# Patient Record
Sex: FEMALE | Employment: OTHER | URBAN - METROPOLITAN AREA
[De-identification: names, ages, dates, MRNs, and addresses within clinical notes are randomized per-mention and may not be internally consistent; named-entity substitution may affect disease eponyms.]

---

## 2022-07-03 ENCOUNTER — APPOINTMENT (OUTPATIENT)
Dept: CT IMAGING | Age: 82
DRG: 871 | End: 2022-07-03
Attending: EMERGENCY MEDICINE
Payer: MEDICARE

## 2022-07-03 ENCOUNTER — APPOINTMENT (OUTPATIENT)
Dept: GENERAL RADIOLOGY | Age: 82
DRG: 871 | End: 2022-07-03
Attending: HOSPITALIST
Payer: MEDICARE

## 2022-07-03 ENCOUNTER — APPOINTMENT (OUTPATIENT)
Dept: GENERAL RADIOLOGY | Age: 82
DRG: 871 | End: 2022-07-03
Attending: EMERGENCY MEDICINE
Payer: MEDICARE

## 2022-07-03 ENCOUNTER — HOSPITAL ENCOUNTER (INPATIENT)
Age: 82
LOS: 13 days | Discharge: NURSING, REHAB OR LONG-TERM CARE | DRG: 871 | End: 2022-07-16
Attending: EMERGENCY MEDICINE | Admitting: HOSPITALIST
Payer: MEDICARE

## 2022-07-03 DIAGNOSIS — I48.91 ATRIAL FIBRILLATION WITH RAPID VENTRICULAR RESPONSE (HCC): Primary | ICD-10-CM

## 2022-07-03 PROBLEM — H52.13 MYOPIA OF BOTH EYES WITH ASTIGMATISM: Status: ACTIVE | Noted: 2022-07-03

## 2022-07-03 PROBLEM — N39.0 UTI (URINARY TRACT INFECTION): Status: ACTIVE | Noted: 2022-07-03

## 2022-07-03 PROBLEM — J32.9 SINUSITIS: Status: ACTIVE | Noted: 2022-07-03

## 2022-07-03 PROBLEM — D70.9 NEUTROPENIC FEVER (HCC): Status: ACTIVE | Noted: 2022-07-03

## 2022-07-03 PROBLEM — L03.90 CELLULITIS: Status: ACTIVE | Noted: 2022-07-03

## 2022-07-03 PROBLEM — E83.42 HYPOMAGNESEMIA: Status: ACTIVE | Noted: 2022-07-03

## 2022-07-03 PROBLEM — C83.30 DIFFUSE LARGE B CELL LYMPHOMA (HCC): Status: ACTIVE | Noted: 2022-07-03

## 2022-07-03 PROBLEM — J90 PLEURAL EFFUSION: Status: ACTIVE | Noted: 2022-07-03

## 2022-07-03 PROBLEM — D61.818 PANCYTOPENIA (HCC): Status: ACTIVE | Noted: 2022-07-03

## 2022-07-03 PROBLEM — E43 SEVERE PROTEIN-CALORIE MALNUTRITION (HCC): Status: ACTIVE | Noted: 2022-07-03

## 2022-07-03 PROBLEM — E87.1 HYPONATREMIA: Status: ACTIVE | Noted: 2022-07-03

## 2022-07-03 PROBLEM — R50.81 NEUTROPENIC FEVER (HCC): Status: ACTIVE | Noted: 2022-07-03

## 2022-07-03 PROBLEM — C85.90 LYMPHOMA (HCC): Status: ACTIVE | Noted: 2022-07-03

## 2022-07-03 PROBLEM — J96.01 ACUTE RESPIRATORY FAILURE WITH HYPOXIA (HCC): Status: ACTIVE | Noted: 2022-07-03

## 2022-07-03 PROBLEM — H52.203 MYOPIA OF BOTH EYES WITH ASTIGMATISM: Status: ACTIVE | Noted: 2022-07-03

## 2022-07-03 LAB
ALBUMIN SERPL-MCNC: 2.8 G/DL (ref 3.4–5)
ALBUMIN/GLOB SERPL: 1 {RATIO} (ref 0.8–1.7)
ALP SERPL-CCNC: 113 U/L (ref 45–117)
ALT SERPL-CCNC: 15 U/L (ref 13–56)
ANION GAP SERPL CALC-SCNC: 10 MMOL/L (ref 3–18)
APPEARANCE UR: ABNORMAL
ARTERIAL PATENCY WRIST A: POSITIVE
AST SERPL-CCNC: 14 U/L (ref 10–38)
BACTERIA URNS QL MICRO: ABNORMAL /HPF
BASE DEFICIT BLD-SCNC: 1.9 MMOL/L
BASOPHILS # BLD: 0 K/UL (ref 0–0.1)
BASOPHILS NFR BLD: 8 % (ref 0–2)
BDY SITE: ABNORMAL
BILIRUB SERPL-MCNC: 0.6 MG/DL (ref 0.2–1)
BILIRUB UR QL: NEGATIVE
BNP SERPL-MCNC: 446 PG/ML (ref 0–1800)
BUN SERPL-MCNC: 19 MG/DL (ref 7–18)
BUN/CREAT SERPL: 37 (ref 12–20)
CALCIUM SERPL-MCNC: 8.2 MG/DL (ref 8.5–10.1)
CHLORIDE SERPL-SCNC: 100 MMOL/L (ref 100–111)
CO2 SERPL-SCNC: 22 MMOL/L (ref 21–32)
COLOR UR: YELLOW
COVID-19 RAPID TEST, COVR: NOT DETECTED
CREAT SERPL-MCNC: 0.52 MG/DL (ref 0.6–1.3)
DIFFERENTIAL METHOD BLD: ABNORMAL
EOSINOPHIL # BLD: 0 K/UL (ref 0–0.4)
EOSINOPHIL NFR BLD: 4 % (ref 0–5)
EPITH CASTS URNS QL MICRO: ABNORMAL /LPF (ref 0–5)
ERYTHROCYTE [DISTWIDTH] IN BLOOD BY AUTOMATED COUNT: 29.2 % (ref 11.6–14.5)
FLUAV AG NPH QL IA: NEGATIVE
FLUBV AG NOSE QL IA: NEGATIVE
GAS FLOW.O2 O2 DELIVERY SYS: ABNORMAL L/MIN
GAS FLOW.O2 SETTING OXYMISER: 37 BPM
GLOBULIN SER CALC-MCNC: 2.7 G/DL (ref 2–4)
GLUCOSE BLD STRIP.AUTO-MCNC: 212 MG/DL (ref 70–110)
GLUCOSE SERPL-MCNC: 193 MG/DL (ref 74–99)
GLUCOSE UR STRIP.AUTO-MCNC: NEGATIVE MG/DL
HCO3 BLD-SCNC: 21.2 MMOL/L (ref 22–26)
HCT VFR BLD AUTO: 31 % (ref 35–45)
HGB BLD-MCNC: 10 G/DL (ref 12–16)
HGB UR QL STRIP: ABNORMAL
IMM GRANULOCYTES # BLD AUTO: 0 K/UL
IMM GRANULOCYTES NFR BLD AUTO: 0 %
KETONES UR QL STRIP.AUTO: NEGATIVE MG/DL
LACTATE SERPL-SCNC: 2 MMOL/L (ref 0.4–2)
LEUKOCYTE ESTERASE UR QL STRIP.AUTO: ABNORMAL
LYMPHOCYTES # BLD: 0.1 K/UL (ref 0.9–3.6)
LYMPHOCYTES NFR BLD: 40 % (ref 21–52)
MAGNESIUM SERPL-MCNC: 1.5 MG/DL (ref 1.6–2.6)
MCH RBC QN AUTO: 28.4 PG (ref 24–34)
MCHC RBC AUTO-ENTMCNC: 32.3 G/DL (ref 31–37)
MCV RBC AUTO: 88.1 FL (ref 78–100)
MONOCYTES # BLD: 0.1 K/UL (ref 0.05–1.2)
MONOCYTES NFR BLD: 36 % (ref 3–10)
NEUTS SEG # BLD: 0 K/UL (ref 1.8–8)
NEUTS SEG NFR BLD: 12 % (ref 40–73)
NITRITE UR QL STRIP.AUTO: NEGATIVE
NRBC # BLD: 0 K/UL (ref 0–0.01)
NRBC BLD-RTO: 0 PER 100 WBC
O2/TOTAL GAS SETTING VFR VENT: 44 %
PCO2 BLD: 30 MMHG (ref 35–45)
PH BLD: 7.46 [PH] (ref 7.35–7.45)
PH UR STRIP: 7 [PH] (ref 5–8)
PLATELET # BLD AUTO: 108 K/UL (ref 135–420)
PMV BLD AUTO: 9.4 FL (ref 9.2–11.8)
PO2 BLD: 74 MMHG (ref 80–100)
POTASSIUM SERPL-SCNC: 3.7 MMOL/L (ref 3.5–5.5)
PROT SERPL-MCNC: 5.5 G/DL (ref 6.4–8.2)
PROT UR STRIP-MCNC: 300 MG/DL
RBC # BLD AUTO: 3.52 M/UL (ref 4.2–5.3)
RBC #/AREA URNS HPF: ABNORMAL /HPF (ref 0–5)
RBC MORPH BLD: ABNORMAL
RBC MORPH BLD: ABNORMAL
SAO2 % BLD: 95.7 % (ref 92–97)
SERVICE CMNT-IMP: ABNORMAL
SODIUM SERPL-SCNC: 132 MMOL/L (ref 136–145)
SOURCE, COVRS: NORMAL
SP GR UR REFRACTOMETRY: 1.02 (ref 1–1.03)
SPECIMEN TYPE: ABNORMAL
TROPONIN-HIGH SENSITIVITY: 19 NG/L (ref 0–54)
UROBILINOGEN UR QL STRIP.AUTO: 0.2 EU/DL (ref 0.2–1)
WBC # BLD AUTO: 0.2 K/UL (ref 4.6–13.2)
WBC URNS QL MICRO: ABNORMAL /HPF (ref 0–5)

## 2022-07-03 PROCEDURE — 65610000006 HC RM INTENSIVE CARE

## 2022-07-03 PROCEDURE — 83605 ASSAY OF LACTIC ACID: CPT

## 2022-07-03 PROCEDURE — 87077 CULTURE AEROBIC IDENTIFY: CPT

## 2022-07-03 PROCEDURE — 74011636637 HC RX REV CODE- 636/637: Performed by: HOSPITALIST

## 2022-07-03 PROCEDURE — 36600 WITHDRAWAL OF ARTERIAL BLOOD: CPT

## 2022-07-03 PROCEDURE — 74011000636 HC RX REV CODE- 636: Performed by: EMERGENCY MEDICINE

## 2022-07-03 PROCEDURE — 70450 CT HEAD/BRAIN W/O DYE: CPT

## 2022-07-03 PROCEDURE — 74011000258 HC RX REV CODE- 258: Performed by: HOSPITALIST

## 2022-07-03 PROCEDURE — 74011000258 HC RX REV CODE- 258: Performed by: EMERGENCY MEDICINE

## 2022-07-03 PROCEDURE — 73610 X-RAY EXAM OF ANKLE: CPT

## 2022-07-03 PROCEDURE — 71045 X-RAY EXAM CHEST 1 VIEW: CPT

## 2022-07-03 PROCEDURE — 82962 GLUCOSE BLOOD TEST: CPT

## 2022-07-03 PROCEDURE — 51702 INSERT TEMP BLADDER CATH: CPT

## 2022-07-03 PROCEDURE — P9047 ALBUMIN (HUMAN), 25%, 50ML: HCPCS | Performed by: HOSPITALIST

## 2022-07-03 PROCEDURE — 74011000250 HC RX REV CODE- 250: Performed by: HOSPITALIST

## 2022-07-03 PROCEDURE — 74011250636 HC RX REV CODE- 250/636: Performed by: HOSPITALIST

## 2022-07-03 PROCEDURE — 74011250636 HC RX REV CODE- 250/636: Performed by: EMERGENCY MEDICINE

## 2022-07-03 PROCEDURE — 87040 BLOOD CULTURE FOR BACTERIA: CPT

## 2022-07-03 PROCEDURE — 87635 SARS-COV-2 COVID-19 AMP PRB: CPT

## 2022-07-03 PROCEDURE — 87086 URINE CULTURE/COLONY COUNT: CPT

## 2022-07-03 PROCEDURE — 71275 CT ANGIOGRAPHY CHEST: CPT

## 2022-07-03 PROCEDURE — 81001 URINALYSIS AUTO W/SCOPE: CPT

## 2022-07-03 PROCEDURE — 83735 ASSAY OF MAGNESIUM: CPT

## 2022-07-03 PROCEDURE — 99285 EMERGENCY DEPT VISIT HI MDM: CPT

## 2022-07-03 PROCEDURE — 93005 ELECTROCARDIOGRAM TRACING: CPT

## 2022-07-03 PROCEDURE — 85025 COMPLETE CBC W/AUTO DIFF WBC: CPT

## 2022-07-03 PROCEDURE — 96365 THER/PROPH/DIAG IV INF INIT: CPT

## 2022-07-03 PROCEDURE — 87804 INFLUENZA ASSAY W/OPTIC: CPT

## 2022-07-03 PROCEDURE — 80053 COMPREHEN METABOLIC PANEL: CPT

## 2022-07-03 PROCEDURE — 87186 SC STD MICRODIL/AGAR DIL: CPT

## 2022-07-03 PROCEDURE — 0202U NFCT DS 22 TRGT SARS-COV-2: CPT

## 2022-07-03 PROCEDURE — 82803 BLOOD GASES ANY COMBINATION: CPT

## 2022-07-03 PROCEDURE — 83880 ASSAY OF NATRIURETIC PEPTIDE: CPT

## 2022-07-03 PROCEDURE — 96368 THER/DIAG CONCURRENT INF: CPT

## 2022-07-03 PROCEDURE — 84484 ASSAY OF TROPONIN QUANT: CPT

## 2022-07-03 RX ORDER — ENOXAPARIN SODIUM 100 MG/ML
40 INJECTION SUBCUTANEOUS DAILY
Status: DISCONTINUED | OUTPATIENT
Start: 2022-07-04 | End: 2022-07-05

## 2022-07-03 RX ORDER — MAGNESIUM SULFATE 1 G/100ML
1 INJECTION INTRAVENOUS
Status: COMPLETED | OUTPATIENT
Start: 2022-07-03 | End: 2022-07-03

## 2022-07-03 RX ORDER — INSULIN LISPRO 100 [IU]/ML
INJECTION, SOLUTION INTRAVENOUS; SUBCUTANEOUS EVERY 6 HOURS
Status: DISCONTINUED | OUTPATIENT
Start: 2022-07-03 | End: 2022-07-07

## 2022-07-03 RX ORDER — ACETAMINOPHEN 325 MG/1
650 TABLET ORAL
Status: DISCONTINUED | OUTPATIENT
Start: 2022-07-03 | End: 2022-07-03

## 2022-07-03 RX ORDER — SODIUM CHLORIDE 0.9 % (FLUSH) 0.9 %
5-40 SYRINGE (ML) INJECTION AS NEEDED
Status: DISCONTINUED | OUTPATIENT
Start: 2022-07-03 | End: 2022-07-16 | Stop reason: HOSPADM

## 2022-07-03 RX ORDER — FUROSEMIDE 10 MG/ML
20 INJECTION INTRAMUSCULAR; INTRAVENOUS DAILY
Status: DISCONTINUED | OUTPATIENT
Start: 2022-07-03 | End: 2022-07-07

## 2022-07-03 RX ORDER — DEXTROSE MONOHYDRATE 100 MG/ML
0-250 INJECTION, SOLUTION INTRAVENOUS AS NEEDED
Status: DISCONTINUED | OUTPATIENT
Start: 2022-07-03 | End: 2022-07-16 | Stop reason: HOSPADM

## 2022-07-03 RX ORDER — ACETAMINOPHEN 325 MG/1
650 TABLET ORAL
Status: DISCONTINUED | OUTPATIENT
Start: 2022-07-03 | End: 2022-07-16 | Stop reason: HOSPADM

## 2022-07-03 RX ORDER — SODIUM CHLORIDE 0.9 % (FLUSH) 0.9 %
5-40 SYRINGE (ML) INJECTION EVERY 8 HOURS
Status: DISCONTINUED | OUTPATIENT
Start: 2022-07-03 | End: 2022-07-16 | Stop reason: HOSPADM

## 2022-07-03 RX ORDER — MAGNESIUM SULFATE HEPTAHYDRATE 40 MG/ML
2 INJECTION, SOLUTION INTRAVENOUS ONCE
Status: COMPLETED | OUTPATIENT
Start: 2022-07-03 | End: 2022-07-03

## 2022-07-03 RX ORDER — ALBUMIN HUMAN 250 G/1000ML
25 SOLUTION INTRAVENOUS EVERY 6 HOURS
Status: DISCONTINUED | OUTPATIENT
Start: 2022-07-03 | End: 2022-07-13

## 2022-07-03 RX ORDER — ACETAMINOPHEN 650 MG/1
650 SUPPOSITORY RECTAL
Status: DISCONTINUED | OUTPATIENT
Start: 2022-07-03 | End: 2022-07-16 | Stop reason: HOSPADM

## 2022-07-03 RX ORDER — MAGNESIUM SULFATE 100 %
4 CRYSTALS MISCELLANEOUS AS NEEDED
Status: DISCONTINUED | OUTPATIENT
Start: 2022-07-03 | End: 2022-07-16 | Stop reason: HOSPADM

## 2022-07-03 RX ORDER — ONDANSETRON 2 MG/ML
4 INJECTION INTRAMUSCULAR; INTRAVENOUS
Status: DISCONTINUED | OUTPATIENT
Start: 2022-07-03 | End: 2022-07-16 | Stop reason: HOSPADM

## 2022-07-03 RX ORDER — SODIUM CHLORIDE 9 MG/ML
25 INJECTION, SOLUTION INTRAVENOUS CONTINUOUS
Status: DISCONTINUED | OUTPATIENT
Start: 2022-07-03 | End: 2022-07-05

## 2022-07-03 RX ORDER — POLYETHYLENE GLYCOL 3350 17 G/17G
17 POWDER, FOR SOLUTION ORAL DAILY PRN
Status: DISCONTINUED | OUTPATIENT
Start: 2022-07-03 | End: 2022-07-16 | Stop reason: HOSPADM

## 2022-07-03 RX ORDER — ONDANSETRON 4 MG/1
4 TABLET, ORALLY DISINTEGRATING ORAL
Status: DISCONTINUED | OUTPATIENT
Start: 2022-07-03 | End: 2022-07-16 | Stop reason: HOSPADM

## 2022-07-03 RX ADMIN — ALBUMIN (HUMAN) 25 G: 0.25 INJECTION, SOLUTION INTRAVENOUS at 20:16

## 2022-07-03 RX ADMIN — SODIUM CHLORIDE 75 ML/HR: 9 INJECTION, SOLUTION INTRAVENOUS at 19:06

## 2022-07-03 RX ADMIN — FUROSEMIDE 20 MG: 10 INJECTION, SOLUTION INTRAMUSCULAR; INTRAVENOUS at 20:16

## 2022-07-03 RX ADMIN — MAGNESIUM SULFATE HEPTAHYDRATE 2 G: 2 INJECTION, SOLUTION INTRAVENOUS at 20:17

## 2022-07-03 RX ADMIN — SODIUM CHLORIDE 1000 ML: 9 INJECTION, SOLUTION INTRAVENOUS at 18:31

## 2022-07-03 RX ADMIN — ALBUMIN (HUMAN) 25 G: 0.25 INJECTION, SOLUTION INTRAVENOUS at 23:49

## 2022-07-03 RX ADMIN — Medication 4 UNITS: at 21:19

## 2022-07-03 RX ADMIN — PIPERACILLIN AND TAZOBACTAM 3.38 G: 3; .375 INJECTION, POWDER, LYOPHILIZED, FOR SOLUTION INTRAVENOUS at 23:44

## 2022-07-03 RX ADMIN — VANCOMYCIN HYDROCHLORIDE 1000 MG: 1 INJECTION, POWDER, LYOPHILIZED, FOR SOLUTION INTRAVENOUS at 17:15

## 2022-07-03 RX ADMIN — MAGNESIUM SULFATE HEPTAHYDRATE 1 G: 1 INJECTION, SOLUTION INTRAVENOUS at 18:10

## 2022-07-03 RX ADMIN — PIPERACILLIN AND TAZOBACTAM 3.38 G: 3; .375 INJECTION, POWDER, LYOPHILIZED, FOR SOLUTION INTRAVENOUS at 17:11

## 2022-07-03 RX ADMIN — IOPAMIDOL 100 ML: 755 INJECTION, SOLUTION INTRAVENOUS at 18:07

## 2022-07-03 RX ADMIN — SODIUM CHLORIDE, PRESERVATIVE FREE 10 ML: 5 INJECTION INTRAVENOUS at 21:20

## 2022-07-03 NOTE — ED PROVIDER NOTES
66-year-old female past medical history of formal GERD hypertension hypothyroidism presents to the emergency department with altered mental status and weakness. She had a last chemotherapy on Monday.  said she was given the medication to increase her white blood count. Today patient woke up was fine later on in the day she became weak and confused he called his oncologist he said to watch her at home. Somebody came over to give her therapy today she get weak again and confused EMS was shortly called. EMS stated that they were considering bagging her because of difficulty breathing. She cannot provide a history.  told me what happened. In addition to the help from EMS workers. Past Medical History:   Diagnosis Date    Arthritis     GERD (gastroesophageal reflux disease)     Hypertension     Osteoarthritis of right knee 2012    S/P total knee arthroplasty 2012    Thyroid disease        Past Surgical History:   Procedure Laterality Date    HX APPENDECTOMY  65's    HX CHOLECYSTECTOMY      laparoscopic    HX HEENT      Surgery OD infection x 4    HX HEENT      throat surgery x 2    HX KNEE ARTHROSCOPY      right         No family history on file. Social History     Socioeconomic History    Marital status:      Spouse name: Not on file    Number of children: Not on file    Years of education: Not on file    Highest education level: Not on file   Occupational History    Not on file   Tobacco Use    Smoking status: Former Smoker     Quit date: 1999     Years since quittin.4    Smokeless tobacco: Never Used   Substance and Sexual Activity    Alcohol use:  Yes     Alcohol/week: 1.7 standard drinks     Types: 1 Glasses of wine, 1 Cans of beer per week    Drug use: No    Sexual activity: Not on file   Other Topics Concern    Not on file   Social History Narrative    Not on file     Social Determinants of Health     Financial Resource Strain:  Difficulty of Paying Living Expenses: Not on file   Food Insecurity:     Worried About Running Out of Food in the Last Year: Not on file    Ran Out of Food in the Last Year: Not on file   Transportation Needs:     Lack of Transportation (Medical): Not on file    Lack of Transportation (Non-Medical): Not on file   Physical Activity:     Days of Exercise per Week: Not on file    Minutes of Exercise per Session: Not on file   Stress:     Feeling of Stress : Not on file   Social Connections:     Frequency of Communication with Friends and Family: Not on file    Frequency of Social Gatherings with Friends and Family: Not on file    Attends [employer] Services: Not on file    Active Member of [address] [place] [address] or Organizations: Not on file    Attends Club or Organization Meetings: Not on file    Marital Status: Not on file   Intimate Partner Violence:     Fear of Current or Ex-Partner: Not on file    Emotionally Abused: Not on file    Physically Abused: Not on file    Sexually Abused: Not on file   Housing Stability:     Unable to Pay for Housing in the Last Year: Not on file    Number of [place] in the Last Year: Not on file    Unstable Housing in the Last Year: Not on file         ALLERGIES: Adhesive tape-silicones and Penicillin g    Review of Systems   Unable to perform ROS: Mental status change       Vitals:    07/03/22 1702 07/03/22 1730 07/03/22 1805 07/03/22 1808   BP: 136/75 (!) 199/66 (!) 149/72    Pulse: (!) 116 (!) 119 (!) 119    Resp: 27 (!) 34  22   Temp:       SpO2: 100% 100% 96%    Weight:       Height:                Physical Exam  Vitals and nursing note reviewed. Constitutional:       General: She is in acute distress. Appearance: She is ill-appearing and toxic-appearing. She is not diaphoretic. HENT:      Head: Normocephalic. Mouth/Throat:      Mouth: Mucous membranes are moist.   Cardiovascular:      Rate and Rhythm: Regular rhythm. Tachycardia present.    Pulmonary: Effort: Respiratory distress present. Breath sounds: Normal breath sounds. Abdominal:      General: There is no distension. Palpations: Abdomen is soft. There is no mass. Tenderness: There is no abdominal tenderness. There is no guarding. Musculoskeletal:      Cervical back: Neck supple. Skin:     General: Skin is warm. Capillary Refill: Capillary refill takes less than 2 seconds. Neurological:      Mental Status: She is alert. Psychiatric:         Mood and Affect: Mood is anxious. MDM  Number of Diagnoses or Management Options  Diagnosis management comments: I spoke to oncology they agreed with my work-up. He said to see her tomorrow morning. 2 hospitalist who agreed to admit the patient. She wanted him to go to ICU. She wants me to consult the intensivist.  Am waiting for the intensivist to call back. Spoke to the  and told him that she was going to get admitted and all the labs and testings that were done. It is pending also CT results are pending.    's  stated that she has been having diarrhea so ordered stool labs.     ED Course as of 07/03/22 1819   Sun Jul 03, 2022   1757 WBC(!!): 0.2 [MI]      ED Course User Index  [MI] Nicole Willams MD       Procedures

## 2022-07-03 NOTE — H&P
History & Physical    Patient: Susana Ocasio MRN: [id number]  CSN: [id number]    YOB: 1940  Age: 78 y.o. Sex: female      DOA: 7/3/2022  Primary Care Provider:  Yessenia Andujar MD      Assessment/Plan     Hospital Problems  Date Reviewed: 9/21/2012          Codes Class Noted POA    Neutropenic fever ([facility] ICD-10-CM: D70.9, R50.81  ICD-9-CM: 288.00, 780.61  7/3/2022 Unknown        * (Principal) Acute respiratory failure with hypoxia ([facility] ICD-10-CM: J96.01  ICD-9-CM: 518.81  7/3/2022 Unknown        Hypomagnesemia ICD-10-CM: E83.42  ICD-9-CM: 275.2  7/3/2022 Unknown        Diffuse large B cell lymphoma ([facility] ICD-10-CM: C83.30  ICD-9-CM: 202.80  7/3/2022 Unknown        GERD (gastroesophageal reflux disease) ICD-10-CM: K21.9  ICD-9-CM: 530.81  Unknown Unknown        Thyroid disease ICD-10-CM: E07.9  ICD-9-CM: 246. 9  Unknown Unknown        Acute encephalopathy ICD-10-CM: G93.40  ICD-9-CM: 348.30  Unknown Unknown        Hyponatremia ICD-10-CM: E87.1  ICD-9-CM: 276.1  7/3/2022 Unknown        Pancytopenia ([facility] ICD-10-CM: U10.922  ICD-9-CM: 284.19  7/3/2022 Unknown        UTI (urinary tract infection) ICD-10-CM: N39.0  ICD-9-CM: 599.0  7/3/2022 Unknown        Sinusitis ICD-10-CM: J32.9  ICD-9-CM: 473.9  7/3/2022 Unknown        Myopia of both eyes with astigmatism ICD-10-CM: H52.13, H52.203  ICD-9-CM: 367.1, 367.20  7/3/2022 Unknown        Pleural effusion ICD-10-CM: J90  ICD-9-CM: 511.9  7/3/2022 Unknown        Cellulitis ICD-10-CM: L03.90  ICD-9-CM: 682.9  7/3/2022 Unknown        Severe protein-calorie malnutrition ([facility] ICD-10-CM: E43  ICD-9-CM: 350  7/3/2022 Unknown                Admit to icu   CRITICAL CARE PLAN    Resp -   Acute respiratory failure with hypoxia with 6 L oxygen   Flu negative, rapid covid 19 negative   intensivist is consulted   Breathing tx   HOB>30 degrees. Bronchodilators.    cta no central PE   Low threshhold to be intubated   reported that he is mpoa -keep her full code Pleural effusion   Moderate and large effusion  Will see intensivist recommend for tapping   Lasix -low dose due to boarder line bp       ID -   Cellulitis of rt leg, neutropenia fever , acute on chronic sinusitis , UTI  Continue vanc and zosyn Follow up blood and urine cx. CVS -   paf   Tachy now, will have echo   bp boarder line bp     Heme/onc -   Pancytopenia  Due to chemo   Continue monitoring   Will defer to hematologist for Neupogen     Diffusion Large B lymphoma   On chemo therapy   Oncologist f/u         Renal -   Hyponatremia , hypomagnesemia  icu electrolytes replacement protocol   Monitoring urine out    Trend BUN, Cr, follow I/O, castrejon in place. Check and replace Mg, K, phos. Endocrine -   DM type II   Ssi , hypoglycemia protocol     hypothyroidism   Continue synthyroid      Neuro  Acute encephalopath  Will mri brain     Myopia      GI - NPO for now-will have speech evaluation     Mks: rt ankle swelling-will have x-ray to r/o     Severe malnutrition     Hypoalbuminemia   Albumin infusion     Prophylaxis - DVT: hold for lovenox for now in case  thoracentesis and will see hematologist recommendation due to pancytopenia  , GI: protonix    Prognosis is guided , will have palliative care on board       6:00 PM-8:00     Discussed with    120  minutes of critical care time spent in the direct evaluation and treatment of this high risk patient. The reason for providing this level of medical care for this critically ill patient was due a critical illness that impaired one or more vital organ systems such that there was a high probability of imminent or life threatening deterioration in the patients condition. This care involved high complexity decision making to assess, manipulate, and support vital system functions, to treat this degreee vital organ system failure and to prevent further life threatening deterioration of the patients condition.      AC:  I have had a discussion with  regarding code status. Particularly, described potential options in event of cardiac or respiratory arrest. I have explained what being full code entails, including cardiorespiratory resuscitation attempts with chest compressions, potential cariodioversion/ \" shocks\" as well as intubation. I have also explained Do not resuscitate which would mean we allow a natural death with out aggressive interventions. Full code AC 32 min     Please note that this dictation was completed with [employer]iwave, the computer voice recognition software. Quite often unanticipated grammatical, syntax, homophones, and other interpretive errors are inadvertently transcribed by the computer software. Please disregard these errors. Please excuse any errors that have escaped final proofreading    Estimate  length of stay : TBD     DVT : heparin ppi proph  CC:ams        HPI:     Noris Al is a 78 y.o. female with hypertension, hypothyroidism, a fib,  lymphoma last chemo Monday presented to ER due to weakness/AMS/shortness of breath. Pt presented weakness after nap today.  called on call oncologist , he was  recommend to check vitals and she was back to her normal, then she became weakness and confused again.  was recommended to send patient  to ER for further evaluation. EMS was called and she was found hypoxia and bagged her. In ER, She presented Neutropenia with neutrophile  #0, pancytopenia, magnesium 1.5, hypoxia she was put on 6 L oxygen in ER. Ct head no acute issue, but indicated acute on chronic sinusitis, ua -uti. She also has rt ankle injured two days ago. She received iv abx in ER. She still confusion, oriented to her self, unable to answer questions.  Above information came from  and ER physician     Visit Vitals  BP (!) 119/54   Pulse (!) 112   Temp 99.5 °F (37.5 °C)   Resp (!) 35   Ht 5' 7\" (1.702 m)   Wt 53.5 kg (118 lb)   SpO2 (!) 89%   BMI 18.48 kg/m²    O2 Flow Rate (L/min): 6 l/min O2 Device: Nasal cannula      Past Medical History:   Diagnosis Date    Arthritis     GERD (gastroesophageal reflux disease)     Hypertension     Osteoarthritis of right knee 2012    S/P total knee arthroplasty 2012    Thyroid disease        Past Surgical History:   Procedure Laterality Date    HX APPENDECTOMY  65's    HX CHOLECYSTECTOMY      laparoscopic    HX HEENT      Surgery OD infection x 4    HX HEENT      throat surgery x 2    HX KNEE ARTHROSCOPY      right     Family History      Medical History Relation Name Comments   Cancer Father          Relation Name Status Comments   Father         Mother              Social History     Socioeconomic History    Marital status:    Tobacco Use    Smoking status: Former Smoker     Quit date: 1999     Years since quittin.4    Smokeless tobacco: Never Used   Substance and Sexual Activity    Alcohol use: Yes     Alcohol/week: 1.7 standard drinks     Types: 1 Glasses of wine, 1 Cans of beer per week    Drug use: No       Prior to Admission medications    Medication Sig Start Date End Date Taking? Authorizing Provider   oxyCODONE-acetaminophen (PERCOCET) 5-325 mg per tablet Take 1-2 Tabs by mouth every four (4) hours as needed for Pain. 12   Seay Clingillian ALONSO PA-C   warfarin (COUMADIN) 2.5 mg tablet Take one tab PO every evening or as directed per Dr. Jyoti Greenwood nurse Dayna Rea. Dosage angella vary depending on PT/INR levels.   Indications: DEEP VEIN THROMBOSIS PREVENTION 12   Seay Clink ALANA ALONSO       Allergies   Allergen Reactions    Adhesive Tape-Silicones Other (comments)     Takes skin off    Penicillin G Itching       Review of Systems  Limited due to pt' condition         Physical Exam:     Physical Exam:  Visit Vitals  BP (!) 119/54   Pulse (!) 112   Temp 99.5 °F (37.5 °C)   Resp (!) 35   Ht 5' 7\" (1.702 m)   Wt 53.5 kg (118 lb)   SpO2 (!) 89%   BMI 18.48 kg/m²    O2 Flow Rate (L/min): 6 l/min O2 Device: Nasal cannula    Temp (24hrs), Av.1 °F (37.8 °C), Min:99.5 °F (37.5 °C), Max:100.7 °F (38.2 °C)    No intake/output data recorded. No intake/output data recorded. General:  Awake, oriented to her self , in distress, sick looking    Head:  Normocephalic, without obvious abnormality, atraumatic. Eyes:  Conjunctivae/corneas clear, sclera anicteric, PERRL, EOMs intact. myopia    Nose: Nares normal. No drainage or sinus tenderness. Throat: Lips, mucosa, and tongue normal. .   Neck: Supple, symmetrical, trachea midline, no adenopathy. Lungs:   Decreases BS        Heart:  Tachy  S1, S2 normal, no murmur, click, rub or gallop. Abdomen: Soft, non-tender. Bowel sounds normal. No masses,  No organomegaly. Extremities: Extremities normal, atraumatic, + edema, erythema of rt leg, warm on touch, rt ankle swelling. Pulses: 2+ and symmetric all extremities.    Skin: Skin color-pink, texture, turgor normal. See above, ecchymosis  Capillary refill normal    Neurologic: Limited due to conation        Labs Reviewed:    BMP:   Lab Results   Component Value Date/Time     (L) 2022 04:45 PM    K 3.7 2022 04:45 PM     2022 04:45 PM    CO2 22 2022 04:45 PM    AGAP 10 2022 04:45 PM     (H) 2022 04:45 PM    BUN 19 (H) 2022 04:45 PM    CREA 0.52 (L) 2022 04:45 PM    GFRAA >60 2022 04:45 PM    GFRNA >60 2022 04:45 PM     CMP:   Lab Results   Component Value Date/Time     (L) 2022 04:45 PM    K 3.7 2022 04:45 PM     2022 04:45 PM    CO2 22 2022 04:45 PM    AGAP 10 2022 04:45 PM     (H) 2022 04:45 PM    BUN 19 (H) 2022 04:45 PM    CREA 0.52 (L) 2022 04:45 PM    GFRAA >60 2022 04:45 PM    GFRNA >60 2022 04:45 PM    CA 8.2 (L) 2022 04:45 PM    MG 1.5 (L) 2022 04:45 PM    ALB 2.8 (L) 2022 04:45 PM    TP 5.5 (L) 2022 04:45 PM    GLOB 2.7 2022 04:45 PM    AGRAT 1.0 07/03/2022 04:45 PM    ALT 15 07/03/2022 04:45 PM     CBC:   Lab Results   Component Value Date/Time    WBC 0.2 (LL) 07/03/2022 04:45 PM    HGB 10.0 (L) 07/03/2022 04:45 PM    HCT 31.0 (L) 07/03/2022 04:45 PM     (L) 07/03/2022 04:45 PM     All Cardiac Markers in the last 24 hours: No results found for: CPK, CK, CKMMB, CKMB, RCK3, CKMBT, CKNDX, CKND1, HUMBERTO, TROPT, TROIQ, JANE, TROPT, TNIPOC, BNP, BNPP  Recent Glucose Results:   Lab Results   Component Value Date/Time     (H) 07/03/2022 04:45 PM     ABG:   Lab Results   Component Value Date/Time    PHI 7.46 (H) 07/03/2022 05:12 PM    PCO2I 30.0 (L) 07/03/2022 05:12 PM    PO2I 74 (L) 07/03/2022 05:12 PM    HCO3I 21.2 (L) 07/03/2022 05:12 PM    FIO2I 44 07/03/2022 05:12 PM     COAGS: No results found for: APTT, PTP, INR, INREXT, INREXT  Liver Panel:   Lab Results   Component Value Date/Time    ALB 2.8 (L) 07/03/2022 04:45 PM    TP 5.5 (L) 07/03/2022 04:45 PM    GLOB 2.7 07/03/2022 04:45 PM    AGRAT 1.0 07/03/2022 04:45 PM    ALT 15 07/03/2022 04:45 PM     07/03/2022 04:45 PM     Pancreatic Markers: No results found for: AMYLPOCT, AML, LIPPOCT, LPSE    No results found.   Procedures/imaging: see electronic medical records for all procedures/Xrays and details which were not copied into this note but were reviewed prior to creation of Sharon Mayberry MD, Internal Medicine     CC: Rebekah Katz MD

## 2022-07-03 NOTE — ED TRIAGE NOTES
Pt arrives via ems stretcher with c\o AMS that started at approx 1300 today, pt BGL en route was 224 mg/dL, pt is a&o x 4 in triage, pt with delayed responses in triage, pt has difficulty following commands, having to repeat directions multiple times, pt with redness and swelling to LEFT ankle but denies pain, pt is in obvious distress aeb tachycardia and increased WOB

## 2022-07-03 NOTE — PROGRESS NOTES
[facility] Pharmacokinetic Monitoring Service - Vancomycin     Estuardo Al is a 78 y.o. female starting on vancomycin therapy for Sepsis unknown. Pharmacy consulted by Dr Solange Cormier for monitoring and adjustment. Target Concentration: Goal trough of 10-15 mg/L and AUC/TRUDY <500 mg*hr/L    Additional Antimicrobials: Zosyn 3.375 gm q6h    Pertinent Laboratory Values: Wt Readings from Last 1 Encounters:   07/03/22 53.5 kg (118 lb)     Temp Readings from Last 1 Encounters:   07/03/22 100 °F (37.8 °C)     No components found for: PROCAL  Estimated Creatinine Clearance: 51.4 mL/min (A) (by C-G formula based on SCr of 0.52 mg/dL (L)).   Recent Labs     07/03/22  1645   WBC 0.2*       Pertinent Cultures:  Culture Date Source Results   7/3/2022 Lab Pending     Plan:  Dosing recommendations based on Bayesian software  Start vancomycin 1,000 mg load, then 750 mg q12h  Anticipated AUC of 453 and trough concentration of 13.8  at steady state  Renal labs as indicated   Lab levels drawn when needed  Pharmacy will continue to monitor patient and adjust therapy as indicated    Thank you for the consult,  VERONICA Morse [facility]  7/3/2022 7:13 PM  [phone]

## 2022-07-04 PROBLEM — R65.21 SEPTIC SHOCK (HCC): Status: ACTIVE | Noted: 2022-07-04

## 2022-07-04 PROBLEM — A41.9 SEPTIC SHOCK (HCC): Status: ACTIVE | Noted: 2022-07-04

## 2022-07-04 LAB
ALBUMIN SERPL-MCNC: 3.5 G/DL (ref 3.4–5)
ALBUMIN/GLOB SERPL: 1.2 {RATIO} (ref 0.8–1.7)
ALP SERPL-CCNC: 87 U/L (ref 45–117)
ALT SERPL-CCNC: 13 U/L (ref 13–56)
ANION GAP SERPL CALC-SCNC: 12 MMOL/L (ref 3–18)
AST SERPL-CCNC: 9 U/L (ref 10–38)
B PERT DNA SPEC QL NAA+PROBE: NOT DETECTED
BASOPHILS # BLD: 0 K/UL (ref 0–0.1)
BASOPHILS NFR BLD: 6 % (ref 0–2)
BILIRUB SERPL-MCNC: 1 MG/DL (ref 0.2–1)
BORDETELLA PARAPERTUSSIS PCR, BORPAR: NOT DETECTED
BUN SERPL-MCNC: 15 MG/DL (ref 7–18)
BUN/CREAT SERPL: 25 (ref 12–20)
C PNEUM DNA SPEC QL NAA+PROBE: NOT DETECTED
CA-I SERPL-SCNC: 1.12 MMOL/L (ref 1.12–1.32)
CALCIUM SERPL-MCNC: 8.7 MG/DL (ref 8.5–10.1)
CHLORIDE SERPL-SCNC: 100 MMOL/L (ref 100–111)
CO2 SERPL-SCNC: 21 MMOL/L (ref 21–32)
CORTIS SERPL-MCNC: 38.9 UG/DL
CREAT SERPL-MCNC: 0.61 MG/DL (ref 0.6–1.3)
DIFFERENTIAL METHOD BLD: ABNORMAL
EOSINOPHIL # BLD: 0 K/UL (ref 0–0.4)
EOSINOPHIL NFR BLD: 6 % (ref 0–5)
ERYTHROCYTE [DISTWIDTH] IN BLOOD BY AUTOMATED COUNT: 28.8 % (ref 11.6–14.5)
EST. AVERAGE GLUCOSE BLD GHB EST-MCNC: 143 MG/DL
FLUAV SUBTYP SPEC NAA+PROBE: NOT DETECTED
FLUBV RNA SPEC QL NAA+PROBE: NOT DETECTED
GLOBULIN SER CALC-MCNC: 2.9 G/DL (ref 2–4)
GLUCOSE BLD STRIP.AUTO-MCNC: 196 MG/DL (ref 70–110)
GLUCOSE BLD STRIP.AUTO-MCNC: 207 MG/DL (ref 70–110)
GLUCOSE BLD STRIP.AUTO-MCNC: 226 MG/DL (ref 70–110)
GLUCOSE BLD STRIP.AUTO-MCNC: 74 MG/DL (ref 70–110)
GLUCOSE BLD STRIP.AUTO-MCNC: 84 MG/DL (ref 70–110)
GLUCOSE SERPL-MCNC: 192 MG/DL (ref 74–99)
HADV DNA SPEC QL NAA+PROBE: NOT DETECTED
HBA1C MFR BLD: 6.6 % (ref 4.2–5.6)
HCOV 229E RNA SPEC QL NAA+PROBE: NOT DETECTED
HCOV HKU1 RNA SPEC QL NAA+PROBE: NOT DETECTED
HCOV NL63 RNA SPEC QL NAA+PROBE: NOT DETECTED
HCOV OC43 RNA SPEC QL NAA+PROBE: NOT DETECTED
HCT VFR BLD AUTO: 28.7 % (ref 35–45)
HGB BLD-MCNC: 9.4 G/DL (ref 12–16)
HMPV RNA SPEC QL NAA+PROBE: NOT DETECTED
HPIV1 RNA SPEC QL NAA+PROBE: NOT DETECTED
HPIV2 RNA SPEC QL NAA+PROBE: NOT DETECTED
HPIV3 RNA SPEC QL NAA+PROBE: NOT DETECTED
HPIV4 RNA SPEC QL NAA+PROBE: NOT DETECTED
IMM GRANULOCYTES # BLD AUTO: 0 K/UL
IMM GRANULOCYTES NFR BLD AUTO: 0 %
INR PPP: 1.2 (ref 0.8–1.2)
L PNEUMO AG UR QL IA: NEGATIVE
LYMPHOCYTES # BLD: 0.1 K/UL (ref 0.9–3.6)
LYMPHOCYTES NFR BLD: 34 % (ref 21–52)
M PNEUMO DNA SPEC QL NAA+PROBE: NOT DETECTED
MAGNESIUM SERPL-MCNC: 2.6 MG/DL (ref 1.6–2.6)
MCH RBC QN AUTO: 29.4 PG (ref 24–34)
MCHC RBC AUTO-ENTMCNC: 32.8 G/DL (ref 31–37)
MCV RBC AUTO: 89.7 FL (ref 78–100)
MONOCYTES # BLD: 0.3 K/UL (ref 0.05–1.2)
MONOCYTES NFR BLD: 48 % (ref 3–10)
NEUTS SEG # BLD: 0 K/UL (ref 1.8–8)
NEUTS SEG NFR BLD: 6 % (ref 40–73)
NRBC # BLD: 0 K/UL (ref 0–0.01)
NRBC BLD-RTO: 0 PER 100 WBC
PHOSPHATE SERPL-MCNC: 3.2 MG/DL (ref 2.5–4.9)
PLATELET # BLD AUTO: 108 K/UL (ref 135–420)
PLATELET COMMENTS,PCOM: ABNORMAL
PMV BLD AUTO: 9.2 FL (ref 9.2–11.8)
POTASSIUM SERPL-SCNC: 3.1 MMOL/L (ref 3.5–5.5)
POTASSIUM SERPL-SCNC: 3.2 MMOL/L (ref 3.5–5.5)
PROT SERPL-MCNC: 6.4 G/DL (ref 6.4–8.2)
PROTHROMBIN TIME: 15.4 SEC (ref 11.5–15.2)
RBC # BLD AUTO: 3.2 M/UL (ref 4.2–5.3)
RBC MORPH BLD: ABNORMAL
RBC MORPH BLD: ABNORMAL
RSV RNA SPEC QL NAA+PROBE: NOT DETECTED
RV+EV RNA SPEC QL NAA+PROBE: NOT DETECTED
S PNEUM AG UR QL: NEGATIVE
SARS-COV-2 PCR, COVPCR: NOT DETECTED
SODIUM SERPL-SCNC: 133 MMOL/L (ref 136–145)
T4 FREE SERPL-MCNC: 1.4 NG/DL (ref 0.7–1.5)
TOTAL CELLS COUNTED SPEC: 50
TSH SERPL DL<=0.05 MIU/L-ACNC: 3.5 UIU/ML (ref 0.36–3.74)
WBC # BLD AUTO: 0.4 K/UL (ref 4.6–13.2)

## 2022-07-04 PROCEDURE — 92610 EVALUATE SWALLOWING FUNCTION: CPT

## 2022-07-04 PROCEDURE — 74011000258 HC RX REV CODE- 258: Performed by: HOSPITALIST

## 2022-07-04 PROCEDURE — 65610000006 HC RM INTENSIVE CARE

## 2022-07-04 PROCEDURE — 74011636637 HC RX REV CODE- 636/637: Performed by: HOSPITALIST

## 2022-07-04 PROCEDURE — 83036 HEMOGLOBIN GLYCOSYLATED A1C: CPT

## 2022-07-04 PROCEDURE — 77010033678 HC OXYGEN DAILY

## 2022-07-04 PROCEDURE — 74011250636 HC RX REV CODE- 250/636: Performed by: INTERNAL MEDICINE

## 2022-07-04 PROCEDURE — 84145 PROCALCITONIN (PCT): CPT

## 2022-07-04 PROCEDURE — 82330 ASSAY OF CALCIUM: CPT

## 2022-07-04 PROCEDURE — 87449 NOS EACH ORGANISM AG IA: CPT

## 2022-07-04 PROCEDURE — 84443 ASSAY THYROID STIM HORMONE: CPT

## 2022-07-04 PROCEDURE — 74011000250 HC RX REV CODE- 250: Performed by: INTERNAL MEDICINE

## 2022-07-04 PROCEDURE — 84100 ASSAY OF PHOSPHORUS: CPT

## 2022-07-04 PROCEDURE — 74011250637 HC RX REV CODE- 250/637: Performed by: INTERNAL MEDICINE

## 2022-07-04 PROCEDURE — 74011250636 HC RX REV CODE- 250/636: Performed by: HOSPITALIST

## 2022-07-04 PROCEDURE — 74011000250 HC RX REV CODE- 250: Performed by: HOSPITALIST

## 2022-07-04 PROCEDURE — 74011000258 HC RX REV CODE- 258: Performed by: INTERNAL MEDICINE

## 2022-07-04 PROCEDURE — 85025 COMPLETE CBC W/AUTO DIFF WBC: CPT

## 2022-07-04 PROCEDURE — 84439 ASSAY OF FREE THYROXINE: CPT

## 2022-07-04 PROCEDURE — 92526 ORAL FUNCTION THERAPY: CPT

## 2022-07-04 PROCEDURE — 84132 ASSAY OF SERUM POTASSIUM: CPT

## 2022-07-04 PROCEDURE — 85610 PROTHROMBIN TIME: CPT

## 2022-07-04 PROCEDURE — 82533 TOTAL CORTISOL: CPT

## 2022-07-04 PROCEDURE — 36415 COLL VENOUS BLD VENIPUNCTURE: CPT

## 2022-07-04 PROCEDURE — 82962 GLUCOSE BLOOD TEST: CPT

## 2022-07-04 PROCEDURE — 83735 ASSAY OF MAGNESIUM: CPT

## 2022-07-04 PROCEDURE — P9047 ALBUMIN (HUMAN), 25%, 50ML: HCPCS | Performed by: HOSPITALIST

## 2022-07-04 PROCEDURE — C9113 INJ PANTOPRAZOLE SODIUM, VIA: HCPCS | Performed by: HOSPITALIST

## 2022-07-04 RX ORDER — AMIODARONE HYDROCHLORIDE 100 MG/1
100 TABLET ORAL DAILY
COMMUNITY

## 2022-07-04 RX ORDER — POTASSIUM CHLORIDE 7.45 MG/ML
10 INJECTION INTRAVENOUS
Status: COMPLETED | OUTPATIENT
Start: 2022-07-04 | End: 2022-07-05

## 2022-07-04 RX ORDER — ACYCLOVIR 400 MG/1
400 TABLET ORAL 2 TIMES DAILY
COMMUNITY

## 2022-07-04 RX ORDER — EZETIMIBE 10 MG/1
TABLET ORAL
COMMUNITY

## 2022-07-04 RX ORDER — ALBUTEROL SULFATE 0.83 MG/ML
2.5 SOLUTION RESPIRATORY (INHALATION)
Status: DISCONTINUED | OUTPATIENT
Start: 2022-07-04 | End: 2022-07-04

## 2022-07-04 RX ORDER — LANOLIN ALCOHOL/MO/W.PET/CERES
400 CREAM (GRAM) TOPICAL 2 TIMES DAILY
COMMUNITY

## 2022-07-04 RX ORDER — CHOLECALCIFEROL (VITAMIN D3) 125 MCG
5 CAPSULE ORAL
COMMUNITY

## 2022-07-04 RX ORDER — RABEPRAZOLE SODIUM 20 MG/1
20 TABLET, DELAYED RELEASE ORAL DAILY
COMMUNITY

## 2022-07-04 RX ORDER — ALBUTEROL SULFATE 0.83 MG/ML
2.5 SOLUTION RESPIRATORY (INHALATION)
Status: DISCONTINUED | OUTPATIENT
Start: 2022-07-04 | End: 2022-07-16 | Stop reason: HOSPADM

## 2022-07-04 RX ORDER — ALBUTEROL SULFATE 0.83 MG/ML
2.5 SOLUTION RESPIRATORY (INHALATION)
Status: DISCONTINUED | OUTPATIENT
Start: 2022-07-04 | End: 2022-07-04 | Stop reason: SDUPTHER

## 2022-07-04 RX ORDER — POTASSIUM CHLORIDE 7.45 MG/ML
10 INJECTION INTRAVENOUS
Status: COMPLETED | OUTPATIENT
Start: 2022-07-04 | End: 2022-07-04

## 2022-07-04 RX ORDER — LEVOTHYROXINE SODIUM 125 UG/1
125 TABLET ORAL
COMMUNITY

## 2022-07-04 RX ORDER — LEVOTHYROXINE SODIUM 20 UG/ML
75 INJECTION, SOLUTION INTRAVENOUS EVERY 24 HOURS
Status: DISCONTINUED | OUTPATIENT
Start: 2022-07-04 | End: 2022-07-04

## 2022-07-04 RX ORDER — NOREPINEPHRINE BITARTRATE/D5W 8 MG/250ML
.5-3 PLASTIC BAG, INJECTION (ML) INTRAVENOUS
Status: DISCONTINUED | OUTPATIENT
Start: 2022-07-04 | End: 2022-07-06

## 2022-07-04 RX ADMIN — POTASSIUM CHLORIDE 10 MEQ: 7.46 INJECTION, SOLUTION INTRAVENOUS at 23:33

## 2022-07-04 RX ADMIN — SODIUM CHLORIDE, PRESERVATIVE FREE 10 ML: 5 INJECTION INTRAVENOUS at 21:48

## 2022-07-04 RX ADMIN — POTASSIUM CHLORIDE 10 MEQ: 7.46 INJECTION, SOLUTION INTRAVENOUS at 08:45

## 2022-07-04 RX ADMIN — AZITHROMYCIN MONOHYDRATE 500 MG: 500 INJECTION, POWDER, LYOPHILIZED, FOR SOLUTION INTRAVENOUS at 13:50

## 2022-07-04 RX ADMIN — POTASSIUM CHLORIDE 10 MEQ: 7.46 INJECTION, SOLUTION INTRAVENOUS at 10:23

## 2022-07-04 RX ADMIN — PIPERACILLIN AND TAZOBACTAM 4.5 G: 4; .5 INJECTION, POWDER, LYOPHILIZED, FOR SOLUTION INTRAVENOUS at 18:08

## 2022-07-04 RX ADMIN — NOREPINEPHRINE BITARTRATE 17 MCG/MIN: 8 INJECTION, SOLUTION INTRAVENOUS at 18:08

## 2022-07-04 RX ADMIN — SODIUM CHLORIDE, PRESERVATIVE FREE 10 ML: 5 INJECTION INTRAVENOUS at 14:00

## 2022-07-04 RX ADMIN — POTASSIUM CHLORIDE 10 MEQ: 7.46 INJECTION, SOLUTION INTRAVENOUS at 19:05

## 2022-07-04 RX ADMIN — ALBUMIN (HUMAN) 25 G: 0.25 INJECTION, SOLUTION INTRAVENOUS at 05:49

## 2022-07-04 RX ADMIN — NOREPINEPHRINE BITARTRATE 4 MCG/MIN: 8 INJECTION, SOLUTION INTRAVENOUS at 00:54

## 2022-07-04 RX ADMIN — POTASSIUM BICARBONATE 40 MEQ: 782 TABLET, EFFERVESCENT ORAL at 21:48

## 2022-07-04 RX ADMIN — SODIUM CHLORIDE, PRESERVATIVE FREE 40 MG: 5 INJECTION INTRAVENOUS at 09:18

## 2022-07-04 RX ADMIN — FUROSEMIDE 20 MG: 10 INJECTION, SOLUTION INTRAMUSCULAR; INTRAVENOUS at 09:13

## 2022-07-04 RX ADMIN — Medication 2 UNITS: at 18:00

## 2022-07-04 RX ADMIN — POTASSIUM CHLORIDE 10 MEQ: 7.46 INJECTION, SOLUTION INTRAVENOUS at 09:13

## 2022-07-04 RX ADMIN — Medication 4 UNITS: at 12:14

## 2022-07-04 RX ADMIN — ALBUMIN (HUMAN) 25 G: 0.25 INJECTION, SOLUTION INTRAVENOUS at 12:00

## 2022-07-04 RX ADMIN — NOREPINEPHRINE BITARTRATE 17 MCG/MIN: 8 INJECTION, SOLUTION INTRAVENOUS at 09:15

## 2022-07-04 RX ADMIN — POTASSIUM CHLORIDE 10 MEQ: 7.46 INJECTION, SOLUTION INTRAVENOUS at 22:12

## 2022-07-04 RX ADMIN — POTASSIUM CHLORIDE 10 MEQ: 7.46 INJECTION, SOLUTION INTRAVENOUS at 05:49

## 2022-07-04 RX ADMIN — POTASSIUM CHLORIDE 10 MEQ: 7.46 INJECTION, SOLUTION INTRAVENOUS at 21:08

## 2022-07-04 RX ADMIN — VANCOMYCIN HYDROCHLORIDE 750 MG: 750 INJECTION, POWDER, LYOPHILIZED, FOR SOLUTION INTRAVENOUS at 04:06

## 2022-07-04 RX ADMIN — POTASSIUM CHLORIDE 10 MEQ: 7.46 INJECTION, SOLUTION INTRAVENOUS at 11:35

## 2022-07-04 RX ADMIN — SODIUM CHLORIDE, PRESERVATIVE FREE 10 ML: 5 INJECTION INTRAVENOUS at 05:49

## 2022-07-04 RX ADMIN — ALBUMIN (HUMAN) 25 G: 0.25 INJECTION, SOLUTION INTRAVENOUS at 18:08

## 2022-07-04 RX ADMIN — Medication 4 UNITS: at 05:54

## 2022-07-04 RX ADMIN — PIPERACILLIN AND TAZOBACTAM 3.38 G: 3; .375 INJECTION, POWDER, LYOPHILIZED, FOR SOLUTION INTRAVENOUS at 04:06

## 2022-07-04 RX ADMIN — PIPERACILLIN AND TAZOBACTAM 3.38 G: 3; .375 INJECTION, POWDER, LYOPHILIZED, FOR SOLUTION INTRAVENOUS at 11:39

## 2022-07-04 RX ADMIN — POTASSIUM CHLORIDE 10 MEQ: 7.46 INJECTION, SOLUTION INTRAVENOUS at 07:09

## 2022-07-04 RX ADMIN — VANCOMYCIN HYDROCHLORIDE 750 MG: 750 INJECTION, POWDER, LYOPHILIZED, FOR SOLUTION INTRAVENOUS at 16:07

## 2022-07-04 RX ADMIN — POTASSIUM CHLORIDE 10 MEQ: 7.46 INJECTION, SOLUTION INTRAVENOUS at 20:05

## 2022-07-04 NOTE — PROGRESS NOTES
Pharmacy IV Levothyroxine Hold    Estuardo Al is a 78 y.o. yo female ordered levothyroxine IV for thyroid disease. Ordered dose: levothyroxine 75 mcg IV q24h -   order discontinued to be held for 7 days (approved by Dr. Macey Walker)     Pt takes levothyroxine 125 mcg po daily before breakfast as outpatient medication. Pt is currently NPO     TSH   Date Value Ref Range Status   07/04/2022 3.50 0.36 - 3.74 uIU/mL Final     T4, Free   Date Value Ref Range Status   07/04/2022 1.4 0.7 - 1.5 NG/DL Final       IV Levothyroxine will NOT be held for myxedema, pediatric indications, organ donation or new starts. IV Levothyroxine will be held for 7 days. (IV therapy can be ordered on 7/11/22). If pt can take oral medications prior to 7/11/22, oral therapy may be re-ordered. Pharmacy to continue to monitor for conversion to oral levothyroxine.

## 2022-07-04 NOTE — CONSULTS
[facility]  (A Division of [address] [place] [address])                                                                                                                      Julieta Meza MD  Office #: - Option # [phone]  Fax #: [phone]     Date of Admission: 7/3/2022Date of Note: 7/4/2022      Reason for Referral: Evaluation and antibiotic management of Neutropenic Fever/ Septic shock. Thank you for involving me in the care of this patient. Please do not hesitate to contact me on the above number if question or concern. Current Antimicrobials:    Prior Antimicrobials:  Vancomycin and Zosyn    Immunosuppressive drugs: chemotherapy-- R-CHOP last 6/28/22        Assessment- ID related:  --------------------------------------------------------------------------    Critically ill patient with:    · Septic Shock  -- etiology for infection multiple given her level of Neutropenia-->> resp Vs urinary Vs GI Vs line related- Possible sinus source, though appears to be chronic. RLE cellulitic change present as well  --UA is abnormal-pyuria  --CT lung: interstial edema/pleural effusion-- possible>> PNA( BNP slightly up)  --multiple hypodense lesion on spleen-- appeared smaller in April C/W Jan 2022 as well. Tumor related? · Neutropenic Fever  · NHL-- post R-CHOP 6/28/22, received growth factor as well  · Hx of PNA/Septic shock at [facility] 3/2022-- 2/2 Pneumococcus( urine ag +)  · History of Bacteroides Bacteremia 01/2022    Other Medical Issues- Mx per respective team:    · DM T2  · HLD  · A fib with RVR history  · Hx of graves ds, R eye blind   Recommendation for ID issues I am following:  ------------------------------------------------------------------------------  DW Dr Dukes/ RN    Check procalcitonin/urinary ag pneumo + legionala STAT  --add Azithromycin for atypical coverage added  Cont Vanco/Zosyn. Need to cover GI//Line pathogen until cultures unfolds.  Adjust Zosyn dose to anti-pseudomonal 4.5gm Q6-- as she is at risk    FU BCX/ UCX 7/3-- NGSF  C.diff if she has diarreha  Closely monitor abd for distension/ tenderness    Daily CBC/CMP    Type/Duration based on clinical progress and culture     Oncology following, ICU supportive care per team           HPI: History obtained from patient and medical staff and Care Every where. Yasir Lozano is a 78 y.o. DECLINED with [facility] as listed below-- she had her last chemo on 6/28 and received growth factor as well. She came to ED with fever/lethargy/ weakness and SOB of 1 day and found to have fever to 100.7, Neutropenia to 200, pyruria, and CT head/CT chest done as well as CXR and ankle X-ray R foot done and admitted to ICU. She is requiring levophed support for border line BP. During interview( hard of hearing too), she denies HA/sorethroat. Chest pain, abd pain, dysuria. She has no focal complaint during exam.  Denies viral/COVID infection at home. Feels SOB. She is currently receiving V/Z.     Care Everywhere-- shows admission in 01 and 04 2022 with diagnosis of sepsis- Bacteroides bacteremia in jan( also dx of cancer)  and PNA with septic shock in 04/2022        Active Hospital Problems    Diagnosis Date Noted    Septic shock ([address] 07/04/2022    Neutropenic fever ([address] 07/03/2022    Acute respiratory failure with hypoxia ([address] 07/03/2022    Hypomagnesemia 07/03/2022    Diffuse large B cell lymphoma ([address] 07/03/2022    Hyponatremia 07/03/2022    Pancytopenia ([address] 07/03/2022    UTI (urinary tract infection) 07/03/2022    Sinusitis 07/03/2022    Myopia of both eyes with astigmatism 07/03/2022    Pleural effusion 07/03/2022    Cellulitis 07/03/2022    Severe protein-calorie malnutrition ([address] 07/03/2022    GERD (gastroesophageal reflux disease)     Thyroid disease     Acute encephalopathy     Hypokalemia 09/21/2012     Past Medical History:   Diagnosis Date    Arthritis     GERD (gastroesophageal reflux disease)  Hypertension     Osteoarthritis of right knee 2012    S/P total knee arthroplasty 2012    Thyroid disease      Past Surgical History:   Procedure Laterality Date    HX APPENDECTOMY  65's    HX CHOLECYSTECTOMY      laparoscopic    HX HEENT      Surgery OD infection x 4    HX HEENT      throat surgery x 2    HX KNEE ARTHROSCOPY      right     No family history on file. Social History     Socioeconomic History    Marital status:      Spouse name: Not on file    Number of children: Not on file    Years of education: Not on file    Highest education level: Not on file   Occupational History    Not on file   Tobacco Use    Smoking status: Former Smoker     Quit date: 1999     Years since quittin.4    Smokeless tobacco: Never Used   Substance and Sexual Activity    Alcohol use: Yes     Alcohol/week: 1.7 standard drinks     Types: 1 Glasses of wine, 1 Cans of beer per week    Drug use: No    Sexual activity: Not on file   Other Topics Concern    Not on file   Social History Narrative    Not on file     Social Determinants of Health     Financial Resource Strain:     Difficulty of Paying Living Expenses: Not on file   Food Insecurity:     Worried About Running Out of Food in the Last Year: Not on file    Patricia of Food in the Last Year: Not on file   Transportation Needs:     Lack of Transportation (Medical): Not on file    Lack of Transportation (Non-Medical):  Not on file   Physical Activity:     Days of Exercise per Week: Not on file    Minutes of Exercise per Session: Not on file   Stress:     Feeling of Stress : Not on file   Social Connections:     Frequency of Communication with Friends and Family: Not on file    Frequency of Social Gatherings with Friends and Family: Not on file    Attends Anabaptist Services: Not on file    Active Member of Clubs or Organizations: Not on file    Attends Club or Organization Meetings: Not on file    Marital Status: Not on file   Intimate Partner Violence:     Fear of Current or Ex-Partner: Not on file    Emotionally Abused: Not on file    Physically Abused: Not on file    Sexually Abused: Not on file   Housing Stability:     Unable to Pay for Housing in the Last Year: Not on file    Number of Places Lived in the Last Year: Not on file    Unstable Housing in the Last Year: Not on file       Allergies:  Adhesive tape-silicones and Penicillin g     Medications:  Current Facility-Administered Medications   Medication Dose Route Frequency    NOREPINephrine (LEVOPHED) 8 mg in 5% dextrose 250mL (32 mcg/mL) infusion  0.5-30 mcg/min IntraVENous TITRATE    potassium chloride 10 mEq in 100 ml IVPB  10 mEq IntraVENous Q1H    albuterol (PROVENTIL VENTOLIN) nebulizer solution 2.5 mg  2.5 mg Nebulization Q4H PRN    azithromycin (ZITHROMAX) 500 mg in 0.9% sodium chloride 250 mL (VIAL-MATE)  500 mg IntraVENous Q24H    piperacillin-tazobactam (ZOSYN) 4.5 g in 0.9% sodium chloride (MBP/ADV) 100 mL MBP  4.5 g IntraVENous Q6H    sodium chloride (NS) flush 5-40 mL  5-40 mL IntraVENous Q8H    sodium chloride (NS) flush 5-40 mL  5-40 mL IntraVENous PRN    acetaminophen (TYLENOL) tablet 650 mg  650 mg Oral Q6H PRN    Or    acetaminophen (TYLENOL) suppository 650 mg  650 mg Rectal Q6H PRN    polyethylene glycol (MIRALAX) packet 17 g  17 g Oral DAILY PRN    ondansetron (ZOFRAN ODT) tablet 4 mg  4 mg Oral Q8H PRN    Or    ondansetron (ZOFRAN) injection 4 mg  4 mg IntraVENous Q6H PRN    [Held by provider] enoxaparin (LOVENOX) injection 40 mg  40 mg SubCUTAneous DAILY    ELECTROLYTE REPLACEMENT PROTOCOL - Potassium Standard Dosing   1 Each Other PRN    ELECTROLYTE REPLACEMENT PROTOCOL - Magnesium   1 Each Other PRN    ELECTROLYTE REPLACEMENT PROTOCOL - Phosphorus  Standard Dosing  1 Each Other PRN    ELECTROLYTE REPLACEMENT PROTOCOL - Calcium   1 Each Other PRN    pantoprazole (PROTONIX) 40 mg in 0.9% sodium chloride 10 mL injection  40 mg IntraVENous DAILY    0.9% sodium chloride infusion  25 mL/hr IntraVENous CONTINUOUS    vancomycin (VANCOCIN) 750 mg in 0.9% sodium chloride 250 mL (VIAL-MATE)  750 mg IntraVENous Q12H    Vancomycin-Pharmacy To Dose  1 Each Other Rx Dosing/Monitoring    albumin human 25% (BUMINATE) solution 25 g  25 g IntraVENous Q6H    furosemide (LASIX) injection 20 mg  20 mg IntraVENous DAILY    insulin lispro (HUMALOG) injection   SubCUTAneous Q6H    glucose chewable tablet 16 g  4 Tablet Oral PRN    glucagon (GLUCAGEN) injection 1 mg  1 mg IntraMUSCular PRN    dextrose 10% infusion 0-250 mL  0-250 mL IntraVENous PRN        ROS:  Pertinent items are noted in the History of Present Illness. Physical Exam:    Temp (24hrs), Av.8 °F (37.7 °C), Min:99 °F (37.2 °C), Max:100.7 °F (38.2 °C)    Visit Vitals  BP (!) 128/54   Pulse 84   Temp 99.8 °F (37.7 °C)   Resp 30   Ht 5' 7\" (1.702 m)   Wt 53.5 kg (118 lb)   SpO2 96%   BMI 18.48 kg/m²          GEN: WD sick looking, on NC--not in resp distress. HEENT: Unicteric. Protruding eye-R>L, EOMI intact  No neck swelling  CHEST: Non laboured breathing. CTA  CVS:RRR, no mur/gallop  ABD: Obese/soft. Non tender. Non distended  ANN:Lucero in  EXT: No apparent swelling or redness on UE/LE joints except:  R ankle has some mild redness, no marked calor. No open lesion  Skin: Dry and intact. No rash- vesicular or other type, no redness. CNS: A, OX3. Moves all extremity.  CN grossly ok except Very [facility]       Microbiology  All Micro Results     Procedure Component Value Units Date/Time    LEGIONELLA PNEUMOPHILA Bere Linares URINE [[id number]]     Order Status: Sent Specimen: Urine     STREP Serg Hensley, URINE [[id number]]     Order Status: Sent Specimen: Urine     CULTURE, URINE [[id number]] Collected: 22 1700    Order Status: Completed Specimen: Urine from Clean catch Updated: 22 1045    CULTURE, BLOOD [[id number]] Collected: 22 1645    Order Status: Completed Specimen: Blood Updated: 07/04/22 0719     Special Requests: NO SPECIAL REQUESTS        Culture result: NO GROWTH AFTER 14 HOURS       CULTURE, BLOOD [[id number]] Collected: 07/03/22 1650    Order Status: Completed Specimen: Blood Updated: 07/04/22 0719     Special Requests: NO SPECIAL REQUESTS        Culture result: NO GROWTH AFTER 14 HOURS       RESPIRATORY VIRUS PANEL W/COVID-19, PCR [[id number]] Collected: 07/03/22 2110    Order Status: Completed Specimen: NASOPHARYNGEAL SWAB Updated: 07/03/22 2144    C. DIFFICILE AG & TOXIN A/B [[id number]]     Order Status: Sent Specimen: Stool     COVID-19 RAPID TEST [[id number]] Collected: 07/03/22 1715    Order Status: Completed Specimen: Nasopharyngeal Updated: 07/03/22 1839     Specimen source Nasopharyngeal        COVID-19 rapid test Not detected        Comment: Rapid Abbott ID Now       Rapid NAAT:  The specimen is NEGATIVE for SARS-CoV-2, the novel coronavirus associated with COVID-19. Negative results should be treated as presumptive and, if inconsistent with clinical signs and symptoms or necessary for patient management, should be tested with an alternative molecular assay. Negative results do not preclude SARS-CoV-2 infection and should not be used as the sole basis for patient management decisions. This test has been authorized by the FDA under an Emergency Use Authorization (EUA) for use by authorized laboratories.    Fact sheet for Healthcare Providers: ConventionUpdate.co.nz  Fact sheet for Patients: ConventionUpdate.co.nz       Methodology: Isothermal Nucleic Acid Amplification         ENTERIC BACTERIA PANEL, DNA [[id number]]     Order Status: Sent Specimen: Stool     INFLUENZA A & B AG (RAPID TEST) [[id number]] Collected: 07/03/22 1715    Order Status: Completed Specimen: Nasopharyngeal from Nasal washing Updated: 07/03/22 1744     Influenza A Antigen Negative        Comment: A negative result does not exclude influenza virus infection, seasonal or H1N1 due to suboptimal sensitivity. If influenza is circulating in your community, a diagnosis of influenza should be considered based on a patients clinical presentation and empiric antiviral treatment should be considered, if indicated. Influenza B Antigen Negative              Lab results:    Chemistry  Recent Labs     07/04/22  0440 07/03/22  1645   * 193*   * 132*   K 3.1* 3.7    100   CO2 21 22   BUN 15 19*   CREA 0.61 0.52*   CA 8.7 8.2*   AGAP 12 10   BUCR 25* 37*   AP 87 113   TP 6.4 5.5*   ALB 3.5 2.8*   GLOB 2.9 2.7   AGRAT 1.2 1.0       CBC w/ Diff  Recent Labs     07/04/22  0440 07/03/22  1645   WBC 0.4* 0.2*   RBC 3.20* 3.52*   HGB 9.4* 10.0*   HCT 28.7* 31.0*   * 108*   GRANS 6* 12*   LYMPH 34 40   EOS 6* 4       Imaging: report reviewed and as posted by radiologist    CT head- 7/3  1. No CT findings of an acute intracranial abnormality. Please note that  noncontrast head CT may be normal in early acute infarct.        2. Chronic left sphenoethmoidal sinus disease, with questional small left  frontoethmoidal air-fluid level potentially acute on chronic sinus disease.     3. Asymmetric hyperdense right globe. Recommend correlation with patient ocular  History. 7/3: R ankle     1.  Nonspecific soft tissue edema, with no acute or destructive osseous  abnormality.

## 2022-07-04 NOTE — CONSULTS
[facility]  Phone: [phone]  Paging : [facility]) [phone] ([phone]  [facility]) [phone]     Hematology / Oncology Consult Note    Impression:   Principal Problem:    Acute respiratory failure with hypoxia ([address] (7/3/2022)    Active Problems:    Neutropenic fever ([address] (7/3/2022)      Hypomagnesemia (7/3/2022)      Diffuse large B cell lymphoma (HCC) (7/3/2022)      GERD (gastroesophageal reflux disease) ()      Thyroid disease ()      Acute encephalopathy ()      Hyponatremia (7/3/2022)      Pancytopenia ([address] (7/3/2022)      UTI (urinary tract infection) (7/3/2022)      Sinusitis (7/3/2022)      Myopia of both eyes with astigmatism (7/3/2022)      Pleural effusion (7/3/2022)      Cellulitis (7/3/2022)      Severe protein-calorie malnutrition ([address] (7/3/2022)        Sepsis     Diffuse Large B Cell Lymphoma - remission status undetermined. She was to received a PET/CT as outpatient to determine remission status - treatment was curative intent    Splenic Hypodensities - ?infectious v. Lymphomatous. Neutropenia - the patient has already received growth factor    Anemia - dt/ chemotherapy    Pleural Effusion    ? Cellulitis    Hx of AFib    Weakness - dt/ chemotherapy + illness    Plan:   Agree w/ antibiotics  ? Thoracentesis  No need for growth factor - already received  Reviewed all records + imaging + labs  I will speak with her       Reason for Consultation:  Naldo Dewey is a 78 y.o. female who I've been asked to consult for suspected sepsis in the setting of lymphoma    HPI:      The patient is treated by my partner Dr. Bibi Mao for stage IV Diffuse Large B cell Lymphoma. She was undergoing chemotherapy with R-CHOP, last cycle given 6/28/22. This was to be her final chemotherapy prior to a PET/CT to determine remission status with some possibility for cure.   Her treatment course has been complicated by multiple admissions for sepsis, pneumonia, Atrial Fibrillation with RVR and general weakness. I spoke with her  yesterday who reported severe weakness and I directed him to the emergency department for ongoing care with concerns that she may be septic or have some complication as related to her chemotherapy. Labwork reveaeld a hemoglobin of 9, WBC of 0.4 with an ANC of 0. She did receive Neulasta in the office. INR was 1.2  CMP was normal.   NT pro-BNP slightly elevated at 446 and lactic acid was 2. Influenza negative and Covid pending. CT Head 7/3/22 showed no concerning abnormality. CT of Chest identified interstitial ground glass changes suggestive of edema with a large L effusion and indeterminate splenic hypodensities. Upon arrival, she was found to be hypoxic and was requiring 6L of oxygen. Past Medical History:   Diagnosis Date    Arthritis     GERD (gastroesophageal reflux disease)     Hypertension     Osteoarthritis of right knee 9/19/2012    S/P total knee arthroplasty 9/19/2012    Thyroid disease      Past Surgical History:   Procedure Laterality Date    HX APPENDECTOMY  65's    HX CHOLECYSTECTOMY      laparoscopic    HX HEENT      Surgery OD infection x 4    HX HEENT      throat surgery x 2    HX KNEE ARTHROSCOPY      right     @socr@  No family history on file.   Allergies   Allergen Reactions    Adhesive Tape-Silicones Other (comments)     Takes skin off    Penicillin G Itching       Home Medications:   [unfilled]    Hospital Medications:     Current Facility-Administered Medications   Medication Dose Route Frequency Provider Last Rate Last Admin    albuterol (PROVENTIL VENTOLIN) nebulizer solution 2.5 mg  2.5 mg Nebulization Q4H PRN Joshua Patel MD        NOREPINephrine (LEVOPHED) 8 mg in 5% dextrose 250mL (32 mcg/mL) infusion  0.5-30 mcg/min IntraVENous TITRATE Lana Bhakta MD 31.9 mL/hr at 07/04/22 0400 17 mcg/min at 07/04/22 0400    albuterol (PROVENTIL VENTOLIN) nebulizer solution 2.5 mg  2.5 mg Nebulization TID RT Margaux Ariza MD        potassium chloride 10 mEq in 100 ml IVPB  10 mEq IntraVENous Q1H Lars Bhakta  mL/hr at 07/04/22 0709 10 mEq at 07/04/22 0709    sodium chloride (NS) flush 5-40 mL  5-40 mL IntraVENous Q8H Margaux Ariza MD   10 mL at 07/04/22 0549    sodium chloride (NS) flush 5-40 mL  5-40 mL IntraVENous PRN Margaux Ariza MD        acetaminophen (TYLENOL) tablet 650 mg  650 mg Oral Q6H PRN Margaux Ariza MD        Or   Andrés Brown acetaminophen (TYLENOL) suppository 650 mg  650 mg Rectal Q6H PRN Margaux Ariza MD        polyethylene glycol (MIRALAX) packet 17 g  17 g Oral DAILY PRN Margaux Ariza MD        ondansetron (ZOFRAN ODT) tablet 4 mg  4 mg Oral Q8H PRN Margaux Ariza MD        Or    ondansetron [facility]) injection 4 mg  4 mg IntraVENous Q6H PRN Margaux Ariza MD        [Held by provider] enoxaparin (LOVENOX) injection 40 mg  40 mg SubCUTAneous DAILY Margaux Ariza MD        ELECTROLYTE REPLACEMENT PROTOCOL - Potassium Standard Dosing   1 Each Other PRPALAK Ariza MD        ELECTROLYTE REPLACEMENT PROTOCOL - Magnesium   1 Each Other PRN Margaux Ariza MD        ELECTROLYTE REPLACEMENT PROTOCOL - Phosphorus  Standard Dosing  1 Each Other PRPALAK Ariza MD        ELECTROLYTE REPLACEMENT PROTOCOL - Calcium   1 Each Other PRPALAK Ariza MD        piperacillin-tazobactam (ZOSYN) 3.375 g in 0.9% sodium chloride (MBP/ADV) 100 mL MBP  3.375 g IntraVENous Q6H Margaux Ariza  mL/hr at 07/04/22 0406 3.375 g at 07/04/22 0406    pantoprazole (PROTONIX) 40 mg in 0.9% sodium chloride 10 mL injection  40 mg IntraVENous DAILY Margaux Ariza MD        0.9% sodium chloride infusion  25 mL/hr IntraVENous CONTINUOUS Margaux Ariza MD 25 mL/hr at 07/03/22 1910 25 mL/hr at 07/03/22 1910    vancomycin (VANCOCIN) 750 mg in 0.9% sodium chloride 250 mL (VIAL-MATE)  750 mg IntraVENous Q12H Margaux Ariza  mL/hr at 07/04/22 0406 750 mg at 07/04/22 0406    Vancomycin-Pharmacy To Dose  1 Each Other Rx Dosing/Monitoring MD Andrés Zuniga albumin human 25% (BUMINATE) solution 25 g  25 g IntraVENous Q6H Margaux Ariza MD   25 g at 07/04/22 0549    furosemide (LASIX) injection 20 mg  20 mg IntraVENous DAILY Margaux Ariza MD   20 mg at 07/03/22 2016    insulin lispro (HUMALOG) injection   SubCUTAneous Q6H Margaux Ariza MD   4 Units at 07/04/22 0554    glucose chewable tablet 16 g  4 Tablet Oral PRN Margaux Ariza MD        glucagon [facility]) injection 1 mg  1 mg IntraMUSCular PRN Margaux Ariza MD        dextrose 10% infusion 0-250 mL  0-250 mL IntraVENous PRN Margaux Ariza MD           Review of Systems:   Constitutional:   Fever: Negative, Chills: Negative, Weight Loss: Negative, Malaise/Fatigue: Negative   Diaphoresis: Negative,  Weakness: Negative  Skin:   Rash: Negative,  Itching: Negative  HENT:   Headache:Negative, Hearing Loss: Negative, Tinnitus: Negative, Ear Pain: Negative   Nosebleeds: Negative, Congestion: Negative, Stridor: Negative,   Sore Throat: Negative  Eyes:    Blurred Vision: Negative, Double Vision: Negative, Photophobia: Negative   Eye Pain: Negative, Eye Discharge: Negative, Eye Redness: Negative  Cardiovascular:    Chest Pain: Negative, Palpitations: Negative, Orthopnea: Negative   Claudication: Negative, Leg Swelling: Negative PND: Negative  Respiratory:   Cough: Negative, Hemoptysis: Negative, Sputum Production: Negative   Shortness of Breath: Negative, Wheezing: Negative  Gastrointestinal:   Heartburn: Negative, Nausea: Negative, Vomiting: Negative   Abdominal Pain: Negative, Diarrhea: Negative, Constipation: Negative   Blood in Stool: Negative, Melena: Negative   Genitourinary:    Dysuria: Negative, Urgency: Negative, Frequency: Negative   Hematuria: Negative, Flank Pain: Negative  Musculoskeletal:    Myalgias: Negative, Neck Pain: Negative, Back Pain: Negative   Joint Pain: Negative, Falls: Negative  Endo/Heme/Allergies:    Easy Bruise/Blood: Negative, Env.  Allergies: Negative, Polydipsia: Negative   Neurological:    Dizziness: Negative, Tingling: Negative. Tremor: Negative,    Sensory Change: Negative. Speech Change: Negative, Focal Weakness: Negative     Seizures: Negative, LOC: Negative  Psychiatric:   Depression: Negative, Suicidal Ideas: Negative, Substance Abuse: Negative   Hallucinations: Negative, Nervous/Anxious: Negative   Insomnia: Negative, Memory Loss: Negative     Visit Vitals  /60   Pulse 81   Temp 99.8 °F (37.7 °C)   Resp 27   Ht 5' 7\" (1.702 m)   Wt 53.5 kg (118 lb)   SpO2 98%   BMI 18.48 kg/m²       Temp (24hrs), Av.8 °F (37.7 °C), Min:99 °F (37.2 °C), Max:100.7 °F (38.2 °C)      General: no acute distress and laying in bed  HEENT: no icterus, no oral lesions  Lym: no cervical or supraclavicular adenopathy  CV: rate is regular and normal and their is no murmur  Lung: clear to auscultation, no increased work of breathing  Abd: nontender, no organomegaly  Neuro: cnoversant and moving extremities  MSK: no sarcopenia, normal tone  Derm: no rash  Psych: normal affect  Per: warm, no edema    Labs:  Recent Labs     22  0440 22  1645   WBC 0.4* 0.2*   RBC 3.20* 3.52*   HCT 28.7* 31.0*   MCV 89.7 88.1   MCH 29.4 28.4   MCHC 32.8 32.3   RDW 28.8* 29.2*       Recent Labs     22  0440 22  1645   CO2 21 22   BUN 15 19*       No results for input(s): ALBUMIN in the last 72 hours.     No lab exists for component: BELA Huang    [email or web address]    Juliana Darnell MD MD  [facility]

## 2022-07-04 NOTE — RT PROTOCOL NOTE
RT Nebulizer Bronchodilator Protocol Note    There is a bronchodilator order in the chart from a provider indicating to follow the RT Bronchodilator Protocol and there is an Initiate RT Bronchodilator Protocol order as well (see protocol at bottom of note). CXR Findings:  Recent Results (from the past 2 days)    XR ANKLE RT MIN 3 V 07/03/2022    Impression  1. Nonspecific soft tissue edema, with no acute or destructive osseous  abnormality. The findings from the last RT Protocol Assessment were as follows:  History of Pulmonary Disease: None or smoker <15 pack years  Respiratory Pattern: Use of accessory muscles, prolonged exhalation, or RR 26-30 bpm  Breath Sounds: Slighly diminished and/or crackles  Cough: Strong, spontaneous, non-productive  Indication for Bronchodilator Therapy: Decreased or absent breath sounds  Bronchodilator Assessment Score: 8     Aerosolized bronchodilator medication orders have been revised according to the RT Nebulizer Bronchodilator Protocol below. Respiratory Therapist to perform RT Therapy Protocol Assessment initially then follow the protocol. Repeat RT Therapy Protocol Assessment PRN for score 0-3 or on second treatment, BID, and PRN for scores above 3. No Indications - adjust the frequency to every 6 hours PRN wheezing or bronchospasm, if no treatments needed after 48 hours then discontinue using Per Protocol order mode. If indication present, adjust the RT bronchodilator orders based on the Bronchodilator Assessment Score as indicated below. If a patient is on this medication at home then do not decrease Frequency below that used at home. 0-3 - enter or revise RT bronchodilator order(s) to equivalent RT Bronchodilator order with Frequency of every 4 hours PRN for wheezing or increased work of breathing using Per Protocol order mode.         4-6 - enter or revise RT Bronchodilator order(s) to two equivalent RT bronchodilator orders with one order with BID Frequency and one order with Frequency of every 4 hours PRN wheezing or increased work of breathing using Per Protocol order mode. 7-10 - enter or revise RT Bronchodilator order(s) to two equivalent RT bronchodilator orders with one order with TID Frequency and one order with Frequency of every 4 hours PRN wheezing or increased work of breathing using Per Protocol order mode. 11-13 - enter or revise RT Bronchodilator order(s) to one equivalent RT bronchodilator order with QID Frequency and an Albuterol order with Frequency of every 4 hours PRN wheezing or increased work of breathing using Per Protocol order mode. Greater than 13 - enter or revise RT Bronchodilator order(s) to one equivalent RT bronchodilator order with every 4 hours Frequency and an Albuterol order with Frequency of every 2 hours PRN wheezing or increased work of breathing using Per Protocol order mode. RT to enter RT Home Evaluation for COPD & MDI Assessment order using Per Protocol order mode.     Electronically signed by RT Shiela on 7/4/2022 at 12:39 AM

## 2022-07-04 NOTE — PROGRESS NOTES
1939: TRANSFER - IN REPORT:    Verbal report received from [facility], RN (name) on Rajwinder Moore  being received from ED (unit). Report consisted of patients Situation, Background, Assessment and   Recommendations(SBAR). Information from the following report(s) SBAR, Kardex, ED Summary, Intake/Output, MAR and Recent Results was reviewed with the receiving nurse. Opportunity for questions and clarification was provided. Assessment completed upon patients arrival to unit and care assumed. 2000: Received patient on 6L NC. Respiration rate shallow and tachypneic. Patient is alert to self and place. Dyspneic with exertion and at rest. HR <110s.      0032: Attempted blood pressure in multiple extremities -- BP systolic currently reading in the 80s. Called Dr. Nabila Alfaro, received orders for Levophed drip.     0630: Patient is currently on 17mcg/min of Levophed. Remains on 6L NC. Electrolytes will be replaced per protocol. Bedside shift change report given to Jordan Cristobal RN (oncoming nurse) by Alejandro BRARIENTOS RN (offgoing nurse). Report included the following information SBAR, Kardex and MAR.

## 2022-07-04 NOTE — PROGRESS NOTES
Hospitalist Progress Note-critical care note     Patient: Bert Araiza MRN: [id number]  [facility]: [id number]    YOB: 1940  Age: 78 y.o. Sex: female    DOA: 7/3/2022 LOS:  LOS: 1 day            Chief complaint: septic shock. Neutropenic fever, lymphoma, gerd , dm    Assessment/Plan         Hospital Problems  Date Reviewed: 9/21/2012          Codes Class Noted POA    Septic shock ([facility] ICD-10-CM: A41.9, R65.21  ICD-9-CM: 038.9, 785.52, 995.92  7/4/2022 Unknown        Neutropenic fever ([facility] ICD-10-CM: D70.9, R50.81  ICD-9-CM: 288.00, 780.61  7/3/2022 Unknown        * (Principal) Acute respiratory failure with hypoxia (HCC) ICD-10-CM: J96.01  ICD-9-CM: 518.81  7/3/2022 Unknown        Hypomagnesemia ICD-10-CM: E83.42  ICD-9-CM: 275.2  7/3/2022 Unknown        Diffuse large B cell lymphoma ([facility] ICD-10-CM: C83.30  ICD-9-CM: 202.80  7/3/2022 Unknown        GERD (gastroesophageal reflux disease) ICD-10-CM: K21.9  ICD-9-CM: 530.81  Unknown Unknown        Thyroid disease ICD-10-CM: E07.9  ICD-9-CM: 246. 9  Unknown Unknown        Acute encephalopathy ICD-10-CM: G93.40  ICD-9-CM: 348.30  Unknown Unknown        Hyponatremia ICD-10-CM: E87.1  ICD-9-CM: 276.1  7/3/2022 Unknown        Pancytopenia ([facility] ICD-10-CM: A19.895  ICD-9-CM: 284.19  7/3/2022 Unknown        UTI (urinary tract infection) ICD-10-CM: N39.0  ICD-9-CM: 599.0  7/3/2022 Unknown        Sinusitis ICD-10-CM: J32.9  ICD-9-CM: 473.9  7/3/2022 Unknown        Myopia of both eyes with astigmatism ICD-10-CM: H52.13, H52.203  ICD-9-CM: 367.1, 367.20  7/3/2022 Unknown        Pleural effusion ICD-10-CM: J90  ICD-9-CM: 511.9  7/3/2022 Unknown        Cellulitis ICD-10-CM: L03.90  ICD-9-CM: 682.9  7/3/2022 Unknown        Severe protein-calorie malnutrition ([facility] ICD-10-CM: E43  ICD-9-CM: 857  7/3/2022 Unknown        Hypokalemia ICD-10-CM: E87.6  ICD-9-CM: 276.8  9/21/2012 Yes              Resp -   Acute respiratory failure with hypoxia with 6 L oxygen -continue weaning as needed -stable overnight   Flu negative, rapid covid 19 negative   cta no central PE   Low threshhold to be intubated  Full code, palliative care consult   Viral panel and pcr covid 19 still pending      Pleural effusion   Moderate and large effusion-will defer to pulmonologist decide for thoracentesis   Lasix -low         ID -   Cellulitis of rt leg, neutropenia fever , acute on chronic sinusitis , UTI-leg significant improving   Continue vanc and zosyn Follow up blood and urine cx. ID consulted   bcx negative      [facility] -   paf   Continue balance electrolytes and echo     Septic shock   On levophed to keep MAP >65. Continue abx to treat septic shock      Heme/onc -   Pancytopenia  Due to chemo   Continue monitoring  Received growth factor per oncologist      Diffusion Large B lymphoma   On chemo therapy   Oncologist f/u    splenic hypodensities?          Renal -   Hyponatremia , hypomagnesemia, hypokalemia -na 133, K 3.1 will replace. Mg is good now   icu electrolytes replacement protocol   Monitoring urine out      Endocrine -   DM type II   Ssi , hypoglycemia protocol      hypothyroidism   Continue synthyroid  ,ths and free t4 ordered      Neuro  Acute encephalopath  Much better,      Myopia      GI - NPO for now-will have speech evaluation -will start diet with speech recommended      Mks: rt ankle swelling-swelling much better, x-ray no fracture      Severe malnutrition      Hypoalbuminemia   Albumin infusion       rn : alert and oriented more levophed started last night     Case discussed with Dr. Angelica Carmnoa and Dr. Monserrat Dong   8:10-8:45  35 minutes of critical care time spent in the direct evaluation and treatment of this high risk patient. The reason for providing this level of medical care for this critically ill patient was due a critical illness that impaired one or more vital organ systems such that there was a high probability of imminent or life threatening deterioration in the patients condition. This care involved high complexity decision making to assess, manipulate, and support vital system functions, to treat this degreee vital organ system failure and to prevent further life threatening deterioration of the patients condition. Subjective : feel better   Disposition :tbd,   Review of systems:    Limited due to pt' condition, she answered some questions and closed her eyes   Vital signs/Intake and Output:  Visit Vitals  /74   Pulse 78   Temp 99.8 °F (37.7 °C)   Resp 23   Ht 5' 7\" (1.702 m)   Wt 53.5 kg (118 lb)   SpO2 97%   BMI 18.48 kg/m²     Current Shift:  07/04 0701 - 07/04 1900  In: 0   Out: 250 [Urine:250]  Last three shifts:  07/02 1901 - 07/04 0700  In: 1417.8 [I.V.:1417.8]  Out: 1750 [Urine:1750]    Physical Exam:  General: WD, WN. Alert, some cooperative, sick looking   HEENT: NC, Atraumatic. PERRLA, anicteric sclerae. Lungs: Decreased BS of left side of chest , cta of rt   Heart:  Regular  rhythm,  No murmur, No Rubs, No Gallops  Abdomen: Soft, Non distended, Non tender. +Bowel sounds,    Extremities: No c/c/e ,rt leg erythema significant improving, swelling resolved   Psych:   Not anxious or agitated. Neurologic:  Myopia , limited due to pt's condition              Labs: Results:       Chemistry Recent Labs     07/04/22  0440 07/03/22  1645   * 193*   * 132*   K 3.1* 3.7    100   CO2 21 22   BUN 15 19*   CREA 0.61 0.52*   CA 8.7 8.2*   AGAP 12 10   BUCR 25* 37*   AP 87 113   TP 6.4 5.5*   ALB 3.5 2.8*   GLOB 2.9 2.7   AGRAT 1.2 1.0      CBC w/Diff Recent Labs     07/04/22  0440 07/03/22  1645   WBC 0.4* 0.2*   RBC 3.20* 3.52*   HGB 9.4* 10.0*   HCT 28.7* 31.0*   * 108*   GRANS 6* 12*   LYMPH 34 40   EOS 6* 4      Cardiac Enzymes No results for input(s): CPK, CKND1, HUMBERTO in the last 72 hours.     No lab exists for component: CKRMB, TROIP   Coagulation Recent Labs     07/04/22  0440   PTP 15.4*   INR 1.2       Lipid Panel No results found for: CHOL, CHOLPOCT, CHOLX, CHLST, CHOLV, [id number], HDL, HDLP, LDL, LDLC, DLDLP, 483170, VLDLC, VLDL, TGLX, TRIGL, TRIGP, TGLPOCT, CHHD, CHHDX   BNP No results for input(s): BNPP in the last 72 hours. Liver Enzymes Recent Labs     07/04/22  0440   TP 6.4   ALB 3.5   AP 87      Thyroid Studies No results found for: T4, T3U, TSH, TSHEXT, TSHEXT     Procedures/imaging: see electronic medical records for all procedures/Xrays and details which were not copied into this note but were reviewed prior to creation of Plan    XR ANKLE RT MIN 3 V    Result Date: 7/3/2022  EXAM: ANKLE SERIES Indication: Swelling Technique: Frontal, , oblique, and lateral views of the right ankle. Comparison studies: None. _______________ FINDINGS: -OSSEOUS: No acute or destructive osseous abnormality. Tibiotalar degenerative changes with small degenerative plantar calcaneal spur. Normal alignment with preserved ankle mortise. . -SOFT TISSUES: Benign calcifications of the lower leg with mild diffuse edema of the distal leg to visualized midfoot. . No significant joint effusion. _____________    1. Nonspecific soft tissue edema, with no acute or destructive osseous abnormality. CT HEAD WO CONT    Result Date: 7/3/2022  EXAM: CT HEAD WO CONT CLINICAL INDICATION/HISTORY: ams -Additional: None COMPARISON: None TECHNIQUE: Axial CT imaging of the head was performed without intravenous contrast. One or more dose reduction techniques were used on this CT: automated exposure control, adjustment of the mAs and/or kVp according to patient size, and iterative reconstruction techniques. The specific techniques used on this CT exam have been documented in the patient's electronic medical record. Digital Imaging and Communications in Medicine (DICOM) format image data are available to nonaffiliated external healthcare facilities or entities on a secure, media free, reciprocally searchable basis with patient authorization for at least a 12-month period after this study. _______________ FINDINGS: BRAIN:   > Brain volume: Age appropriate.   > White matter: Little or no white matter disease. > Infarcts, encephalomalacia: None.   > Parenchymal mass: None.   > Parenchymal hemorrhage: None.   > Midline shift: None.   > Miscellaneous: None. EXTRA-AXIAL SPACES: Unremarkable. No fluid collections. CALVARIUM: Intact. SINUSES, MASTOIDS: Complete opacified left sphenoethmoidal air cells with mild mucosal thickening on the right and small left anterior frontal ethmoidal opacities. OTHER EXTRACRANIAL: Asymmetric dense right globe with findings of prior bilateral cataract surgery. _______________     1. No CT findings of an acute intracranial abnormality. Please note that noncontrast head CT may be normal in early acute infarct. 2. Chronic left sphenoethmoidal sinus disease, with questional small left frontoethmoidal air-fluid level potentially acute on chronic sinus disease. 3. Asymmetric hyperdense right globe. Recommend correlation with patient ocular history. CTA CHEST W OR W WO CONT    Result Date: 7/3/2022  EXAM: CTA Chest INDICATION: Hypoxic. COMPARISON: No recent exams for direct comparison, most recent study from 9/21/2012. TECHNIQUE: Axial CT imaging from the thoracic inlet through the diaphragm with intravenous contrast. Coronal and sagittal MIP reformats were generated. One or more dose reduction techniques were used on this CT: automated exposure control, adjustment of the mAs and/or kVp according to patient size, and iterative reconstruction techniques. The specific techniques used on this CT exam have been documented in the patient's electronic medical record. Digital Imaging and Communications in Medicine (DICOM) format image data are available to nonaffiliated external healthcare facilities or entities on a secure, media free, reciprocally searchable basis with patient authorization for at least a 12-month period after this study.  _______________ FINDINGS: EXAM QUALITY: Adequate bolus timing with mild diffuse respiratory motion artifact degradation. [facility] PULMONARY ARTERIES: No evidence of central, interlobar or mid to proximal segmental branch pulmonary arterial filling defect. Asymmetric motion degradation involving the lower lobes. Normal sized main pulmonary artery. Pulmonary embolism. MEDIASTINUM: Normal heart size without pericardial effusion. Coronary artery and aortic atherosclerosis. A right upper chest Mediport is present with the catheter in the SVC. Moderate-sized hiatus hernia. LUNGS and PLEURA: Moderate to large left low attenuating layering pleural effusion. Marked diffuse emphysematous changes with chronic areas of scarring and atelectasis. Bilateral lower lobe groundglass, difficult to differentiate between dependent changes, atelectasis and/or edema. . AIRWAY: Normal. LYMPH NODES: No enlarged nodes. UPPER ABDOMEN: Multiple splenic hypodensities, largest anteriorly measuring 3 cm. Overall normal splenic size measuring 11.3 cm in AP dimension. Prior cholecystectomy OTHER: No acute or aggressive osseous abnormalities identified. _______________     1. Respiratory motion degradation. Otherwise, No evidence of central pulmonary embolism. 2.  Interstitial and groundglass changes suggestive of underlying edema, with moderate to large left-sided pleural effusion. 3. Multiple Indeterminate splenic hypodensities, recommend comparison to any prior recent outside exams and patient history. 4. Moderate-sized hiatus hernia. XR CHEST PORT    Result Date: 7/3/2022  EXAM: XR CHEST PORT CLINICAL INDICATION/HISTORY: ams fever -Additional: None COMPARISON: None TECHNIQUE: Frontal view of the chest _______________ FINDINGS: SUPPORT LINES AND TUBES:Right-sided tunneled Mediport and catheter tip over the distal SVC. HEART AND MEDIASTINUM: Midline cardiac silhouette appreciable cardiomegaly. Hilar vascular congestion and cephalization.  LUNGS AND PLEURAL SPACES: Diffuse bilateral hazy indistinct interstitial opacities with retrocardiac left basilar confluent opacity and blunted costophrenic angle/effusion. No significant right-sided pleural effusion. No pneumothorax. BONY THORAX AND SOFT TISSUES: No acute or destructive osseous abnormality. _______________     1. Findings of CHF versus fluid overload with edema and left pleural effusion.       Nadeen Galindo MD

## 2022-07-04 NOTE — CONSULTS
[facility]  Pulmonary, Critical Care, and Sleep Medicine    Name: Carla Hanley MRN: [id number]   : 1940 Hospital: [facility]    Date: 2022  Room: [address] [place] [address] Note                                              Consult requesting physician: Dr. Tatyana Abdul  Reason for Consult: Neutropenic fever, acute respiratory failure with hypoxia. IMPRESSION:       Active Hospital Problems    Diagnosis Date Noted    Neutropenic fever ([address] 2022    Acute respiratory failure with hypoxia (HCC) 2022    Hypomagnesemia 2022    Diffuse large B cell lymphoma ([address] 2022    Hyponatremia 2022    Pancytopenia ([address] 2022    UTI (urinary tract infection) 2022    Sinusitis 2022    Myopia of both eyes with astigmatism 2022    Pleural effusion 2022    Cellulitis 2022    Severe protein-calorie malnutrition (HCC) 2022    GERD (gastroesophageal reflux disease)     Thyroid disease     Acute encephalopathy    ·      Patient Active Problem List   Diagnosis Code    S/P total knee arthroplasty Z96.659    Hypoxia R09.02    Hypokalemia E87.6    Acute posthemorrhagic anemia D62    HTN (hypertension), benign I10    Neutropenic fever ([address] D70.9, R50.81    Acute respiratory failure with hypoxia ([address] J96.01    Hypomagnesemia E83.42    Diffuse large B cell lymphoma (HCC) C83.30    GERD (gastroesophageal reflux disease) K21.9    Thyroid disease E07.9    Acute encephalopathy G93.40    Hyponatremia E87.1    Pancytopenia (HCC) D61.818    UTI (urinary tract infection) N39.0    Sinusitis J32.9    Myopia of both eyes with astigmatism H52.13, H52.203    Pleural effusion J90    Cellulitis L03.90    Severe protein-calorie malnutrition (HCC) E43   ·       · Code status: Full Code       RECOMMENDATIONS:     Respiratory: Acute hypoxic respiratory failure due to pulmonary vascular congestion and pleural effusion.   CTA chest 7/3/2022: No PE. Emphysema. Moderate to large left pleural effusion. Pulmonary vascular congestion. Moderate hiatal hernia. Multiple indeterminate splenic hypodensities. On 6 LPM NC; titrate to keep SPO2 more than 91%. On Lasix 20 mg daily for pleural effusion and vascular congestion. Repeat CXR in the morning; if persistent pleural effusion, then consider diagnostic thoracentesis by IR. Bronchodilators: Change albuterol ATC to as needed. Keep SPO2 >=92%. HOB 30 degree elevation all the time. Aggressive pulmonary toileting. Aspiration precautions. Incentive spirometry. CVS:   Normal lactic acid and troponin. Mildly elevated BNP. Right leg edema; PVL LE pending. Pulmonary vascular congestion and pleural effusion; echo pending. On Lasix 20 mg daily. Hypotensive, likely shock; on Levophed; titrate to keep SBP more than 100 or MAP more than 65. ID: Neutropenic fever/neutropenic sepsis. Possible source of infection: UTI, Questionable acute on chronic sinusitis as reported on CT head, right leg cellulitis, pulmonary infection. Rapid COVID-19 7/3/2022: Negative. Rapid influenza 7/3/2022: Negative. Respiratory viral panel 7/3/2022: Pending. UA 7/3/2022: Negative nitrites but moderate leukocyte esterase; bacteria 2+ with WBC 21-35. Urine culture 7/3/2022: Pending. Blood culture 7/3/2022: NGTD. Lactic acid normal.  Antibiotics: Continue Zosyn and vancomycin for now. Sepsis bundle followed. Follow cultures. Deescalate antibiotic when appropriate. ID consult pending. Hematology/Oncology: Diffuse large B-cell lymphoma on R-CHOP, last received 6/28/2022. Splenic indeterminate hypodensities, could be early B-cell lymphoma process versus other etiologies. Neutropenic fever. Pancytopenia. Normal coags. Patient has received Neupogen at Dr. Osvaldo Gonzalez office. Seen by Dr. Aissatou Giraldo. Renal:   Normal creatinine. Hypokalemia being replaced.   Mild hyponatremia; expected to improve with IVF.    GI/:   Normal LFT. Splenic indeterminate hypodensities, could be early B-cell lymphoma process versus other etiologies. Endocrine: Monitor BS. SSI. Patient is on Synthroid 125 mcg daily; will change to IV 75 mcg daily for now. Check TSH, free T4, cortisol. Neurology: Alert awake oriented but lethargic due to sepsis. Nonfocal on exam.  CT head 7/3/2022: No acute findings. Chronic left sphenoid ethmoidal sinus disease, with questionable small left frontoethmoidal air-fluid level, potentially acute on chronic sinus disease. Asymmetric hypodensities right eye globe. Psychiatry: No acute issues. Toxicology: No acute issues. Pain/Sedation: No acute issues. Skin/Wound: No acute issues. Electrolytes: Replace electrolytes per ICU electrolyte replacement protocol. IVF: NS at 25 mill per hour. Nutrition: N.p.o. for now    Prophylaxis: DVT Prophylaxis: SCD (thrombocytopenia). GI Prophylaxis: Protonix. Restraints: none    Lines/Tubes: PIV  Midline will be placed. Lucero: 7/3/2022 (Medically necessary for strict input/output monitoring in critically ill patient, will remove it when not needed. Lucero bundle followed). PT/OT eval and treat. OOB. Advance Directive/Palliative Care: Consulted. Will defer respective systems problem management to primary and other respective consultant and follow patient in ICU with primary and other medical team.  Further recommendations will be based on the patient's response to recommended treatment and results of the investigation ordered. Quality Care: PPI, DVT prophylaxis, HOB elevated, Infection control all reviewed and addressed. Care of plan d/w RN, RT, MDR, Dr. Shaquille Jiang  D/w patient (answered all questions to satisfaction). High complexity decision making was performed during the evaluation of this patient at high risk for decompensation with multiple organ involvement.     Total critical care time spent rendering care exclusive of procedures/family discussion/coordination of care: 45 minutes. Subjective/History of Present Illness:     Patient is a 78 y.o. female with PMHx significant for HTN, hypothyroidism, GERD, diffuse large B-cell lymphoma on R-CHOP, last received 6/28/2022; admitted with neutropenic fever and hypoxic respiratory failure, pleural effusion. COVID19 vaccine status: Patient has received Pfizer COVID-19 vaccine x2 followed by 1 booster. 7/4/2022 :   Seen in ICU room 105. Patient is lethargic and weak. Patient was brought in to ER by her  due to shortness of breath and generalized weakness. Due to neutropenia, patient has received Neulasta and Dr. Adan Cruz office. Right distal leg and ankle redness with warmth. Rapid COVID-19 negative; PCR pending. Due to hypotension, patient is on Levophed at 17 mcg now. Hypokalemia being replaced. Denies cough but appears mildly dyspneic; on 6 LPM O2 NC. Bilateral extensive rhonchi. Temperature max 100.7. On broad-spectrum antibiotics. On droplet isolation precautions until COVID-19 PCR result available. No other overnight issues reported. I/O last 24 hrs: Intake/Output Summary (Last 24 hours) at 7/4/2022 0945  Last data filed at 7/4/2022 0803  Gross per 24 hour   Intake 1417.76 ml   Output 2000 ml   Net -582.24 ml         The patient is critically ill and can not provide additional history due to Unable to comprehend    History taken from  EMR     Review of Systems:  ROS not obtained due to patient factor.        Allergies   Allergen Reactions    Adhesive Tape-Silicones Other (comments)     Takes skin off    Penicillin G Itching      Past Medical History:   Diagnosis Date    Arthritis     GERD (gastroesophageal reflux disease)     Hypertension     Osteoarthritis of right knee 9/19/2012    S/P total knee arthroplasty 9/19/2012    Thyroid disease       Past Surgical History:   Procedure Laterality Date    HX APPENDECTOMY  1950's    HX CHOLECYSTECTOMY      laparoscopic    HX HEENT      Surgery OD infection x 4    HX HEENT      throat surgery x 2    HX KNEE ARTHROSCOPY      right      Social History     Tobacco Use    Smoking status: Former Smoker     Quit date: 1999     Years since quittin.4    Smokeless tobacco: Never Used   Substance Use Topics    Alcohol use: Yes     Alcohol/week: 1.7 standard drinks     Types: 1 Glasses of wine, 1 Cans of beer per week      No family history on file. Prior to Admission medications    Medication Sig Start Date End Date Taking? Authorizing Provider   levothyroxine (Synthroid) 125 mcg tablet Take 125 mcg by mouth Daily (before breakfast). Yes Provider, Historical   acyclovir (ZOVIRAX) 400 mg tablet Take 400 mg by mouth two (2) times a day. Yes Provider, Historical   amiodarone (PACERONE) 100 mg tablet Take 100 mg by mouth daily. Yes Provider, Historical   apixaban (Eliquis) 5 mg tablet Take 5 mg by mouth two (2) times a day. Yes Provider, Historical   ezetimibe (Zetia) 10 mg tablet Take  by mouth. Yes Provider, Historical   SITagliptin (Januvia) 100 mg tablet Take 100 mg by mouth daily. Yes Provider, Historical   RABEprazole (ACIPHEX) 20 mg TbEC Take 20 mg by mouth daily. Yes Provider, Historical   melatonin 5 mg tablet Take 5 mg by mouth nightly. Yes Provider, Historical   magnesium oxide (MAG-OX) 400 mg tablet Take 400 mg by mouth two (2) times a day.    Yes Provider, Historical     Current Facility-Administered Medications   Medication Dose Route Frequency    NOREPINephrine (LEVOPHED) 8 mg in 5% dextrose 250mL (32 mcg/mL) infusion  0.5-30 mcg/min IntraVENous TITRATE    albuterol (PROVENTIL VENTOLIN) nebulizer solution 2.5 mg  2.5 mg Nebulization TID RT    potassium chloride 10 mEq in 100 ml IVPB  10 mEq IntraVENous Q1H    sodium chloride (NS) flush 5-40 mL  5-40 mL IntraVENous Q8H    [Held by provider] enoxaparin (LOVENOX) injection 40 mg  40 mg SubCUTAneous DAILY    piperacillin-tazobactam (ZOSYN) 3.375 g in 0.9% sodium chloride (MBP/ADV) 100 mL MBP  3.375 g IntraVENous Q6H    pantoprazole (PROTONIX) 40 mg in 0.9% sodium chloride 10 mL injection  40 mg IntraVENous DAILY    0.9% sodium chloride infusion  25 mL/hr IntraVENous CONTINUOUS    vancomycin (VANCOCIN) 750 mg in 0.9% sodium chloride 250 mL (VIAL-MATE)  750 mg IntraVENous Q12H    Vancomycin-Pharmacy To Dose  1 Each Other Rx Dosing/Monitoring    albumin human 25% (BUMINATE) solution 25 g  25 g IntraVENous Q6H    furosemide (LASIX) injection 20 mg  20 mg IntraVENous DAILY    insulin lispro (HUMALOG) injection   SubCUTAneous Q6H         Objective:   Vital Signs:    Visit Vitals  /60   Pulse 81   Temp 99.8 °F (37.7 °C)   Resp 27   Ht 5' 7\" (1.702 m)   Wt 53.5 kg (118 lb)   SpO2 97%   BMI 18.48 kg/m²       O2 Device: Nasal cannula   O2 Flow Rate (L/min): 6 l/min   Temp (24hrs), Av.8 °F (37.7 °C), Min:99 °F (37.2 °C), Max:100.7 °F (38.2 °C)       Intake/Output:   Last shift:      701 -  190  In: 0   Out: 250 [Urine:250]    Last 3 shifts:  190 -  0700  In: 1417.8 [I.V.:1417.8]  Out: 1750 [Urine:1750]      Intake/Output Summary (Last 24 hours) at 2022 0945  Last data filed at 2022 0803  Gross per 24 hour   Intake 1417.76 ml   Output 2000 ml   Net -582.24 ml       Last 3 Recorded Weights in this Encounter    22 1649   Weight: 53.5 kg (118 lb)         Recent Labs     22  1712   PHI 7.46*   PCO2I 30.0*   PO2I 74*   HCO3I 21.2*   FIO2I 44       Physical Exam:     General/Neurology: Alert, awake and oriented. Lethargic. Chronically ill looking. O2 NC. Right chest Mediport in place. Head:   Normocephalic, without obvious abnormality, atraumatic. Eye:   EOM intact. No scleral icterus, no pallor, no cyanosis. Nose:   No sinus tenderness  Throat:  Lips, mucosa, and tongue normal. No oral thrush. Neck:   Supple, symmetric. No lymphadenopathy.  Trachea midline  Lung: Reduced air entry at the bases. Bilateral extensive rhonchi. No wheezing. No prolonged expiration. Heart:   Regular rate & rhythm. S1 S2 present. No murmur. No JVD. Abdomen:  Soft. NT. ND. +BS. No masses. Extremities:  Right leg mild edema with erythema at the distal leg/ankle with warmth. 2+ dorsalis pedis pulses bilaterally. No cyanosis or clubbing. Pulses: 2+ and symmetric in DP. Capillary refill: normal  Lymphatic:  No cervical or supraclavicular palpable lymphadenopathy. Musculoskeletal: No joint swelling. No tenderness. Data:       Recent Results (from the past 24 hour(s))   CULTURE, BLOOD    Collection Time: 07/03/22  4:45 PM    Specimen: Blood   Result Value Ref Range    Special Requests: NO SPECIAL REQUESTS      Culture result: NO GROWTH AFTER 14 HOURS     LACTIC ACID    Collection Time: 07/03/22  4:45 PM   Result Value Ref Range    Lactic acid 2.0 0.4 - 2.0 MMOL/L   CBC WITH AUTOMATED DIFF    Collection Time: 07/03/22  4:45 PM   Result Value Ref Range    WBC 0.2 (LL) 4.6 - 13.2 K/uL    RBC 3.52 (L) 4.20 - 5.30 M/uL    HGB 10.0 (L) 12.0 - 16.0 g/dL    HCT 31.0 (L) 35.0 - 45.0 %    MCV 88.1 78.0 - 100.0 FL    MCH 28.4 24.0 - 34.0 PG    MCHC 32.3 31.0 - 37.0 g/dL    RDW 29.2 (H) 11.6 - 14.5 %    PLATELET 319 (L) 559 - 420 K/uL    MPV 9.4 9.2 - 11.8 FL    NRBC 0.0 0  WBC    ABSOLUTE NRBC 0.00 0.00 - 0.01 K/uL    NEUTROPHILS 12 (L) 40 - 73 %    LYMPHOCYTES 40 21 - 52 %    MONOCYTES 36 (H) 3 - 10 %    EOSINOPHILS 4 0 - 5 %    BASOPHILS 8 (H) 0 - 2 %    IMMATURE GRANULOCYTES 0 %    ABS. NEUTROPHILS 0.0 (L) 1.8 - 8.0 K/UL    ABS. LYMPHOCYTES 0.1 (L) 0.9 - 3.6 K/UL    ABS. MONOCYTES 0.1 0.05 - 1.2 K/UL    ABS. EOSINOPHILS 0.0 0.0 - 0.4 K/UL    ABS. BASOPHILS 0.0 0.0 - 0.1 K/UL    ABS. IMM.  GRANS. 0.0 K/UL    DF MANUAL      RBC COMMENTS ANISOCYTOSIS  2+        RBC COMMENTS OVALOCYTES  1+       METABOLIC PANEL, COMPREHENSIVE    Collection Time: 07/03/22  4:45 PM   Result Value Ref Range Sodium 132 (L) 136 - 145 mmol/L    Potassium 3.7 3.5 - 5.5 mmol/L    Chloride 100 100 - 111 mmol/L    CO2 22 21 - 32 mmol/L    Anion gap 10 3.0 - 18 mmol/L    Glucose 193 (H) 74 - 99 mg/dL    BUN 19 (H) 7.0 - 18 MG/DL    Creatinine 0.52 (L) 0.6 - 1.3 MG/DL    BUN/Creatinine ratio 37 (H) 12 - 20      GFR est AA >60 >60 ml/min/1.73m2    GFR est non-AA >60 >60 ml/min/1.73m2    Calcium 8.2 (L) 8.5 - 10.1 MG/DL    Bilirubin, total 0.6 0.2 - 1.0 MG/DL    ALT (SGPT) 15 13 - 56 U/L    AST (SGOT) 14 10 - 38 U/L    Alk.  phosphatase 113 45 - 117 U/L    Protein, total 5.5 (L) 6.4 - 8.2 g/dL    Albumin 2.8 (L) 3.4 - 5.0 g/dL    Globulin 2.7 2.0 - 4.0 g/dL    A-G Ratio 1.0 0.8 - 1.7     NT-PRO BNP    Collection Time: 07/03/22  4:45 PM   Result Value Ref Range    NT pro- 0 - 1,800 PG/ML   TROPONIN-HIGH SENSITIVITY    Collection Time: 07/03/22  4:45 PM   Result Value Ref Range    Troponin-High Sensitivity 19 0 - 54 ng/L   MAGNESIUM    Collection Time: 07/03/22  4:45 PM   Result Value Ref Range    Magnesium 1.5 (L) 1.6 - 2.6 mg/dL   CULTURE, BLOOD    Collection Time: 07/03/22  4:50 PM    Specimen: Blood   Result Value Ref Range    Special Requests: NO SPECIAL REQUESTS      Culture result: NO GROWTH AFTER 14 HOURS     URINALYSIS W/ RFLX MICROSCOPIC    Collection Time: 07/03/22  5:00 PM   Result Value Ref Range    Color YELLOW      Appearance CLOUDY      Specific gravity 1.016 1.005 - 1.030      pH (UA) 7.0 5.0 - 8.0      Protein 300 (A) NEG mg/dL    Glucose Negative NEG mg/dL    Ketone Negative NEG mg/dL    Bilirubin Negative NEG      Blood MODERATE (A) NEG      Urobilinogen 0.2 0.2 - 1.0 EU/dL    Nitrites Negative NEG      Leukocyte Esterase MODERATE (A) NEG     URINE MICROSCOPIC ONLY    Collection Time: 07/03/22  5:00 PM   Result Value Ref Range    WBC 21 to 35 0 - 5 /hpf    RBC 11 to 20 0 - 5 /hpf    Epithelial cells FEW 0 - 5 /lpf    Bacteria 2+ (A) NEG /hpf   BLOOD GAS, ARTERIAL POC    Collection Time: 07/03/22  5:12 PM Result Value Ref Range    Device: NASAL CANNULA      FIO2 (POC) 44 %    pH (POC) 7.46 (H) 7.35 - 7.45      pCO2 (POC) 30.0 (L) 35.0 - 45.0 MMHG    pO2 (POC) 74 (L) 80 - 100 MMHG    HCO3 (POC) 21.2 (L) 22 - 26 MMOL/L    sO2 (POC) 95.7 92 - 97 %    Base deficit (POC) 1.9 mmol/L    Set Rate 37 bpm    Allens test (POC) Positive      Site LEFT BRACHIAL      Specimen type (POC) ARTERIAL      Performed by Angi Bell    COVID-19 RAPID TEST    Collection Time: 07/03/22  5:15 PM   Result Value Ref Range    Specimen source Nasopharyngeal      COVID-19 rapid test Not detected NOTD     INFLUENZA A & B AG (RAPID TEST)    Collection Time: 07/03/22  5:15 PM   Result Value Ref Range    Influenza A Antigen Negative NEG      Influenza B Antigen Negative NEG     GLUCOSE, POC    Collection Time: 07/03/22  9:16 PM   Result Value Ref Range    Glucose (POC) 212 (H) 70 - 110 mg/dL   GLUCOSE, POC    Collection Time: 07/04/22 12:12 AM   Result Value Ref Range    Glucose (POC) 74 70 - 110 mg/dL   GLUCOSE, POC    Collection Time: 07/04/22 12:37 AM   Result Value Ref Range    Glucose (POC) 84 70 - 110 mg/dL   PROTHROMBIN TIME + INR    Collection Time: 07/04/22  4:40 AM   Result Value Ref Range    Prothrombin time 15.4 (H) 11.5 - 15.2 sec    INR 1.2 0.8 - 1.2     METABOLIC PANEL, COMPREHENSIVE    Collection Time: 07/04/22  4:40 AM   Result Value Ref Range    Sodium 133 (L) 136 - 145 mmol/L    Potassium 3.1 (L) 3.5 - 5.5 mmol/L    Chloride 100 100 - 111 mmol/L    CO2 21 21 - 32 mmol/L    Anion gap 12 3.0 - 18 mmol/L    Glucose 192 (H) 74 - 99 mg/dL    BUN 15 7.0 - 18 MG/DL    Creatinine 0.61 0.6 - 1.3 MG/DL    BUN/Creatinine ratio 25 (H) 12 - 20      GFR est AA >60 >60 ml/min/1.73m2    GFR est non-AA >60 >60 ml/min/1.73m2    Calcium 8.7 8.5 - 10.1 MG/DL    Bilirubin, total 1.0 0.2 - 1.0 MG/DL    ALT (SGPT) 13 13 - 56 U/L    AST (SGOT) 9 (L) 10 - 38 U/L    Alk.  phosphatase 87 45 - 117 U/L    Protein, total 6.4 6.4 - 8.2 g/dL    Albumin 3.5 3.4 - 5.0 g/dL    Globulin 2.9 2.0 - 4.0 g/dL    A-G Ratio 1.2 0.8 - 1.7     CBC WITH AUTOMATED DIFF    Collection Time: 07/04/22  4:40 AM   Result Value Ref Range    WBC 0.4 (LL) 4.6 - 13.2 K/uL    RBC 3.20 (L) 4.20 - 5.30 M/uL    HGB 9.4 (L) 12.0 - 16.0 g/dL    HCT 28.7 (L) 35.0 - 45.0 %    MCV 89.7 78.0 - 100.0 FL    MCH 29.4 24.0 - 34.0 PG    MCHC 32.8 31.0 - 37.0 g/dL    RDW 28.8 (H) 11.6 - 14.5 %    PLATELET 268 (L) 687 - 420 K/uL    MPV 9.2 9.2 - 11.8 FL    NRBC 0.0 0  WBC    ABSOLUTE NRBC 0.00 0.00 - 0.01 K/uL    NEUTROPHILS 6 (L) 40 - 73 %    LYMPHOCYTES 34 21 - 52 %    MONOCYTES 48 (H) 3 - 10 %    EOSINOPHILS 6 (H) 0 - 5 %    BASOPHILS 6 (H) 0 - 2 %    IMMATURE GRANULOCYTES 0 %    TOTAL CELLS COUNTED 50      ABS. NEUTROPHILS 0.0 (L) 1.8 - 8.0 K/UL    ABS. LYMPHOCYTES 0.1 (L) 0.9 - 3.6 K/UL    ABS. MONOCYTES 0.3 0.05 - 1.2 K/UL    ABS. EOSINOPHILS 0.0 0.0 - 0.4 K/UL    ABS. BASOPHILS 0.0 0.0 - 0.1 K/UL    ABS. IMM.  GRANS. 0.0 K/UL    DF MANUAL      PLATELET COMMENTS DECREASED PLATELETS      RBC COMMENTS ANISOCYTOSIS  2+        RBC COMMENTS OVALOCYTES  1+       MAGNESIUM    Collection Time: 07/04/22  4:40 AM   Result Value Ref Range    Magnesium 2.6 1.6 - 2.6 mg/dL   PHOSPHORUS    Collection Time: 07/04/22  4:40 AM   Result Value Ref Range    Phosphorus 3.2 2.5 - 4.9 MG/DL   CALCIUM, IONIZED    Collection Time: 07/04/22  4:40 AM   Result Value Ref Range    Ionized Calcium 1.12 1.12 - 1.32 MMOL/L   GLUCOSE, POC    Collection Time: 07/04/22  5:53 AM   Result Value Ref Range    Glucose (POC) 226 (H) 70 - 110 mg/dL         Chemistry Recent Labs     07/04/22  0440 07/03/22  1645   * 193*   * 132*   K 3.1* 3.7    100   CO2 21 22   BUN 15 19*   CREA 0.61 0.52*   CA 8.7 8.2*   MG 2.6 1.5*   PHOS 3.2  --    AGAP 12 10   BUCR 25* 37*   AP 87 113   TP 6.4 5.5*   ALB 3.5 2.8*   GLOB 2.9 2.7   AGRAT 1.2 1.0        Lactic Acid Lactic acid   Date Value Ref Range Status   07/03/2022 2.0 0.4 - 2. 0 MMOL/L Final     Recent Labs     07/03/22  1645   LAC 2.0        Liver Enzymes Protein, total   Date Value Ref Range Status   07/04/2022 6.4 6.4 - 8.2 g/dL Final     Albumin   Date Value Ref Range Status   07/04/2022 3.5 3.4 - 5.0 g/dL Final     Globulin   Date Value Ref Range Status   07/04/2022 2.9 2.0 - 4.0 g/dL Final     A-G Ratio   Date Value Ref Range Status   07/04/2022 1.2 0.8 - 1.7   Final     Alk.  phosphatase   Date Value Ref Range Status   07/04/2022 87 45 - 117 U/L Final     Recent Labs     07/04/22  0440 07/03/22  1645   TP 6.4 5.5*   ALB 3.5 2.8*   GLOB 2.9 2.7   AGRAT 1.2 1.0   AP 87 113        CBC w/Diff Recent Labs     07/04/22  0440 07/03/22  1645   WBC 0.4* 0.2*   RBC 3.20* 3.52*   HGB 9.4* 10.0*   HCT 28.7* 31.0*   * 108*   GRANS 6* 12*   LYMPH 34 40   EOS 6* 4        Cardiac Enzymes No results found for: CPK, CK, CKMMB, CKMB, RCK3, CKMBT, CKNDX, CKND1, HUMBERTO, TROPT, TROIQ, JANE, TROPT, TNIPOC, BNP, BNPP     BNP No results found for: BNP, BNPP, XBNPT     Coagulation Recent Labs     07/04/22  0440   PTP 15.4*   INR 1.2         Thyroid  No results found for: T4, T3U, TSH, TSHEXT    No results found for: T4     Urinalysis Lab Results   Component Value Date/Time    Color YELLOW 07/03/2022 05:00 PM    Appearance CLOUDY 07/03/2022 05:00 PM    Specific gravity 1.016 07/03/2022 05:00 PM    pH (UA) 7.0 07/03/2022 05:00 PM    Protein 300 (A) 07/03/2022 05:00 PM    Glucose Negative 07/03/2022 05:00 PM    Ketone Negative 07/03/2022 05:00 PM    Bilirubin Negative 07/03/2022 05:00 PM    Urobilinogen 0.2 07/03/2022 05:00 PM    Nitrites Negative 07/03/2022 05:00 PM    Leukocyte Esterase MODERATE (A) 07/03/2022 05:00 PM    Epithelial cells FEW 07/03/2022 05:00 PM    Bacteria 2+ (A) 07/03/2022 05:00 PM    WBC 21 to 35 07/03/2022 05:00 PM    RBC 11 to 20 07/03/2022 05:00 PM        Micro  Recent Labs     07/03/22  1650 07/03/22  1645   CULT NO GROWTH AFTER 14 HOURS NO GROWTH AFTER 14 HOURS     Recent Labs 07/03/22 1650 07/03/22 1645   CULT NO GROWTH AFTER 14 HOURS NO GROWTH AFTER 14 HOURS          Culture data during this hospitalization. All Micro Results     Procedure Component Value Units Date/Time    CULTURE, BLOOD [[id number]] Collected: 07/03/22 1645    Order Status: Completed Specimen: Blood Updated: 07/04/22 0719     Special Requests: NO SPECIAL REQUESTS        Culture result: NO GROWTH AFTER 14 HOURS       CULTURE, BLOOD [[id number]] Collected: 07/03/22 1650    Order Status: Completed Specimen: Blood Updated: 07/04/22 0719     Special Requests: NO SPECIAL REQUESTS        Culture result: NO GROWTH AFTER 14 HOURS       RESPIRATORY VIRUS PANEL W/COVID-19, PCR [[id number]] Collected: 07/03/22 2110    Order Status: Completed Specimen: NASOPHARYNGEAL SWAB Updated: 07/03/22 2144    C. DIFFICILE AG & TOXIN A/B [[id number]]     Order Status: Sent Specimen: Stool     COVID-19 RAPID TEST [[id number]] Collected: 07/03/22 1715    Order Status: Completed Specimen: Nasopharyngeal Updated: 07/03/22 1839     Specimen source Nasopharyngeal        COVID-19 rapid test Not detected        Comment: Rapid Abbott ID Now       Rapid NAAT:  The specimen is NEGATIVE for SARS-CoV-2, the novel coronavirus associated with COVID-19. Negative results should be treated as presumptive and, if inconsistent with clinical signs and symptoms or necessary for patient management, should be tested with an alternative molecular assay. Negative results do not preclude SARS-CoV-2 infection and should not be used as the sole basis for patient management decisions. This test has been authorized by the FDA under an Emergency Use Authorization (EUA) for use by authorized laboratories.    Fact sheet for Healthcare Providers: ConventionUpdate.co.nz  Fact sheet for Patients: ConventionUpdate.co.nz       Methodology: Isothermal Nucleic Acid Amplification         ENTERIC BACTERIA PANEL, DNA [[id number]] Order Status: Sent Specimen: Stool     INFLUENZA A & B AG (RAPID TEST) [[id number]] Collected: 07/03/22 1715    Order Status: Completed Specimen: Nasopharyngeal from Nasal washing Updated: 07/03/22 1744     Influenza A Antigen Negative        Comment: A negative result does not exclude influenza virus infection, seasonal or H1N1 due to suboptimal sensitivity. If influenza is circulating in your community, a diagnosis of influenza should be considered based on a patients clinical presentation and empiric antiviral treatment should be considered, if indicated. Influenza B Antigen Negative       CULTURE, URINE [[id number]] Collected: 07/03/22 1700    Order Status: Completed Specimen: Urine from Clean catch Updated: 07/03/22 1726             XR ANKLE RT MIN 3 V    Result Date: 7/3/2022  EXAM: ANKLE SERIES Indication: Swelling Technique: Frontal, , oblique, and lateral views of the right ankle. Comparison studies: None. _______________ FINDINGS: -OSSEOUS: No acute or destructive osseous abnormality. Tibiotalar degenerative changes with small degenerative plantar calcaneal spur. Normal alignment with preserved ankle mortise. . -SOFT TISSUES: Benign calcifications of the lower leg with mild diffuse edema of the distal leg to visualized midfoot. . No significant joint effusion. _____________    1. Nonspecific soft tissue edema, with no acute or destructive osseous abnormality. CT HEAD WO CONT    Result Date: 7/3/2022  EXAM: CT HEAD WO CONT CLINICAL INDICATION/HISTORY: ams -Additional: None COMPARISON: None TECHNIQUE: Axial CT imaging of the head was performed without intravenous contrast. One or more dose reduction techniques were used on this CT: automated exposure control, adjustment of the mAs and/or kVp according to patient size, and iterative reconstruction techniques. The specific techniques used on this CT exam have been documented in the patient's electronic medical record.  Digital Imaging and Communications in Medicine (DICOM) format image data are available to nonaffiliated external healthcare facilities or entities on a secure, media free, reciprocally searchable basis with patient authorization for at least a 12-month period after this study. _______________ FINDINGS: BRAIN:   > Brain volume: Age appropriate.   > White matter: Little or no white matter disease. > Infarcts, encephalomalacia: None.   > Parenchymal mass: None.   > Parenchymal hemorrhage: None.   > Midline shift: None.   > Miscellaneous: None. EXTRA-AXIAL SPACES: Unremarkable. No fluid collections. CALVARIUM: Intact. SINUSES, MASTOIDS: Complete opacified left sphenoethmoidal air cells with mild mucosal thickening on the right and small left anterior frontal ethmoidal opacities. OTHER EXTRACRANIAL: Asymmetric dense right globe with findings of prior bilateral cataract surgery. _______________     1. No CT findings of an acute intracranial abnormality. Please note that noncontrast head CT may be normal in early acute infarct. 2. Chronic left sphenoethmoidal sinus disease, with questional small left frontoethmoidal air-fluid level potentially acute on chronic sinus disease. 3. Asymmetric hyperdense right globe. Recommend correlation with patient ocular history. CTA CHEST W OR W WO CONT    Result Date: 7/3/2022  EXAM: CTA Chest INDICATION: Hypoxic. COMPARISON: No recent exams for direct comparison, most recent study from 9/21/2012. TECHNIQUE: Axial CT imaging from the thoracic inlet through the diaphragm with intravenous contrast. Coronal and sagittal MIP reformats were generated. One or more dose reduction techniques were used on this CT: automated exposure control, adjustment of the mAs and/or kVp according to patient size, and iterative reconstruction techniques. The specific techniques used on this CT exam have been documented in the patient's electronic medical record.  Digital Imaging and Communications in Medicine (DICOM) format image data are available to nonaffiliated external healthcare facilities or entities on a secure, media free, reciprocally searchable basis with patient authorization for at least a 12-month period after this study. _______________ FINDINGS: EXAM QUALITY: Adequate bolus timing with mild diffuse respiratory motion artifact degradation. Lakshmi Graven PULMONARY ARTERIES: No evidence of central, interlobar or mid to proximal segmental branch pulmonary arterial filling defect. Asymmetric motion degradation involving the lower lobes. Normal sized main pulmonary artery. Pulmonary embolism. MEDIASTINUM: Normal heart size without pericardial effusion. Coronary artery and aortic atherosclerosis. A right upper chest Mediport is present with the catheter in the SVC. Moderate-sized hiatus hernia. LUNGS and PLEURA: Moderate to large left low attenuating layering pleural effusion. Marked diffuse emphysematous changes with chronic areas of scarring and atelectasis. Bilateral lower lobe groundglass, difficult to differentiate between dependent changes, atelectasis and/or edema. . AIRWAY: Normal. LYMPH NODES: No enlarged nodes. UPPER ABDOMEN: Multiple splenic hypodensities, largest anteriorly measuring 3 cm. Overall normal splenic size measuring 11.3 cm in AP dimension. Prior cholecystectomy OTHER: No acute or aggressive osseous abnormalities identified. _______________     1. Respiratory motion degradation. Otherwise, No evidence of central pulmonary embolism. 2.  Interstitial and groundglass changes suggestive of underlying edema, with moderate to large left-sided pleural effusion. 3. Multiple Indeterminate splenic hypodensities, recommend comparison to any prior recent outside exams and patient history. 4. Moderate-sized hiatus hernia.     XR CHEST PORT    Result Date: 7/3/2022  EXAM: XR CHEST PORT CLINICAL INDICATION/HISTORY: ams fever -Additional: None COMPARISON: None TECHNIQUE: Frontal view of the chest _______________ FINDINGS: SUPPORT LINES AND TUBES:Right-sided tunneled Mediport and catheter tip over the distal SVC. HEART AND MEDIASTINUM: Midline cardiac silhouette appreciable cardiomegaly. Hilar vascular congestion and cephalization. LUNGS AND PLEURAL SPACES: Diffuse bilateral hazy indistinct interstitial opacities with retrocardiac left basilar confluent opacity and blunted costophrenic angle/effusion. No significant right-sided pleural effusion. No pneumothorax. BONY THORAX AND SOFT TISSUES: No acute or destructive osseous abnormality. _______________     1. Findings of CHF versus fluid overload with edema and left pleural effusion. Images report reviewed by me:  CT (Most Recent) (CT chest reviewed by me) Results from Hospital Encounter encounter on 07/03/22    CTA CHEST W OR W WO CONT    Narrative  EXAM: CTA Chest    INDICATION: Hypoxic. COMPARISON: No recent exams for direct comparison, most recent study from  9/21/2012. TECHNIQUE: Axial CT imaging from the thoracic inlet through the diaphragm with  intravenous contrast. Coronal and sagittal MIP reformats were generated. One or more dose reduction techniques were used on this CT: automated exposure  control, adjustment of the mAs and/or kVp according to patient size, and  iterative reconstruction techniques. The specific techniques used on this CT  exam have been documented in the patient's electronic medical record. Digital  Imaging and Communications in Medicine (DICOM) format image data are available  to nonaffiliated external healthcare facilities or entities on a secure, media  free, reciprocally searchable basis with patient authorization for at least a  12-month period after this study. _______________    FINDINGS:    EXAM QUALITY: Adequate bolus timing with mild diffuse respiratory motion  artifact degradation. Pamalee Bucker PULMONARY ARTERIES: No evidence of central, interlobar or mid to proximal  segmental branch pulmonary arterial filling defect.  Asymmetric motion  degradation involving the lower lobes. Normal sized main pulmonary artery. Pulmonary embolism. MEDIASTINUM: Normal heart size without pericardial effusion. Coronary artery and  aortic atherosclerosis. A right upper chest Mediport is present with the  catheter in the SVC. Moderate-sized hiatus hernia. LUNGS and PLEURA: Moderate to large left low attenuating layering pleural  effusion. Marked diffuse emphysematous changes with chronic areas of scarring  and atelectasis. Bilateral lower lobe groundglass, difficult to differentiate  between dependent changes, atelectasis and/or edema. .    AIRWAY: Normal.    LYMPH NODES: No enlarged nodes. UPPER ABDOMEN: Multiple splenic hypodensities, largest anteriorly measuring 3  cm. Overall normal splenic size measuring 11.3 cm in AP dimension. Prior  cholecystectomy    OTHER: No acute or aggressive osseous abnormalities identified. _______________    Impression  1. Respiratory motion degradation. Otherwise, No evidence of central pulmonary  embolism. 2.  Interstitial and groundglass changes suggestive of underlying edema, with  moderate to large left-sided pleural effusion. 3. Multiple Indeterminate splenic hypodensities, recommend comparison to any  prior recent outside exams and patient history. 4. Moderate-sized hiatus hernia. CXR reviewed by me:  XR (Most Recent). CXR  reviewed by me and compared with previous CXR Results from Hospital Encounter encounter on 07/03/22    XR ANKLE RT MIN 3 V    Narrative  EXAM: ANKLE SERIES    Indication: Swelling    Technique: Frontal, , oblique, and lateral views of the right ankle. Comparison studies: None.  _______________  FINDINGS:    -OSSEOUS: No acute or destructive osseous abnormality. Tibiotalar degenerative  changes with small degenerative plantar calcaneal spur. Normal alignment with  preserved ankle mortise. .    -SOFT TISSUES: Benign calcifications of the lower leg with mild diffuse edema of  the distal leg to visualized midfoot. . No significant joint effusion. _____________    Impression  1. Nonspecific soft tissue edema, with no acute or destructive osseous  abnormality. ·Please note: Voice-recognition software may have been used to generate this report, which may have resulted in some phonetic-based errors in grammar and contents. Even though attempts were made to correct all the mistakes, some may have been missed, and remained in the body of the document.       Anayeli Daily MD  7/4/2022

## 2022-07-04 NOTE — PROGRESS NOTES
Zosyn (Piperacillin/Tazobactam) Extended Infusion    Erika Hernandez, a 78 y.o. yo female, has been converted to an extended infusion of Zosyn while in the intensive care unit. Extended infusions will run over 4 hours (240 minutes).     Recent Labs     07/04/22  0440 07/03/22  1645   CREA 0.61 0.52*     Ht Readings from Last 1 Encounters:   07/03/22 170.2 cm (67\")     Wt Readings from Last 1 Encounters:   07/03/22 53.5 kg (118 lb)       CrCl : Serum creatinine: 0.61 mg/dL 07/04/22 0440  Estimated creatinine clearance: 51.4 mL/min    adjustment of extended infusion of Zosyn  4.5 gm every 8 hours for CrCl >/= 20 ml/min    Nixon Lee, [address]

## 2022-07-04 NOTE — PROGRESS NOTES
Bedside shift change report given to Remi Lombardi RN (oncoming nurse) by Nelsy Serrano RN (offgoing nurse). Report included the following information SBAR, Kardex, MAR and Accordion.

## 2022-07-04 NOTE — RT PROTOCOL NOTE
RT Inhaler-Nebulizer Bronchodilator Protocol Note    There is a bronchodilator order in the chart from a provider indicating to follow the RT Bronchodilator Protocol and there is an Initiate RT Bronchodilator Protocol order as well (see protocol at bottom of note). CXR Findings:  Recent Results (from the past 2 days)    XR ANKLE RT MIN 3 V 07/03/2022    Impression  1. Nonspecific soft tissue edema, with no acute or destructive osseous  abnormality. The findings from the last RT Protocol Assessment were as follows:  History of Pulmonary Disease: None or smoker <15 pack years  Respiratory Pattern: Regular pattern and RR 12-20 bpm  Breath Sounds: Slighly diminished and/or crackles  Cough: Strong, spontaneous, non-productive  Indication for Bronchodilator Therapy: Decreased or absent breath sounds  Bronchodilator Assessment Score: 2    Aerosolized bronchodilator medication orders have been revised according to the RT Inhaler-Nebulizer Bronchodilator Protocol below. Respiratory Therapist to perform RT Therapy Protocol Assessment initially then follow the protocol. Repeat RT Therapy Protocol Assessment PRN for score 0-3 or on second treatment, BID, and PRN for scores above 3. No Indications  adjust the frequency to every 6 hours PRN wheezing or bronchospasm, if no treatments needed after 48 hours then discontinue using Per Protocol order mode. If indication present, adjust the RT bronchodilator orders based on the Bronchodilator Assessment Score as indicated below. Use Inhaler orders unless patient has one or more of the following: on home nebulizer, not able to hold breath for 10 seconds, is not alert and oriented, cannot activate and use MDI correctly, or respiratory rate 25 breaths per minute or more, then use the equivalent nebulizer order(s) with same Frequency and PRN reasons based on the score.   If a patient is on this medication at home then do not decrease Frequency below that used at home.    0-3  enter or revise RT bronchodilator order(s) to equivalent RT Bronchodilator order with Frequency of every 4 hours PRN for wheezing or increased work of breathing using Per Protocol order mode. 4-6  enter or revise RT Bronchodilator order(s) to two equivalent RT bronchodilator orders with one order with BID Frequency and one order with Frequency of every 4 hours PRN wheezing or increased work of breathing using Per Protocol order mode. 7-10  enter or revise RT Bronchodilator order(s) to two equivalent RT bronchodilator orders with one order with TID Frequency and one order with Frequency of every 4 hours PRN wheezing or increased work of breathing using Per Protocol order mode. 11-13  enter or revise RT Bronchodilator order(s) to one equivalent RT bronchodilator order with QID Frequency and an Albuterol order with Frequency of every 4 hours PRN wheezing or increased work of breathing using Per Protocol order mode. Greater than 13  enter or revise RT Bronchodilator order(s) to one equivalent RT bronchodilator order with every 4 hours Frequency and an Albuterol order with Frequency of every 2 hours PRN wheezing or increased work of breathing using Per Protocol order mode. RT to enter RT Home Evaluation for COPD & MDI Assessment order using Per Protocol order mode.     Electronically signed by RT Karon on 7/4/2022 at 12:27 AM

## 2022-07-04 NOTE — PROGRESS NOTES
attempted to visit with patient. She was sound asleep and did not awaken, no family seen. Rev.  Pennie Lin,Certified [employer] [employer] [facility]   Coordinator [place]  [phone]

## 2022-07-04 NOTE — PROGRESS NOTES
Problem: Dysphagia (Adult)  Goal: *Acute Goals and Plan of Care (Insert Text)  Description: Patient will:  1. Tolerate PO trials with 0 s/s overt distress in 4/5 trials  2. Participate in training and education related to continued aspiration risk, diet recs and compensatory strategies     Recommend:   Regular diet with thin liquids  Meds per patient preference  Aspiration precautions  HOB >45 degrees during all intake and for at least 30 min after po   Small bites/sips, Slow rate of intake    Outcome: Progressing Towards Goal       SPEECH LANGUAGE PATHOLOGY BEDSIDE SWALLOW   EVALUATION & TREATMENT     Patient: Anna Perkins (59 y.o. female)  Date: 7/4/2022  Primary Diagnosis: Neutropenic fever ([facility] [address] [D70.9, R50.81]  Precautions: aspiration     PLOF: as per H&P    ASSESSMENT :  Based on the objective data described below, the patient presents with no overt s/sx of dysphagia at this time. Patient A&Ox4- slight delay in responses noted. OME (oral mech exam) revealed WNL for all tasks performed. Patient noted to be tremulous in her hand and voice. Patient reported no issues with swallowing/meals prior to hospitalization. Patient requires feeding assistance. PO trials consumed: ice, thin +/- straw, puree, and regular solid. No overt s/sx of aspiration/penetration noted for all consistencies consumed. Patient demonstrated adequate mastication, bolus manipulation, and A-P transit. Laryngeal elevation upon palpation appeared functional with timely swallow initiation. 0 residue appreciated post swallow. Recommend regular diet with thin liquids Meds per patient preference. SLP following to check diet tolerance. Feeding assistance. D/w RN. TREATMENT :  Skilled therapy initiated; Educated pt on aspiration precautions and importance of compensatory swallow techniques to decrease aspiration risk (decrease rate of intake & sip/bite size, upright @HOB for all po intake and ~30 minutes after po); verbalized comprehension.  SLP to follow as indicated. Patient will benefit from skilled intervention to address the above impairments. Patient's rehabilitation potential is considered to be GooD  Factors which may influence rehabilitation potential include:   []            None noted  []            Mental ability/status  [x]            Medical condition  []            Home/family situation and support systems  []            Safety awareness  []            Pain tolerance/management  []            Other:      PLAN :  Recommendations and Planned Interventions:  See above  Frequency/Duration: Patient will be followed by speech-language pathology 1-2 times to check tolerance/safety  Discharge Recommendations: To Be Determined     SUBJECTIVE:   Patient stated I eat fine. OBJECTIVE:     Past Medical History:   Diagnosis Date    Arthritis     GERD (gastroesophageal reflux disease)     Hypertension     Osteoarthritis of right knee 9/19/2012    S/P total knee arthroplasty 9/19/2012    Thyroid disease      Past Surgical History:   Procedure Laterality Date    HX APPENDECTOMY  1950's    HX CHOLECYSTECTOMY      laparoscopic    HX HEENT      Surgery OD infection x 4    HX HEENT      throat surgery x 2    HX KNEE ARTHROSCOPY      right     Home Situation: did not assess  Diet prior to admission: regular with thin liquids  Current Diet:  regular with thin liquids   Cognitive and Communication Status:  Neurologic State: Alert  Orientation Level: Oriented X4  Cognition: Follows commands  Oral Assessment:  Oral Assessment  Labial: No impairment  Dentition: Natural  Oral Hygiene: adequate  Lingual: No impairment  Velum: No impairment  Mandible: No impairment  P.O. Trials:  Patient Position: 45 at [facility]  Vocal quality prior to P.O.: Tremorous  Consistency Presented: Puree; Solid; Thin liquid; Ice chips  How Presented: Self-fed/presented;SLP-fed/presented;Cup/sip;Spoon;Straw;Successive swallows  Bolus Acceptance: No impairment  Bolus Formation/Control: No impairment  Propulsion: No impairment  Oral Residue: None  Initiation of Swallow: No impairment  Laryngeal Elevation: Functional  Aspiration Signs/Symptoms: None  Pharyngeal Phase Characteristics: Poor endurance;Easily fatigued   Effective Modifications: None  Cues for Modifications: None  Oral Phase Severity: No impairment  Pharyngeal Phase Severity : No impairment    PAIN:  Pain level pre-treatment: non reported/10   Pain level post-treatment: non reported/10   After treatment:   []            Patient left in no apparent distress sitting up in chair  [x]            Patient left in no apparent distress in bed  [x]            Call bell left within reach  [x]            Nursing notified  []            Family present  []            Caregiver present  []            Bed alarm activated    COMMUNICATION/EDUCATION:   [x]            Aspiration precautions; swallow safety; compensatory techniques. [x]            Patient/family have participated as able in goal setting and plan of care. []            Patient/family agree to work toward stated goals and plan of care. []            Patient understands intent and goals of therapy; neutral about participation. []            Patient unable to participate in goal setting/plan of care; educ ongoing with interdisciplinary staff  [x]         Posted safety precautions in patient's room.     Thank you for this referral.  Jocy Flores M.S., CFY-SLP  Speech-Language Pathologist

## 2022-07-05 ENCOUNTER — APPOINTMENT (OUTPATIENT)
Dept: NON INVASIVE DIAGNOSTICS | Age: 82
DRG: 871 | End: 2022-07-05
Attending: HOSPITALIST
Payer: MEDICARE

## 2022-07-05 ENCOUNTER — APPOINTMENT (OUTPATIENT)
Dept: NON INVASIVE DIAGNOSTICS | Age: 82
DRG: 871 | End: 2022-07-05
Attending: INTERNAL MEDICINE
Payer: MEDICARE

## 2022-07-05 ENCOUNTER — APPOINTMENT (OUTPATIENT)
Dept: VASCULAR SURGERY | Age: 82
DRG: 871 | End: 2022-07-05
Attending: INTERNAL MEDICINE
Payer: MEDICARE

## 2022-07-05 ENCOUNTER — APPOINTMENT (OUTPATIENT)
Dept: GENERAL RADIOLOGY | Age: 82
DRG: 871 | End: 2022-07-05
Attending: INTERNAL MEDICINE
Payer: MEDICARE

## 2022-07-05 LAB
ALBUMIN SERPL-MCNC: 2.8 G/DL (ref 3.4–5)
ALBUMIN/GLOB SERPL: 1.3 {RATIO} (ref 0.8–1.7)
ALP SERPL-CCNC: 73 U/L (ref 45–117)
ALT SERPL-CCNC: 11 U/L (ref 13–56)
ANION GAP SERPL CALC-SCNC: 6 MMOL/L (ref 3–18)
AST SERPL-CCNC: 5 U/L (ref 10–38)
BASOPHILS # BLD: ABNORMAL K/UL
BASOPHILS NFR BLD: ABNORMAL %
BILIRUB SERPL-MCNC: 0.6 MG/DL (ref 0.2–1)
BNP SERPL-MCNC: 4592 PG/ML (ref 0–1800)
BUN SERPL-MCNC: 9 MG/DL (ref 7–18)
BUN/CREAT SERPL: 17 (ref 12–20)
CA-I SERPL-SCNC: 1.15 MMOL/L (ref 1.12–1.32)
CALCIUM SERPL-MCNC: 8.4 MG/DL (ref 8.5–10.1)
CHLORIDE SERPL-SCNC: 102 MMOL/L (ref 100–111)
CO2 SERPL-SCNC: 24 MMOL/L (ref 21–32)
CREAT SERPL-MCNC: 0.53 MG/DL (ref 0.6–1.3)
DATE LAST DOSE: ABNORMAL
DIFFERENTIAL METHOD BLD: ABNORMAL
ECHO AO ROOT DIAM: 3.4 CM
ECHO AO ROOT INDEX: 2.1 CM/M2
ECHO AV AREA PEAK VELOCITY: 1.9 CM2
ECHO AV AREA VTI: 2.1 CM2
ECHO AV AREA/BSA PEAK VELOCITY: 1.2 CM2/M2
ECHO AV AREA/BSA VTI: 1.3 CM2/M2
ECHO AV MEAN GRADIENT: 7 MMHG
ECHO AV MEAN VELOCITY: 1.3 M/S
ECHO AV PEAK GRADIENT: 12 MMHG
ECHO AV PEAK VELOCITY: 1.7 M/S
ECHO AV VELOCITY RATIO: 0.59
ECHO AV VTI: 22.9 CM
ECHO EST RA PRESSURE: 3 MMHG
ECHO LA DIAMETER INDEX: 2.04 CM/M2
ECHO LA DIAMETER: 3.3 CM
ECHO LA TO AORTIC ROOT RATIO: 0.97
ECHO LA VOL 2C: 30 ML (ref 22–52)
ECHO LA VOL 4C: 34 ML (ref 22–52)
ECHO LA VOL BP: 32 ML (ref 22–52)
ECHO LA VOL/BSA BIPLANE: 20 ML/M2 (ref 16–34)
ECHO LA VOLUME AREA LENGTH: 34 ML
ECHO LA VOLUME INDEX A2C: 19 ML/M2 (ref 16–34)
ECHO LA VOLUME INDEX A4C: 21 ML/M2 (ref 16–34)
ECHO LA VOLUME INDEX AREA LENGTH: 21 ML/M2 (ref 16–34)
ECHO LV EDV A2C: 38 ML
ECHO LV EDV A4C: 28 ML
ECHO LV EDV BP: 34 ML (ref 56–104)
ECHO LV EDV INDEX A4C: 17 ML/M2
ECHO LV EDV INDEX BP: 21 ML/M2
ECHO LV EDV NDEX A2C: 23 ML/M2
ECHO LV EJECTION FRACTION A2C: 69 %
ECHO LV EJECTION FRACTION A4C: 54 %
ECHO LV EJECTION FRACTION BIPLANE: 64 % (ref 55–100)
ECHO LV ESV A2C: 12 ML
ECHO LV ESV A4C: 13 ML
ECHO LV ESV BP: 12 ML (ref 19–49)
ECHO LV ESV INDEX A2C: 7 ML/M2
ECHO LV ESV INDEX A4C: 8 ML/M2
ECHO LV ESV INDEX BP: 7 ML/M2
ECHO LV FRACTIONAL SHORTENING: 24 % (ref 28–44)
ECHO LV INTERNAL DIMENSION DIASTOLE INDEX: 2.28 CM/M2
ECHO LV INTERNAL DIMENSION DIASTOLIC: 3.7 CM (ref 3.9–5.3)
ECHO LV INTERNAL DIMENSION SYSTOLIC INDEX: 1.73 CM/M2
ECHO LV INTERNAL DIMENSION SYSTOLIC: 2.8 CM
ECHO LV IVSD: 0.9 CM (ref 0.6–0.9)
ECHO LV MASS 2D: 89.5 G (ref 67–162)
ECHO LV MASS INDEX 2D: 55.2 G/M2 (ref 43–95)
ECHO LV POSTERIOR WALL DIASTOLIC: 0.8 CM (ref 0.6–0.9)
ECHO LV RELATIVE WALL THICKNESS RATIO: 0.43
ECHO LVOT AREA: 3.5 CM2
ECHO LVOT AV VTI INDEX: 0.62
ECHO LVOT DIAM: 2.1 CM
ECHO LVOT MEAN GRADIENT: 2 MMHG
ECHO LVOT PEAK GRADIENT: 4 MMHG
ECHO LVOT PEAK VELOCITY: 1 M/S
ECHO LVOT STROKE VOLUME INDEX: 30.1 ML/M2
ECHO LVOT SV: 48.8 ML
ECHO LVOT VTI: 14.1 CM
ECHO RIGHT VENTRICULAR SYSTOLIC PRESSURE (RVSP): 41 MMHG
ECHO RV FREE WALL PEAK S': 21 CM/S
ECHO RV INTERNAL DIMENSION: 3.5 CM
ECHO RV TAPSE: 1.5 CM (ref 1.7–?)
ECHO TV REGURGITANT MAX VELOCITY: 3.07 M/S
ECHO TV REGURGITANT PEAK GRADIENT: 38 MMHG
EOSINOPHIL # BLD: ABNORMAL K/UL
EOSINOPHIL NFR BLD: ABNORMAL %
ERYTHROCYTE [DISTWIDTH] IN BLOOD BY AUTOMATED COUNT: 29.1 % (ref 11.6–14.5)
GLOBULIN SER CALC-MCNC: 2.1 G/DL (ref 2–4)
GLUCOSE BLD STRIP.AUTO-MCNC: 156 MG/DL (ref 70–110)
GLUCOSE BLD STRIP.AUTO-MCNC: 178 MG/DL (ref 70–110)
GLUCOSE BLD STRIP.AUTO-MCNC: 196 MG/DL (ref 70–110)
GLUCOSE BLD STRIP.AUTO-MCNC: 207 MG/DL (ref 70–110)
GLUCOSE BLD STRIP.AUTO-MCNC: 240 MG/DL (ref 70–110)
GLUCOSE SERPL-MCNC: 147 MG/DL (ref 74–99)
HCT VFR BLD AUTO: 28.6 % (ref 35–45)
HGB BLD-MCNC: 9.1 G/DL (ref 12–16)
IMM GRANULOCYTES # BLD AUTO: ABNORMAL K/UL
IMM GRANULOCYTES NFR BLD AUTO: ABNORMAL %
LYMPHOCYTES # BLD: ABNORMAL K/UL
LYMPHOCYTES NFR BLD: ABNORMAL %
MAGNESIUM SERPL-MCNC: 1.9 MG/DL (ref 1.6–2.6)
MCH RBC QN AUTO: 28.7 PG (ref 24–34)
MCHC RBC AUTO-ENTMCNC: 31.8 G/DL (ref 31–37)
MCV RBC AUTO: 90.2 FL (ref 78–100)
MONOCYTES # BLD: ABNORMAL K/UL
MONOCYTES NFR BLD: ABNORMAL %
NEUTS SEG # BLD: ABNORMAL K/UL
NEUTS SEG NFR BLD: ABNORMAL %
NRBC # BLD: 0 K/UL (ref 0–0.01)
NRBC BLD-RTO: 0 PER 100 WBC
PHOSPHATE SERPL-MCNC: 1.9 MG/DL (ref 2.5–4.9)
PLATELET # BLD AUTO: 94 K/UL (ref 135–420)
PMV BLD AUTO: 9.4 FL (ref 9.2–11.8)
POTASSIUM SERPL-SCNC: 3.7 MMOL/L (ref 3.5–5.5)
POTASSIUM SERPL-SCNC: 3.7 MMOL/L (ref 3.5–5.5)
PROCALCITONIN SERPL-MCNC: 3.72 NG/ML
PROT SERPL-MCNC: 4.9 G/DL (ref 6.4–8.2)
RBC # BLD AUTO: 3.17 M/UL (ref 4.2–5.3)
REPORTED DOSE,DOSE: ABNORMAL UNITS
REPORTED DOSE/TIME,TMG: 1600
SODIUM SERPL-SCNC: 132 MMOL/L (ref 136–145)
TROPONIN-HIGH SENSITIVITY: 15 NG/L (ref 0–54)
VANCOMYCIN TROUGH SERPL-MCNC: 7.5 UG/ML (ref 10–20)
WBC # BLD AUTO: 0.5 K/UL (ref 4.6–13.2)
WBC #/AREA STL HPF: NORMAL /HPF

## 2022-07-05 PROCEDURE — 87493 C DIFF AMPLIFIED PROBE: CPT

## 2022-07-05 PROCEDURE — 89055 LEUKOCYTE ASSESSMENT FECAL: CPT

## 2022-07-05 PROCEDURE — 74011250636 HC RX REV CODE- 250/636: Performed by: HOSPITALIST

## 2022-07-05 PROCEDURE — 80202 ASSAY OF VANCOMYCIN: CPT

## 2022-07-05 PROCEDURE — 74011250636 HC RX REV CODE- 250/636: Performed by: INTERNAL MEDICINE

## 2022-07-05 PROCEDURE — 93970 EXTREMITY STUDY: CPT

## 2022-07-05 PROCEDURE — 84132 ASSAY OF SERUM POTASSIUM: CPT

## 2022-07-05 PROCEDURE — 74011636637 HC RX REV CODE- 636/637: Performed by: HOSPITALIST

## 2022-07-05 PROCEDURE — 74011000258 HC RX REV CODE- 258: Performed by: INTERNAL MEDICINE

## 2022-07-05 PROCEDURE — 99221 1ST HOSP IP/OBS SF/LOW 40: CPT | Performed by: NURSE PRACTITIONER

## 2022-07-05 PROCEDURE — 92526 ORAL FUNCTION THERAPY: CPT

## 2022-07-05 PROCEDURE — C9113 INJ PANTOPRAZOLE SODIUM, VIA: HCPCS | Performed by: HOSPITALIST

## 2022-07-05 PROCEDURE — 65610000006 HC RM INTENSIVE CARE

## 2022-07-05 PROCEDURE — 36415 COLL VENOUS BLD VENIPUNCTURE: CPT

## 2022-07-05 PROCEDURE — 82962 GLUCOSE BLOOD TEST: CPT

## 2022-07-05 PROCEDURE — 84100 ASSAY OF PHOSPHORUS: CPT

## 2022-07-05 PROCEDURE — 93306 TTE W/DOPPLER COMPLETE: CPT

## 2022-07-05 PROCEDURE — 85025 COMPLETE CBC W/AUTO DIFF WBC: CPT

## 2022-07-05 PROCEDURE — 84484 ASSAY OF TROPONIN QUANT: CPT

## 2022-07-05 PROCEDURE — 74011000250 HC RX REV CODE- 250: Performed by: INTERNAL MEDICINE

## 2022-07-05 PROCEDURE — 82330 ASSAY OF CALCIUM: CPT

## 2022-07-05 PROCEDURE — 87324 CLOSTRIDIUM AG IA: CPT

## 2022-07-05 PROCEDURE — 74011000250 HC RX REV CODE- 250: Performed by: HOSPITALIST

## 2022-07-05 PROCEDURE — 83880 ASSAY OF NATRIURETIC PEPTIDE: CPT

## 2022-07-05 PROCEDURE — P9047 ALBUMIN (HUMAN), 25%, 50ML: HCPCS | Performed by: HOSPITALIST

## 2022-07-05 PROCEDURE — 87506 IADNA-DNA/RNA PROBE TQ 6-11: CPT

## 2022-07-05 PROCEDURE — 74011250637 HC RX REV CODE- 250/637: Performed by: INTERNAL MEDICINE

## 2022-07-05 PROCEDURE — 77010033678 HC OXYGEN DAILY

## 2022-07-05 PROCEDURE — 83735 ASSAY OF MAGNESIUM: CPT

## 2022-07-05 PROCEDURE — 71045 X-RAY EXAM CHEST 1 VIEW: CPT

## 2022-07-05 RX ORDER — IPRATROPIUM BROMIDE 0.5 MG/2.5ML
0.5 SOLUTION RESPIRATORY (INHALATION)
Status: DISCONTINUED | OUTPATIENT
Start: 2022-07-05 | End: 2022-07-16 | Stop reason: HOSPADM

## 2022-07-05 RX ORDER — POTASSIUM CHLORIDE 20 MEQ/1
20 TABLET, EXTENDED RELEASE ORAL ONCE
Status: COMPLETED | OUTPATIENT
Start: 2022-07-05 | End: 2022-07-05

## 2022-07-05 RX ORDER — DIGOXIN 0.25 MG/ML
0.25 INJECTION INTRAMUSCULAR; INTRAVENOUS ONCE
Status: COMPLETED | OUTPATIENT
Start: 2022-07-05 | End: 2022-07-05

## 2022-07-05 RX ORDER — DIGOXIN 0.25 MG/ML
0.25 INJECTION INTRAMUSCULAR; INTRAVENOUS ONCE
Status: COMPLETED | OUTPATIENT
Start: 2022-07-06 | End: 2022-07-06

## 2022-07-05 RX ORDER — DIGOXIN 0.25 MG/ML
0.5 INJECTION INTRAMUSCULAR; INTRAVENOUS ONCE
Status: COMPLETED | OUTPATIENT
Start: 2022-07-05 | End: 2022-07-05

## 2022-07-05 RX ORDER — ENOXAPARIN SODIUM 100 MG/ML
1 INJECTION SUBCUTANEOUS EVERY 12 HOURS
Status: DISCONTINUED | OUTPATIENT
Start: 2022-07-05 | End: 2022-07-11

## 2022-07-05 RX ORDER — POTASSIUM CHLORIDE 7.45 MG/ML
10 INJECTION INTRAVENOUS
Status: COMPLETED | OUTPATIENT
Start: 2022-07-05 | End: 2022-07-05

## 2022-07-05 RX ORDER — AMIODARONE HYDROCHLORIDE 200 MG/1
100 TABLET ORAL DAILY
Status: DISCONTINUED | OUTPATIENT
Start: 2022-07-05 | End: 2022-07-16 | Stop reason: HOSPADM

## 2022-07-05 RX ADMIN — AMIODARONE HYDROCHLORIDE 100 MG: 200 TABLET ORAL at 11:56

## 2022-07-05 RX ADMIN — PHENYLEPHRINE HYDROCHLORIDE 75 MCG/MIN: 10 INJECTION INTRAVENOUS at 12:50

## 2022-07-05 RX ADMIN — FUROSEMIDE 20 MG: 10 INJECTION, SOLUTION INTRAMUSCULAR; INTRAVENOUS at 09:57

## 2022-07-05 RX ADMIN — ALBUMIN (HUMAN) 25 G: 0.25 INJECTION, SOLUTION INTRAVENOUS at 06:49

## 2022-07-05 RX ADMIN — PIPERACILLIN AND TAZOBACTAM 4.5 G: 4; .5 INJECTION, POWDER, LYOPHILIZED, FOR SOLUTION INTRAVENOUS at 18:29

## 2022-07-05 RX ADMIN — Medication 2 UNITS: at 06:51

## 2022-07-05 RX ADMIN — SODIUM CHLORIDE, PRESERVATIVE FREE 10 ML: 5 INJECTION INTRAVENOUS at 14:00

## 2022-07-05 RX ADMIN — VANCOMYCIN HYDROCHLORIDE 750 MG: 750 INJECTION, POWDER, LYOPHILIZED, FOR SOLUTION INTRAVENOUS at 16:42

## 2022-07-05 RX ADMIN — Medication 2 UNITS: at 18:29

## 2022-07-05 RX ADMIN — POTASSIUM CHLORIDE 10 MEQ: 7.46 INJECTION, SOLUTION INTRAVENOUS at 14:58

## 2022-07-05 RX ADMIN — SODIUM CHLORIDE, PRESERVATIVE FREE 40 MG: 5 INJECTION INTRAVENOUS at 09:57

## 2022-07-05 RX ADMIN — Medication 2 UNITS: at 00:14

## 2022-07-05 RX ADMIN — PHENYLEPHRINE HYDROCHLORIDE 80 MCG/MIN: 10 INJECTION INTRAVENOUS at 17:00

## 2022-07-05 RX ADMIN — NOREPINEPHRINE BITARTRATE 7 MCG/MIN: 8 INJECTION, SOLUTION INTRAVENOUS at 07:41

## 2022-07-05 RX ADMIN — DIGOXIN 500 MCG: 0.25 INJECTION INTRAMUSCULAR; INTRAVENOUS at 13:44

## 2022-07-05 RX ADMIN — ALBUMIN (HUMAN) 25 G: 0.25 INJECTION, SOLUTION INTRAVENOUS at 18:29

## 2022-07-05 RX ADMIN — DIGOXIN 250 MCG: 250 INJECTION, SOLUTION INTRAMUSCULAR; INTRAVENOUS; PARENTERAL at 22:08

## 2022-07-05 RX ADMIN — ALBUMIN (HUMAN) 25 G: 0.25 INJECTION, SOLUTION INTRAVENOUS at 00:10

## 2022-07-05 RX ADMIN — DOXYCYCLINE 100 MG: 100 INJECTION, POWDER, LYOPHILIZED, FOR SOLUTION INTRAVENOUS at 12:23

## 2022-07-05 RX ADMIN — POTASSIUM CHLORIDE 20 MEQ: 20 TABLET, EXTENDED RELEASE ORAL at 20:35

## 2022-07-05 RX ADMIN — PIPERACILLIN AND TAZOBACTAM 4.5 G: 4; .5 INJECTION, POWDER, LYOPHILIZED, FOR SOLUTION INTRAVENOUS at 09:56

## 2022-07-05 RX ADMIN — Medication 4 UNITS: at 13:44

## 2022-07-05 RX ADMIN — VANCOMYCIN HYDROCHLORIDE 750 MG: 750 INJECTION, POWDER, LYOPHILIZED, FOR SOLUTION INTRAVENOUS at 06:49

## 2022-07-05 RX ADMIN — ALBUMIN (HUMAN) 25 G: 0.25 INJECTION, SOLUTION INTRAVENOUS at 12:24

## 2022-07-05 RX ADMIN — PIPERACILLIN AND TAZOBACTAM 4.5 G: 4; .5 INJECTION, POWDER, LYOPHILIZED, FOR SOLUTION INTRAVENOUS at 02:24

## 2022-07-05 RX ADMIN — AMIODARONE HYDROCHLORIDE 150 MG: 1.5 INJECTION, SOLUTION INTRAVENOUS at 12:05

## 2022-07-05 RX ADMIN — ENOXAPARIN SODIUM 50 MG: 100 INJECTION SUBCUTANEOUS at 13:51

## 2022-07-05 RX ADMIN — POTASSIUM CHLORIDE 10 MEQ: 7.46 INJECTION, SOLUTION INTRAVENOUS at 14:16

## 2022-07-05 RX ADMIN — SODIUM CHLORIDE, PRESERVATIVE FREE 10 ML: 5 INJECTION INTRAVENOUS at 06:51

## 2022-07-05 RX ADMIN — POTASSIUM CHLORIDE 10 MEQ: 7.46 INJECTION, SOLUTION INTRAVENOUS at 00:11

## 2022-07-05 RX ADMIN — SODIUM CHLORIDE, PRESERVATIVE FREE 10 ML: 5 INJECTION INTRAVENOUS at 22:21

## 2022-07-05 RX ADMIN — PHENYLEPHRINE HYDROCHLORIDE 30 MCG/MIN: 10 INJECTION INTRAVENOUS at 12:05

## 2022-07-05 NOTE — PROGRESS NOTES
[facility]  (A Division of [address] [place] [address])                                                                                                                      Jake Perdomo MD  Office #: - Option [phone]  Fax #: [phone]     Date of Admission: 7/3/2022Date of Note: 7/5/2022  Reason for Referral: Evaluation and antibiotic management of Neutropenic Fever/ Septic shock. Current Antimicrobials:    Prior Antimicrobials:  Vancomycin and Zosyn  Zithromax 7/4-> doxycycline 7/5    Immunosuppressive drugs: chemotherapy-- R-CHOP last 6/28/22        Assessment- ID related:  --------------------------------------------------------------------------    Critically ill and neutropenic patient with:    · Septic Shock  -- etiology for infection multiple given her level of Neutropenia-->> Suspect urinary +/- R ankle possible cellulitis. Susp resp change more due to CHF/fluid . Line related/ possible sinus source-- no growth so far.   --UA is abnormal-pyuria  --CT lung: interstial edema/pleural effusion-- possible>> PNA( BNP slightly up)  --multiple hypodense lesion on spleen-- appeared smaller in April C/W Jan 2022 as well.  Tumor related?  --Urine ag- leg/Pneumon 7/4: Negative  --Elevated Procal 3.72  · Neutropenic Fever  · NHL-- post R-CHOP 6/28/22, received growth factor as well  · Hx of PNA/Septic shock at [facility] 3/2022-- 2/2 Pneumococcus( urine ag +)  · History of Bacteroides Bacteremia 01/2022    Other Medical Issues- Mx per respective team:    · DM T2  · HLD  · A fib with RVR history   · Hx of graves ds, R eye blind   Recommendation for ID issues I am following:  ------------------------------------------------------------------------------  DW Dr Abad Cisse, ICU NP/ RN and pt       Remains neutropenic-  only  --cont with Broad ABX- Vanco/Zosyn- GI well covered  --Rui Kennedy to change zithromax to doxycycline for atypical  --No antifungal indicated at this time  -- Pharmacy on case at length- [place]     BCX--NGSF  UCX 7/3-- GNR->100K, ID pending    Daily CBC/CMP    Type/Duration based on clinical progress and culture     Oncology following, ICU supportive care per team                      -> received IV push amiodrone. Cardiology following       Subjective:  Pt is awake, reports no comlaints. [place]   in room with her  She remains on 1 pressor  No fever/ sob/cough/abd pain  Lucero in place    CXR this am:    Bilateral interstitial and parenchymal opacities. Small to moderate left  effusion/atelectasis. HPI:     [place] is a 78 y.o. DECLINED with PMH as listed below-- she had her last chemo on 6/28 and received growth factor as well. She came to ED with fever/lethargy/ weakness and SOB of 1 day and found to have fever to 100.7, Neutropenia to 200, pyruria, and CT head/CT chest done as well as CXR and ankle X-ray R foot done and admitted to ICU. She is requiring levophed support for border line BP. During interview( hard of hearing too), she denies HA/sorethroat. Chest pain, abd pain, dysuria. She has no focal complaint during exam.  Denies viral/COVID infection at home. Feels SOB. She is currently receiving V/Z.     Care Everywhere-- shows admission in 01 and 04 2022 with diagnosis of sepsis- Bacteroides bacteremia in jan( also dx of cancer)  and PNA with septic shock in 04/2022        Active Hospital Problems    Diagnosis Date Noted    Septic shock ([address] 07/04/2022    Neutropenic fever ([address] 07/03/2022    Acute respiratory failure with hypoxia ([address] 07/03/2022    Hypomagnesemia 07/03/2022    Diffuse large B cell lymphoma ([address] 07/03/2022    Hyponatremia 07/03/2022    Pancytopenia ([address] 07/03/2022    UTI (urinary tract infection) 07/03/2022    Sinusitis 07/03/2022    Myopia of both eyes with astigmatism 07/03/2022    Pleural effusion 07/03/2022    Cellulitis 07/03/2022    Severe protein-calorie malnutrition ([address] 07/03/2022    GERD (gastroesophageal reflux disease)     Thyroid disease     Acute encephalopathy     Hypokalemia 2012     Past Medical History:   Diagnosis Date    Arthritis     GERD (gastroesophageal reflux disease)     Hypertension     Osteoarthritis of right knee 2012    S/P total knee arthroplasty 2012    Thyroid disease      Past Surgical History:   Procedure Laterality Date    HX APPENDECTOMY  's    HX CHOLECYSTECTOMY      laparoscopic    HX HEENT      Surgery OD infection x 4    HX HEENT      throat surgery x 2    HX KNEE ARTHROSCOPY      right     No family history on file. Social History     Socioeconomic History    Marital status:      Spouse name: Not on file    Number of children: Not on file    Years of education: Not on file    Highest education level: Not on file   Occupational History    Not on file   Tobacco Use    Smoking status: Former Smoker     Quit date: 1999     Years since quittin.4    Smokeless tobacco: Never Used   Substance and Sexual Activity    Alcohol use: Yes     Alcohol/week: 1.7 standard drinks     Types: 1 Glasses of wine, 1 Cans of beer per week    Drug use: No    Sexual activity: Not on file   Other Topics Concern    Not on file   Social History Narrative    Not on file     Social Determinants of Health     Financial Resource Strain:     Difficulty of Paying Living Expenses: Not on file   Food Insecurity:     Worried About Running Out of Food in the Last Year: Not on file    Patricia of Food in the Last Year: Not on file   Transportation Needs:     Lack of Transportation (Medical): Not on file    Lack of Transportation (Non-Medical):  Not on file   Physical Activity:     Days of Exercise per Week: Not on file    Minutes of Exercise per Session: Not on file   Stress:     Feeling of Stress : Not on file   Social Connections:     Frequency of Communication with Friends and Family: Not on file    Frequency of Social Gatherings with Friends and Family: Not on file    Attends Oriental orthodox Services: Not on file    Active Member of Clubs or Organizations: Not on file    Attends Club or Organization Meetings: Not on file    Marital Status: Not on file   Intimate Partner Violence:     Fear of Current or Ex-Partner: Not on file    Emotionally Abused: Not on file    Physically Abused: Not on file    Sexually Abused: Not on file   Housing Stability:     Unable to Pay for Housing in the Last Year: Not on file    Number of Places Lived in the Last Year: Not on file    Unstable Housing in the Last Year: Not on file       Allergies:  Adhesive tape-silicones and Penicillin g     Medications:  Current Facility-Administered Medications   Medication Dose Route Frequency    doxycycline (VIBRAMYCIN) 100 mg in 0.9% sodium chloride (MBP/ADV) 100 mL MBP  100 mg IntraVENous Q12H    PHENYLephrine (VISHAL-SYNEPHRINE) 30 mg in 0.9% sodium chloride 250 mL infusion   mcg/min IntraVENous TITRATE    amiodarone (CORDARONE) tablet 100 mg  100 mg Oral DAILY    enoxaparin (LOVENOX) injection 50 mg  1 mg/kg SubCUTAneous Q12H    ipratropium (ATROVENT) 0.02 % nebulizer solution 0.5 mg  0.5 mg Nebulization TID PRN    digoxin (LANOXIN) injection 500 mcg  0.5 mg IntraVENous ONCE    [Held by provider] NOREPINephrine (LEVOPHED) 8 mg in 5% dextrose 250mL (32 mcg/mL) infusion  0.5-30 mcg/min IntraVENous TITRATE    albuterol (PROVENTIL VENTOLIN) nebulizer solution 2.5 mg  2.5 mg Nebulization Q4H PRN    piperacillin-tazobactam (ZOSYN) 4.5 g in 0.9% sodium chloride (MBP/ADV) 100 mL MBP  4.5 g IntraVENous Q8H    sodium chloride (NS) flush 5-40 mL  5-40 mL IntraVENous Q8H    sodium chloride (NS) flush 5-40 mL  5-40 mL IntraVENous PRN    acetaminophen (TYLENOL) tablet 650 mg  650 mg Oral Q6H PRN    Or    acetaminophen (TYLENOL) suppository 650 mg  650 mg Rectal Q6H PRN    polyethylene glycol (MIRALAX) packet 17 g  17 g Oral DAILY PRN    ondansetron (ZOFRAN ODT) tablet 4 mg  4 mg Oral Q8H PRN    Or    ondansetron (ZOFRAN) injection 4 mg  4 mg IntraVENous Q6H PRN    ELECTROLYTE REPLACEMENT PROTOCOL - Potassium Standard Dosing   1 Each Other PRN    ELECTROLYTE REPLACEMENT PROTOCOL - Magnesium   1 Each Other PRN    ELECTROLYTE REPLACEMENT PROTOCOL - Phosphorus  Standard Dosing  1 Each Other PRN    ELECTROLYTE REPLACEMENT PROTOCOL - Calcium   1 Each Other PRN    pantoprazole (PROTONIX) 40 mg in 0.9% sodium chloride 10 mL injection  40 mg IntraVENous DAILY    vancomycin (VANCOCIN) 750 mg in 0.9% sodium chloride 250 mL (VIAL-MATE)  750 mg IntraVENous Q12H    Vancomycin-Pharmacy To Dose  1 Each Other Rx Dosing/Monitoring    albumin human 25% (BUMINATE) solution 25 g  25 g IntraVENous Q6H    furosemide (LASIX) injection 20 mg  20 mg IntraVENous DAILY    insulin lispro (HUMALOG) injection   SubCUTAneous Q6H    glucose chewable tablet 16 g  4 Tablet Oral PRN    glucagon (GLUCAGEN) injection 1 mg  1 mg IntraMUSCular PRN    dextrose 10% infusion 0-250 mL  0-250 mL IntraVENous PRN        ROS:  Pertinent items are noted in the History of Present Illness. Physical Exam:    Temp (24hrs), Av.8 °F (37.1 °C), Min:97.7 °F (36.5 °C), Max:99.8 °F (37.7 °C)    Visit Vitals  BP (!) 92/56   Pulse (!) 153   Temp 97.7 °F (36.5 °C)   Resp 23   Ht 5' 7\" (1.702 m)   Wt 53.5 kg (118 lb)   SpO2 97%   BMI 18.48 kg/m²          GEN: WD sick looking, on NC--not in resp distress. HEENT: Unicteric. Protruding eye-R>L, EOMI intact  No neck swelling  CHEST: Non laboured breathing. CTA  CVS:RRR, no mur/gallop  ABD: Obese/soft. Non tender. Non distended  ANN:Lucero in  EXT: No apparent swelling or redness on UE/LE joints except:  R ankle has some mild redness- stable, no marked calor. No open lesion  Skin: Dry and intact. No rash- vesicular or other type, no redness. CNS: A, OX3. Moves all extremity.  CN grossly ok except Very ODALIS [facility]       Microbiology  All Micro Results     Procedure Component Value Units Date/Time    CULTURE, URINE [[id number]]  (Abnormal) Collected: 07/03/22 1700    Order Status: Completed Specimen: Urine from Clean catch Updated: 07/05/22 0815     Special Requests: NO SPECIAL REQUESTS        [place] Count --        >100,000  COLONIES/mL       Culture result: GRAM NEGATIVE RODS       CULTURE, BLOOD [[id number]] Collected: 07/03/22 1645    Order Status: Completed Specimen: Blood Updated: 07/05/22 0725     Special Requests: NO SPECIAL REQUESTS        Culture result: NO GROWTH 2 DAYS       CULTURE, BLOOD [[id number]] Collected: 07/03/22 1650    Order Status: Completed Specimen: Blood Updated: 07/05/22 0725     Special Requests: NO SPECIAL REQUESTS        Culture result: NO GROWTH 2 DAYS       STREP PNEUMO AG, URINE [[id number]] Collected: 07/04/22 1247    Order Status: Completed Specimen: Urine, random Updated: 07/04/22 2030     Strep pneumo Ag, urine Negative       LEGIONELLA PNEUMOPHILA AG, URINE [[id number]] Collected: 07/04/22 1247    Order Status: Completed Specimen: Urine, random Updated: 07/04/22 2030     Legionella Ag, urine Negative       RESPIRATORY VIRUS PANEL W/COVID-19, PCR [[id number]] Collected: 07/03/22 2110    Order Status: Completed Specimen: Nasopharyngeal Updated: 07/04/22 1229     Adenovirus Not detected        Coronavirus 229E Not detected        Coronavirus HKU1 Not detected        Coronavirus CVNL63 Not detected        Coronavirus OC43 Not detected        SARS-CoV-2, PCR Not detected        Metapneumovirus Not detected        Rhinovirus and Enterovirus Not detected        Influenza A Not detected        Influenza B Not detected        Parainfluenza 1 Not detected        Parainfluenza 2 Not detected        Parainfluenza 3 Not detected        Parainfluenza virus 4 Not detected        RSV by PCR Not detected        B. parapertussis, PCR Not detected        Bordetella pertussis - PCR Not detected        Chlamydophila pneumoniae DNA, QL, PCR Not detected Mycoplasma pneumoniae DNA, QL, PCR Not detected       C. DIFFICILE AG & TOXIN A/B [[id number]]     Order Status: Canceled Specimen: Stool     COVID-19 RAPID TEST [[id number]] Collected: 07/03/22 1715    Order Status: Completed Specimen: Nasopharyngeal Updated: 07/03/22 1839     Specimen source Nasopharyngeal        COVID-19 rapid test Not detected        Comment: Rapid Abbott ID Now       Rapid NAAT:  The specimen is NEGATIVE for SARS-CoV-2, the novel coronavirus associated with COVID-19. Negative results should be treated as presumptive and, if inconsistent with clinical signs and symptoms or necessary for patient management, should be tested with an alternative molecular assay. Negative results do not preclude SARS-CoV-2 infection and should not be used as the sole basis for patient management decisions. This test has been authorized by the FDA under an Emergency Use Authorization (EUA) for use by authorized laboratories. Fact sheet for Healthcare Providers: ConventionUpdate.co.nz  Fact sheet for Patients: ConventionUpdate.co.nz       Methodology: Isothermal Nucleic Acid Amplification         ENTERIC BACTERIA PANEL, DNA [[id number]]     Order Status: Sent Specimen: Stool     INFLUENZA A & B AG (RAPID TEST) [[id number]] Collected: 07/03/22 1715    Order Status: Completed Specimen: Nasopharyngeal from Nasal washing Updated: 07/03/22 1749     Influenza A Antigen Negative        Comment: A negative result does not exclude influenza virus infection, seasonal or H1N1 due to suboptimal sensitivity. If influenza is circulating in your community, a diagnosis of influenza should be considered based on a patients clinical presentation and empiric antiviral treatment should be considered, if indicated.         Influenza B Antigen Negative              Lab results:    Chemistry  Recent Labs     07/05/22  0530 07/04/22  1655 07/04/22  0440 07/03/22  1645 07/03/22  1645   * --  192*  --  193*   *  --  133*  --  132*   K 3.7 3.2* 3.1*   < > 3.7     --  100  --  100   CO2 24  --  21  --  22   BUN 9  --  15  --  19*   CREA 0.53*  --  0.61  --  0.52*   CA 8.4*  --  8.7  --  8.2*   AGAP 6  --  12  --  10   BUCR 17  --  25*  --  37*   AP 73  --  87  --  113   TP 4.9*  --  6.4  --  5.5*   ALB 2.8*  --  3.5  --  2.8*   GLOB 2.1  --  2.9  --  2.7   AGRAT 1.3  --  1.2  --  1.0    < > = values in this interval not displayed. CBC w/ Diff  Recent Labs     07/05/22  0530 07/04/22  0440 07/03/22  1645   WBC 0.5* 0.4* 0.2*   RBC 3.17* 3.20* 3.52*   HGB 9.1* 9.4* 10.0*   HCT 28.6* 28.7* 31.0*   PLT 94* 108* 108*   GRANS WBC TOO FEW TO DIFFERENTIATE 6* 12*   LYMPH WBC TOO FEW TO DIFFERENTIATE 34 40   EOS WBC TOO FEW TO DIFFERENTIATE 6* 4       Imaging: report reviewed and as posted by radiologist    CT head- 7/3  1. No CT findings of an acute intracranial abnormality. Please note that  noncontrast head CT may be normal in early acute infarct.        2. Chronic left sphenoethmoidal sinus disease, with questional small left  frontoethmoidal air-fluid level potentially acute on chronic sinus disease.     3. Asymmetric hyperdense right globe. Recommend correlation with patient ocular  History. 7/3: R ankle     1.  Nonspecific soft tissue edema, with no acute or destructive osseous  abnormality.

## 2022-07-05 NOTE — PROGRESS NOTES
Blood work safety concern today 5.30 am blood work was placed as 5/30/2022  We spoke to lab to correct or draw again  SHAWN Saldana MD

## 2022-07-05 NOTE — PROGRESS NOTES
[facility]  Pulmonary, Critical Care, and Sleep Medicine    Name: George Amador MRN: [id number]   : 1940 Hospital: [facility] [place]    Date: 2022  Room: [address] [place] [address] Note                                              Consult requesting physician: Dr. Leonardo Chacon  Reason for Consult: Neutropenic fever, acute respiratory failure with hypoxia.     IMPRESSION:       Active Hospital Problems    Diagnosis Date Noted    Septic shock ([address] 2022    Neutropenic fever ([address] 2022    Acute respiratory failure with hypoxia ([address] 2022    Hypomagnesemia 2022    Diffuse large B cell lymphoma ([address] 2022    Hyponatremia 2022    Pancytopenia ([address] 2022    UTI (urinary tract infection) 2022    Sinusitis 2022    Myopia of both eyes with astigmatism 2022    Pleural effusion 2022    Cellulitis 2022    Severe protein-calorie malnutrition (HCC) 2022    GERD (gastroesophageal reflux disease)     Thyroid disease     Acute encephalopathy     Hypokalemia 2012   ·      Patient Active Problem List   Diagnosis Code    S/P total knee arthroplasty Z96.659    Hypoxia R09.02    Hypokalemia E87.6    Acute posthemorrhagic anemia D62    HTN (hypertension), benign I10    Neutropenic fever ([address] D70.9, R50.81    Acute respiratory failure with hypoxia ([address] J96.01    Hypomagnesemia E83.42    Diffuse large B cell lymphoma (HCC) C83.30    GERD (gastroesophageal reflux disease) K21.9    Thyroid disease E07.9    Acute encephalopathy G93.40    Hyponatremia E87.1    Pancytopenia (HCC) D61.818    UTI (urinary tract infection) N39.0    Sinusitis J32.9    Myopia of both eyes with astigmatism H52.13, H52.203    Pleural effusion J90    Cellulitis L03.90    Severe protein-calorie malnutrition (HCC) E43    Septic shock (HCC) A41.9, R65.21         · Code status: Full Code       RECOMMENDATIONS:     Respiratory: Acute hypoxic respiratory failure due to pulmonary vascular congestion and pleural effusion. On NC  Albumins and gentle diuresis    CTA chest 7/3/2022: No PE. Emphysema. Moderate to large left pleural effusion. Pulmonary vascular congestion. Moderate hiatal hernia. Multiple indeterminate splenic hypodensities. On 6 LPM NC; titrate to keep SPO2 more than 91%. On Lasix 20 mg daily for pleural effusion and vascular congestion. Repeat CXR in the morning; if persistent pleural effusion, then consider diagnostic thoracentesis by IR. Stable today   Bronchodilators: Change albuterol ATC to as needed. Keep SPO2 >=92%. HOB 30 degree elevation all the time. Aggressive pulmonary toileting. Aspiration precautions. Incentive spirometry. CVS:  Septic shock on levophed change to carly  Afib at home on amiodarone give 1 push and see if able to take po  Cardiology consulted   Ac an issue has anemia hold Eliquis as might need thora or central line  Start Lovenox and monitor hb oncology on board  Echo pending   Normal lactic acid and troponin. Mildly elevated BNP. Right leg edema; PVL LE pending. Pulmonary vascular congestion and pleural effusion; echo pending. On Lasix 20 mg daily with albumins   Hypotensive, likely shock; on Levophed; titrate to keep SBP more than 100 or MAP more than 65. Avoid central line due to pancytopenia abut ok to access port we spoke to oncology  ID: Neutropenic fever/neutropenic sepsis. Primary  UTI on admission now gram negative renata final pending   Change azith to doxy due to amiodarone   Remove castrejon if able to tolerate   Possible source of infection: UTI, Questionable acute on chronic sinusitis as reported on CT head, right leg cellulitis, pulmonary infection less likely . Rapid COVID-19 7/3/2022: Negative. Rapid influenza 7/3/2022: Negative. Respiratory viral panel 7/3/2022: Pending. UA 7/3/2022: Negative nitrites but moderate leukocyte esterase; bacteria 2+ with WBC 21-35.   Urine culture 7/3/2022: Pending. Blood culture 7/3/2022: NGTD. Lactic acid normal.  Antibiotics: Continue Zosyn and vancomycin add doxy  Sepsis bundle followed. Follow cultures. Deescalate antibiotic when appropriate. ID consulted     Hematology/Oncology: Diffuse large B-cell lymphoma on R-CHOP, last received 6/28/2022. Splenic indeterminate hypodensities, could be early B-cell lymphoma process versus other etiologies. Neutropenic fever. Pancytopenia. Normal coags. Patient has received Neupogen at Dr. Puga Cea office. Seen by Dr. Rhonda Barclay. pvl today  Renal:   Normal creatinine today   Hypokalemia being replaced. Mild hyponatremia; expected to improve with IVF. GI/:   Normal LFT. Splenic indeterminate hypodensities, could be early B-cell lymphoma process versus other etiologies. Endocrine: Monitor BS. SSI. Patient is on Synthroid 125 mcg daily; will change to IV 75 mcg daily for now. Check TSH, free T4, cortisol. Neurology: Alert awake oriented but lethargic due to sepsis. Nonfocal on exam.  CT head 7/3/2022: No acute findings. Chronic left sphenoid ethmoidal sinus disease, with questionable small left frontoethmoidal air-fluid level, potentially acute on chronic sinus disease. Asymmetric hypodensities right eye globe. Psychiatry: No acute issues. Toxicology: No acute issues. Pain/Sedation: No acute issues. Skin/Wound: No acute issues. Electrolytes: Replace electrolytes per ICU electrolyte replacement protocol. IVF: NS at 25 mill per hour. Nutrition: N.p.o. for now    Prophylaxis: DVT Prophylaxis: SCD (thrombocytopenia). GI Prophylaxis: Protonix. Restraints: none    Lines/Tubes: PIV  Midline will be placed. Lucero: 7/3/2022 (Medically necessary for strict input/output monitoring in critically ill patient, will remove it when not needed. Lucero bundle followed). PT/OT eval and treat. OOB. Advance Directive/Palliative Care: Consulted.      Will defer respective systems problem management to primary and other respective consultant and follow patient in ICU with primary and other medical team.  Further recommendations will be based on the patient's response to recommended treatment and results of the investigation ordered. Quality Care: PPI, DVT prophylaxis, HOB elevated, Infection control all reviewed and addressed. Care of plan d/w RN, RT, MDR, Dr. Tatyana Abdul  D/w patient (answered all questions to satisfaction).  updated at the bedside     High complexity decision making was performed during the evaluation of this patient at high risk for decompensation with multiple organ involvement. Total critical care time spent rendering care exclusive of procedures/family discussion/coordination of care: 48 minutes. Subjective/History of Present Illness:     Patient is a 78 y.o. female with PMHx significant for HTN, hypothyroidism, GERD, diffuse large B-cell lymphoma on R-CHOP, last received 6/28/2022; admitted with neutropenic fever and hypoxic respiratory failure, pleural effusion. COVID19 vaccine status: Patient has received Pfizer COVID-19 vaccine x2 followed by 1 booster. 7/5/2022 :   Seen in ICU room 105. Patient is lethargic and weak. ICU bed 5   at the bedside  Shock and afib not improving   Consulted cariology amiodarone iv push  Access port ok per oncology  Urine culure positive  Hold Eliquis as might need procedures ok to give Ac as per oncology  Monitor inr and hb  Prognosis is guarded full code          I/O last 24 hrs: Intake/Output Summary (Last 24 hours) at 7/5/2022 1200  Last data filed at 7/5/2022 1050  Gross per 24 hour   Intake 2655.9 ml   Output 2150 ml   Net 505.9 ml         The patient is critically ill and can not provide additional history due to Unable to comprehend    History taken from  EMR     Review of Systems:  ROS not obtained due to patient factor.        Allergies   Allergen Reactions    Adhesive Tape-Silicones Other (comments)     Takes skin off    Penicillin G Itching      Past Medical History:   Diagnosis Date    Arthritis     GERD (gastroesophageal reflux disease)     Hypertension     Osteoarthritis of right knee 2012    S/P total knee arthroplasty 2012    Thyroid disease       Past Surgical History:   Procedure Laterality Date    HX APPENDECTOMY  65's    HX CHOLECYSTECTOMY      laparoscopic    HX HEENT      Surgery OD infection x 4    HX HEENT      throat surgery x 2    HX KNEE ARTHROSCOPY      right      Social History     Tobacco Use    Smoking status: Former Smoker     Quit date: 1999     Years since quittin.4    Smokeless tobacco: Never Used   Substance Use Topics    Alcohol use: Yes     Alcohol/week: 1.7 standard drinks     Types: 1 Glasses of wine, 1 Cans of beer per week      No family history on file. Prior to Admission medications    Medication Sig Start Date End Date Taking? Authorizing Provider   levothyroxine (Synthroid) 125 mcg tablet Take 125 mcg by mouth Daily (before breakfast). Yes Provider, Historical   acyclovir (ZOVIRAX) 400 mg tablet Take 400 mg by mouth two (2) times a day. Yes Provider, Historical   amiodarone (PACERONE) 100 mg tablet Take 100 mg by mouth daily. Yes Provider, Historical   apixaban (Eliquis) 5 mg tablet Take 5 mg by mouth two (2) times a day. Yes Provider, Historical   ezetimibe (Zetia) 10 mg tablet Take  by mouth. Yes Provider, Historical   SITagliptin (Januvia) 100 mg tablet Take 100 mg by mouth daily. Yes Provider, Historical   RABEprazole (ACIPHEX) 20 mg TbEC Take 20 mg by mouth daily. Yes Provider, Historical   melatonin 5 mg tablet Take 5 mg by mouth nightly. Yes Provider, Historical   magnesium oxide (MAG-OX) 400 mg tablet Take 400 mg by mouth two (2) times a day.    Yes Provider, Historical     Current Facility-Administered Medications   Medication Dose Route Frequency    doxycycline (VIBRAMYCIN) 100 mg in 0.9% sodium chloride (MBP/ADV) 100 mL MBP  100 mg IntraVENous Q12H    PHENYLephrine (VISHAL-SYNEPHRINE) 30 mg in 0.9% sodium chloride 250 mL infusion   mcg/min IntraVENous TITRATE    amiodarone (NEXTERONE) 150 mg in dextrose 5% 100 ml bolus premix 150 mg  150 mg IntraVENous ONCE    amiodarone (CORDARONE) tablet 100 mg  100 mg Oral DAILY    enoxaparin (LOVENOX) injection 50 mg  1 mg/kg SubCUTAneous Q12H    NOREPINephrine (LEVOPHED) 8 mg in 5% dextrose 250mL (32 mcg/mL) infusion  0.5-30 mcg/min IntraVENous TITRATE    piperacillin-tazobactam (ZOSYN) 4.5 g in 0.9% sodium chloride (MBP/ADV) 100 mL MBP  4.5 g IntraVENous Q8H    sodium chloride (NS) flush 5-40 mL  5-40 mL IntraVENous Q8H    pantoprazole (PROTONIX) 40 mg in 0.9% sodium chloride 10 mL injection  40 mg IntraVENous DAILY    vancomycin (VANCOCIN) 750 mg in 0.9% sodium chloride 250 mL (VIAL-MATE)  750 mg IntraVENous Q12H    Vancomycin-Pharmacy To Dose  1 Each Other Rx Dosing/Monitoring    albumin human 25% (BUMINATE) solution 25 g  25 g IntraVENous Q6H    furosemide (LASIX) injection 20 mg  20 mg IntraVENous DAILY    insulin lispro (HUMALOG) injection   SubCUTAneous Q6H         Objective:   Vital Signs:    Visit Vitals  BP (!) 103/50   Pulse 76   Temp 98.4 °F (36.9 °C)   Resp 23   Ht 5' 7\" (1.702 m)   Wt 53.5 kg (118 lb)   SpO2 97%   BMI 18.48 kg/m²       O2 Device: Nasal cannula   O2 Flow Rate (L/min): 6 l/min   Temp (24hrs), Av °F (37.2 °C), Min:98.4 °F (36.9 °C), Max:99.8 °F (37.7 °C)       Intake/Output:   Last shift:      701 - 1900  In: 200 [P.O.:200]  Out: -     Last 3 shifts: 1901 - 700  In: 3873.7 [P.O.:480; I.V.:3393.7]  Out: 5050 [Urine:5050]      Intake/Output Summary (Last 24 hours) at 2022 1200  Last data filed at 2022 1050  Gross per 24 hour   Intake 2655.9 ml   Output 2150 ml   Net 505.9 ml       Last 3 Recorded Weights in this Encounter    22 1649   Weight: 53.5 kg (118 lb) Recent Labs     07/03/22  1712   PHI 7.46*   PCO2I 30.0*   PO2I 74*   HCO3I 21.2*   FIO2I 44       Physical Exam:     General/Neurology: Alert, awake and oriented. Lethargic. Chronically ill looking. O2 NC. Right chest Mediport   Head:   Normocephalic, without obvious abnormality, atraumatic. Eye:   EOM intact. No scleral icterus, no pallor, no cyanosis. Nose:   No sinus tenderness  Throat:  Lips, mucosa, and tongue normal. No oral thrush. Neck:   Supple, symmetric. No lymphadenopathy. Trachea midline  Lung:   Reduced air entry at the bases. Bilateral extensive rhonchi. L>R No wheezing. No prolonged expiration. Heart:   Regular rate & rhythm. S1 S2 present. No murmur. No JVD. Abdomen:  Soft. NT. ND. +BS. No masses. Extremities:  Right leg mild edema with erythema at the distal leg/ankle with warmth. 2+ dorsalis pedis pulses bilaterally. No cyanosis or clubbing. Pulses: 2+ and symmetric in DP. Capillary refill: normal  Lymphatic:  No cervical or supraclavicular palpable lymphadenopathy. Musculoskeletal: No joint swelling. No tenderness.    Skin left lower extremity swelling, bruses upper extremities       Data:       Recent Results (from the past 24 hour(s))   LEGIONELLA PNEUMOPHILA AG, URINE    Collection Time: 07/04/22 12:47 PM    Specimen: Urine, random   Result Value Ref Range    Legionella Ag, urine Negative NEG     STREP PNEUMO AG, URINE    Collection Time: 07/04/22 12:47 PM    Specimen: Urine, random   Result Value Ref Range    Strep pneumo Ag, urine Negative NEG     PROCALCITONIN    Collection Time: 07/04/22  1:05 PM   Result Value Ref Range    Procalcitonin 3.72 ng/mL   POTASSIUM    Collection Time: 07/04/22  4:55 PM   Result Value Ref Range    Potassium 3.2 (L) 3.5 - 5.5 mmol/L   GLUCOSE, POC    Collection Time: 07/04/22  5:46 PM   Result Value Ref Range    Glucose (POC) 196 (H) 70 - 110 mg/dL   GLUCOSE, POC    Collection Time: 07/05/22 12:10 AM   Result Value Ref Range    Glucose (POC) 196 (H) 70 - 110 mg/dL   CALCIUM, IONIZED    Collection Time: 07/05/22  5:30 AM   Result Value Ref Range    Ionized Calcium 1.15 1.12 - 1.32 MMOL/L   VANCOMYCIN, TROUGH    Collection Time: 07/05/22  5:30 AM   Result Value Ref Range    Vancomycin,trough 7.5 (L) 10.0 - 20.0 ug/mL    Reported dose date 20220704      Reported dose time: 1600      Reported dose: 750MG Q12H UNITS   GLUCOSE, POC    Collection Time: 07/05/22  5:53 AM   Result Value Ref Range    Glucose (POC) 156 (H) 70 - 110 mg/dL   GLUCOSE, POC    Collection Time: 07/05/22 11:30 AM   Result Value Ref Range    Glucose (POC) 240 (H) 70 - 110 mg/dL         Chemistry Recent Labs     07/04/22  1655 07/04/22  0440 07/03/22  1645   GLU  --  192* 193*   NA  --  133* 132*   K 3.2* 3.1* 3.7   CL  --  100 100   CO2  --  21 22   BUN  --  15 19*   CREA  --  0.61 0.52*   CA  --  8.7 8.2*   MG  --  2.6 1.5*   PHOS  --  3.2  --    AGAP  --  12 10   BUCR  --  25* 37*   AP  --  87 113   TP  --  6.4 5.5*   ALB  --  3.5 2.8*   GLOB  --  2.9 2.7   AGRAT  --  1.2 1.0        Lactic Acid Lactic acid   Date Value Ref Range Status   07/03/2022 2.0 0.4 - 2.0 MMOL/L Final     Recent Labs     07/03/22  1645   LAC 2.0        Liver Enzymes Protein, total   Date Value Ref Range Status   07/04/2022 6.4 6.4 - 8.2 g/dL Final     Albumin   Date Value Ref Range Status   07/04/2022 3.5 3.4 - 5.0 g/dL Final     Globulin   Date Value Ref Range Status   07/04/2022 2.9 2.0 - 4.0 g/dL Final     A-G Ratio   Date Value Ref Range Status   07/04/2022 1.2 0.8 - 1.7   Final     Alk.  phosphatase   Date Value Ref Range Status   07/04/2022 87 45 - 117 U/L Final     Recent Labs     07/04/22  0440 07/03/22  1645   TP 6.4 5.5*   ALB 3.5 2.8*   GLOB 2.9 2.7   AGRAT 1.2 1.0   AP 87 113        CBC w/Diff Recent Labs     07/04/22  0440 07/03/22  1645   WBC 0.4* 0.2*   RBC 3.20* 3.52*   HGB 9.4* 10.0*   HCT 28.7* 31.0*   * 108*   GRANS 6* 12*   LYMPH 34 40   EOS 6* 4        Cardiac Enzymes No results found for: CPK, CK, CKMMB, CKMB, RCK3, CKMBT, CKNDX, CKND1, HUMBERTO, TROPT, TROIQ, JANE, TROPT, TNIPOC, BNP, BNPP     BNP No results found for: BNP, BNPP, XBNPT     Coagulation Recent Labs     07/04/22  0440   PTP 15.4*   INR 1.2         Thyroid  Lab Results   Component Value Date/Time    TSH 3.50 07/04/2022 04:40 AM       No results found for: T4     Urinalysis Lab Results   Component Value Date/Time    Color YELLOW 07/03/2022 05:00 PM    Appearance CLOUDY 07/03/2022 05:00 PM    Specific gravity 1.016 07/03/2022 05:00 PM    pH (UA) 7.0 07/03/2022 05:00 PM    Protein 300 (A) 07/03/2022 05:00 PM    Glucose Negative 07/03/2022 05:00 PM    Ketone Negative 07/03/2022 05:00 PM    Bilirubin Negative 07/03/2022 05:00 PM    Urobilinogen 0.2 07/03/2022 05:00 PM    Nitrites Negative 07/03/2022 05:00 PM    Leukocyte Esterase MODERATE (A) 07/03/2022 05:00 PM    Epithelial cells FEW 07/03/2022 05:00 PM    Bacteria 2+ (A) 07/03/2022 05:00 PM    WBC 21 to 35 07/03/2022 05:00 PM    RBC 11 to 20 07/03/2022 05:00 PM        Micro  Recent Labs     07/03/22  1700 07/03/22  1650 07/03/22  1645   CULT GRAM NEGATIVE RODS* NO GROWTH 2 DAYS NO GROWTH 2 DAYS     Recent Labs     07/03/22  1700 07/03/22  1650 07/03/22  1645   CULT GRAM NEGATIVE RODS* NO GROWTH 2 DAYS NO GROWTH 2 DAYS          Culture data during this hospitalization.    All Micro Results     Procedure Component Value Units Date/Time    CULTURE, URINE [[id number]]  (Abnormal) Collected: 07/03/22 1700    Order Status: Completed Specimen: Urine from Clean catch Updated: 07/05/22 0815     Special Requests: NO SPECIAL REQUESTS        Nelson Count --        >100,000  COLONIES/mL       Culture result: GRAM NEGATIVE RODS       CULTURE, BLOOD [[id number]] Collected: 07/03/22 1645    Order Status: Completed Specimen: Blood Updated: 07/05/22 0725     Special Requests: NO SPECIAL REQUESTS        Culture result: NO GROWTH 2 DAYS       CULTURE, BLOOD [[id number]] Collected: 07/03/22 1650    Order Status: Completed Specimen: Blood Updated: 07/05/22 0725     Special Requests: NO SPECIAL REQUESTS        Culture result: NO GROWTH 2 DAYS       STREP Serg Numbers, URINE [[id number]] Collected: 07/04/22 1247    Order Status: Completed Specimen: Urine, random Updated: 07/04/22 2030     Strep pneumo Ag, urine Negative       LEGIONELLA PNEUMOPHILA AG, URINE [[id number]] Collected: 07/04/22 1247    Order Status: Completed Specimen: Urine, random Updated: 07/04/22 2030     Legionella Ag, urine Negative       RESPIRATORY VIRUS PANEL W/COVID-19, PCR [[id number]] Collected: 07/03/22 2110    Order Status: Completed Specimen: Nasopharyngeal Updated: 07/04/22 1229     Adenovirus Not detected        Coronavirus 229E Not detected        Coronavirus HKU1 Not detected        Coronavirus CVNL63 Not detected        Coronavirus OC43 Not detected        SARS-CoV-2, PCR Not detected        Metapneumovirus Not detected        Rhinovirus and Enterovirus Not detected        Influenza A Not detected        Influenza B Not detected        Parainfluenza 1 Not detected        Parainfluenza 2 Not detected        Parainfluenza 3 Not detected        Parainfluenza virus 4 Not detected        RSV by PCR Not detected        B. parapertussis, PCR Not detected        Bordetella pertussis - PCR Not detected        Chlamydophila pneumoniae DNA, QL, PCR Not detected        Mycoplasma pneumoniae DNA, QL, PCR Not detected       C. DIFFICILE AG & TOXIN A/B [[id number]]     Order Status: Sent Specimen: Stool     COVID-19 RAPID TEST [[id number]] Collected: 07/03/22 1715    Order Status: Completed Specimen: Nasopharyngeal Updated: 07/03/22 1839     Specimen source Nasopharyngeal        COVID-19 rapid test Not detected        Comment: Rapid Abbott ID Now       Rapid NAAT:  The specimen is NEGATIVE for SARS-CoV-2, the novel coronavirus associated with COVID-19.        Negative results should be treated as presumptive and, if inconsistent with clinical signs and symptoms or necessary for patient management, should be tested with an alternative molecular assay. Negative results do not preclude SARS-CoV-2 infection and should not be used as the sole basis for patient management decisions. This test has been authorized by the FDA under an Emergency Use Authorization (EUA) for use by authorized laboratories. Fact sheet for Healthcare Providers: [employer]  Fact sheet for Patients: [employer]       Methodology: Isothermal Nucleic Acid Amplification         ENTERIC BACTERIA PANEL, DNA [[id number]]     Order Status: Sent Specimen: Stool     INFLUENZA A & B AG (RAPID TEST) [[id number]] Collected: 07/03/22 1719    Order Status: Completed Specimen: Nasopharyngeal from Nasal washing Updated: 07/03/22 1281     Influenza A Antigen Negative        Comment: A negative result does not exclude influenza virus infection, seasonal or H1N1 due to suboptimal sensitivity. If influenza is circulating in your community, a diagnosis of influenza should be considered based on a patients clinical presentation and empiric antiviral treatment should be considered, if indicated. Influenza B Antigen Negative                XR ANKLE RT MIN 3 V    Result Date: 7/3/2022  EXAM: ANKLE SERIES Indication: Swelling Technique: Frontal, , oblique, and lateral views of the right ankle. Comparison studies: None. _______________ FINDINGS: -OSSEOUS: No acute or destructive osseous abnormality. Tibiotalar degenerative changes with small degenerative plantar calcaneal spur. Normal alignment with preserved ankle mortise. . -SOFT TISSUES: Benign calcifications of the lower leg with mild diffuse edema of the distal leg to visualized midfoot. . No significant joint effusion. _____________    1. Nonspecific soft tissue edema, with no acute or destructive osseous abnormality.      CT HEAD WO CONT    Result Date: 7/3/2022  EXAM: CT HEAD WO CONT CLINICAL INDICATION/HISTORY: ams -Additional: None COMPARISON: None TECHNIQUE: Axial CT imaging of the head was performed without intravenous contrast. One or more dose reduction techniques were used on this CT: automated exposure control, adjustment of the mAs and/or kVp according to patient size, and iterative reconstruction techniques. The specific techniques used on this CT exam have been documented in the patient's electronic medical record. Digital Imaging and Communications in Medicine (DICOM) format image data are available to nonaffiliated external healthcare facilities or entities on a secure, media free, reciprocally searchable basis with patient authorization for at least a 12-month period after this study. _______________ FINDINGS: BRAIN:   > Brain volume: Age appropriate.   > White matter: Little or no white matter disease. > Infarcts, encephalomalacia: None.   > Parenchymal mass: None.   > Parenchymal hemorrhage: None.   > Midline shift: None.   > Miscellaneous: None. EXTRA-AXIAL SPACES: Unremarkable. No fluid collections. CALVARIUM: Intact. SINUSES, MASTOIDS: Complete opacified left sphenoethmoidal air cells with mild mucosal thickening on the right and small left anterior frontal ethmoidal opacities. OTHER EXTRACRANIAL: Asymmetric dense right globe with findings of prior bilateral cataract surgery. _______________     1. No CT findings of an acute intracranial abnormality. Please note that noncontrast head CT may be normal in early acute infarct. 2. Chronic left sphenoethmoidal sinus disease, with questional small left frontoethmoidal air-fluid level potentially acute on chronic sinus disease. 3. Asymmetric hyperdense right globe. Recommend correlation with patient ocular history. CTA CHEST W OR W WO CONT    Result Date: 7/3/2022  EXAM: CTA Chest INDICATION: Hypoxic. COMPARISON: No recent exams for direct comparison, most recent study from 9/21/2012.  TECHNIQUE: Axial CT imaging from the thoracic inlet through the diaphragm with intravenous contrast. Coronal and sagittal MIP reformats were generated. One or more dose reduction techniques were used on this CT: automated exposure control, adjustment of the mAs and/or kVp according to patient size, and iterative reconstruction techniques. The specific techniques used on this CT exam have been documented in the patient's electronic medical record. Digital Imaging and Communications in Medicine (DICOM) format image data are available to nonaffiliated external healthcare facilities or entities on a secure, media free, reciprocally searchable basis with patient authorization for at least a 12-month period after this study. _______________ FINDINGS: EXAM QUALITY: Adequate bolus timing with mild diffuse respiratory motion artifact degradation. Blanchie Bevels PULMONARY ARTERIES: No evidence of central, interlobar or mid to proximal segmental branch pulmonary arterial filling defect. Asymmetric motion degradation involving the lower lobes. Normal sized main pulmonary artery. Pulmonary embolism. MEDIASTINUM: Normal heart size without pericardial effusion. Coronary artery and aortic atherosclerosis. A right upper chest Mediport is present with the catheter in the SVC. Moderate-sized hiatus hernia. LUNGS and PLEURA: Moderate to large left low attenuating layering pleural effusion. Marked diffuse emphysematous changes with chronic areas of scarring and atelectasis. Bilateral lower lobe groundglass, difficult to differentiate between dependent changes, atelectasis and/or edema. . AIRWAY: Normal. LYMPH NODES: No enlarged nodes. UPPER ABDOMEN: Multiple splenic hypodensities, largest anteriorly measuring 3 cm. Overall normal splenic size measuring 11.3 cm in AP dimension. Prior cholecystectomy OTHER: No acute or aggressive osseous abnormalities identified. _______________     1. Respiratory motion degradation. Otherwise, No evidence of central pulmonary embolism.  2.  Interstitial and groundglass changes suggestive of underlying edema, with moderate to large left-sided pleural effusion. 3. Multiple Indeterminate splenic hypodensities, recommend comparison to any prior recent outside exams and patient history. 4. Moderate-sized hiatus hernia. XR CHEST PORT    Result Date: 7/3/2022  EXAM: XR CHEST PORT CLINICAL INDICATION/HISTORY: ams fever -Additional: None COMPARISON: None TECHNIQUE: Frontal view of the chest _______________ FINDINGS: SUPPORT LINES AND TUBES:Right-sided tunneled Mediport and catheter tip over the distal SVC. HEART AND MEDIASTINUM: Midline cardiac silhouette appreciable cardiomegaly. Hilar vascular congestion and cephalization. LUNGS AND PLEURAL SPACES: Diffuse bilateral hazy indistinct interstitial opacities with retrocardiac left basilar confluent opacity and blunted costophrenic angle/effusion. No significant right-sided pleural effusion. No pneumothorax. BONY THORAX AND SOFT TISSUES: No acute or destructive osseous abnormality. _______________     1. Findings of CHF versus fluid overload with edema and left pleural effusion. Images report reviewed by me:  CT (Most Recent) (CT chest reviewed by me) Results from Hospital Encounter encounter on 07/03/22    CTA CHEST W OR W WO CONT    Narrative  EXAM: CTA Chest    INDICATION: Hypoxic. COMPARISON: No recent exams for direct comparison, most recent study from  9/21/2012. TECHNIQUE: Axial CT imaging from the thoracic inlet through the diaphragm with  intravenous contrast. Coronal and sagittal MIP reformats were generated. One or more dose reduction techniques were used on this CT: automated exposure  control, adjustment of the mAs and/or kVp according to patient size, and  iterative reconstruction techniques. The specific techniques used on this CT  exam have been documented in the patient's electronic medical record.  Digital  Imaging and Communications in Medicine (DICOM) format image data are available  to nonaffiliated external healthcare facilities or entities on a secure, media  free, reciprocally searchable basis with patient authorization for at least a  12-month period after this study. _______________    FINDINGS:    EXAM QUALITY: Adequate bolus timing with mild diffuse respiratory motion  artifact degradation. Brijesh Confer PULMONARY ARTERIES: No evidence of central, interlobar or mid to proximal  segmental branch pulmonary arterial filling defect. Asymmetric motion  degradation involving the lower lobes. Normal sized main pulmonary artery. Pulmonary embolism. MEDIASTINUM: Normal heart size without pericardial effusion. Coronary artery and  aortic atherosclerosis. A right upper chest Mediport is present with the  catheter in the SVC. Moderate-sized hiatus hernia. LUNGS and PLEURA: Moderate to large left low attenuating layering pleural  effusion. Marked diffuse emphysematous changes with chronic areas of scarring  and atelectasis. Bilateral lower lobe groundglass, difficult to differentiate  between dependent changes, atelectasis and/or edema. .    AIRWAY: Normal.    LYMPH NODES: No enlarged nodes. UPPER ABDOMEN: Multiple splenic hypodensities, largest anteriorly measuring 3  cm. Overall normal splenic size measuring 11.3 cm in AP dimension. Prior  cholecystectomy    OTHER: No acute or aggressive osseous abnormalities identified. _______________    Impression  1. Respiratory motion degradation. Otherwise, No evidence of central pulmonary  embolism. 2.  Interstitial and groundglass changes suggestive of underlying edema, with  moderate to large left-sided pleural effusion. 3. Multiple Indeterminate splenic hypodensities, recommend comparison to any  prior recent outside exams and patient history. 4. Moderate-sized hiatus hernia. CXR reviewed by me:  XR (Most Recent).  CXR  reviewed by me and compared with previous CXR Results from Hospital Encounter encounter on 07/03/22    XR CHEST PORT    Narrative  EXAM: XR CHEST PORT    CLINICAL INDICATION/HISTORY: Pleural effusion  -Additional: None    COMPARISON: 7/3/2022    TECHNIQUE: Portable frontal view of the chest    _______________    FINDINGS:    SUPPORT DEVICES: None. HEART AND MEDIASTINUM: Cardiomediastinal silhouette within normal limits. LUNGS AND PLEURAL SPACES: Bilateral interstitial and parenchymal opacities. Small to moderate left effusion/atelectasis. No pneumothorax.    _______________    Impression  Bilateral interstitial and parenchymal opacities. Small to moderate left  effusion/atelectasis. ·Please note: Voice-recognition software may have been used to generate this report, which may have resulted in some phonetic-based errors in grammar and contents. Even though attempts were made to correct all the mistakes, some may have been missed, and remained in the body of the document.       Jyoti Peterson MD  7/5/2022

## 2022-07-05 NOTE — PROGRESS NOTES
Reason for Admission:   Neutropenic fever ([facility] [address] [D70.9, R50.81]    PCP: Nimisha Alford MD    Enteric Contact  C. difficile (Rule Out)                RUR Score:     17%             Resources/supports,as identified by patient/family:     Lives with spouse    Barriers to discharge:             Plan for utilizing home health:    yes Anticipate patient could benefit from [facility]. If patient goes to rehab she will need to put any cancer treatment on hold while at facility. Likelihood of readmission:        17%    Transition of Care Plan:  Anticipate home with [facility] versus rehab depending upon clinical progression;HH would allow patient to continue any caner treatments                  Chart reviewed. Patient brought to ER via EMS stretcher for AMS. Per chart review, patient's spouse is her MPOA. Patient is active with Dr. Susie Méndez of [facility] for stage IV diffuse large B-cell lymphoma. Patient had her last cycle of cheoptherapy prior to PET/CT to determine remission status. Per patient's chart, patient was at [facility] in April, will need to verify discharge date to determine if patient has skilled rehab days available. Patient's current insurance is Payor: [place] MEDICARE / Plan: VA MEDICARE PART A & B / Product Type: Medicare /      Care Management Interventions  PCP Verified by CM:  Yes  Transition of Care Consult (CM Consult): Discharge Planning  Physical Therapy Consult: Yes  Occupational Therapy Consult: Yes  Support Systems: Spouse/Significant Other  Confirm Follow Up Transport: Family  Discharge Location  Patient Expects to be Discharged to[de-identified] Skilled nursing facility

## 2022-07-05 NOTE — CONSULTS
Palliative Medicine Consult    Patient Name: Estuardo Al  YOB: 1940    Date of Initial Consult: 7/5/2022  Reason for Consult: Goals of care discussions  Requesting Provider: Dr. Solange Cormier   Primary Care Physician: Jo Ramirez MD      SUMMARY:   Estuardo Al is a 78 y.o. with a past history of hypertension, hypothyroidism, atrial fibrillation, and lymphoma on chemo, who was admitted on 7/3/2022 from home with a diagnosis of septic shock, neutropenic fever, and lymphoma. Current medical issues leading to Palliative Medicine involvement include: Goals of care discussions. PALLIATIVE DIAGNOSES:   1. Goals of care discussions  2. Septic shock  3. Diffuse large B-cell lymphoma  4. Advanced age/debility       PLAN:   1. Goals of care: Palliative medicine team including  LUCY Davis and MANOHAR met with patient at patient's bedside. Patient is awake, alert, and oriented to person, place, and time. She is currently disoriented to situation, cannot express why she is hospitalized. Met with patient later in the morning when  was at bedside.  Xander Orantes is appreciative of all the medical care patient is receiving. Introduced role as palliative medicine. Support offered as  expresses his desire to determine patient's response to her chemotherapy which was last received 6/28/2022. Patient and  are hopeful for remission status, as treatment had a curative intent; patient was scheduled to have a CAT scan tomorrow, but due to hospitalization will be unable to complete. Oncology following.  reports patient was admitted earlier this year with similar septic shock.  is hopeful that patient will respond to medical treatments. Reviewed CODE STATUS including intubation in the event of cardiopulmonary arrest or respiratory decline prearrest.  At this time patient remains a full code with full interventions.   They are open to further discussions once they are aware of patient's lymphoma cancer status. We will continue to follow for support, and further goals of care conversations as appropriate. 2. Septic shock: ID following, on vasopressor support. On broad-spectrum antibiotic therapy. 3. Diffuse large B-cell lymphoma: Follows Dr. Colleen Rushing from oncology, patient received her last chemotherapy session 6/28/2022, remission status is unknown as she was supposed to receive a CT scan as an outpatient but did not make appointment due to hospitalization. Treatment was curative intent. Oncology continuing to follow. 4. Advanced age/debility: 78-year-old female who lives at home with her . She has generalized weakness and malaise. Poor appetite, which has been improving since admission. At this time she needs assistance with all ADLs, including feeding. 5. Initial consult note routed to primary continuity provider  6. Communicated plan of care with: Palliative IDT       GOALS OF CARE / TREATMENT PREFERENCES:   [====Goals of Care====]  GOALS OF CARE: Full code with full interventions  Patient/Health Care Proxy Stated Goals: Prolong life      TREATMENT PREFERENCES:   Code Status: Full Code    Advance Care Planning:  No flowsheet data found.     Medical Interventions: Full interventions            The palliative care team has discussed with patient / health care proxy about goals of care / treatment preferences for patient.  [====Goals of Care====]         HISTORY:     History obtained from: patient, chart, spouse    CHIEF COMPLAINT: drowsy, appetite improving slowly    HPI/SUBJECTIVE:    The patient is:   [x] Verbal and participatory  [] Non-participatory due to:   Limited due to fatigue, confused about current situation (did not know why she is in the hospital)    Clinical Pain Assessment (nonverbal scale for severity on nonverbal patients):   Clinical Pain Assessment  Severity: 0    Adult Nonverbal Pain Scale  Face: No particular expression or smile  Activity (Movement): Lying quietly, normal position  Guarding: Lying quietly, no positioning of hands over areas of body  Physiology (Vital Signs): Stable vital signs  Respiratory: Baseline RR/SpO2 compliant with ventilator  Total Score: 0       FUNCTIONAL ASSESSMENT:     Palliative Performance Scale (PPS):   PPS: 50       PSYCHOSOCIAL/SPIRITUAL SCREENING:     Advance Care Planning:  No flowsheet data found. Any spiritual / Gnosticism concerns:  [] Yes /  [x] No    Caregiver Burnout:  [] Yes /  [x] No /  [] No Caregiver Present      Anticipatory grief assessment:   [x] Normal  / [] Maladaptive          REVIEW OF SYSTEMS:     Positive and pertinent negative findings in ROS are noted above in HPI. The following systems were [x] reviewed / [] unable to be reviewed as noted in HPI  Other findings are noted below. Systems: constitutional, ears/nose/mouth/throat, respiratory, gastrointestinal, genitourinary, musculoskeletal, integumentary, neurologic, psychiatric, endocrine. Positive findings noted below. Modified ESAS Completed by: provider   Fatigue: 5       Pain: 0   Anxiety: 0 Nausea: 0   Anorexia: 0 Dyspnea: 0     Constipation: No              PHYSICAL EXAM:     From RN flowsheet:  Wt Readings from Last 3 Encounters:   07/03/22 53.5 kg (118 lb)   09/19/12 84.4 kg (186 lb 2 oz)   09/14/12 83.9 kg (185 lb)     Blood pressure (!) 85/60, pulse (!) 155, temperature 97.7 °F (36.5 °C), resp. rate 23, height 5' 7\" (1.702 m), weight 53.5 kg (118 lb), SpO2 97 %.     Pain Scale 1: Numeric (0 - 10)  Pain Intensity 1: 0                      Constitutional: Awake, alert, NAD, appears chronically ill  Eyes: pupils equal, anicteric  ENMT: no nasal discharge, dry mucous membranes  Cardiovascular: regular rhythm, distal pulses intact  Respiratory: breathing not labored, symmetric, on NC  Gastrointestinal: soft non-tender   Musculoskeletal: no deformity, no tenderness to palpation  Skin: warm, dry  Neurologic: following commands, moving all extremities, oriented to person, place, and time. Confused about current situation. Psychiatric: flat affect, no hallucinations       HISTORY:     Principal Problem:    Acute respiratory failure with hypoxia ([address] (7/3/2022)    Active Problems:    Hypokalemia (2012)      Neutropenic fever ([address] (7/3/2022)      Hypomagnesemia (7/3/2022)      Diffuse large B cell lymphoma (HCC) (7/3/2022)      GERD (gastroesophageal reflux disease) ()      Thyroid disease ()      Acute encephalopathy ()      Hyponatremia (7/3/2022)      Pancytopenia ([address] (7/3/2022)      UTI (urinary tract infection) (7/3/2022)      Sinusitis (7/3/2022)      Myopia of both eyes with astigmatism (7/3/2022)      Pleural effusion (7/3/2022)      Cellulitis (7/3/2022)      Severe protein-calorie malnutrition ([address] (7/3/2022)      Septic shock ([address] (2022)      Past Medical History:   Diagnosis Date    Arthritis     GERD (gastroesophageal reflux disease)     Hypertension     Osteoarthritis of right knee 2012    S/P total knee arthroplasty 2012    Thyroid disease       Past Surgical History:   Procedure Laterality Date    HX APPENDECTOMY  s    HX CHOLECYSTECTOMY      laparoscopic    HX HEENT      Surgery OD infection x 4    HX HEENT      throat surgery x 2    HX KNEE ARTHROSCOPY      right      No family history on file. History reviewed, no pertinent family history. Social History     Tobacco Use    Smoking status: Former Smoker     Quit date: 1999     Years since quittin.4    Smokeless tobacco: Never Used   Substance Use Topics    Alcohol use:  Yes     Alcohol/week: 1.7 standard drinks     Types: 1 Glasses of wine, 1 Cans of beer per week     Allergies   Allergen Reactions    Adhesive Tape-Silicones Other (comments)     Takes skin off    Penicillin G Itching      Current Facility-Administered Medications   Medication Dose Route Frequency    doxycycline (VIBRAMYCIN) 100 mg in 0.9% sodium chloride (MBP/ADV) 100 mL MBP  100 mg IntraVENous Q12H    PHENYLephrine (VISHAL-SYNEPHRINE) 30 mg in 0.9% sodium chloride 250 mL infusion   mcg/min IntraVENous TITRATE    amiodarone (CORDARONE) tablet 100 mg  100 mg Oral DAILY    enoxaparin (LOVENOX) injection 50 mg  1 mg/kg SubCUTAneous Q12H    ipratropium (ATROVENT) 0.02 % nebulizer solution 0.5 mg  0.5 mg Nebulization TID PRN    potassium chloride 10 mEq in 100 ml IVPB  10 mEq IntraVENous Q1H    [Held by provider] NOREPINephrine (LEVOPHED) 8 mg in 5% dextrose 250mL (32 mcg/mL) infusion  0.5-30 mcg/min IntraVENous TITRATE    albuterol (PROVENTIL VENTOLIN) nebulizer solution 2.5 mg  2.5 mg Nebulization Q4H PRN    piperacillin-tazobactam (ZOSYN) 4.5 g in 0.9% sodium chloride (MBP/ADV) 100 mL MBP  4.5 g IntraVENous Q8H    sodium chloride (NS) flush 5-40 mL  5-40 mL IntraVENous Q8H    sodium chloride (NS) flush 5-40 mL  5-40 mL IntraVENous PRN    acetaminophen (TYLENOL) tablet 650 mg  650 mg Oral Q6H PRN    Or    acetaminophen (TYLENOL) suppository 650 mg  650 mg Rectal Q6H PRN    polyethylene glycol (MIRALAX) packet 17 g  17 g Oral DAILY PRN    ondansetron (ZOFRAN ODT) tablet 4 mg  4 mg Oral Q8H PRN    Or    ondansetron (ZOFRAN) injection 4 mg  4 mg IntraVENous Q6H PRN    ELECTROLYTE REPLACEMENT PROTOCOL - Potassium Standard Dosing   1 Each Other PRN    ELECTROLYTE REPLACEMENT PROTOCOL - Magnesium   1 Each Other PRN    ELECTROLYTE REPLACEMENT PROTOCOL - Phosphorus  Standard Dosing  1 Each Other PRN    ELECTROLYTE REPLACEMENT PROTOCOL - Calcium   1 Each Other PRN    pantoprazole (PROTONIX) 40 mg in 0.9% sodium chloride 10 mL injection  40 mg IntraVENous DAILY    vancomycin (VANCOCIN) 750 mg in 0.9% sodium chloride 250 mL (VIAL-MATE)  750 mg IntraVENous Q12H    Vancomycin-Pharmacy To Dose  1 Each Other Rx Dosing/Monitoring    albumin human 25% (BUMINATE) solution 25 g  25 g IntraVENous Q6H    furosemide (LASIX) injection 20 mg  20 mg IntraVENous DAILY  insulin lispro (HUMALOG) injection   SubCUTAneous Q6H    glucose chewable tablet 16 g  4 Tablet Oral PRN    glucagon (GLUCAGEN) injection 1 mg  1 mg IntraMUSCular PRN    dextrose 10% infusion 0-250 mL  0-250 mL IntraVENous PRN          LAB AND IMAGING FINDINGS:     Lab Results   Component Value Date/Time    WBC 0.5 (LL) 07/05/2022 05:30 AM    HGB 9.1 (L) 07/05/2022 05:30 AM    PLATELET 94 (L) 10/52/4489 05:30 AM     Lab Results   Component Value Date/Time    Sodium 132 (L) 07/05/2022 05:30 AM    Potassium 3.7 07/05/2022 05:30 AM    Chloride 102 07/05/2022 05:30 AM    CO2 24 07/05/2022 05:30 AM    BUN 9 07/05/2022 05:30 AM    Creatinine 0.53 (L) 07/05/2022 05:30 AM    Calcium 8.4 (L) 07/05/2022 05:30 AM    Magnesium 1.9 07/05/2022 05:30 AM    Phosphorus 1.9 (L) 07/05/2022 05:30 AM      Lab Results   Component Value Date/Time    Alk. phosphatase 73 07/05/2022 05:30 AM    Protein, total 4.9 (L) 07/05/2022 05:30 AM    Albumin 2.8 (L) 07/05/2022 05:30 AM    Globulin 2.1 07/05/2022 05:30 AM     Lab Results   Component Value Date/Time    INR 1.2 07/04/2022 04:40 AM    Prothrombin time 15.4 (H) 07/04/2022 04:40 AM    aPTT 22.3 (L) 08/28/2012 08:50 AM      No results found for: IRON, FE, TIBC, IBCT, PSAT, FERR   No results found for: PH, PCO2, PO2  No components found for: GLPOC   No results found for: CPK, CKMB             Total time: 30 minutes  Counseling / coordination time, spent as noted above:   > 50% counseling / coordination?: yes, patient, family, and medical team    Prolonged service was provided for  []30 min   []75 min in face to face time in the presence of the patient, spent as noted above.   Time Start:   Time End:

## 2022-07-05 NOTE — PROGRESS NOTES
Comprehensive Nutrition Assessment    Type and Reason for Visit: Initial,Positive nutrition screen    Nutrition Recommendations/Plan:   1. Supplement: will order ensure enlive TID to meet nutritional needs. Malnutrition Assessment:  Malnutrition Status:  Severe malnutrition (07/05/22 1053)    Context:  Acute illness     Findings of the 6 clinical characteristics of malnutrition:   Energy Intake:  50% or less of est energy requirements for 5 or more days  Weight Loss:  Greater than 5% over 1 month     Body Fat Loss: Moderate body fat loss, Orbital   Muscle Mass Loss: Moderate muscle mass loss, Temples (temporalis),Hand (interosseous)    Nutrition Assessment:    PMHx: HTN, hypothyroidism, GERD, lymphoma last chemo Monday. Admitted with Acute respiratory failure with hypoxia, pleural effusions, neutropenia fever. Nutrition Related Findings:    Protonix, humalog, lasix. Started on PO diet 7/4; 51-75% intake. No BM noted since admission. Pt with breakfast tray at bedside (omlette, coffee, milk). Helped pt set up tray. States tried to have 3 meals daily however does not eat alot. States has been eating 25-50% less than normal. Prior to admission, pt drinks oral nutritional supplement (ensure) 3x daily at each meal - will order ensure TID for pt. Pt with significant weight lost per chart review: 154lb (12/29/21), 176lb (1/2022), 166lb (4/8/22). Current Nutrition Intake & Therapies:  Average Meal Intake: 51-75%  Average Supplement Intake: None ordered  ADULT DIET Regular; 4 carb choices (60 gm/meal); no raw fruits or veggies    Anthropometric Measures:  Height: 5' 7\" (170.2 cm)  Ideal Body Weight (IBW): 135 lbs (61 kg)  Admission Body Weight: 118 lb  Current Body Wt:  53.5 kg (118 lb), 87.4 % IBW.  Bed scale  Current BMI (kg/m2): 18.5  Usual Body Weight: 75.3 kg (166 lb) (4/8/2022)  % Weight Change (Calculated): -28.9  Weight Adjustment: No adjustment                 BMI Category: Underweight (BMI less than 25) age over 72    Estimated Daily Nutrient Needs:  Energy Requirements Based On: Formula  Weight Used for Energy Requirements: Current (30-35kcals)  Energy (kcal/day): 1596-5251  Weight Used for Protein Requirements: Current (1-1.5g)  Protein (g/day): 54-80  Method Used for Fluid Requirements: 1 ml/kcal  Fluid (ml/day): 1046-4108    Nutrition Diagnosis:   · Severe malnutrition related to inadequate protein-energy intake as evidenced by weight loss greater than or equal to 5% in 1 month,moderate loss of subcutaneous fat,moderate muscle loss,poor intake prior to admission      Nutrition Interventions:   Food and/or Nutrient Delivery: Continue current diet,Start oral nutrition supplement  Nutrition Education/Counseling: No recommendations at this time  Coordination of Nutrition Care: Continue to monitor while inpatient       Goals:     Goals: PO intake 75% or greater,within 2 days       Nutrition Monitoring and Evaluation:   Behavioral-Environmental Outcomes: None identified  Food/Nutrient Intake Outcomes: Diet advancement/tolerance,Food and nutrient intake,Supplement intake  Physical Signs/Symptoms Outcomes: Biochemical data,Hemodynamic status,Meal time behavior,Nutrition focused physical findings,Skin,Weight    Discharge Planning:    Continue current diet,Continue oral nutrition supplement    Jose R Conteh RD

## 2022-07-05 NOTE — PROGRESS NOTES
Problem: Dysphagia (Adult)  Goal: *Acute Goals and Plan of Care (Insert Text)  Description: Patient will:  1. Tolerate PO trials with 0 s/s overt distress in 4/5 trials  2. Participate in training and education related to continued aspiration risk, diet recs and compensatory strategies     Recommend:   Regular diet with thin liquids  Meds per patient preference  Aspiration precautions  HOB >45 degrees during all intake and for at least 30 min after po   Small bites/sips, Slow rate of intake    Outcome: Resolved/Met     [address] [address] Holman TREATMENT & DISCHARGE    Patient: Anjali Story (35 y.o. female)  Date: 7/5/2022  Diagnosis: Neutropenic fever ([facility] [address] [D70.9, R50.81] Acute respiratory failure with hypoxia (HCC)  Precautions: aspiration    PLOF: as per H&P    ASSESSMENT:  Patient seen on this date for dysphagia follow up.  at bedside. Patient reported no issues with meals. Pt A&Ox4. Presented with thin liquid and solid trials. Exhibited adequate bolus cohesion, manipulation and A-P transit. Further exhibited functional swallow timing/reflex and hyolaryngeal excursion. No overt s/sx of aspiration/penetration noted. Patient has achiever lest restrictive diet in acute setting. No further ST needs for dysphagia indicated at this time. SLP educated pt on role of speech therapist in current setting with re: speech/swallow; verbalized comprehension. SLP available for re-evaluation if indicated by MD. Results d/w RN. Progression toward goals:  [x]         Improving appropriately - goals met/approximated  []         Not making progress/Not appropriate - will d/c POC     PLAN:  Recommendations and Planned Interventions:  Maximum therapeutic gains met; safest, least restrictive diet achieved. D/C ST intervention at this time. Discharge Recommendations:  None     SUBJECTIVE:   Patient stated Kameron Araujo asks me what year it is.     OBJECTIVE:   Cognitive and Communication Status:  Neurologic State: Alert  Orientation Level: Oriented X4  Cognition: Follows commands  Dysphagia Treatment:  Oral Assessment:  Oral Assessment  Labial: No impairment  Dentition: Natural  Oral Hygiene: adequate  Lingual: No impairment  Velum: No impairment  Mandible: No impairment  P.O. Trials:   Patient Position: 45 at [address]   Vocal quality prior to P.O.: Tremorous   Consistency Presented: Solid,Thin liquid   How Presented: Straw,Successive swallows,Self-fed/presented   Bolus Acceptance: No impairment   Bolus Formation/Control: No impairment   Propulsion: No impairment   Oral Residue: None   Initiation of Swallow: No impairment   Laryngeal Elevation: Functional   Aspiration Signs/Symptoms: None   Pharyngeal Phase Characteristics: Poor endurance,Easily fatigued    Effective Modifications: None   Cues for Modifications: None   Oral Phase Severity: No impairment   Pharyngeal Phase Severity : No impairment  PAIN:  Pain level pre-treatment: 0/10   Pain level post-treatment: 0/10   After treatment:   []              Patient left in no apparent distress sitting up in chair  [x]              Patient left in no apparent distress in bed  [x]              Call bell left within reach  [x]              Nursing notified  []              Caregiver present  []              Bed alarm activated      COMMUNICATION/EDUCATION:   [x] Aspiration precautions; swallow safety; compensatory techniques  []        Patient unable to participate in education; education ongoing with staff  [x]  Posted safety precautions in patient's room.   [] Oral-motor/laryngeal strengthening exercises    Janie Coburn M.S., CFY-SLP  Speech-Language Pathologist

## 2022-07-05 NOTE — PROGRESS NOTES
SLP Note:       Follow up attempted x2. Could not progress with ST intervention because patient:       []  Lethargic, unable to be alerted enough for safe PO intake  []  Refused participation  []  Off the unit  []  NPO for procedure  [x]  Other: getting an ECHO. RN reported no swallowing issues with meals or meds. Patient tolerating current diet well. @3:21 & 4:00     Will continue to follow per POC.       Derrick You M.S., CFY-SLP  Speech-Language Pathologist

## 2022-07-05 NOTE — PROGRESS NOTES
Bedside report received from [address]. Assumed care of pt at this time. Pt in bed with no signs of distress. Pt left with call light within reach and encouraged to call for assistance. Able to titrate Levophed drip from 17mcg/min to 7mcg/min throughout the night. Patient is alert and oriented x3-4. Remains on 6L NC. Lungs coarse to auscultation. Bedside shift change report given to  (oncoming nurse) by Chucky BARRIENTOS RN (offgoing nurse). Report included the following information SBAR, Kardex and MAR.

## 2022-07-05 NOTE — PROGRESS NOTES
conducted a follow up visit with Linnea Park, who is a 78 y.o.,female. Patient is hard of hearing. She was responding with simple answers but also got tired.  at the bedside (also hard of hearing). Spoke with him in the room but also later in the hallway.  is trying to be \"on top of things\" as he makes decisions. He is hopeful about the chemo helping her cancer but is waiting to hear if \"what she has had so far is making a difference. \"  The are active at [facility] in [place]. At 's request I contacted the [employer]. They also have their \"personal \" in the form of a good friend who is coming to visit and pray with them.  is grateful for the care she is receiving. Continued the relationship of care and support. Listened empathically. Offered prayer and assurance of continued prayer on patients behalf. Chart reviewed.  expressed gratitude for Spiritual Care visit. Patient was reviewed in ICU Interdisciplinary Rounds. Chaplains will continue to follow and will provide pastoral care as needed or requested.  recommends bedside caregivers page the  on duty if patient shows signs of acute spiritual or emotional distress. [address], [place].    Board Certified   [phone] - Office

## 2022-07-05 NOTE — PROGRESS NOTES
Hospitalist Progress Note-critical care note     Patient: Parveen Novoa MRN: [id number]  CSN: [id number]    YOB: 1940  Age: 78 y.o. Sex: female    DOA: 7/3/2022 LOS:  LOS: 2 days            Chief complaint: septic shock. Neutropenic fever, lymphoma, gerd , dm    Assessment/Plan         Hospital Problems  Date Reviewed: 9/21/2012          Codes Class Noted POA    Septic shock ([facility] ICD-10-CM: A41.9, R65.21  ICD-9-CM: 038.9, 785.52, 995.92  7/4/2022 Unknown        Neutropenic fever ([facility] ICD-10-CM: D70.9, R50.81  ICD-9-CM: 288.00, 780.61  7/3/2022 Unknown        * (Principal) Acute respiratory failure with hypoxia (HCC) ICD-10-CM: J96.01  ICD-9-CM: 518.81  7/3/2022 Unknown        Hypomagnesemia ICD-10-CM: E83.42  ICD-9-CM: 275.2  7/3/2022 Unknown        Diffuse large B cell lymphoma ([facility] ICD-10-CM: C83.30  ICD-9-CM: 202.80  7/3/2022 Unknown        GERD (gastroesophageal reflux disease) ICD-10-CM: K21.9  ICD-9-CM: 530.81  Unknown Unknown        Thyroid disease ICD-10-CM: E07.9  ICD-9-CM: 246. 9  Unknown Unknown        Acute encephalopathy ICD-10-CM: G93.40  ICD-9-CM: 348.30  Unknown Unknown        Hyponatremia ICD-10-CM: E87.1  ICD-9-CM: 276.1  7/3/2022 Unknown        Pancytopenia ([facility] ICD-10-CM: C20.676  ICD-9-CM: 284.19  7/3/2022 Unknown        UTI (urinary tract infection) ICD-10-CM: N39.0  ICD-9-CM: 599.0  7/3/2022 Unknown        Sinusitis ICD-10-CM: J32.9  ICD-9-CM: 473.9  7/3/2022 Unknown        Myopia of both eyes with astigmatism ICD-10-CM: H52.13, H52.203  ICD-9-CM: 367.1, 367.20  7/3/2022 Unknown        Pleural effusion ICD-10-CM: J90  ICD-9-CM: 511.9  7/3/2022 Unknown        Cellulitis ICD-10-CM: L03.90  ICD-9-CM: 682.9  7/3/2022 Unknown        Severe protein-calorie malnutrition ([facility] [address] ICD-10-CM: E43  ICD-9-CM: 346  7/3/2022 Unknown        Hypokalemia ICD-10-CM: E87.6  ICD-9-CM: 276.8  9/21/2012 Yes              Resp -   Acute respiratory failure with hypoxia with 6 L oxygen -stable at 6 L nc oxygen   Flu negative, rapid covid 19 negative -confirmed per pcr   cta no central PE   Low threshhold to be intubated  Full code, palliative care consult        Pleural effusion   Moderate and large effusion-will defer to pulmonologist decide for thoracentesis   Lasix -low         ID -   Cellulitis of rt leg, neutropenia fever , acute on chronic sinusitis , UTI-leg significant improving   Continue vanc and zosyn azithromycin    ID consulted   bcx negative      CVS -   paf : so far HR self controlled   Continue balance electrolytes and echo     Septic shock   On levophed to keep MAP >65. Continue abx to treat septic shock      Heme/onc -   Pancytopenia  Due to chemo   Continue monitoring  Received growth factor per oncologist   Counts stable. continue on zosyn   lovenox hold due to thrombocytopenia      Diffusion Large B lymphoma   On chemo therapy   Oncologist f/u    splenic hypodensities?          Renal -   Hyponatremia , hypomagnesemia, hypokalemia -continue replace K   icu electrolytes replacement protocol   Monitoring urine out      Endocrine -   DM type II   Ssi , hypoglycemia protocol      hypothyroidism   Continue synthyroid  ,ths and free t4 ordered      Neuro  Acute encephalopath  Much better, mri pending      Myopia      GI -passed speech continue diet      Mks: rt ankle swelling-resolved      Severe malnutrition      Hypoalbuminemia   Albumin infusion       rn :passed speech   Case discussed with Dr. Macey Walker and Dr. Solis Needs   8:32-9:02  30 minutes of critical care time spent in the direct evaluation and treatment of this high risk patient. The reason for providing this level of medical care for this critically ill patient was due a critical illness that impaired one or more vital organ systems such that there was a high probability of imminent or life threatening deterioration in the patients condition.  This care involved high complexity decision making to assess, manipulate, and support vital system functions, to treat this degreee vital organ system failure and to prevent further life threatening deterioration of the patients condition. Subjective : feel tired   Disposition :tbd,   Review of systems:    Limited due to pt' condition, she answered some questions, no chest pain, breathing is ok   Vital signs/Intake and Output:  Visit Vitals  BP (!) 103/50   Pulse 76   Temp 98.4 °F (36.9 °C)   Resp 23   Ht 5' 7\" (1.702 m)   Wt 53.5 kg (118 lb)   SpO2 97%   BMI 18.48 kg/m²     Current Shift:  07/05 0701 - 07/05 1900  In: 200 [P.O.:200]  Out: -   Last three shifts:  07/03 1901 - 07/05 0700  In: 3873.7 [P.O.:480; I.V.:3393.7]  Out: 5050 [Urine:5050]    Physical Exam:  General: WD, WN. Alert, some cooperative, sick looking   HEENT: NC, Atraumatic. PERRLA, anicteric sclerae. Lungs: Decreased BS of left side of chest , cta of rt   Heart:  Regular  rhythm,  No murmur, No Rubs, No Gallops  Abdomen: Soft, Non distended, Non tender. +Bowel sounds,    Extremities: No c/c/e ,rt leg erythema significant improving, swelling resolved   Psych:   Not anxious or agitated. Neurologic:  Myopia , limited due to pt's condition              Labs: Results:       Chemistry Recent Labs     07/04/22  1655 07/04/22  0440 07/03/22  1645   GLU  --  192* 193*   NA  --  133* 132*   K 3.2* 3.1* 3.7   CL  --  100 100   CO2  --  21 22   BUN  --  15 19*   CREA  --  0.61 0.52*   CA  --  8.7 8.2*   AGAP  --  12 10   BUCR  --  25* 37*   AP  --  87 113   TP  --  6.4 5.5*   ALB  --  3.5 2.8*   GLOB  --  2.9 2.7   AGRAT  --  1.2 1.0      CBC w/Diff Recent Labs     07/04/22  0440 07/03/22  1645   WBC 0.4* 0.2*   RBC 3.20* 3.52*   HGB 9.4* 10.0*   HCT 28.7* 31.0*   * 108*   GRANS 6* 12*   LYMPH 34 40   EOS 6* 4      Cardiac Enzymes No results for input(s): CPK, CKND1, HUMBERTO in the last 72 hours.     No lab exists for component: CKRMB, TROIP   Coagulation Recent Labs     07/04/22  0440   PTP 15.4*   INR 1.2       Lipid Panel No results found for: CHOL, CHOLPOCT, CHOLX, CHLST, CHOLV, 260639, HDL, HDLP, LDL, LDLC, DLDLP, 366125, VLDLC, VLDL, TGLX, TRIGL, TRIGP, TGLPOCT, CHHD, CHHDX   BNP No results for input(s): BNPP in the last 72 hours. Liver Enzymes Recent Labs     07/04/22  0440   TP 6.4   ALB 3.5   AP 87      Thyroid Studies Lab Results   Component Value Date/Time    TSH 3.50 07/04/2022 04:40 AM        Procedures/imaging: see electronic medical records for all procedures/Xrays and details which were not copied into this note but were reviewed prior to creation of Plan    XR ANKLE RT MIN 3 V    Result Date: 7/3/2022  EXAM: ANKLE SERIES Indication: Swelling Technique: Frontal, , oblique, and lateral views of the right ankle. Comparison studies: None. _______________ FINDINGS: -OSSEOUS: No acute or destructive osseous abnormality. Tibiotalar degenerative changes with small degenerative plantar calcaneal spur. Normal alignment with preserved ankle mortise. . -SOFT TISSUES: Benign calcifications of the lower leg with mild diffuse edema of the distal leg to visualized midfoot. . No significant joint effusion. _____________    1. Nonspecific soft tissue edema, with no acute or destructive osseous abnormality. CT HEAD WO CONT    Result Date: 7/3/2022  EXAM: CT HEAD WO CONT CLINICAL INDICATION/HISTORY: ams -Additional: None COMPARISON: None TECHNIQUE: Axial CT imaging of the head was performed without intravenous contrast. One or more dose reduction techniques were used on this CT: automated exposure control, adjustment of the mAs and/or kVp according to patient size, and iterative reconstruction techniques. The specific techniques used on this CT exam have been documented in the patient's electronic medical record.  Digital Imaging and Communications in Medicine (DICOM) format image data are available to nonaffiliated external healthcare facilities or entities on a secure, media free, reciprocally searchable basis with patient authorization for at least a 12-month period after this study. _______________ FINDINGS: BRAIN:   > Brain volume: Age appropriate.   > White matter: Little or no white matter disease. > Infarcts, encephalomalacia: None.   > Parenchymal mass: None.   > Parenchymal hemorrhage: None.   > Midline shift: None.   > Miscellaneous: None. EXTRA-AXIAL SPACES: Unremarkable. No fluid collections. CALVARIUM: Intact. SINUSES, MASTOIDS: Complete opacified left sphenoethmoidal air cells with mild mucosal thickening on the right and small left anterior frontal ethmoidal opacities. OTHER EXTRACRANIAL: Asymmetric dense right globe with findings of prior bilateral cataract surgery. _______________     1. No CT findings of an acute intracranial abnormality. Please note that noncontrast head CT may be normal in early acute infarct. 2. Chronic left sphenoethmoidal sinus disease, with questional small left frontoethmoidal air-fluid level potentially acute on chronic sinus disease. 3. Asymmetric hyperdense right globe. Recommend correlation with patient ocular history. CTA CHEST W OR W WO CONT    Result Date: 7/3/2022  EXAM: CTA Chest INDICATION: Hypoxic. COMPARISON: No recent exams for direct comparison, most recent study from 9/21/2012. TECHNIQUE: Axial CT imaging from the thoracic inlet through the diaphragm with intravenous contrast. Coronal and sagittal MIP reformats were generated. One or more dose reduction techniques were used on this CT: automated exposure control, adjustment of the mAs and/or kVp according to patient size, and iterative reconstruction techniques. The specific techniques used on this CT exam have been documented in the patient's electronic medical record.  Digital Imaging and Communications in Medicine (DICOM) format image data are available to nonaffiliated external healthcare facilities or entities on a secure, media free, reciprocally searchable basis with patient authorization for at least a 12-month period after this study. _______________ FINDINGS: EXAM QUALITY: Adequate bolus timing with mild diffuse respiratory motion artifact degradation. Graylon Gen PULMONARY ARTERIES: No evidence of central, interlobar or mid to proximal segmental branch pulmonary arterial filling defect. Asymmetric motion degradation involving the lower lobes. Normal sized main pulmonary artery. Pulmonary embolism. MEDIASTINUM: Normal heart size without pericardial effusion. Coronary artery and aortic atherosclerosis. A right upper chest Mediport is present with the catheter in the SVC. Moderate-sized hiatus hernia. LUNGS and PLEURA: Moderate to large left low attenuating layering pleural effusion. Marked diffuse emphysematous changes with chronic areas of scarring and atelectasis. Bilateral lower lobe groundglass, difficult to differentiate between dependent changes, atelectasis and/or edema. . AIRWAY: Normal. LYMPH NODES: No enlarged nodes. UPPER ABDOMEN: Multiple splenic hypodensities, largest anteriorly measuring 3 cm. Overall normal splenic size measuring 11.3 cm in AP dimension. Prior cholecystectomy OTHER: No acute or aggressive osseous abnormalities identified. _______________     1. Respiratory motion degradation. Otherwise, No evidence of central pulmonary embolism. 2.  Interstitial and groundglass changes suggestive of underlying edema, with moderate to large left-sided pleural effusion. 3. Multiple Indeterminate splenic hypodensities, recommend comparison to any prior recent outside exams and patient history. 4. Moderate-sized hiatus hernia. XR CHEST PORT    Result Date: 7/3/2022  EXAM: XR CHEST PORT CLINICAL INDICATION/HISTORY: ams fever -Additional: None COMPARISON: None TECHNIQUE: Frontal view of the chest _______________ FINDINGS: SUPPORT LINES AND TUBES:Right-sided tunneled Mediport and catheter tip over the distal SVC. HEART AND MEDIASTINUM: Midline cardiac silhouette appreciable cardiomegaly. Hilar vascular congestion and cephalization.  LUNGS AND PLEURAL SPACES: Diffuse bilateral hazy indistinct interstitial opacities with retrocardiac left basilar confluent opacity and blunted costophrenic angle/effusion. No significant right-sided pleural effusion. No pneumothorax. BONY THORAX AND SOFT TISSUES: No acute or destructive osseous abnormality. _______________     1. Findings of CHF versus fluid overload with edema and left pleural effusion.       Marlyn Lanes, MD

## 2022-07-05 NOTE — DIABETES MGMT
Diabetes/ Glycemic Control Plan of Care  Recommendations:   Consider starting basal insulin, suggest 5 units Lantus q 24 hours. Assessment: History of diabetes. HgbA1c 6.6% which is appropriate for age and conditions. BG ranging 156-207 mg/dl over the last 24 hours and just outside of hospital glycemic targets. Currently receiving corrective insulin, TDD 10 units Humalog. Can consider initiation of a small dose of basal insulin which may help reduce amount of corrective insulin needed. Recent Glucose Results:   Lab Results   Component Value Date/Time    GLUCPOC 156 (H) 07/05/2022 05:53 AM    GLUCPOC 196 (H) 07/05/2022 12:10 AM    GLUCPOC 196 (H) 07/04/2022 05:46 PM          BG within target range (non-ICU: <180; -180):  [] Yes    [x] No   Current insulin orders: corrective Humalog  Total Daily Dose previous 24 hours = 10 units Humalog    Current A1c:   Lab Results   Component Value Date/Time    Hemoglobin A1c 6.6 (H) 07/04/2022 04:40 AM      equivalent  to ave Blood Glucose of 143 mg/dl for 2-3 months prior to admission  Adequate glycemic control PTA: [x] Yes    [] No   Nutrition/Diet: ADULT DIET Regular; 4 carb choices (60 gm/meal); no raw fruits or veggies   Meal Intake:  Patient Vitals for the past 168 hrs:   % Diet Eaten   07/04/22 1815 51 - 75%     Home diabetes medications:   Key Antihyperglycemic Medications             SITagliptin (Januvia) 100 mg tablet (Taking) Take 100 mg by mouth daily. Plan/Goals:   Blood glucose will be within target of 70 - 180 mg/dl within 72 hours  Reinforce dietary and medication compliance at home.         Education:  [] Refer to Diabetes Education Record                       [x] Education not indicated at this time     Mirela Parnell RD  Glycemic Control Team  [phone]    Monday-Friday   9 am - 3 pm

## 2022-07-05 NOTE — ACP (ADVANCE CARE PLANNING)
Advance Care Planning     General Advance Care Planning (ACP) Conversation      Date of Conversation: 7/5/2022    Conducted with: Patient, patient's  Letty Khoury), Yair Molina NP (Palliative) and this writer. Healthcare Decision Maker:     Patient does not have a formal healthcare decision maker document, on file. Patient's , Letty Khoury, [place]z#[phone] and #[phone]), is her her legal next-of-kin and her default healthcare decision maker. Content/Action Overview: This writer, along with Yair Molina NP, with the Palliative Care team; visited with patient to offer support. Her  González Ambriz) arrived a short time later. Patient was laying in bed and alert and oriented with periods of confusion. She was about to have her breakfast. A short time later, patient's  arrive and the Palliative Care team returned to the room to offer support. Patient's  is very supportive of the patient and is her advocate. Patient's  is very focused on patient regaining her strength; which entails her eating and getting as much nutrition as possible. Patient's  spoke about not hearing anything from the nurse or the doctor, this morning. Patient's  also asked about a CT scan that was scheduled outpatient, for tomorrow. Since patient is now in the hospital, that scan will be postponed.  asked if the scan could be done, here at [facility]. Dr. Teri Damian was made aware of this request and the fact that he wanted some medical updates from the team. Patient's  would like to know what patient's current prognosis is, before he makes a decision regarding goals of care moving forward. The medical team will follow up with patient's . The Palliative Care team, at this time, will continue to offer support to patient and her . At this time, patient will remain a FULL CODE WITH FULL INTERVENTIONS.       Length of Voluntary ACP Conversation in minutes:  20 minutes        Lemmie M. Brigido Goldberg., MSW  Palliative Care   #[phone]

## 2022-07-05 NOTE — PROGRESS NOTES
[facility] Pharmacokinetic Monitoring Service - Vancomycin    Consulting Provider:  Beth Gutierrez  Indication:  Sepsis of Unknown Etiology, Febrile Neutopenia  Target Concentration: Goal AUC/TRUDY 400-600 mg*hr/L  Day of Therapy:  3  Additional Antimicrobials:  Zosyn, Azithromycin    Pertinent Laboratory Values: Wt Readings from Last 1 Encounters:   07/03/22 53.5 kg (118 lb)     Temp Readings from Last 1 Encounters:   07/05/22 98.4 °F (36.9 °C)     No components found for: PROCAL  Estimated Creatinine Clearance: 51.4 mL/min (by C-G formula based on SCr of 0.61 mg/dL). Recent Labs     07/04/22  0440 07/03/22  1645   WBC 0.4* 0.2*       Assessment:  Date/Time Current Dose Concentration Timing of Concentration (h) AUC   7/5/2022 750 mg IV q12hrs 7.5 mg/l 05:30 7/5/2022 400 mg/l.hr   Note: Serum concentrations collected for AUC dosing may appear elevated if collected in close proximity to the dose administered, this is not necessarily an indication of toxicity    Plan:  1. Current dosing regimen is therapeutic  2. Continue current dose  3.  Pharmacy will continue to monitor patient and adjust therapy as indicated    VERONICA Levi [facility], [facility]  7/5/2022 10:32 AM  [phone]

## 2022-07-05 NOTE — PROGRESS NOTES
Physician Progress Note      Viji Will  CSN #:                  [id number]  :                       1940  ADMIT DATE:       7/3/2022 4:42 PM  [address] [place] DATE:  RESPONDING  PROVIDER #:        Rey MITCHELL MD        QUERY TEXT:    Type of Encephalopathy: Please provide further specificity, if known. Clinical indicators include: fever, acute, encephalopathy, hyponatremia, infection, hypoglycemia, ams, alcohol use, alcohol, sepsis, infectious, septic, influenza, ams fever, bacteremia  Options provided:  -- Anoxic/hypoxic encephalopathy  -- Metabolic encephalopathy  -- Toxic encephalopathy  -- Hepatic encephalopathy  -- Hypertensive encephalopathy  -- Other - I will add my own diagnosis  -- Disagree - Not applicable / Not valid  -- Disagree - Clinically Unable to determine / Unknown        PROVIDER RESPONSE TEXT:    The patient has metabolic encephalopathy.       Electronically signed by:  Rey MITCHELL MD 2022 5:18 PM

## 2022-07-06 LAB
ALBUMIN SERPL-MCNC: 3.2 G/DL (ref 3.4–5)
ALBUMIN/GLOB SERPL: 1.8 {RATIO} (ref 0.8–1.7)
ALP SERPL-CCNC: 54 U/L (ref 45–117)
ALT SERPL-CCNC: 9 U/L (ref 13–56)
ANION GAP SERPL CALC-SCNC: 6 MMOL/L (ref 3–18)
AST SERPL-CCNC: 4 U/L (ref 10–38)
BACTERIA SPEC CULT: ABNORMAL
BASOPHILS # BLD: 0 K/UL (ref 0–0.1)
BASOPHILS NFR BLD: 2 % (ref 0–2)
BILIRUB SERPL-MCNC: 0.4 MG/DL (ref 0.2–1)
BNP SERPL-MCNC: 5018 PG/ML (ref 0–1800)
BUN SERPL-MCNC: 13 MG/DL (ref 7–18)
BUN/CREAT SERPL: 27 (ref 12–20)
C DIFF MOLECULAR, CDTTNP: NEGATIVE
C DIFF TOX A+B STL QL IA: NEGATIVE
CA-I SERPL-SCNC: 1.22 MMOL/L (ref 1.12–1.32)
CALCIUM SERPL-MCNC: 8.7 MG/DL (ref 8.5–10.1)
CAMPYLOBACTER SPECIES, DNA: NEGATIVE
CC UR VC: ABNORMAL
CHLORIDE SERPL-SCNC: 105 MMOL/L (ref 100–111)
CO2 SERPL-SCNC: 25 MMOL/L (ref 21–32)
CREAT SERPL-MCNC: 0.48 MG/DL (ref 0.6–1.3)
DIFFERENTIAL METHOD BLD: ABNORMAL
ENTEROTOXIGEN E COLI, DNA: NEGATIVE
EOSINOPHIL # BLD: 0.4 K/UL (ref 0–0.4)
EOSINOPHIL NFR BLD: 34 % (ref 0–5)
ERYTHROCYTE [DISTWIDTH] IN BLOOD BY AUTOMATED COUNT: 29.1 % (ref 11.6–14.5)
EST. AVERAGE GLUCOSE BLD GHB EST-MCNC: 148 MG/DL
GLOBULIN SER CALC-MCNC: 1.8 G/DL (ref 2–4)
GLUCOSE BLD STRIP.AUTO-MCNC: 106 MG/DL (ref 70–110)
GLUCOSE BLD STRIP.AUTO-MCNC: 156 MG/DL (ref 70–110)
GLUCOSE BLD STRIP.AUTO-MCNC: 172 MG/DL (ref 70–110)
GLUCOSE BLD STRIP.AUTO-MCNC: 202 MG/DL (ref 70–110)
GLUCOSE BLD STRIP.AUTO-MCNC: 217 MG/DL (ref 70–110)
GLUCOSE SERPL-MCNC: 99 MG/DL (ref 74–99)
HBA1C MFR BLD: 6.8 % (ref 4.2–5.6)
HCT VFR BLD AUTO: 27.4 % (ref 35–45)
HGB BLD-MCNC: 8.8 G/DL (ref 12–16)
IMM GRANULOCYTES # BLD AUTO: 0 K/UL
IMM GRANULOCYTES NFR BLD AUTO: 0 %
INR PPP: 1.1 (ref 0.8–1.2)
INTERPRETATION: ABNORMAL
LYMPHOCYTES # BLD: 0.2 K/UL (ref 0.9–3.6)
LYMPHOCYTES NFR BLD: 22 % (ref 21–52)
MAGNESIUM SERPL-MCNC: 1.6 MG/DL (ref 1.6–2.6)
MAGNESIUM SERPL-MCNC: 2.1 MG/DL (ref 1.6–2.6)
MCH RBC QN AUTO: 29 PG (ref 24–34)
MCHC RBC AUTO-ENTMCNC: 32.1 G/DL (ref 31–37)
MCV RBC AUTO: 90.4 FL (ref 78–100)
METAMYELOCYTES NFR BLD MANUAL: 2 %
MONOCYTES # BLD: 0.2 K/UL (ref 0.05–1.2)
MONOCYTES NFR BLD: 16 % (ref 3–10)
NEUTS BAND NFR BLD MANUAL: 2 % (ref 0–5)
NEUTS SEG # BLD: 0.3 K/UL (ref 1.8–8)
NEUTS SEG NFR BLD: 22 % (ref 40–73)
NRBC # BLD: 0 K/UL (ref 0–0.01)
NRBC BLD-RTO: 0 PER 100 WBC
P SHIGELLOIDES DNA STL QL NAA+PROBE: NEGATIVE
PCR REFLEX: ABNORMAL
PHOSPHATE SERPL-MCNC: 1.3 MG/DL (ref 2.5–4.9)
PLATELET # BLD AUTO: 101 K/UL (ref 135–420)
PLATELET COMMENTS,PCOM: ABNORMAL
PMV BLD AUTO: 9.3 FL (ref 9.2–11.8)
POTASSIUM SERPL-SCNC: 3.4 MMOL/L (ref 3.5–5.5)
POTASSIUM SERPL-SCNC: 3.6 MMOL/L (ref 3.5–5.5)
PROT SERPL-MCNC: 5 G/DL (ref 6.4–8.2)
PROTHROMBIN TIME: 15 SEC (ref 11.5–15.2)
RBC # BLD AUTO: 3.03 M/UL (ref 4.2–5.3)
RBC MORPH BLD: ABNORMAL
SALMONELLA SPECIES, DNA: NEGATIVE
SERVICE CMNT-IMP: ABNORMAL
SHIGA TOXIN PRODUCING, DNA: NEGATIVE
SHIGELLA SP+EIEC IPAH STL QL NAA+PROBE: NEGATIVE
SODIUM SERPL-SCNC: 136 MMOL/L (ref 136–145)
TOTAL CELLS COUNTED SPEC: 50
VIBRIO SPECIES, DNA: NEGATIVE
WBC # BLD AUTO: 1.1 K/UL (ref 4.6–13.2)
Y. ENTEROCOLITICA, DNA: NEGATIVE

## 2022-07-06 PROCEDURE — 74011000258 HC RX REV CODE- 258: Performed by: INTERNAL MEDICINE

## 2022-07-06 PROCEDURE — P9047 ALBUMIN (HUMAN), 25%, 50ML: HCPCS | Performed by: HOSPITALIST

## 2022-07-06 PROCEDURE — 36415 COLL VENOUS BLD VENIPUNCTURE: CPT

## 2022-07-06 PROCEDURE — 85610 PROTHROMBIN TIME: CPT

## 2022-07-06 PROCEDURE — 83735 ASSAY OF MAGNESIUM: CPT

## 2022-07-06 PROCEDURE — 83036 HEMOGLOBIN GLYCOSYLATED A1C: CPT

## 2022-07-06 PROCEDURE — 74011250636 HC RX REV CODE- 250/636: Performed by: HOSPITALIST

## 2022-07-06 PROCEDURE — 74011250636 HC RX REV CODE- 250/636: Performed by: INTERNAL MEDICINE

## 2022-07-06 PROCEDURE — 82330 ASSAY OF CALCIUM: CPT

## 2022-07-06 PROCEDURE — 65610000006 HC RM INTENSIVE CARE

## 2022-07-06 PROCEDURE — 84132 ASSAY OF SERUM POTASSIUM: CPT

## 2022-07-06 PROCEDURE — 74011250637 HC RX REV CODE- 250/637: Performed by: FAMILY MEDICINE

## 2022-07-06 PROCEDURE — 74011000250 HC RX REV CODE- 250: Performed by: INTERNAL MEDICINE

## 2022-07-06 PROCEDURE — 84100 ASSAY OF PHOSPHORUS: CPT

## 2022-07-06 PROCEDURE — 74011000250 HC RX REV CODE- 250: Performed by: HOSPITALIST

## 2022-07-06 PROCEDURE — 83880 ASSAY OF NATRIURETIC PEPTIDE: CPT

## 2022-07-06 PROCEDURE — 74011250636 HC RX REV CODE- 250/636: Performed by: FAMILY MEDICINE

## 2022-07-06 PROCEDURE — 85025 COMPLETE CBC W/AUTO DIFF WBC: CPT

## 2022-07-06 PROCEDURE — 82962 GLUCOSE BLOOD TEST: CPT

## 2022-07-06 PROCEDURE — C9113 INJ PANTOPRAZOLE SODIUM, VIA: HCPCS | Performed by: HOSPITALIST

## 2022-07-06 PROCEDURE — 74011636637 HC RX REV CODE- 636/637: Performed by: FAMILY MEDICINE

## 2022-07-06 PROCEDURE — 74011250637 HC RX REV CODE- 250/637: Performed by: INTERNAL MEDICINE

## 2022-07-06 RX ORDER — MAGNESIUM SULFATE 1 G/100ML
1 INJECTION INTRAVENOUS ONCE
Status: COMPLETED | OUTPATIENT
Start: 2022-07-06 | End: 2022-07-06

## 2022-07-06 RX ORDER — POTASSIUM CHLORIDE 20 MEQ/1
40 TABLET, EXTENDED RELEASE ORAL
Status: COMPLETED | OUTPATIENT
Start: 2022-07-06 | End: 2022-07-06

## 2022-07-06 RX ORDER — MAGNESIUM SULFATE HEPTAHYDRATE 40 MG/ML
2 INJECTION, SOLUTION INTRAVENOUS ONCE
Status: COMPLETED | OUTPATIENT
Start: 2022-07-06 | End: 2022-07-06

## 2022-07-06 RX ORDER — POTASSIUM CHLORIDE 7.45 MG/ML
10 INJECTION INTRAVENOUS ONCE
Status: COMPLETED | OUTPATIENT
Start: 2022-07-06 | End: 2022-07-06

## 2022-07-06 RX ADMIN — ALBUMIN (HUMAN) 25 G: 0.25 INJECTION, SOLUTION INTRAVENOUS at 23:54

## 2022-07-06 RX ADMIN — MAGNESIUM SULFATE HEPTAHYDRATE 1 G: 1 INJECTION, SOLUTION INTRAVENOUS at 10:52

## 2022-07-06 RX ADMIN — ENOXAPARIN SODIUM 50 MG: 100 INJECTION SUBCUTANEOUS at 00:49

## 2022-07-06 RX ADMIN — PIPERACILLIN AND TAZOBACTAM 3.38 G: 3; .375 INJECTION, POWDER, LYOPHILIZED, FOR SOLUTION INTRAVENOUS at 17:20

## 2022-07-06 RX ADMIN — ALBUMIN (HUMAN) 25 G: 0.25 INJECTION, SOLUTION INTRAVENOUS at 06:03

## 2022-07-06 RX ADMIN — Medication 6 UNITS: at 11:51

## 2022-07-06 RX ADMIN — VANCOMYCIN HYDROCHLORIDE 750 MG: 750 INJECTION, POWDER, LYOPHILIZED, FOR SOLUTION INTRAVENOUS at 04:25

## 2022-07-06 RX ADMIN — ALBUMIN (HUMAN) 25 G: 0.25 INJECTION, SOLUTION INTRAVENOUS at 00:49

## 2022-07-06 RX ADMIN — SODIUM CHLORIDE, PRESERVATIVE FREE 10 ML: 5 INJECTION INTRAVENOUS at 17:25

## 2022-07-06 RX ADMIN — PIPERACILLIN AND TAZOBACTAM 4.5 G: 4; .5 INJECTION, POWDER, LYOPHILIZED, FOR SOLUTION INTRAVENOUS at 09:12

## 2022-07-06 RX ADMIN — PHENYLEPHRINE HYDROCHLORIDE 40 MCG/MIN: 10 INJECTION INTRAVENOUS at 04:07

## 2022-07-06 RX ADMIN — Medication 6 UNITS: at 00:48

## 2022-07-06 RX ADMIN — SODIUM CHLORIDE, PRESERVATIVE FREE 40 MG: 5 INJECTION INTRAVENOUS at 09:13

## 2022-07-06 RX ADMIN — ENOXAPARIN SODIUM 50 MG: 100 INJECTION SUBCUTANEOUS at 11:51

## 2022-07-06 RX ADMIN — DOXYCYCLINE 100 MG: 100 INJECTION, POWDER, LYOPHILIZED, FOR SOLUTION INTRAVENOUS at 00:49

## 2022-07-06 RX ADMIN — DOXYCYCLINE 100 MG: 100 INJECTION, POWDER, LYOPHILIZED, FOR SOLUTION INTRAVENOUS at 11:51

## 2022-07-06 RX ADMIN — VANCOMYCIN HYDROCHLORIDE 1000 MG: 1 INJECTION, POWDER, LYOPHILIZED, FOR SOLUTION INTRAVENOUS at 17:19

## 2022-07-06 RX ADMIN — MAGNESIUM SULFATE HEPTAHYDRATE 2 G: 2 INJECTION, SOLUTION INTRAVENOUS at 09:12

## 2022-07-06 RX ADMIN — ALBUMIN (HUMAN) 25 G: 0.25 INJECTION, SOLUTION INTRAVENOUS at 17:19

## 2022-07-06 RX ADMIN — POTASSIUM CHLORIDE 10 MEQ: 7.46 INJECTION, SOLUTION INTRAVENOUS at 11:52

## 2022-07-06 RX ADMIN — FUROSEMIDE 20 MG: 10 INJECTION, SOLUTION INTRAMUSCULAR; INTRAVENOUS at 09:12

## 2022-07-06 RX ADMIN — PIPERACILLIN AND TAZOBACTAM 4.5 G: 4; .5 INJECTION, POWDER, LYOPHILIZED, FOR SOLUTION INTRAVENOUS at 03:15

## 2022-07-06 RX ADMIN — POTASSIUM CHLORIDE 40 MEQ: 20 TABLET, EXTENDED RELEASE ORAL at 09:13

## 2022-07-06 RX ADMIN — SODIUM PHOSPHATE, MONOBASIC, MONOHYDRATE 15 MMOL: 276; 142 INJECTION, SOLUTION INTRAVENOUS at 17:19

## 2022-07-06 RX ADMIN — AMIODARONE HYDROCHLORIDE 100 MG: 200 TABLET ORAL at 09:13

## 2022-07-06 RX ADMIN — SODIUM CHLORIDE, PRESERVATIVE FREE 10 ML: 5 INJECTION INTRAVENOUS at 06:04

## 2022-07-06 RX ADMIN — Medication 6 UNITS: at 23:54

## 2022-07-06 RX ADMIN — ENOXAPARIN SODIUM 50 MG: 100 INJECTION SUBCUTANEOUS at 23:54

## 2022-07-06 RX ADMIN — DOXYCYCLINE 100 MG: 100 INJECTION, POWDER, LYOPHILIZED, FOR SOLUTION INTRAVENOUS at 23:54

## 2022-07-06 RX ADMIN — Medication 3 UNITS: at 17:45

## 2022-07-06 RX ADMIN — POTASSIUM CHLORIDE 40 MEQ: 20 TABLET, EXTENDED RELEASE ORAL at 20:37

## 2022-07-06 RX ADMIN — ONDANSETRON 4 MG: 2 INJECTION INTRAMUSCULAR; INTRAVENOUS at 10:52

## 2022-07-06 RX ADMIN — ALBUMIN (HUMAN) 25 G: 0.25 INJECTION, SOLUTION INTRAVENOUS at 11:50

## 2022-07-06 RX ADMIN — SODIUM CHLORIDE, PRESERVATIVE FREE 10 ML: 5 INJECTION INTRAVENOUS at 22:10

## 2022-07-06 RX ADMIN — DIGOXIN 250 MCG: 0.25 INJECTION INTRAMUSCULAR; INTRAVENOUS at 04:29

## 2022-07-06 NOTE — PROGRESS NOTES
[facility] Pharmacokinetic Monitoring Service - Vancomycin    Consulting Provider:  Dr. Malathi Oliver   Indication:  Sepsis of Unknown Etiology, Febrile Neutropenia  Target Concentration: Goal AUC/TRUDY 400-600 mg*hr/L  Day of Therapy:  4  Additional Antimicrobials:  Doxycycline, Zosyn    Pertinent Laboratory Values: Wt Readings from Last 1 Encounters:   07/05/22 53.5 kg (118 lb)     Temp Readings from Last 1 Encounters:   07/06/22 98.6 °F (37 °C)     No components found for: PROCAL  Estimated Creatinine Clearance: 51.4 mL/min (A) (by C-G formula based on SCr of 0.48 mg/dL (L)). Recent Labs     07/06/22  0504 07/05/22  0530   WBC 1.1* 0.5*     Plan:  1. Estimated  mg/l.hr, Trough 10.2 mg/l with Vancomycin 750 mg IV q12hrs - Sub Therapeutic. 2. Will increase dosing to 1000 mg IV q12hrs  3. New Estimated  mg/l.hr, Trough 13.8 mg/l  4. Pharmacy will continue to monitor and adjust  5.  CMP ordered through 7/9/22    VERONICA Bryant [facility] [place], BCPS  7/6/2022 9:48 AM

## 2022-07-06 NOTE — PROGRESS NOTES
0800-Pt alert and oriented, vss on pressor,  at bedside, will continue to monitor  1000-Rounded on pt with treatment team  1200-Pt remains stable, MAP below 65 off pressor, physician aware, mental status intact, will continue to monitor  1800-Pt remains stable off pressor, alert and oriented, continues to have loose/watery stools  1940-Bedside and Verbal shift change report given to [address] (oncoming nurse) by Yogesh Hogan RN (offgoing nurse).  Report included the following information SBAR, Kardex, Intake/Output, MAR, Recent Results and Cardiac Rhythm SR.

## 2022-07-06 NOTE — CONSULTS
[facility] Consult Note      Patient: Amelia Jasso MRN: [id number]  SSN: [id number]    YOB: 1940  Age: 78 y.o. Sex: female    Date of Consultation: 07/05/2022  Referring Physician: Pablo De Los Santos   Reason for Consultation: Atrial fibrillation with RVR    Chief complain: Altered mental status     HPI:   78-year-old female brought to emergency room with complaining of change in mental status and weakness. She was brought to emergency room by patient's family member due to altered mental status. She was hypoxic on EMS arrival.  She is being managed for sepsis. Cardiology consult called for evaluation of atrial fibrillation with rapid ventricular rate. She denies any palpitation. She has known atrial fibrillation and is on amiodarone and anticoagulation. She received IV amiodarone bolus in the ICU.     Past Medical History:   Diagnosis Date    Arthritis     GERD (gastroesophageal reflux disease)     Hypertension     Osteoarthritis of right knee 9/19/2012    S/P total knee arthroplasty 9/19/2012    Thyroid disease      Past Surgical History:   Procedure Laterality Date    HX APPENDECTOMY  1950's    HX CHOLECYSTECTOMY      laparoscopic    HX HEENT      Surgery OD infection x 4    HX HEENT      throat surgery x 2    HX KNEE ARTHROSCOPY      right     Current Facility-Administered Medications   Medication Dose Route Frequency    doxycycline (VIBRAMYCIN) 100 mg in 0.9% sodium chloride (MBP/ADV) 100 mL MBP  100 mg IntraVENous Q12H    PHENYLephrine (VISHAL-SYNEPHRINE) 30 mg in 0.9% sodium chloride 250 mL infusion   mcg/min IntraVENous TITRATE    amiodarone (CORDARONE) tablet 100 mg  100 mg Oral DAILY    enoxaparin (LOVENOX) injection 50 mg  1 mg/kg SubCUTAneous Q12H    ipratropium (ATROVENT) 0.02 % nebulizer solution 0.5 mg  0.5 mg Nebulization TID PRN    [START ON 7/6/2022] digoxin (LANOXIN) injection 250 mcg  0.25 mg IntraVENous ONCE    [Held by provider] NOREPINephrine (LEVOPHED) 8 mg in 5% dextrose 250mL (32 mcg/mL) infusion  0.5-30 mcg/min IntraVENous TITRATE    albuterol (PROVENTIL VENTOLIN) nebulizer solution 2.5 mg  2.5 mg Nebulization Q4H PRN    piperacillin-tazobactam (ZOSYN) 4.5 g in 0.9% sodium chloride (MBP/ADV) 100 mL MBP  4.5 g IntraVENous Q8H    sodium chloride (NS) flush 5-40 mL  5-40 mL IntraVENous Q8H    sodium chloride (NS) flush 5-40 mL  5-40 mL IntraVENous PRN    acetaminophen (TYLENOL) tablet 650 mg  650 mg Oral Q6H PRN    Or    acetaminophen (TYLENOL) suppository 650 mg  650 mg Rectal Q6H PRN    polyethylene glycol (MIRALAX) packet 17 g  17 g Oral DAILY PRN    ondansetron (ZOFRAN ODT) tablet 4 mg  4 mg Oral Q8H PRN    Or    ondansetron (ZOFRAN) injection 4 mg  4 mg IntraVENous Q6H PRN    ELECTROLYTE REPLACEMENT PROTOCOL - Potassium Standard Dosing   1 Each Other PRN    ELECTROLYTE REPLACEMENT PROTOCOL - Magnesium   1 Each Other PRN    ELECTROLYTE REPLACEMENT PROTOCOL - Phosphorus  Standard Dosing  1 Each Other PRN    ELECTROLYTE REPLACEMENT PROTOCOL - Calcium   1 Each Other PRN    pantoprazole (PROTONIX) 40 mg in 0.9% sodium chloride 10 mL injection  40 mg IntraVENous DAILY    vancomycin (VANCOCIN) 750 mg in 0.9% sodium chloride 250 mL (VIAL-MATE)  750 mg IntraVENous Q12H    Vancomycin-Pharmacy To Dose  1 Each Other Rx Dosing/Monitoring    albumin human 25% (BUMINATE) solution 25 g  25 g IntraVENous Q6H    furosemide (LASIX) injection 20 mg  20 mg IntraVENous DAILY    insulin lispro (HUMALOG) injection   SubCUTAneous Q6H    glucose chewable tablet 16 g  4 Tablet Oral PRN    glucagon (GLUCAGEN) injection 1 mg  1 mg IntraMUSCular PRN    dextrose 10% infusion 0-250 mL  0-250 mL IntraVENous PRN       Allergies and Intolerances: Allergies   Allergen Reactions    Adhesive Tape-Silicones Other (comments)     Takes skin off    Penicillin G Itching       Family History:   No family history on file.     Social History:   She  reports that she quit smoking about 23 years ago. She has never used smokeless tobacco.  She  reports current alcohol use of about 1.7 standard drinks of alcohol per week. Review of Systems:     Gen: No fever, chills, malaise, weight loss/gain. Heent: No headache, rhinorrhea, epistaxis, ear pain, hearing loss, sinus pain, neck pain/stiffness, sore throat. Heart: positive shortness of breath, No chest pain, palpitations, pnd, or orthopnea. Resp: No cough, hemoptysis, wheezing and Dyspnea  GI: No nausea, vomiting, diarrhea, constipation, melena or hematochezia. : No urinary obstruction, dysuria or hematuria. Derm: No rash, new skin lesion or pruritis. Musc/skeletal:  Positive  thebone or joint complains. Vasc: No edema, cyanosis or claudication. Endo: No heat/cold intolerance, no polyuria,polydipsia or polyphagia. Neuro: No unilateral weakness, numbness, tingling. No seizures. Heme: No easy bruising or bleeding. Physical:   Patient Vitals for the past 6 hrs:   Temp Pulse Resp BP SpO2   07/05/22 2319 99.6 °F (37.6 °C) -- -- -- --   07/05/22 2315 -- 97 30 125/70 99 %   07/05/22 2300 -- 100 29 (!) 121/56 98 %   07/05/22 2245 -- 100 28 120/80 97 %   07/05/22 2230 -- 100 (!) 31 (!) 135/57 96 %   07/05/22 2215 -- 100 30 (!) 131/58 96 %   07/05/22 2208 -- (!) 101 -- 131/61 --   07/05/22 2200 -- (!) 101 (!) 33 131/61 96 %   07/05/22 2145 -- (!) 101 22 (!) 116/53 96 %   07/05/22 2130 -- (!) 101 (!) 32 (!) 117/53 97 %   07/05/22 2115 -- (!) 101 25 (!) 119/50 96 %   07/05/22 2100 -- 100 (!) 48 (!) 111/54 96 %   07/05/22 2045 -- (!) 102 (!) 31 (!) 96/58 96 %   07/05/22 2030 -- (!) 102 (!) 31 (!) 107/47 97 %   07/05/22 2015 -- (!) 106 29 (!) 106/49 96 %   07/05/22 2000 98.1 °F (36.7 °C) (!) 102 29 (!) 103/49 96 %   07/05/22 1919 98.1 °F (36.7 °C) -- -- -- --   07/05/22 1850 -- (!) 101 -- 135/86 --         Exam:   General Appearance:  Looks chronically ill,Comfortable, not using accessory muscles of respiration. HEENT: MICHELINE.    HEAD: Atraumatic  NECK: No JVD, no thyroidomeglay. CAROTIDS: no bruit  LUNGS: Clear bilaterally. HEART: S1+S2 audible, irregularly irregular,no murmur, no pericardial rub. ABD: Non-tender, BS Audible    EXT: No edema, and no cysnosis. VASCULAR EXAM: Pulses are intact. PSYCHIATRIC EXAM: Mood is appropriate. MUSCULOSKELETAL: Grossly no joint deformity.   NEUROLOGICAL:  Alert, follows simple verbal commands    Review of Data:   LABS:   Lab Results   Component Value Date/Time    WBC 0.5 (LL) 07/05/2022 05:30 AM    HGB 9.1 (L) 07/05/2022 05:30 AM    HCT 28.6 (L) 07/05/2022 05:30 AM    PLATELET 94 (L) 12/97/2921 05:30 AM     Lab Results   Component Value Date/Time    Sodium 132 (L) 07/05/2022 05:30 AM    Potassium 3.7 07/05/2022 06:16 PM    Chloride 102 07/05/2022 05:30 AM    CO2 24 07/05/2022 05:30 AM    Glucose 147 (H) 07/05/2022 05:30 AM    BUN 9 07/05/2022 05:30 AM    Creatinine 0.53 (L) 07/05/2022 05:30 AM     No results found for: CHOL, CHOLX, CHLST, CHOLV, HDL, HDLP, LDL, LDLC, DLDLP, TGLX, TRIGL, TRIGP  No components found for: GPT  Lab Results   Component Value Date/Time    Hemoglobin A1c 6.6 (H) 07/04/2022 04:40 AM         Cardiology Procedures:   Results for orders placed or performed during the hospital encounter of 07/03/22   EKG, 12 LEAD, INITIAL   Result Value Ref Range    Ventricular Rate 117 BPM    Atrial Rate 117 BPM    P-R Interval 178 ms    QRS Duration 74 ms    Q-T Interval 316 ms    QTC Calculation (Bezet) 440 ms    Calculated P Axis 50 degrees    Calculated R Axis 55 degrees    Calculated T Axis 62 degrees    Diagnosis        poor data quality  Sinus tachycardia  Low voltage QRS  Nonspecific ST and T wave abnormality  Abnormal ECG               Impression / Plan:    Patient Active Problem List   Diagnosis Code    S/P total knee arthroplasty Z96.659    Hypoxia R09.02    Hypokalemia E87.6    Acute posthemorrhagic anemia D62    HTN (hypertension), benign I10    Neutropenic fever (HCC) D70.9, R50.81    Acute respiratory failure with hypoxia ([facility]) J96.01    Hypomagnesemia E83.42    Diffuse large B cell lymphoma (HCC) C83.30    GERD (gastroesophageal reflux disease) K21.9    Thyroid disease E07.9    Acute encephalopathy G93.40    Hyponatremia E87.1    Pancytopenia (HCC) D61.818    UTI (urinary tract infection) N39.0    Sinusitis J32.9    Myopia of both eyes with astigmatism H52.13, H52.203    Pleural effusion J90    Cellulitis L03.90    Severe protein-calorie malnutrition (HCC) E43    Septic shock ([facility]) A41.9, R65.21     Atrial fibrillation with rapid ventricular rate    Echocardiogram revealed      Left Ventricle: Normal left ventricular systolic function with a visually estimated EF of 60 - 65%. Left ventricle size is normal. Normal wall thickness. Normal wall motion. Indeterminate diastolic function due to atrial fibrillation.   Tricuspid Valve: The estimated PASP is 41 mmHg. TSH is normal     Continue full dose of Lovenox and oral amiodarone. IV digoxin 500 mcg 1 dose followed by 250 mcg IV every 6 hours x2  Plan discussed with patient's nurse      39 minutes of critical care time spent in the direct evaluation and treatment of this high risk patient. The reason for providing this level of medical care for this critically ill patient was due a critical illness that impaired one or more vital organ systems such that there was a high probability of imminent or life threatening deterioration in the patient's condition. This care involved high complexity decision making to assess, manipulate, and support vital system functions, to treat this degree vital organ system failure and to prevent further life threatening deterioration of the patient's condition.      Signed By: Amanda Linn MD     July 5, 2022

## 2022-07-06 NOTE — CONSULTS
[facility]  Phone: [phone]  Paging : Merlinda Capri) [phone]  [facility]) [phone]     Hematology / Oncology Consult Note    Impression:   Principal Problem:    Acute respiratory failure with hypoxia ([address] (7/3/2022)    Active Problems:    Hypokalemia (9/21/2012)      Neutropenic fever ([address] (7/3/2022)      Hypomagnesemia (7/3/2022)      Diffuse large B cell lymphoma (HCC) (7/3/2022)      GERD (gastroesophageal reflux disease) ()      Thyroid disease ()      Acute encephalopathy ()      Hyponatremia (7/3/2022)      Pancytopenia ([address] (7/3/2022)      UTI (urinary tract infection) (7/3/2022)      Sinusitis (7/3/2022)      Myopia of both eyes with astigmatism (7/3/2022)      Pleural effusion (7/3/2022)      Cellulitis (7/3/2022)      Severe protein-calorie malnutrition ([address] (7/3/2022)      Septic shock ([address] (7/4/2022)        Sepsis - still on pressors - not getting worse but still in ICU    Diffuse Large B Cell Lymphoma - remission status undetermined. She was to receive a PET/CT as outpatient to determine remission status - treatment was curative intent but has had multiple complications    UTI    Splenic Hypodensities - ?infectious v. Lymphomatous.       Neutropenia - the patient has already received growth factor    Anemia - dt/ chemotherapy    Pleural Effusion    Hx of AFib    Weakness - dt/ chemotherapy + illness    Plan:   Appreciate help of ICU + hospital medicine + ID  No role for growth factors - had received in office  Supportive care until recovery  I spoke with  that she is very ill and while she is slowly getting better, recovery is tenuous and decline may be unpredictable and that they should consider DNR  Plan was for PET/CT as outpatient - will need to be > 1 month from hospital stay  reviewed all labs, notes, imaging      Reason for Consultation:  Ashleigh Anna is a 78 y.o. female who I've been asked to consult for suspected sepsis in the setting of lymphoma    HPI:      The patient is treated by my partner Dr. 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 She was undergoing chemotherapy with R-CHOP, last cycle given 6/28/22. This was to be her final chemotherapy prior to a PET/CT to determine remission status with some possibility for cure. Her treatment course has been complicated by multiple admissions for sepsis, pneumonia, Atrial Fibrillation with RVR and general weakness. I spoke with her  yesterday who reported severe weakness and I directed him to the emergency department for ongoing care with concerns that she may be septic or have some complication as related to her chemotherapy. Labwork reveaeld a hemoglobin of 9, WBC of 0.4 with an ANC of 0. She did receive Neulasta in the office. INR was 1.2  CMP was normal.   NT pro-BNP slightly elevated at 446 and lactic acid was 2. Influenza negative and Covid pending. CT Head 7/3/22 showed no concerning abnormality. CT of Chest identified interstitial ground glass changes suggestive of edema with a large L effusion and indeterminate splenic hypodensities. Upon arrival, she was found to be hypoxic and was requiring 6L of oxygen. Past Medical History:   Diagnosis Date    Arthritis     GERD (gastroesophageal reflux disease)     Hypertension     Osteoarthritis of right knee 9/19/2012    S/P total knee arthroplasty 9/19/2012    Thyroid disease      Past Surgical History:   Procedure Laterality Date    HX APPENDECTOMY  65's    HX CHOLECYSTECTOMY      laparoscopic    HX HEENT      Surgery OD infection x 4    HX HEENT      throat surgery x 2    HX KNEE ARTHROSCOPY      right     @[email or web address]@  No family history on file.   Allergies   Allergen Reactions    Adhesive Tape-Silicones Other (comments)     Takes skin off    Penicillin G Itching       Home Medications:   [facility] Medications:     Current Facility-Administered Medications   Medication Dose Route Frequency Provider Last Rate Last Admin    potassium chloride (K-DUR, KLOR-CON M20) SR tablet 40 mEq  40 mEq Oral NOW Roula Blanchard MD        potassium chloride 10 mEq in 100 ml IVPB  10 mEq IntraVENous ONCE Roula Blanchard MD        magnesium sulfate 1 g/100 ml IVPB (premix or compounded)  1 g IntraVENous ONCE Roula Blanchard MD        And    magnesium sulfate 2 g/50 ml IVPB (premix or compounded)  2 g IntraVENous ONCE Roula Blanchard MD        doxycycline (VIBRAMYCIN) 100 mg in 0.9% sodium chloride (MBP/ADV) 100 mL MBP  100 mg IntraVENous Q12H Jagdish Irizarry  mL/hr at 07/06/22 0049 100 mg at 07/06/22 0049    amiodarone (CORDARONE) tablet 100 mg  100 mg Oral DAILY Jagdish Irizarry MD   100 mg at 07/05/22 1156    enoxaparin (LOVENOX) injection 50 mg  1 mg/kg SubCUTAneous Q12H Jagdish Irizarry MD   50 mg at 07/06/22 0049    ipratropium (ATROVENT) 0.02 % nebulizer solution 0.5 mg  0.5 mg Nebulization TID PRN Jagdish Irizarry MD        albuterol (PROVENTIL VENTOLIN) nebulizer solution 2.5 mg  2.5 mg Nebulization Q4H PRN Reno Myers MD        piperacillin-tazobactam (ZOSYN) 4.5 g in 0.9% sodium chloride (MBP/ADV) 100 mL MBP  4.5 g IntraVENous Q8H Cathy Cervantes MD 25 mL/hr at 07/06/22 0315 4.5 g at 07/06/22 0315    sodium chloride (NS) flush 5-40 mL  5-40 mL IntraVENous Q8H Bronson Monk MD   10 mL at 07/06/22 0604    sodium chloride (NS) flush 5-40 mL  5-40 mL IntraVENous PRN Bronson Monk MD        acetaminophen (TYLENOL) tablet 650 mg  650 mg Oral Q6H PRN Bronson Monk MD        Or    acetaminophen (TYLENOL) suppository 650 mg  650 mg Rectal Q6H PRN Bronson Monk MD        polyethylene glycol (MIRALAX) packet 17 g  17 g Oral DAILY PRN Bronson Monk MD        ondansetron (ZOFRAN ODT) tablet 4 mg  4 mg Oral Q8H PRN Bronson Monk MD        Or    ondansetron [facility]) injection 4 mg  4 mg IntraVENous Q6H PRN Bronson Monk MD        ELECTROLYTE REPLACEMENT PROTOCOL - Potassium Standard Dosing   1 Each Other PRN Mannie Aragon MD        ELECTROLYTE REPLACEMENT PROTOCOL - Magnesium   1 Each Other PRN Mannie Aragon MD        ELECTROLYTE REPLACEMENT PROTOCOL - Phosphorus  Standard Dosing  1 Each Other PRN Mannie Aragon MD        ELECTROLYTE REPLACEMENT PROTOCOL - Calcium   1 Each Other PRN Mannie Aragon MD        pantoprazole (PROTONIX) 40 mg in 0.9% sodium chloride 10 mL injection  40 mg IntraVENous DAILY Mannie Aragon MD   40 mg at 07/05/22 0957    vancomycin (VANCOCIN) 750 mg in 0.9% sodium chloride 250 mL (VIAL-MATE)  750 mg IntraVENous Q12H Mannie Aragon  mL/hr at 07/06/22 0425 750 mg at 07/06/22 0425    Vancomycin-Pharmacy To Dose  1 Each Other Rx Dosing/Monitoring Mannie Aragon MD        albumin human 25% (BUMINATE) solution 25 g  25 g IntraVENous Q6H Mannie Aragon MD   25 g at 07/06/22 0603    furosemide (LASIX) injection 20 mg  20 mg IntraVENous DAILY Mannie Aragon MD   20 mg at 07/05/22 0957    insulin lispro (HUMALOG) injection   SubCUTAneous Q6H Hallie Ambrosio MD   6 Units at 07/06/22 0048    glucose chewable tablet 16 g  4 Tablet Oral PRN aMnnie Aragon MD        glucagon [facility]) injection 1 mg  1 mg IntraMUSCular PRN Mannie Aragon MD        dextrose 10% infusion 0-250 mL  0-250 mL IntraVENous PRN Mannie Aragon MD           Review of Systems:   Constitutional:   Fever: Negative, Chills: Negative, Weight Loss: Negative, Malaise/Fatigue: Negative   Diaphoresis: Negative,  Weakness: Negative  Skin:   Rash: Negative,  Itching: Negative  HENT:   Headache:Negative, Hearing Loss: Negative, Tinnitus: Negative, Ear Pain: Negative   Nosebleeds: Negative, Congestion: Negative, Stridor: Negative,   Sore Throat: Negative  Eyes:    Blurred Vision: Negative, Double Vision: Negative, Photophobia: Negative   Eye Pain: Negative, Eye Discharge: Negative, Eye Redness: Negative  Cardiovascular:    Chest Pain: Negative, Palpitations: Negative, Orthopnea: Negative   Claudication: Negative, Leg Swelling: Negative PND: Negative  Respiratory:   Cough: Negative, Hemoptysis: Negative, Sputum Production: Negative   Shortness of Breath: Negative, Wheezing: Negative  Gastrointestinal:   Heartburn: Negative, Nausea: Negative, Vomiting: Negative   Abdominal Pain: Negative, Diarrhea: Negative, Constipation: Negative   Blood in Stool: Negative, Melena: Negative   Genitourinary:    Dysuria: Negative, Urgency: Negative, Frequency: Negative   Hematuria: Negative, Flank Pain: Negative  Musculoskeletal:    Myalgias: Negative, Neck Pain: Negative, Back Pain: Negative   Joint Pain: Negative, Falls: Negative  Endo/Heme/Allergies:    Easy Bruise/Blood: Negative, Env. Allergies: Negative, Polydipsia: Negative   Neurological:    Dizziness: Negative, Tingling: Negative. Tremor: Negative,    Sensory Change: Negative.  Speech Change: Negative, Focal Weakness: Negative     Seizures: Negative, LOC: Negative  Psychiatric:   Depression: Negative, Suicidal Ideas: Negative, Substance Abuse: Negative   Hallucinations: Negative, Nervous/Anxious: Negative   Insomnia: Negative, Memory Loss: Negative     Visit Vitals  BP (!) 144/65   Pulse 92   Temp 98.6 °F (37 °C)   Resp 19   Ht 5' 7\" (1.702 m)   Wt 53.5 kg (118 lb)   SpO2 95%   BMI 18.48 kg/m²       Temp (24hrs), Av.5 °F (36.9 °C), Min:97.4 °F (36.3 °C), Max:99.6 °F (37.6 °C)      General: no acute distress and laying in bed  HEENT: no icterus, no oral lesions  Lym: no cervical or supraclavicular adenopathy  CV: rate is regular and normal and their is no murmur  Lung: clear to auscultation, no increased work of breathing  Abd: nontender, no organomegaly  Neuro: cnoversant and moving extremities  MSK: no sarcopenia, normal tone  Derm: no rash  Psych: normal affect  Per: warm, no edema    Labs:  Recent Labs     22  0504 22  0530 22  0440   WBC 1.1* 0.5* 0.4*   RBC 3.03* 3.17* 3.20*   HCT 27.4* 28.6* 28.7*   MCV 90.4 90.2 89.7   MCH 29.0 28.7 29.4   MCHC 32.1 31.8 32.8   RDW 29.1* 29.1* 28.8*       Recent Labs     07/06/22  0504 07/05/22  0530 07/04/22  0440   CO2 25 24 21   BUN 13 9 15       No results for input(s): ALBUMIN in the last 72 hours.     No lab exists for component: BELA Huang    [email or web address]    [facility] MD POLINA Brar  [facility]

## 2022-07-06 NOTE — PROGRESS NOTES
[facility]ists  Pulmonary, Critical Care, and Sleep Medicine    Name: George Amador MRN: [id number]   : 1940 Hospital: [facility] [place]    Date: 2022  Room: [address] [place] [address] Note                                              Consult requesting physician: Dr. Leonardo Chacon  Reason for Consult: Neutropenic fever, acute respiratory failure with hypoxia.     IMPRESSION:       Active Hospital Problems    Diagnosis Date Noted    Septic shock ([address] 2022    Neutropenic fever ([address] 2022    Acute respiratory failure with hypoxia ([address] 2022    Hypomagnesemia 2022    Diffuse large B cell lymphoma ([address] 2022    Hyponatremia 2022    Pancytopenia ([address] 2022    UTI (urinary tract infection) 2022    Sinusitis 2022    Myopia of both eyes with astigmatism 2022    Pleural effusion 2022    Cellulitis 2022    Severe protein-calorie malnutrition (HCC) 2022    GERD (gastroesophageal reflux disease)     Thyroid disease     Acute encephalopathy     Hypokalemia 2012   ·      Patient Active Problem List   Diagnosis Code    S/P total knee arthroplasty Z96.659    Hypoxia R09.02    Hypokalemia E87.6    Acute posthemorrhagic anemia D62    HTN (hypertension), benign I10    Neutropenic fever ([address] D70.9, R50.81    Acute respiratory failure with hypoxia ([address] J96.01    Hypomagnesemia E83.42    Diffuse large B cell lymphoma (HCC) C83.30    GERD (gastroesophageal reflux disease) K21.9    Thyroid disease E07.9    Acute encephalopathy G93.40    Hyponatremia E87.1    Pancytopenia (HCC) D61.818    UTI (urinary tract infection) N39.0    Sinusitis J32.9    Myopia of both eyes with astigmatism H52.13, H52.203    Pleural effusion J90    Cellulitis L03.90    Severe protein-calorie malnutrition (HCC) E43    Septic shock (HCC) A41.9, R65.21         · Code status: Full Code       RECOMMENDATIONS:     Respiratory: Acute hypoxic respiratory failure due to pulmonary vascular congestion and pleural effusion. On NC  Albumins and gentle diuresis  On NC  Add [place] and IC    CTA chest 7/3/2022: No PE. Emphysema. Moderate to large left pleural effusion. Pulmonary vascular congestion. Moderate hiatal hernia. Multiple indeterminate splenic hypodensities. On 6 LPM NC; titrate to keep SPO2 more than 91%. On Lasix 20 mg daily for pleural effusion and vascular congestion. Repeat CXR in the morning; if persistent pleural effusion, then consider diagnostic thoracentesis by IR. Stable today   Bronchodilators: Change albuterol ATC to as needed. Keep SPO2 >=92%. HOB 30 degree elevation all the time. Aggressive pulmonary toileting. Aspiration precautions. Incentive spirometry. CVS:  Septic shock improved weaned pressors to off   Afib at home on amiodarone give 1 push and see if able to take po  Cardiology consulted   Ac an issue has anemia hold Eliquis as might need thora or central line  Start Lovenox and monitor hb oncology on board  Echo pending   Normal lactic acid and troponin. Mildly elevated BNP. Right leg edema; PVL LE pending. Pulmonary vascular congestion and pleural effusion; echo pending. On Lasix 20 mg daily with albumins   Hypotensive, likely shock; on Levophed; titrate to keep SBP more than 100 or MAP more than 65. Avoid central line due to pancytopenia abut ok to access port we spoke to oncology  ID: Neutropenic fever/neutropenic sepsis.   Wbc improved to 1.1  Primary  UTI on admission now gram negative billy final pending   Change azith to doxy due to amiodarone   Remove castrejon if able to tolerate   UTI gram negative Billy     Loose BM stool final pending  Add oral vancomycin    C. difficile toxin NEG   Negative    PCR Reflex   See Reflex order for C. difficile (DNA)    INTERPRETATION NTXCD   Indeterminate for Toxigenic C. difficile, DNA confirmation to follow. Abnormal         Rapid COVID-19 7/3/2022: Negative. Rapid influenza 7/3/2022: Negative. Respiratory viral panel 7/3/2022: Pending. UA 7/3/2022: Negative nitrites but moderate leukocyte esterase; bacteria 2+ with WBC 21-35. Urine culture 7/3/2022: Pending. Blood culture 7/3/2022: NGTD. Lactic acid normal.  Antibiotics: Continue Zosyn and vancomycin add doxy  Sepsis bundle followed. Follow cultures. Deescalate antibiotic when appropriate. ID consulted     Hematology/Oncology: Diffuse large B-cell lymphoma on R-CHOP, last received 6/28/2022. Splenic indeterminate hypodensities, could be early B-cell lymphoma process versus other etiologies. Neutropenic fever. Pancytopenia. Normal coags. Patient has received Neupogen at Dr. Quincy Christopher office. Seen by Dr. Guy Singh. pvl today  Renal:   Normal creatinine today   Hypokalemia being replaced. Mild hyponatremia; expected to improve with IVF. GI/:   Normal LFT. Splenic indeterminate hypodensities, could be early B-cell lymphoma process versus other etiologies. Endocrine: Monitor BS. SSI. Patient is on Synthroid 125 mcg daily; will change to IV 75 mcg daily for now. Check TSH, free T4, cortisol. Neurology: Alert awake oriented but lethargic due to sepsis. Nonfocal on exam.  CT head 7/3/2022: No acute findings. Chronic left sphenoid ethmoidal sinus disease, with questionable small left frontoethmoidal air-fluid level, potentially acute on chronic sinus disease. Asymmetric hypodensities right eye globe. Psychiatry: No acute issues. Toxicology: No acute issues. Pain/Sedation: No acute issues. Skin/Wound: No acute issues. Electrolytes: Replace electrolytes per ICU electrolyte replacement protocol. IVF: NS at 25 mill per hour. Nutrition: N.p.o. for now    Prophylaxis: DVT Prophylaxis: SCD (thrombocytopenia). GI Prophylaxis: Protonix. Restraints: none    Lines/Tubes: PIV  Midline will be placed.   Lucero: 7/3/2022 (Medically necessary for strict input/output monitoring in critically ill patient, will remove it when not needed. Lucero bundle followed). PT/OT eval and treat. OOB. Advance Directive/Palliative Care: Consulted. Will defer respective systems problem management to primary and other respective consultant and follow patient in ICU with primary and other medical team.  Further recommendations will be based on the patient's response to recommended treatment and results of the investigation ordered. Quality Care: PPI, DVT prophylaxis, HOB elevated, Infection control all reviewed and addressed. Care of plan d/w RN, RT, MDR, Dr. Quique Kilgore  D/w patient (answered all questions to satisfaction).  updated at the bedside     High complexity decision making was performed during the evaluation of this patient at high risk for decompensation with multiple organ involvement. Total critical care time spent rendering care exclusive of procedures/family discussion/coordination of care: 38 minutes. Subjective/History of Present Illness:     Patient is a 78 y.o. female with PMHx significant for HTN, hypothyroidism, GERD, diffuse large B-cell lymphoma on R-CHOP, last received 6/28/2022; admitted with neutropenic fever and hypoxic respiratory failure, pleural effusion. COVID19 vaccine status: Patient has received Pfizer COVID-19 vaccine x2 followed by 1 booster. 7/6/2022 :   Seen in ICU room 105. Weak but overall numbers are better  Loose BM final cdiff pending I will add oral vancomycin  BP improved        I/O last 24 hrs: Intake/Output Summary (Last 24 hours) at 7/6/2022 1151  Last data filed at 7/6/2022 0400  Gross per 24 hour   Intake 2866.02 ml   Output 1325 ml   Net 1541.02 ml         The patient is critically ill and can not provide additional history due to Unable to comprehend    History taken from  EMR     Review of Systems:  ROS not obtained due to patient factor.        Allergies   Allergen Reactions    Adhesive Tape-Silicones Other (comments)     Takes skin off    Penicillin G Itching      Past Medical History:   Diagnosis Date    Arthritis     GERD (gastroesophageal reflux disease)     Hypertension     Osteoarthritis of right knee 2012    S/P total knee arthroplasty 2012    Thyroid disease       Past Surgical History:   Procedure Laterality Date    HX APPENDECTOMY  65's    HX CHOLECYSTECTOMY      laparoscopic    HX HEENT      Surgery OD infection x 4    HX HEENT      throat surgery x 2    HX KNEE ARTHROSCOPY      right      Social History     Tobacco Use    Smoking status: Former Smoker     Quit date: 1999     Years since quittin.4    Smokeless tobacco: Never Used   Substance Use Topics    Alcohol use: Yes     Alcohol/week: 1.7 standard drinks     Types: 1 Glasses of wine, 1 Cans of beer per week      No family history on file. Prior to Admission medications    Medication Sig Start Date End Date Taking? Authorizing Provider   levothyroxine (Synthroid) 125 mcg tablet Take 125 mcg by mouth Daily (before breakfast). Yes Provider, Historical   acyclovir (ZOVIRAX) 400 mg tablet Take 400 mg by mouth two (2) times a day. Yes Provider, Historical   amiodarone (PACERONE) 100 mg tablet Take 100 mg by mouth daily. Yes Provider, Historical   apixaban (Eliquis) 5 mg tablet Take 5 mg by mouth two (2) times a day. Yes Provider, Historical   ezetimibe (Zetia) 10 mg tablet Take  by mouth. Yes Provider, Historical   SITagliptin (Januvia) 100 mg tablet Take 100 mg by mouth daily. Yes Provider, Historical   RABEprazole (ACIPHEX) 20 mg TbEC Take 20 mg by mouth daily. Yes Provider, Historical   melatonin 5 mg tablet Take 5 mg by mouth nightly. Yes Provider, Historical   magnesium oxide (MAG-OX) 400 mg tablet Take 400 mg by mouth two (2) times a day.    Yes Provider, Historical     Current Facility-Administered Medications   Medication Dose Route Frequency    potassium chloride 10 mEq in 100 ml IVPB  10 mEq IntraVENous ONCE    magnesium sulfate 1 g/100 ml IVPB (premix or compounded)  1 g IntraVENous ONCE    vancomycin (VANCOCIN) 1,000 mg in 0.9% sodium chloride 250 mL (VIAL-MATE)  1,000 mg IntraVENous Q12H    vancomycin 50 mg/mL oral solution (compounded) 125 mg  125 mg Oral Q6H    piperacillin-tazobactam (ZOSYN) 3.375 g in 0.9% sodium chloride (MBP/ADV) 100 mL MBP  3.375 g IntraVENous Q8H    doxycycline (VIBRAMYCIN) 100 mg in 0.9% sodium chloride (MBP/ADV) 100 mL MBP  100 mg IntraVENous Q12H    amiodarone (CORDARONE) tablet 100 mg  100 mg Oral DAILY    enoxaparin (LOVENOX) injection 50 mg  1 mg/kg SubCUTAneous Q12H    sodium chloride (NS) flush 5-40 mL  5-40 mL IntraVENous Q8H    pantoprazole (PROTONIX) 40 mg in 0.9% sodium chloride 10 mL injection  40 mg IntraVENous DAILY    Vancomycin-Pharmacy To Dose  1 Each Other Rx Dosing/Monitoring    albumin human 25% (BUMINATE) solution 25 g  25 g IntraVENous Q6H    furosemide (LASIX) injection 20 mg  20 mg IntraVENous DAILY    insulin lispro (HUMALOG) injection   SubCUTAneous Q6H         Objective:   Vital Signs:    Visit Vitals  BP (!) 110/46   Pulse 93   Temp 98.6 °F (37 °C)   Resp 25   Ht 5' 7\" (1.702 m)   Wt 53.5 kg (118 lb)   SpO2 95%   BMI 18.48 kg/m²       O2 Device: Nasal cannula   O2 Flow Rate (L/min): 6 l/min   Temp (24hrs), Av.5 °F (36.9 °C), Min:97.4 °F (36.3 °C), Max:99.6 °F (37.6 °C)       Intake/Output:   Last shift:      No intake/output data recorded. Last 3 shifts:  1901 -  0700  In: 3910.7 [P.O.:200;  I.V.:2760.7]  Out: 2175 [Urine:2175]      Intake/Output Summary (Last 24 hours) at 2022 1151  Last data filed at 2022 0400  Gross per 24 hour   Intake 2866.02 ml   Output 1325 ml   Net 1541.02 ml       Last 3 Recorded Weights in this Encounter    22 1649 22 1547   Weight: 53.5 kg (118 lb) 53.5 kg (118 lb)         Recent Labs     22  1712   PHI 7.46*   PCO2I 30.0*   PO2I 74*   HCO3I 21.2*   FIO2I 44 Physical Exam:     General/Neurology: Alert, awake and oriented. Lethargic. Chronically ill looking. O2 NC. Right chest Mediport   Head:   Normocephalic, without obvious abnormality, atraumatic. Eye:   EOM intact. No scleral icterus, no pallor, no cyanosis. Nose:   No sinus tenderness  Throat:  Lips, mucosa, and tongue normal. No oral thrush. Neck:   Supple, symmetric. No lymphadenopathy. Trachea midline  Lung:   Reduced air entry at the bases. Bilateral extensive rhonchi. L>R No wheezing. No prolonged expiration. Heart:   Regular rate & rhythm. S1 S2 present. No murmur. No JVD. Abdomen:  Soft. NT. ND. +BS. No masses. Extremities:  Right leg mild edema with erythema at the distal leg/ankle with warmth. 2+ dorsalis pedis pulses bilaterally. No cyanosis or clubbing. Pulses: 2+ and symmetric in DP. Capillary refill: normal  Lymphatic:  No cervical or supraclavicular palpable lymphadenopathy. Musculoskeletal: No joint swelling. No tenderness. Skin left lower extremity swelling, bruses upper extremities       Data:       Recent Results (from the past 24 hour(s))   WBC, STOOL    Collection Time: 07/05/22  2:55 PM   Result Value Ref Range    White blood cells, stool NO WBC'S SEEN /HPF   C. DIFFICILE AG & TOXIN A/B    Collection Time: 07/05/22  2:55 PM   Result Value Ref Range    C. difficile toxin Negative NEG      PCR Reflex See Reflex order for C. difficile (DNA)      INTERPRETATION (A) NTXCD       Indeterminate for Toxigenic C. difficile, DNA confirmation to follow.    C DIFF MOLECULAR    Collection Time: 07/05/22  2:55 PM    Specimen: Miscellaneous sample    Stool specimen   Result Value Ref Range    C Diff Molecular Negative NEG     ECHO ADULT COMPLETE    Collection Time: 07/05/22  3:47 PM   Result Value Ref Range    IVSd 0.9 0.6 - 0.9 cm    LVIDd 3.7 (A) 3.9 - 5.3 cm    LVIDs 2.8 cm    LVOT Diameter 2.1 cm    LVPWd 0.8 0.6 - 0.9 cm    EF BP 64 55 - 100 %    LV Ejection Fraction A2C 69 % LV Ejection Fraction A4C 54 %    LV EDV A2C 38 mL    LV EDV A4C 28 mL    LV EDV BP 34 (A) 56 - 104 mL    LV ESV A2C 12 mL    LV ESV A4C 13 mL    LV ESV BP 12 (A) 19 - 49 mL    LVOT Peak Gradient 4 mmHg    LVOT Mean Gradient 2 mmHg    LVOT SV 48.8 ml    LVOT Peak Velocity 1.0 m/s    LVOT VTI 14.1 cm    RVIDd 3.5 cm    RV Free Wall Peak S' 21 cm/s    LA Diameter 3.3 cm    LA Volume A/L 34 mL    LA Volume 2C 30 22 - 52 mL    LA Volume 4C 34 22 - 52 mL    LA Volume BP 32 22 - 52 mL    AV Area by Peak Velocity 1.9 cm2    AV Area by VTI 2.1 cm2    AV Peak Gradient 12 mmHg    AV Mean Gradient 7 mmHg    AV Peak Velocity 1.7 m/s    AV Mean Velocity 1.3 m/s    AV VTI 22.9 cm    TAPSE 1.5 (A) 1.7 cm    TR Peak Gradient 38 mmHg    TR Max Velocity 3.07 m/s    Aortic Root 3.4 cm    Fractional Shortening 2D 24 28 - 44 %    LV ESV Index BP 7 mL/m2    LV EDV Index BP 21 mL/m2    LV ESV Index A4C 8 mL/m2    LV EDV Index A4C 17 mL/m2    LV ESV Index A2C 7 mL/m2    LV EDV Index A2C 23 mL/m2    LVIDd Index 2.28 cm/m2    LVIDs Index 1.73 cm/m2    LV RWT Ratio 0.43     LV Mass 2D 89.5 67 - 162 g    LV Mass 2D Index 55.2 43 - 95 g/m2    LA Volume Index BP 20 16 - 34 ml/m2    LA Volume Index A/L 21 16 - 34 mL/m2    LVOT Stroke Volume Index 30.1 mL/m2    LVOT Area 3.5 cm2    LA Volume Index 2C 19 16 - 34 mL/m2    LA Volume Index 4C 21 16 - 34 mL/m2    LA Size Index 2.04 cm/m2    LA/AO Root Ratio 0.97     Ao Root Index 2.10 cm/m2    AV Velocity Ratio 0.59     LVOT:AV VTI Index 0.62     ROSCOE/BSA VTI 1.3 cm2/m2    ROSCOE/BSA Peak Velocity 1.2 cm2/m2    Est. RA Pressure 3 mmHg    RVSP 41 mmHg   GLUCOSE, POC    Collection Time: 07/05/22  4:45 PM   Result Value Ref Range    Glucose (POC) 178 (H) 70 - 110 mg/dL   POTASSIUM    Collection Time: 07/05/22  6:16 PM   Result Value Ref Range    Potassium 3.7 3.5 - 5.5 mmol/L   GLUCOSE, POC    Collection Time: 07/05/22 11:21 PM   Result Value Ref Range    Glucose (POC) 207 (H) 70 - 110 mg/dL   MAGNESIUM Collection Time: 07/06/22  5:04 AM   Result Value Ref Range    Magnesium 1.6 1.6 - 2.6 mg/dL   PHOSPHORUS    Collection Time: 07/06/22  5:04 AM   Result Value Ref Range    Phosphorus 1.3 (L) 2.5 - 4.9 MG/DL   CALCIUM, IONIZED    Collection Time: 07/06/22  5:04 AM   Result Value Ref Range    Ionized Calcium 1.22 1.12 - 1.32 MMOL/L   CBC WITH AUTOMATED DIFF    Collection Time: 07/06/22  5:04 AM   Result Value Ref Range    WBC 1.1 (L) 4.6 - 13.2 K/uL    RBC 3.03 (L) 4.20 - 5.30 M/uL    HGB 8.8 (L) 12.0 - 16.0 g/dL    HCT 27.4 (L) 35.0 - 45.0 %    MCV 90.4 78.0 - 100.0 FL    MCH 29.0 24.0 - 34.0 PG    MCHC 32.1 31.0 - 37.0 g/dL    RDW 29.1 (H) 11.6 - 14.5 %    PLATELET 766 (L) 091 - 420 K/uL    MPV 9.3 9.2 - 11.8 FL    NRBC 0.0 0  WBC    ABSOLUTE NRBC 0.00 0.00 - 0.01 K/uL    NEUTROPHILS 22 (L) 40 - 73 %    BAND NEUTROPHILS 2 0 - 5 %    LYMPHOCYTES 22 21 - 52 %    MONOCYTES 16 (H) 3 - 10 %    EOSINOPHILS 34 (H) 0 - 5 %    BASOPHILS 2 0 - 2 %    METAMYELOCYTES 2 (H) 0 %    IMMATURE GRANULOCYTES 0 %    TOTAL CELLS COUNTED 50      ABS. NEUTROPHILS 0.3 (L) 1.8 - 8.0 K/UL    ABS. LYMPHOCYTES 0.2 (L) 0.9 - 3.6 K/UL    ABS. MONOCYTES 0.2 0.05 - 1.2 K/UL    ABS. EOSINOPHILS 0.4 0.0 - 0.4 K/UL    ABS. BASOPHILS 0.0 0.0 - 0.1 K/UL    ABS. IMM.  GRANS. 0.0 K/UL    DF MANUAL      PLATELET COMMENTS DECREASED PLATELETS      RBC COMMENTS ANISOCYTOSIS  2+        RBC COMMENTS MICROCYTOSIS  1+        RBC COMMENTS OVALOCYTES  1+        RBC COMMENTS SCHISTOCYTES  FEW       METABOLIC PANEL, COMPREHENSIVE    Collection Time: 07/06/22  5:04 AM   Result Value Ref Range    Sodium 136 136 - 145 mmol/L    Potassium 3.4 (L) 3.5 - 5.5 mmol/L    Chloride 105 100 - 111 mmol/L    CO2 25 21 - 32 mmol/L    Anion gap 6 3.0 - 18 mmol/L    Glucose 99 74 - 99 mg/dL    BUN 13 7.0 - 18 MG/DL    Creatinine 0.48 (L) 0.6 - 1.3 MG/DL    BUN/Creatinine ratio 27 (H) 12 - 20      GFR est AA >60 >60 ml/min/1.73m2    GFR est non-AA >60 >60 ml/min/1.73m2 Calcium 8.7 8.5 - 10.1 MG/DL    Bilirubin, total 0.4 0.2 - 1.0 MG/DL    ALT (SGPT) 9 (L) 13 - 56 U/L    AST (SGOT) 4 (L) 10 - 38 U/L    Alk. phosphatase 54 45 - 117 U/L    Protein, total 5.0 (L) 6.4 - 8.2 g/dL    Albumin 3.2 (L) 3.4 - 5.0 g/dL    Globulin 1.8 (L) 2.0 - 4.0 g/dL    A-G Ratio 1.8 (H) 0.8 - 1.7     NT-PRO BNP    Collection Time: 07/06/22  5:04 AM   Result Value Ref Range    NT pro-BNP 5,018 (H) 0 - 1,800 PG/ML   PROTHROMBIN TIME + INR    Collection Time: 07/06/22  5:04 AM   Result Value Ref Range    Prothrombin time 15.0 11.5 - 15.2 sec    INR 1.1 0.8 - 1.2     GLUCOSE, POC    Collection Time: 07/06/22  5:13 AM   Result Value Ref Range    Glucose (POC) 106 70 - 110 mg/dL   GLUCOSE, POC    Collection Time: 07/06/22  8:05 AM   Result Value Ref Range    Glucose (POC) 156 (H) 70 - 110 mg/dL   GLUCOSE, POC    Collection Time: 07/06/22 11:12 AM   Result Value Ref Range    Glucose (POC) 217 (H) 70 - 110 mg/dL         Chemistry Recent Labs     07/06/22  0504 07/05/22  1816 07/05/22  0530 07/04/22  1655 07/04/22  0440   GLU 99  --  147*  --  192*     --  132*  --  133*   K 3.4* 3.7 3.7   < > 3.1*     --  102  --  100   CO2 25  --  24  --  21   BUN 13  --  9  --  15   CREA 0.48*  --  0.53*  --  0.61   CA 8.7  --  8.4*  --  8.7   MG 1.6  --  1.9  --  2.6   PHOS 1.3*  --  1.9*  --  3.2   AGAP 6  --  6  --  12   BUCR 27*  --  17  --  25*   AP 54  --  73  --  87   TP 5.0*  --  4.9*  --  6.4   ALB 3.2*  --  2.8*  --  3.5   GLOB 1.8*  --  2.1  --  2.9   AGRAT 1.8*  --  1.3  --  1.2    < > = values in this interval not displayed.         Lactic Acid Lactic acid   Date Value Ref Range Status   07/03/2022 2.0 0.4 - 2.0 MMOL/L Final     Recent Labs     07/03/22  1645   LAC 2.0        Liver Enzymes Protein, total   Date Value Ref Range Status   07/06/2022 5.0 (L) 6.4 - 8.2 g/dL Final     Albumin   Date Value Ref Range Status   07/06/2022 3.2 (L) 3.4 - 5.0 g/dL Final     Globulin   Date Value Ref Range Status 07/06/2022 1.8 (L) 2.0 - 4.0 g/dL Final     A-G Ratio   Date Value Ref Range Status   07/06/2022 1.8 (H) 0.8 - 1.7   Final     Alk.  phosphatase   Date Value Ref Range Status   07/06/2022 54 45 - 117 U/L Final     Recent Labs     07/06/22  0504 07/05/22  0530 07/04/22  0440   TP 5.0* 4.9* 6.4   ALB 3.2* 2.8* 3.5   GLOB 1.8* 2.1 2.9   AGRAT 1.8* 1.3 1.2   AP 54 73 87        CBC w/Diff Recent Labs     07/06/22  0504 07/05/22  0530 07/04/22  0440   WBC 1.1* 0.5* 0.4*   RBC 3.03* 3.17* 3.20*   HGB 8.8* 9.1* 9.4*   HCT 27.4* 28.6* 28.7*   * 94* 108*   GRANS 22* WBC TOO FEW TO DIFFERENTIATE 6*   LYMPH 22 WBC TOO FEW TO DIFFERENTIATE 34   EOS 34* WBC TOO FEW TO DIFFERENTIATE 6*        Cardiac Enzymes No results found for: CPK, CK, CKMMB, CKMB, RCK3, CKMBT, CKNDX, CKND1, HUMBERTO, TROPT, TROIQ, JANE, TROPT, TNIPOC, BNP, BNPP     BNP No results found for: BNP, BNPP, XBNPT     Coagulation Recent Labs     07/06/22  0504 07/04/22  0440   PTP 15.0 15.4*   INR 1.1 1.2         Thyroid  Lab Results   Component Value Date/Time    TSH 3.50 07/04/2022 04:40 AM       No results found for: T4     Urinalysis Lab Results   Component Value Date/Time    Color YELLOW 07/03/2022 05:00 PM    Appearance CLOUDY 07/03/2022 05:00 PM    Specific gravity 1.016 07/03/2022 05:00 PM    pH (UA) 7.0 07/03/2022 05:00 PM    Protein 300 (A) 07/03/2022 05:00 PM    Glucose Negative 07/03/2022 05:00 PM    Ketone Negative 07/03/2022 05:00 PM    Bilirubin Negative 07/03/2022 05:00 PM    Urobilinogen 0.2 07/03/2022 05:00 PM    Nitrites Negative 07/03/2022 05:00 PM    Leukocyte Esterase MODERATE (A) 07/03/2022 05:00 PM    Epithelial cells FEW 07/03/2022 05:00 PM    Bacteria 2+ (A) 07/03/2022 05:00 PM    WBC 21 to 35 07/03/2022 05:00 PM    RBC 11 to 20 07/03/2022 05:00 PM        Micro  Recent Labs     07/03/22  1700 07/03/22  1650 07/03/22  1645   CULT GRAM NEGATIVE RODS* NO GROWTH 3 DAYS NO GROWTH 3 DAYS     Recent Labs     07/03/22  1700 07/03/22  1650 07/03/22  [address]* NO GROWTH 3 DAYS NO GROWTH 3 DAYS          Culture data during this hospitalization. All Micro Results     Procedure Component Value Units Date/Time    C. DIFFICILE AG & TOXIN A/B [[id number]]  (Abnormal) Collected: 07/05/22 1455    Order Status: Completed Specimen: Miscellaneous sample Updated: 07/06/22 1133     C. difficile toxin Negative        PCR Reflex       See Reflex order for C. difficile (DNA)           INTERPRETATION       Indeterminate for Toxigenic C. difficile, DNA confirmation to follow. Liane Jovita DIFF MOLECULAR [[id number]] Collected: 07/05/22 1455    Order Status: Completed Specimen: Miscellaneous sample Updated: 07/06/22 1133     C Diff Molecular Negative        Comment: This specimen is negative for toxigenic C difficile by DNA amplification. Repeat testing is not recommended for confirmation, samples received within 7 days of this negative result will be rejected.        CULTURE, BLOOD [[id number]] Collected: 07/03/22 1645    Order Status: Completed Specimen: Blood Updated: 07/06/22 0931     Special Requests: NO SPECIAL REQUESTS        Culture result: NO GROWTH 3 DAYS       CULTURE, BLOOD [[id number]] Collected: 07/03/22 1650    Order Status: Completed Specimen: Blood Updated: 07/06/22 0931     Special Requests: NO SPECIAL REQUESTS        Culture result: NO GROWTH 3 DAYS       ENTERIC BACTERIA PANEL, DNA [[id number]] Collected: 07/03/22 1455    Order Status: Completed Specimen: Stool Updated: 07/05/22 1509    CULTURE, URINE [[id number]]  (Abnormal) Collected: 07/03/22 1700    Order Status: Completed Specimen: Urine from Clean catch Updated: 07/05/22 0815     Special Requests: NO SPECIAL REQUESTS        [place] Count --        >100,000  COLONIES/mL       Culture result: GRAM NEGATIVE RODS       [place], URINE [[id number]] Collected: 07/04/22 1247    Order Status: Completed Specimen: Urine, random Updated: 07/04/22 2030     Strep pneumo Ag, urine Negative       LEGIONELLA PNEUMOPHILA AG, URINE [[id number]] Collected: 07/04/22 1247    Order Status: Completed Specimen: Urine, random Updated: 07/04/22 2030     Legionella Ag, urine Negative       RESPIRATORY VIRUS PANEL W/COVID-19, PCR [[id number]] Collected: 07/03/22 2110    Order Status: Completed Specimen: Nasopharyngeal Updated: 07/04/22 1229     Adenovirus Not detected        Coronavirus 229E Not detected        Coronavirus HKU1 Not detected        Coronavirus CVNL63 Not detected        Coronavirus OC43 Not detected        SARS-CoV-2, PCR Not detected        Metapneumovirus Not detected        Rhinovirus and Enterovirus Not detected        Influenza A Not detected        Influenza B Not detected        Parainfluenza 1 Not detected        Parainfluenza 2 Not detected        Parainfluenza 3 Not detected        Parainfluenza virus 4 Not detected        RSV by PCR Not detected        B. parapertussis, PCR Not detected        Bordetella pertussis - PCR Not detected        Chlamydophila pneumoniae DNA, QL, PCR Not detected        Mycoplasma pneumoniae DNA, QL, PCR Not detected       C. DIFFICILE AG & TOXIN A/B [[id number]]     Order Status: Canceled Specimen: Stool     COVID-19 RAPID TEST [[id number]] Collected: 07/03/22 1715    Order Status: Completed Specimen: Nasopharyngeal Updated: 07/03/22 1839     Specimen source Nasopharyngeal        COVID-19 rapid test Not detected        Comment: Rapid Abbott ID Now       Rapid NAAT:  The specimen is NEGATIVE for SARS-CoV-2, the novel coronavirus associated with COVID-19. Negative results should be treated as presumptive and, if inconsistent with clinical signs and symptoms or necessary for patient management, should be tested with an alternative molecular assay. Negative results do not preclude SARS-CoV-2 infection and should not be used as the sole basis for patient management decisions.        This test has been authorized by the FDA under an Emergency Use Authorization (EUA) for use by authorized laboratories. Fact sheet for Healthcare Providers: ConventionUpdate.co.nz  Fact sheet for Patients: ConventionUpdate.co.nz       Methodology: Isothermal Nucleic Acid Amplification         INFLUENZA A & B AG (RAPID TEST) [[id number]] Collected: 07/03/22 1715    Order Status: Completed Specimen: Nasopharyngeal from Nasal washing Updated: 07/03/22 1744     Influenza A Antigen Negative        Comment: A negative result does not exclude influenza virus infection, seasonal or H1N1 due to suboptimal sensitivity. If influenza is circulating in your community, a diagnosis of influenza should be considered based on a patients clinical presentation and empiric antiviral treatment should be considered, if indicated. Influenza B Antigen Negative                XR ANKLE RT MIN 3 V    Result Date: 7/3/2022  EXAM: ANKLE SERIES Indication: Swelling Technique: Frontal, , oblique, and lateral views of the right ankle. Comparison studies: None. _______________ FINDINGS: -OSSEOUS: No acute or destructive osseous abnormality. Tibiotalar degenerative changes with small degenerative plantar calcaneal spur. Normal alignment with preserved ankle mortise. . -SOFT TISSUES: Benign calcifications of the lower leg with mild diffuse edema of the distal leg to visualized midfoot. . No significant joint effusion. _____________    1. Nonspecific soft tissue edema, with no acute or destructive osseous abnormality. CT HEAD WO CONT    Result Date: 7/3/2022  EXAM: CT HEAD WO CONT CLINICAL INDICATION/HISTORY: ams -Additional: None COMPARISON: None TECHNIQUE: Axial CT imaging of the head was performed without intravenous contrast. One or more dose reduction techniques were used on this CT: automated exposure control, adjustment of the mAs and/or kVp according to patient size, and iterative reconstruction techniques.   The specific techniques used on this CT exam have been documented in the patient's electronic medical record. Digital Imaging and Communications in Medicine (DICOM) format image data are available to nonaffiliated external healthcare facilities or entities on a secure, media free, reciprocally searchable basis with patient authorization for at least a 12-month period after this study. _______________ FINDINGS: BRAIN:   > Brain volume: Age appropriate.   > White matter: Little or no white matter disease. > Infarcts, encephalomalacia: None.   > Parenchymal mass: None.   > Parenchymal hemorrhage: None.   > Midline shift: None.   > Miscellaneous: None. EXTRA-AXIAL SPACES: Unremarkable. No fluid collections. CALVARIUM: Intact. SINUSES, MASTOIDS: Complete opacified left sphenoethmoidal air cells with mild mucosal thickening on the right and small left anterior frontal ethmoidal opacities. OTHER EXTRACRANIAL: Asymmetric dense right globe with findings of prior bilateral cataract surgery. _______________     1. No CT findings of an acute intracranial abnormality. Please note that noncontrast head CT may be normal in early acute infarct. 2. Chronic left sphenoethmoidal sinus disease, with questional small left frontoethmoidal air-fluid level potentially acute on chronic sinus disease. 3. Asymmetric hyperdense right globe. Recommend correlation with patient ocular history. CTA CHEST W OR W WO CONT    Result Date: 7/3/2022  EXAM: CTA Chest INDICATION: Hypoxic. COMPARISON: No recent exams for direct comparison, most recent study from 9/21/2012. TECHNIQUE: Axial CT imaging from the thoracic inlet through the diaphragm with intravenous contrast. Coronal and sagittal MIP reformats were generated. One or more dose reduction techniques were used on this CT: automated exposure control, adjustment of the mAs and/or kVp according to patient size, and iterative reconstruction techniques.   The specific techniques used on this CT exam have been documented in the patient's electronic medical record. Digital Imaging and Communications in Medicine (DICOM) format image data are available to nonaffiliated external healthcare facilities or entities on a secure, media free, reciprocally searchable basis with patient authorization for at least a 12-month period after this study. _______________ FINDINGS: EXAM QUALITY: Adequate bolus timing with mild diffuse respiratory motion artifact degradation. Lakshmi Graven PULMONARY ARTERIES: No evidence of central, interlobar or mid to proximal segmental branch pulmonary arterial filling defect. Asymmetric motion degradation involving the lower lobes. Normal sized main pulmonary artery. Pulmonary embolism. MEDIASTINUM: Normal heart size without pericardial effusion. Coronary artery and aortic atherosclerosis. A right upper chest Mediport is present with the catheter in the SVC. Moderate-sized hiatus hernia. LUNGS and PLEURA: Moderate to large left low attenuating layering pleural effusion. Marked diffuse emphysematous changes with chronic areas of scarring and atelectasis. Bilateral lower lobe groundglass, difficult to differentiate between dependent changes, atelectasis and/or edema. . AIRWAY: Normal. LYMPH NODES: No enlarged nodes. UPPER ABDOMEN: Multiple splenic hypodensities, largest anteriorly measuring 3 cm. Overall normal splenic size measuring 11.3 cm in AP dimension. Prior cholecystectomy OTHER: No acute or aggressive osseous abnormalities identified. _______________     1. Respiratory motion degradation. Otherwise, No evidence of central pulmonary embolism. 2.  Interstitial and groundglass changes suggestive of underlying edema, with moderate to large left-sided pleural effusion. 3. Multiple Indeterminate splenic hypodensities, recommend comparison to any prior recent outside exams and patient history. 4. Moderate-sized hiatus hernia.     XR CHEST PORT    Result Date: 7/3/2022  EXAM: XR CHEST PORT CLINICAL INDICATION/HISTORY: ams fever -Additional: None COMPARISON: None TECHNIQUE: Frontal view of the chest _______________ FINDINGS: SUPPORT LINES AND TUBES:Right-sided tunneled Mediport and catheter tip over the distal SVC. HEART AND MEDIASTINUM: Midline cardiac silhouette appreciable cardiomegaly. Hilar vascular congestion and cephalization. LUNGS AND PLEURAL SPACES: Diffuse bilateral hazy indistinct interstitial opacities with retrocardiac left basilar confluent opacity and blunted costophrenic angle/effusion. No significant right-sided pleural effusion. No pneumothorax. BONY THORAX AND SOFT TISSUES: No acute or destructive osseous abnormality. _______________     1. Findings of CHF versus fluid overload with edema and left pleural effusion. Images report reviewed by me:  CT (Most Recent) (CT chest reviewed by me) Results from Hospital Encounter encounter on 07/03/22    CTA CHEST W OR W WO CONT    Narrative  EXAM: CTA Chest    INDICATION: Hypoxic. COMPARISON: No recent exams for direct comparison, most recent study from  9/21/2012. TECHNIQUE: Axial CT imaging from the thoracic inlet through the diaphragm with  intravenous contrast. Coronal and sagittal MIP reformats were generated. One or more dose reduction techniques were used on this CT: automated exposure  control, adjustment of the mAs and/or kVp according to patient size, and  iterative reconstruction techniques. The specific techniques used on this CT  exam have been documented in the patient's electronic medical record. Digital  Imaging and Communications in Medicine (DICOM) format image data are available  to nonaffiliated external healthcare facilities or entities on a secure, media  free, reciprocally searchable basis with patient authorization for at least a  12-month period after this study. _______________    FINDINGS:    EXAM QUALITY: Adequate bolus timing with mild diffuse respiratory motion  artifact degradation. Crystal Bernice     PULMONARY ARTERIES: No evidence of central, interlobar or mid to proximal  segmental branch pulmonary arterial filling defect. Asymmetric motion  degradation involving the lower lobes. Normal sized main pulmonary artery. Pulmonary embolism. MEDIASTINUM: Normal heart size without pericardial effusion. Coronary artery and  aortic atherosclerosis. A right upper chest Mediport is present with the  catheter in the SVC. Moderate-sized hiatus hernia. LUNGS and PLEURA: Moderate to large left low attenuating layering pleural  effusion. Marked diffuse emphysematous changes with chronic areas of scarring  and atelectasis. Bilateral lower lobe groundglass, difficult to differentiate  between dependent changes, atelectasis and/or edema. .    AIRWAY: Normal.    LYMPH NODES: No enlarged nodes. UPPER ABDOMEN: Multiple splenic hypodensities, largest anteriorly measuring 3  cm. Overall normal splenic size measuring 11.3 cm in AP dimension. Prior  cholecystectomy    OTHER: No acute or aggressive osseous abnormalities identified. _______________    Impression  1. Respiratory motion degradation. Otherwise, No evidence of central pulmonary  embolism. 2.  Interstitial and groundglass changes suggestive of underlying edema, with  moderate to large left-sided pleural effusion. 3. Multiple Indeterminate splenic hypodensities, recommend comparison to any  prior recent outside exams and patient history. 4. Moderate-sized hiatus hernia. CXR reviewed by me:  XR (Most Recent). CXR  reviewed by me and compared with previous CXR Results from Hospital Encounter encounter on 07/03/22    XR CHEST PORT    Narrative  EXAM: XR CHEST PORT    CLINICAL INDICATION/HISTORY: Pleural effusion  -Additional: None    COMPARISON: 7/3/2022    TECHNIQUE: Portable frontal view of the chest    _______________    FINDINGS:    SUPPORT DEVICES: None. HEART AND MEDIASTINUM: Cardiomediastinal silhouette within normal limits. LUNGS AND PLEURAL SPACES: Bilateral interstitial and parenchymal opacities.   Small to moderate left effusion/atelectasis. No pneumothorax.    _______________    Impression  Bilateral interstitial and parenchymal opacities. Small to moderate left  effusion/atelectasis. ·Please note: Voice-recognition software may have been used to generate this report, which may have resulted in some phonetic-based errors in grammar and contents. Even though attempts were made to correct all the mistakes, some may have been missed, and remained in the body of the document.       Luz Funes MD  7/6/2022

## 2022-07-06 NOTE — PROGRESS NOTES
Hospitalist Progress Note-critical care note     Patient: Laura Bernal MRN: [id number]  CSN: [id number]    YOB: 1940  Age: 78 y.o. Sex: female    DOA: 7/3/2022 LOS:  LOS: 3 days            Chief complaint: septic shock. Neutropenic fever, lymphoma, gerd , dm    Assessment/Plan         Hospital Problems  Date Reviewed: 9/21/2012          Codes Class Noted POA    Septic shock ([facility] [address] ICD-10-CM: A41.9, R65.21  ICD-9-CM: 038.9, 785.52, 995.92  7/4/2022 Yes        Neutropenic fever ([facility] [address] ICD-10-CM: D70.9, R50.81  ICD-9-CM: 288.00, 780.61  7/3/2022 Yes        * (Principal) Acute respiratory failure with hypoxia [facility]) ICD-10-CM: J96.01  ICD-9-CM: 518.81  7/3/2022 Yes        Hypomagnesemia ICD-10-CM: E83.42  ICD-9-CM: 275.2  7/3/2022 Yes        Diffuse large B cell lymphoma ([facility] [address] ICD-10-CM: C83.30  ICD-9-CM: 202.80  7/3/2022 Yes        GERD (gastroesophageal reflux disease) ICD-10-CM: K21.9  ICD-9-CM: 530.81  Unknown Yes        Thyroid disease ICD-10-CM: E07.9  ICD-9-CM: 246. 9  Unknown Unknown        Acute encephalopathy ICD-10-CM: G93.40  ICD-9-CM: 348.30  Unknown Yes        Hyponatremia ICD-10-CM: E87.1  ICD-9-CM: 276.1  7/3/2022 Yes        Pancytopenia ([facility] [address] ICD-10-CM: D78.450  ICD-9-CM: 284.19  7/3/2022 Yes        UTI (urinary tract infection) ICD-10-CM: N39.0  ICD-9-CM: 599.0  7/3/2022 Yes        Sinusitis ICD-10-CM: J32.9  ICD-9-CM: 473.9  7/3/2022 Unknown        Myopia of both eyes with astigmatism ICD-10-CM: H52.13, H52.203  ICD-9-CM: 367.1, 367.20  7/3/2022 Unknown        Pleural effusion ICD-10-CM: J90  ICD-9-CM: 511.9  7/3/2022 Unknown        Cellulitis ICD-10-CM: L03.90  ICD-9-CM: 682.9  7/3/2022 Yes        Severe protein-calorie malnutrition ([facility] [address] ICD-10-CM: E43  ICD-9-CM: 281  7/3/2022 Yes        Hypokalemia ICD-10-CM: E87.6  ICD-9-CM: 276.8  9/21/2012 Yes              Resp -   Acute respiratory failure with hypoxia with 6 L oxygen on admission  Improving today   Flu negative, rapid covid 19 negative -confirmed per pcr   cta no central PE        Pleural effusion   Moderate and large effusion-will defer to pulmonologist decide for thoracentesis   Lasix -low dose due to         ID -   Cellulitis of rt leg, neutropenia fever , acute on chronic sinusitis , UTI-cellulitis resolving   Continue vanc and zosyn doxy per id   ID consulted   bcx negative       [facility] -   paf : flares up on amiodorone gtt   Cardiologist is consulted   Continue balance electrolytes and echo : ef wnl     Septic shock   Was on levophed , now off levophed      Heme/onc -   Pancytopenia-improving   Due to chemo   lovenox hold due to thrombocytopenia   neutropenic fever improving      Diffusion Large B lymphoma   On chemo therapy   Oncologist f/u    splenic hypodensities?          Renal -   Hyponatremia , hypomagnesemia, hypokalemia -continue replace K per protocol   icu electrolytes replacement protocol   Monitoring urine out      Endocrine -   DM type II   Ssi , hypoglycemia protocol      hypothyroidism   Continue synthyroid  ,ths and free t4 ordered      Neuro  Acute encephalopath  Much better, mri pending      Myopia      GI -passed speech continue diet      Mks: rt ankle swelling-resolved      Severe malnutrition      Hypoalbuminemia   Albumin infusion       Subjective I feel fine , feel much better     Pt looks better today     30 minutes of critical care time spent in the direct evaluation and treatment of this high risk patient. The reason for providing this level of medical care for this critically ill patient was due a critical illness that impaired one or more vital organ systems such that there was a high probability of imminent or life threatening deterioration in the patients condition.  This care involved high complexity decision making to assess, manipulate, and support vital system functions, to treat this degreee vital organ system failure and to prevent further life threatening deterioration of the patients condition. Subjective : feel tired   Disposition :tbd,     Review of systems:    General : no fever /chills . +fatigue   Lung no sob , no wheezing  Heart : no chest pain, no palpitation   Gi : no n/v no abdomen pain     Limited due to pt' condition, she answered some questions, no chest pain, breathing is ok   Vital signs/Intake and Output:  Visit Vitals  BP (!) 90/43   Pulse 90   Temp 98.4 °F (36.9 °C)   Resp 19   Ht 5' 7\" (1.702 m)   Wt 53.5 kg (118 lb)   SpO2 95%   BMI 18.48 kg/m²     Current Shift:  07/06 0701 - 07/06 1900  In: -   Out: 750 [Urine:750]  Last three shifts:  07/04 1901 - 07/06 0700  In: 3910.7 [P.O.:200; I.V.:2760.7]  Out: 2175 [Urine:2175]    Physical Exam:  General: WD, WN. Alert,  cooperative, sick looking   HEENT: NC, Atraumatic. PERRLA, anicteric sclerae. Lungs: Decreased BS of left side of chest , cta of rt   Heart:  Regular  rhythm,  No murmur, No Rubs, No Gallops  Abdomen: Soft, Non distended, Non tender. +Bowel sounds,    Extremities: No c/c/e   Psych:   Not anxious or agitated. Neurologic:  No acute neuro deficit           Labs: Results:       Chemistry Recent Labs     07/06/22  0504 07/05/22  1816 07/05/22  0530 07/04/22  1655 07/04/22  0440   GLU 99  --  147*  --  192*     --  132*  --  133*   K 3.4* 3.7 3.7   < > 3.1*     --  102  --  100   CO2 25  --  24  --  21   BUN 13  --  9  --  15   CREA 0.48*  --  0.53*  --  0.61   CA 8.7  --  8.4*  --  8.7   AGAP 6  --  6  --  12   BUCR 27*  --  17  --  25*   AP 54  --  73  --  87   TP 5.0*  --  4.9*  --  6.4   ALB 3.2*  --  2.8*  --  3.5   GLOB 1.8*  --  2.1  --  2.9   AGRAT 1.8*  --  1.3  --  1.2    < > = values in this interval not displayed.       CBC w/Diff Recent Labs     07/06/22  0504 07/05/22  0530 07/04/22  0440   WBC 1.1* 0.5* 0.4*   RBC 3.03* 3.17* 3.20*   HGB 8.8* 9.1* 9.4*   HCT 27.4* 28.6* 28.7*   * 94* 108*   GRANS 22* WBC TOO FEW TO DIFFERENTIATE 6*   LYMPH 22 WBC TOO FEW TO DIFFERENTIATE 34   EOS 34* WBC TOO FEW TO DIFFERENTIATE 6*      Cardiac Enzymes No results for input(s): CPK, CKND1, HUMBERTO in the last 72 hours. No lab exists for component: CKRMB, TROIP   Coagulation Recent Labs     07/06/22  0504 07/04/22  0440   PTP 15.0 15.4*   INR 1.1 1.2       Lipid Panel No results found for: CHOL, CHOLPOCT, CHOLX, CHLST, CHOLV, 736803, HDL, HDLP, LDL, LDLC, DLDLP, 934119, VLDLC, VLDL, TGLX, TRIGL, TRIGP, TGLPOCT, CHHD, CHHDX   BNP No results for input(s): BNPP in the last 72 hours. Liver Enzymes Recent Labs     07/06/22  0504   TP 5.0*   ALB 3.2*   AP 54      Thyroid Studies Lab Results   Component Value Date/Time    TSH 3.50 07/04/2022 04:40 AM        Procedures/imaging: see electronic medical records for all procedures/Xrays and details which were not copied into this note but were reviewed prior to creation of Plan    XR ANKLE RT MIN 3 V    Result Date: 7/3/2022  EXAM: ANKLE SERIES Indication: Swelling Technique: Frontal, , oblique, and lateral views of the right ankle. Comparison studies: None. _______________ FINDINGS: -OSSEOUS: No acute or destructive osseous abnormality. Tibiotalar degenerative changes with small degenerative plantar calcaneal spur. Normal alignment with preserved ankle mortise. . -SOFT TISSUES: Benign calcifications of the lower leg with mild diffuse edema of the distal leg to visualized midfoot. . No significant joint effusion. _____________    1. Nonspecific soft tissue edema, with no acute or destructive osseous abnormality. CT HEAD WO CONT    Result Date: 7/3/2022  EXAM: CT HEAD WO CONT CLINICAL INDICATION/HISTORY: ams -Additional: None COMPARISON: None TECHNIQUE: Axial CT imaging of the head was performed without intravenous contrast. One or more dose reduction techniques were used on this CT: automated exposure control, adjustment of the mAs and/or kVp according to patient size, and iterative reconstruction techniques.   The specific techniques used on this CT exam have been documented in the patient's electronic medical record. Digital Imaging and Communications in Medicine (DICOM) format image data are available to nonaffiliated external healthcare facilities or entities on a secure, media free, reciprocally searchable basis with patient authorization for at least a 12-month period after this study. _______________ FINDINGS: BRAIN:   > Brain volume: Age appropriate.   > White matter: Little or no white matter disease. > Infarcts, encephalomalacia: None.   > Parenchymal mass: None.   > Parenchymal hemorrhage: None.   > Midline shift: None.   > Miscellaneous: None. EXTRA-AXIAL SPACES: Unremarkable. No fluid collections. CALVARIUM: Intact. SINUSES, MASTOIDS: Complete opacified left sphenoethmoidal air cells with mild mucosal thickening on the right and small left anterior frontal ethmoidal opacities. OTHER EXTRACRANIAL: Asymmetric dense right globe with findings of prior bilateral cataract surgery. _______________     1. No CT findings of an acute intracranial abnormality. Please note that noncontrast head CT may be normal in early acute infarct. 2. Chronic left sphenoethmoidal sinus disease, with questional small left frontoethmoidal air-fluid level potentially acute on chronic sinus disease. 3. Asymmetric hyperdense right globe. Recommend correlation with patient ocular history. CTA CHEST W OR W WO CONT    Result Date: 7/3/2022  EXAM: CTA Chest INDICATION: Hypoxic. COMPARISON: No recent exams for direct comparison, most recent study from 9/21/2012. TECHNIQUE: Axial CT imaging from the thoracic inlet through the diaphragm with intravenous contrast. Coronal and sagittal MIP reformats were generated. One or more dose reduction techniques were used on this CT: automated exposure control, adjustment of the mAs and/or kVp according to patient size, and iterative reconstruction techniques.   The specific techniques used on this CT exam have been documented in the patient's electronic medical record. Digital Imaging and Communications in Medicine (DICOM) format image data are available to nonaffiliated external healthcare facilities or entities on a secure, media free, reciprocally searchable basis with patient authorization for at least a 12-month period after this study. _______________ FINDINGS: EXAM QUALITY: Adequate bolus timing with mild diffuse respiratory motion artifact degradation. Katelynn Dye PULMONARY ARTERIES: No evidence of central, interlobar or mid to proximal segmental branch pulmonary arterial filling defect. Asymmetric motion degradation involving the lower lobes. Normal sized main pulmonary artery. Pulmonary embolism. MEDIASTINUM: Normal heart size without pericardial effusion. Coronary artery and aortic atherosclerosis. A right upper chest Mediport is present with the catheter in the SVC. Moderate-sized hiatus hernia. LUNGS and PLEURA: Moderate to large left low attenuating layering pleural effusion. Marked diffuse emphysematous changes with chronic areas of scarring and atelectasis. Bilateral lower lobe groundglass, difficult to differentiate between dependent changes, atelectasis and/or edema. . AIRWAY: Normal. LYMPH NODES: No enlarged nodes. UPPER ABDOMEN: Multiple splenic hypodensities, largest anteriorly measuring 3 cm. Overall normal splenic size measuring 11.3 cm in AP dimension. Prior cholecystectomy OTHER: No acute or aggressive osseous abnormalities identified. _______________     1. Respiratory motion degradation. Otherwise, No evidence of central pulmonary embolism. 2.  Interstitial and groundglass changes suggestive of underlying edema, with moderate to large left-sided pleural effusion. 3. Multiple Indeterminate splenic hypodensities, recommend comparison to any prior recent outside exams and patient history. 4. Moderate-sized hiatus hernia.     XR CHEST PORT    Result Date: 7/3/2022  EXAM: XR CHEST PORT CLINICAL INDICATION/HISTORY: ams fever -Additional: None COMPARISON: None TECHNIQUE: Frontal view of the chest _______________ FINDINGS: SUPPORT LINES AND TUBES:Right-sided tunneled Mediport and catheter tip over the distal SVC. HEART AND MEDIASTINUM: Midline cardiac silhouette appreciable cardiomegaly. Hilar vascular congestion and cephalization. LUNGS AND PLEURAL SPACES: Diffuse bilateral hazy indistinct interstitial opacities with retrocardiac left basilar confluent opacity and blunted costophrenic angle/effusion. No significant right-sided pleural effusion. No pneumothorax. BONY THORAX AND SOFT TISSUES: No acute or destructive osseous abnormality. _______________     1. Findings of CHF versus fluid overload with edema and left pleural effusion.       Hermes Shaw MD

## 2022-07-06 NOTE — PROGRESS NOTES
[facility]  (A Division of [address] [place] [address])                                                                                                                      Juliana Silva MD  Office #: - Option [phone]  Fax #: [phone]     Date of Admission: 7/3/2022Date of Note: 7/6/2022  Reason for Referral: Evaluation and antibiotic management of Neutropenic Fever/ Septic shock. Current Antimicrobials:    Prior Antimicrobials:  Vancomycin and Zosyn  Zithromax 7/4-> doxycycline 7/5    Immunosuppressive drugs: chemotherapy-- R-CHOP last 6/28/22        Assessment- ID related:  --------------------------------------------------------------------------    Critically ill and neutropenic patient with:    · Septic Shock  -- etiology for infection multiple given her level of Neutropenia-->> Suspect urinary +/- R ankle possible cellulitis. Susp resp change more due to CHF/fluid . Line related/ possible sinus source-- no growth so far.   --UA is abnormal-pyuria  --CT lung: interstial edema/pleural effusion-- possible>> PNA( BNP slightly up)  --multiple hypodense lesion on spleen-- appeared smaller in April C/W Jan 2022 as well.  Tumor related?  --Urine ag- leg/Pneumon 7/4: Negative  --Elevated Procal 3.72  · Neutropenic Fever  · NHL-- post R-CHOP 6/28/22, received growth factor as well  · Hx of PNA/Septic shock at [facility] 3/2022-- 2/2 Pneumococcus( urine ag +)  · History of Bacteroides Bacteremia 01/2022    Other Medical Issues- Mx per respective team:    · DM T2  · HLD  · A fib with RVR history   · Hx of graves ds, R eye blind   Recommendation for ID issues I am following:  ------------------------------------------------------------------------------  WOJCIECH San, ICU NP/ RN and pt     WBC improving  --cont with Broad ABX- Vanco/Zosyn- GI well covered  --once ANC improves, if no gpc bacteremia-- will DC Vanco  --cont doxycycline for atypical  --No antifungal indicated at this time  --C.diff test-Pending      FU BCX--NGSF  UCX 7/3-- GNR->100K, ID pending    Daily CBC/CMP    Type/Duration based on clinical progress and culture     Oncology following, ICU supportive care per team                      -> received IV push amiodrone. Cardiology following       Subjective:  Pt  reports no comlaints. Slept ok  DW RN- no acute event overnight  She remains on 1 pressor  No fever/ sob/cough/abd pain  Lucero in place    CXR this am- one:  From 7/5    Bilateral interstitial and parenchymal opacities. Small to moderate left  effusion/atelectasis. HPI:     Kasie Don is a 78 y.o. DECLINED with PMH as listed below-- she had her last chemo on 6/28 and received growth factor as well. She came to ED with fever/lethargy/ weakness and SOB of 1 day and found to have fever to 100.7, Neutropenia to 200, pyruria, and CT head/CT chest done as well as CXR and ankle X-ray R foot done and admitted to ICU. She is requiring levophed support for border line BP. During interview( hard of hearing too), she denies HA/sorethroat. Chest pain, abd pain, dysuria. She has no focal complaint during exam.  Denies viral/COVID infection at home. Feels SOB. She is currently receiving V/Z.     Care Everywhere-- shows admission in 01 and 04 2022 with diagnosis of sepsis- Bacteroides bacteremia in jan( also dx of cancer)  and PNA with septic shock in 04/2022        Active Hospital Problems    Diagnosis Date Noted    Septic shock ([facility] 07/04/2022    Neutropenic fever ([facility] 07/03/2022    Acute respiratory failure with hypoxia ([facility] 07/03/2022    Hypomagnesemia 07/03/2022    Diffuse large B cell lymphoma ([facility] 07/03/2022    Hyponatremia 07/03/2022    Pancytopenia ([address] 07/03/2022    UTI (urinary tract infection) 07/03/2022    Sinusitis 07/03/2022    Myopia of both eyes with astigmatism 07/03/2022    Pleural effusion 07/03/2022    Cellulitis 07/03/2022    Severe protein-calorie malnutrition ([address] 07/03/2022    GERD (gastroesophageal reflux disease)     Thyroid disease     Acute encephalopathy     Hypokalemia 2012     Past Medical History:   Diagnosis Date    Arthritis     GERD (gastroesophageal reflux disease)     Hypertension     Osteoarthritis of right knee 2012    S/P total knee arthroplasty 2012    Thyroid disease      Past Surgical History:   Procedure Laterality Date    HX APPENDECTOMY  's    HX CHOLECYSTECTOMY      laparoscopic    HX HEENT      Surgery OD infection x 4    HX HEENT      throat surgery x 2    HX KNEE ARTHROSCOPY      right     No family history on file. Social History     Socioeconomic History    Marital status:      Spouse name: Not on file    Number of children: Not on file    Years of education: Not on file    Highest education level: Not on file   Occupational History    Not on file   Tobacco Use    Smoking status: Former Smoker     Quit date: 1999     Years since quittin.4    Smokeless tobacco: Never Used   Substance and Sexual Activity    Alcohol use: Yes     Alcohol/week: 1.7 standard drinks     Types: 1 Glasses of wine, 1 Cans of beer per week    Drug use: No    Sexual activity: Not on file   Other Topics Concern    Not on file   Social History Narrative    Not on file     Social Determinants of Health     Financial Resource Strain:     Difficulty of Paying Living Expenses: Not on file   Food Insecurity:     Worried About Running Out of Food in the Last Year: Not on file    Patricia of Food in the Last Year: Not on file   Transportation Needs:     Lack of Transportation (Medical): Not on file    Lack of Transportation (Non-Medical):  Not on file   Physical Activity:     Days of Exercise per Week: Not on file    Minutes of Exercise per Session: Not on file   Stress:     Feeling of Stress : Not on file   Social Connections:     Frequency of Communication with Friends and Family: Not on file    Frequency of Social Gatherings with Friends and Family: Not on file    Attends Gnosticist Services: Not on file    Active Member of Clubs or Organizations: Not on file    Attends Club or Organization Meetings: Not on file    Marital Status: Not on file   Intimate Partner Violence:     Fear of Current or Ex-Partner: Not on file    Emotionally Abused: Not on file    Physically Abused: Not on file    Sexually Abused: Not on file   Housing Stability:     Unable to Pay for Housing in the Last Year: Not on file    Number of Places Lived in the Last Year: Not on file    Unstable Housing in the Last Year: Not on file       Allergies:  Adhesive tape-silicones and Penicillin g     Medications:  Current Facility-Administered Medications   Medication Dose Route Frequency    doxycycline (VIBRAMYCIN) 100 mg in 0.9% sodium chloride (MBP/ADV) 100 mL MBP  100 mg IntraVENous Q12H    PHENYLephrine (VISHAL-SYNEPHRINE) 30 mg in 0.9% sodium chloride 250 mL infusion   mcg/min IntraVENous TITRATE    amiodarone (CORDARONE) tablet 100 mg  100 mg Oral DAILY    enoxaparin (LOVENOX) injection 50 mg  1 mg/kg SubCUTAneous Q12H    ipratropium (ATROVENT) 0.02 % nebulizer solution 0.5 mg  0.5 mg Nebulization TID PRN    [Held by provider] NOREPINephrine (LEVOPHED) 8 mg in 5% dextrose 250mL (32 mcg/mL) infusion  0.5-30 mcg/min IntraVENous TITRATE    albuterol (PROVENTIL VENTOLIN) nebulizer solution 2.5 mg  2.5 mg Nebulization Q4H PRN    piperacillin-tazobactam (ZOSYN) 4.5 g in 0.9% sodium chloride (MBP/ADV) 100 mL MBP  4.5 g IntraVENous Q8H    sodium chloride (NS) flush 5-40 mL  5-40 mL IntraVENous Q8H    sodium chloride (NS) flush 5-40 mL  5-40 mL IntraVENous PRN    acetaminophen (TYLENOL) tablet 650 mg  650 mg Oral Q6H PRN    Or    acetaminophen (TYLENOL) suppository 650 mg  650 mg Rectal Q6H PRN    polyethylene glycol (MIRALAX) packet 17 g  17 g Oral DAILY PRN    ondansetron (ZOFRAN ODT) tablet 4 mg  4 mg Oral Q8H PRN    Or    ondansetron (ZOFRAN) injection 4 mg  4 mg IntraVENous Q6H PRN    ELECTROLYTE REPLACEMENT PROTOCOL - Potassium Standard Dosing   1 Each Other PRN    ELECTROLYTE REPLACEMENT PROTOCOL - Magnesium   1 Each Other PRN    ELECTROLYTE REPLACEMENT PROTOCOL - Phosphorus  Standard Dosing  1 Each Other PRN    ELECTROLYTE REPLACEMENT PROTOCOL - Calcium   1 Each Other PRN    pantoprazole (PROTONIX) 40 mg in 0.9% sodium chloride 10 mL injection  40 mg IntraVENous DAILY    vancomycin (VANCOCIN) 750 mg in 0.9% sodium chloride 250 mL (VIAL-MATE)  750 mg IntraVENous Q12H    Vancomycin-Pharmacy To Dose  1 Each Other Rx Dosing/Monitoring    albumin human 25% (BUMINATE) solution 25 g  25 g IntraVENous Q6H    furosemide (LASIX) injection 20 mg  20 mg IntraVENous DAILY    insulin lispro (HUMALOG) injection   SubCUTAneous Q6H    glucose chewable tablet 16 g  4 Tablet Oral PRN    glucagon (GLUCAGEN) injection 1 mg  1 mg IntraMUSCular PRN    dextrose 10% infusion 0-250 mL  0-250 mL IntraVENous PRN        ROS:  Pertinent items are noted in the History of Present Illness. Physical Exam:    Temp (24hrs), Av.5 °F (36.9 °C), Min:97.4 °F (36.3 °C), Max:99.6 °F (37.6 °C)    Visit Vitals  BP (!) 144/65   Pulse 92   Temp 99.1 °F (37.3 °C)   Resp 19   Ht 5' 7\" (1.702 m)   Wt 53.5 kg (118 lb)   SpO2 95%   BMI 18.48 kg/m²          GEN: WD sick looking, on NC--not in resp distress. HEENT: Unicteric. Protruding eye-R>L, EOMI intact  No neck swelling  CHEST: Non laboured breathing. CTA  CVS:RRR, no mur/gallop  ABD: Obese/soft. Non tender. Non distended  ANN:Lucero in  EXT: No apparent swelling or redness on UE/LE joints except:  R ankle has some mild redness- stable, no marked calor. No open lesion  Skin: Dry and intact. No rash- vesicular or other type, no redness. CNS: A, OX3. Moves all extremity.  CN grossly ok except Very [place]       Microbiology  All Micro Results     Procedure Component Value Units Date/Time    C. DIFFICILE AG & TOXIN A/B [[id number]] Collected: 07/05/22 1455    Order Status: Completed Updated: 07/05/22 1529    ENTERIC BACTERIA PANEL, DNA [[id number]] Collected: 07/03/22 1455    Order Status: Completed Specimen: Stool Updated: 07/05/22 1509    CULTURE, URINE [[id number]]  (Abnormal) Collected: 07/03/22 1700    Order Status: Completed Specimen: Urine from Clean catch Updated: 07/05/22 0815     Special Requests: NO SPECIAL REQUESTS        [place] Count --        >100,000  COLONIES/mL       Culture result: GRAM NEGATIVE RODS       CULTURE, BLOOD [[id number]] Collected: 07/03/22 1645    Order Status: Completed Specimen: Blood Updated: 07/05/22 0725     Special Requests: NO SPECIAL REQUESTS        Culture result: NO GROWTH 2 DAYS       CULTURE, BLOOD [[id number]] Collected: 07/03/22 1650    Order Status: Completed Specimen: Blood Updated: 07/05/22 0725     Special Requests: NO SPECIAL REQUESTS        Culture result: NO GROWTH 2 DAYS       STREP PNEUMO AG, URINE [[id number]] Collected: 07/04/22 1247    Order Status: Completed Specimen: Urine, random Updated: 07/04/22 2030     Strep pneumo Ag, urine Negative       LEGIONELLA PNEUMOPHILA AG, URINE [[id number]] Collected: 07/04/22 1247    Order Status: Completed Specimen: Urine, random Updated: 07/04/22 2030     Legionella Ag, urine Negative       RESPIRATORY VIRUS PANEL W/COVID-19, PCR [[id number]] Collected: 07/03/22 2110    Order Status: Completed Specimen: Nasopharyngeal Updated: 07/04/22 1229     Adenovirus Not detected        Coronavirus 229E Not detected        Coronavirus HKU1 Not detected        Coronavirus CVNL63 Not detected        Coronavirus OC43 Not detected        SARS-CoV-2, PCR Not detected        Metapneumovirus Not detected        Rhinovirus and Enterovirus Not detected        Influenza A Not detected        Influenza B Not detected        Parainfluenza 1 Not detected        Parainfluenza 2 Not detected        Parainfluenza 3 Not detected        Parainfluenza virus 4 Not detected RSV by PCR Not detected        B. parapertussis, PCR Not detected        Bordetella pertussis - PCR Not detected        Chlamydophila pneumoniae DNA, QL, PCR Not detected        Mycoplasma pneumoniae DNA, QL, PCR Not detected       C. DIFFICILE AG & TOXIN A/B [[id number]]     Order Status: Canceled Specimen: Stool     COVID-19 RAPID TEST [[id number]] Collected: 07/03/22 1715    Order Status: Completed Specimen: Nasopharyngeal Updated: 07/03/22 1839     Specimen source Nasopharyngeal        COVID-19 rapid test Not detected        Comment: Rapid Abbott ID Now       Rapid NAAT:  The specimen is NEGATIVE for SARS-CoV-2, the novel coronavirus associated with COVID-19. Negative results should be treated as presumptive and, if inconsistent with clinical signs and symptoms or necessary for patient management, should be tested with an alternative molecular assay. Negative results do not preclude SARS-CoV-2 infection and should not be used as the sole basis for patient management decisions. This test has been authorized by the FDA under an Emergency Use Authorization (EUA) for use by authorized laboratories. Fact sheet for Healthcare Providers: ConventionUpdate.co.nz  Fact sheet for Patients: ConventionUpdate.co.nz       Methodology: Isothermal Nucleic Acid Amplification         INFLUENZA A & B AG (RAPID TEST) [[id number]] Collected: 07/03/22 1715    Order Status: Completed Specimen: Nasopharyngeal from Nasal washing Updated: 07/03/22 4379     Influenza A Antigen Negative        Comment: A negative result does not exclude influenza virus infection, seasonal or H1N1 due to suboptimal sensitivity. If influenza is circulating in your community, a diagnosis of influenza should be considered based on a patients clinical presentation and empiric antiviral treatment should be considered, if indicated.         Influenza B Antigen Negative              Lab results:    Chemistry  Recent Labs     07/06/22  0504 07/05/22  1816 07/05/22  0530 07/04/22  1655 07/04/22  0440   GLU 99  --  147*  --  192*     --  132*  --  133*   K 3.4* 3.7 3.7   < > 3.1*     --  102  --  100   CO2 25  --  24  --  21   BUN 13  --  9  --  15   CREA 0.48*  --  0.53*  --  0.61   CA 8.7  --  8.4*  --  8.7   AGAP 6  --  6  --  12   BUCR 27*  --  17  --  25*   AP 54  --  73  --  87   TP 5.0*  --  4.9*  --  6.4   ALB 3.2*  --  2.8*  --  3.5   GLOB 1.8*  --  2.1  --  2.9   AGRAT 1.8*  --  1.3  --  1.2    < > = values in this interval not displayed. CBC w/ Diff  Recent Labs     07/06/22  0504 07/05/22  0530 07/04/22  0440   WBC 1.1* 0.5* 0.4*   RBC 3.03* 3.17* 3.20*   HGB 8.8* 9.1* 9.4*   HCT 27.4* 28.6* 28.7*   * 94* 108*   GRANS 22* WBC TOO FEW TO DIFFERENTIATE 6*   LYMPH 22 WBC TOO FEW TO DIFFERENTIATE 34   EOS 34* WBC TOO FEW TO DIFFERENTIATE 6*       Imaging: report reviewed and as posted by radiologist    CT head- 7/3  1. No CT findings of an acute intracranial abnormality. Please note that  noncontrast head CT may be normal in early acute infarct.        2. Chronic left sphenoethmoidal sinus disease, with questional small left  frontoethmoidal air-fluid level potentially acute on chronic sinus disease.     3. Asymmetric hyperdense right globe. Recommend correlation with patient ocular  History. 7/3: R ankle     1.  Nonspecific soft tissue edema, with no acute or destructive osseous  abnormality.

## 2022-07-06 NOTE — PROGRESS NOTES
1900- Bedside and Verbal shift change report given to Rebecca Conrad RN (oncoming nurse) by Isiah May RN (offgoing nurse). Report included the following information SBAR, Kardex, ED Summary, Intake/Output, MAR, Recent Results and Cardiac Rhythm NSR/ Sinus Tach. 2000- Shift assessment completed. Pt lung sounds are course on 6L NC, Sats remain above 95%. Pt is sinus tach/ NSR on the monitor. All questions answered and all needs addressed. 0000- Reassessment competed, no change to pt condition. 0400- Reassessment completed, no change to pt condition. 0700- Bedside and Verbal shift change report given to PAPI Reyes RN (oncoming nurse) by Rebecca Conrad RN (offgoing nurse). Report included the following information SBAR, Kardex, ED Summary, Intake/Output, MAR, Recent Results and Cardiac Rhythm NSR.

## 2022-07-06 NOTE — PROGRESS NOTES
Cardiology Progress Note        Patient: Marlene Josue        Sex: female          DOA: 7/3/2022  YOB: 1940      Age:  78 y.o.        LOS:  LOS: 3 days      Patient seen and examined, chart reviewed. Assessment/Plan     Patient Active Problem List   Diagnosis Code    S/P total knee arthroplasty Z96.659    Hypoxia R09.02    Hypokalemia E87.6    Acute posthemorrhagic anemia D62    HTN (hypertension), benign I10    Neutropenic fever ([address] D70.9, R50.81    Acute respiratory failure with hypoxia ([address] J96.01    Hypomagnesemia E83.42    Diffuse large B cell lymphoma (HCC) C83.30    GERD (gastroesophageal reflux disease) K21.9    Thyroid disease E07.9    Acute encephalopathy G93.40    Hyponatremia E87.1    Pancytopenia (HCC) D61.818    UTI (urinary tract infection) N39.0    Sinusitis J32.9    Myopia of both eyes with astigmatism H52.13, H52.203    Pleural effusion J90    Cellulitis L03.90    Severe protein-calorie malnutrition (HCC) E43    Septic shock (HCC) A41.9, R65.21      Echocardiogram revealed    Echocardiogram revealed       Left Ventricle: Normal left ventricular systolic function with a visually estimated EF of 60 - 65%. Left ventricle size is normal. Normal wall thickness. Normal wall motion. Indeterminate diastolic function due to atrial fibrillation.   Tricuspid Valve: The estimated PASP is 41 mmHg. Plan:    Continue Amiodarone   Continue Full dose of Lovenx  Monitor and replace electrolytes as per hospitalist  Plan discussed with patient's family member at bed side              Subjective:    cc:  Denies any chest pain or shortness of breath      REVIEW OF SYSTEMS:     General: No fevers or chills. Cardiovascular: Positive shortness of breath, No chest pain,No palpitations, No orthopnea, No PND, No leg swelling, No claudication  Pulmonary: No dyspnea.    Gastrointestinal: No nausea, vomiting, bleeding  Neurology: No Dizziness    Objective: Visit Vitals  BP (!) 90/43   Pulse 90   Temp 98.4 °F (36.9 °C)   Resp 19   Ht 5' 7\" (1.702 m)   Wt 53.5 kg (118 lb)   SpO2 95%   BMI 18.48 kg/m²     Body mass index is 18.48 kg/m². Physical Exam:  General Appearance: Looks chronically ill, Comfortable, not using accessory muscles of respiration. HEENT: MICHELINE. HEAD: Atraumatic  NECK: No JVD, no thyroidomeglay. CAROTIDS: No bruit  LUNGS: Clear bilaterally. HEART: S1+S2 audible, no murmur, no pericardial rub.    NEUROLOGICAL: Alert, follows verbal commands    Medication:  Current Facility-Administered Medications   Medication Dose Route Frequency    vancomycin (VANCOCIN) 1,000 mg in 0.9% sodium chloride 250 mL (VIAL-MATE)  1,000 mg IntraVENous Q12H    piperacillin-tazobactam (ZOSYN) 3.375 g in 0.9% sodium chloride (MBP/ADV) 100 mL MBP  3.375 g IntraVENous Q8H    PHENYLephrine (VISHAL-SYNEPHRINE) 30 mg in 0.9% sodium chloride 250 mL infusion   mcg/min IntraVENous TITRATE    sodium phosphate 15 mmol in 0.9% sodium chloride 250 mL infusion  15 mmol IntraVENous ONCE    doxycycline (VIBRAMYCIN) 100 mg in 0.9% sodium chloride (MBP/ADV) 100 mL MBP  100 mg IntraVENous Q12H    amiodarone (CORDARONE) tablet 100 mg  100 mg Oral DAILY    enoxaparin (LOVENOX) injection 50 mg  1 mg/kg SubCUTAneous Q12H    ipratropium (ATROVENT) 0.02 % nebulizer solution 0.5 mg  0.5 mg Nebulization TID PRN    albuterol (PROVENTIL VENTOLIN) nebulizer solution 2.5 mg  2.5 mg Nebulization Q4H PRN    sodium chloride (NS) flush 5-40 mL  5-40 mL IntraVENous Q8H    sodium chloride (NS) flush 5-40 mL  5-40 mL IntraVENous PRN    acetaminophen (TYLENOL) tablet 650 mg  650 mg Oral Q6H PRN    Or    acetaminophen (TYLENOL) suppository 650 mg  650 mg Rectal Q6H PRN    polyethylene glycol (MIRALAX) packet 17 g  17 g Oral DAILY PRN    ondansetron (ZOFRAN ODT) tablet 4 mg  4 mg Oral Q8H PRN    Or    ondansetron (ZOFRAN) injection 4 mg  4 mg IntraVENous Q6H PRN    ELECTROLYTE REPLACEMENT PROTOCOL - Potassium Standard Dosing   1 Each Other PRN    ELECTROLYTE REPLACEMENT PROTOCOL - Magnesium   1 Each Other PRN    ELECTROLYTE REPLACEMENT PROTOCOL - Phosphorus  Standard Dosing  1 Each Other PRN    ELECTROLYTE REPLACEMENT PROTOCOL - Calcium   1 Each Other PRN    pantoprazole (PROTONIX) 40 mg in 0.9% sodium chloride 10 mL injection  40 mg IntraVENous DAILY    Vancomycin-Pharmacy To Dose  1 Each Other Rx Dosing/Monitoring    albumin human 25% (BUMINATE) solution 25 g  25 g IntraVENous Q6H    furosemide (LASIX) injection 20 mg  20 mg IntraVENous DAILY    insulin lispro (HUMALOG) injection   SubCUTAneous Q6H    glucose chewable tablet 16 g  4 Tablet Oral PRN    glucagon (GLUCAGEN) injection 1 mg  1 mg IntraMUSCular PRN    dextrose 10% infusion 0-250 mL  0-250 mL IntraVENous PRN               Lab/Data Reviewed:       Recent Labs     07/06/22  0504 07/05/22  0530 07/04/22  0440   WBC 1.1* 0.5* 0.4*   HGB 8.8* 9.1* 9.4*   HCT 27.4* 28.6* 28.7*   * 94* 108*     Recent Labs     07/06/22  0504 07/05/22  1816 07/05/22  0530 07/04/22  1655 07/04/22  0440     --  132*  --  133*   K 3.4* 3.7 3.7   < > 3.1*     --  102  --  100   CO2 25  --  24  --  21   GLU 99  --  147*  --  192*   BUN 13  --  9  --  15   CREA 0.48*  --  0.53*  --  0.61   CA 8.7  --  8.4*  --  8.7    < > = values in this interval not displayed.        Signed By: Abram Joe MD     July 6, 2022

## 2022-07-06 NOTE — PROGRESS NOTES
Palliative Medicine    CODE STATUS: FULL CODE    AMD Status: none on file. , Que Lucia, is her legal next of kin     7/6/2022  Seen today in room ICU 5 along with Umm Hinton NP. Lying comfortably in bed with head of bed elevated speaking with her . Awake, alert. Did not appear in distress. Discussion with patient and spouse yesterday discussing goals of care options. Decision at that time was for full code. She remains stable on one pressor. Will continue conversations in light of Dr Murrieta's recommendation for DNR. Disposition plan: to be determined based on response to treatment and family decisions    Palliative care will continue to follow Noris Al  and her family during her hospitalization and support them as they make healthcare decisions and define goals of care.       Morris Mansfield RN, MSN  Palliative Medicine  P: [phone]

## 2022-07-07 ENCOUNTER — APPOINTMENT (OUTPATIENT)
Dept: CT IMAGING | Age: 82
DRG: 871 | End: 2022-07-07
Attending: INTERNAL MEDICINE
Payer: MEDICARE

## 2022-07-07 ENCOUNTER — APPOINTMENT (OUTPATIENT)
Dept: GENERAL RADIOLOGY | Age: 82
DRG: 871 | End: 2022-07-07
Attending: INTERNAL MEDICINE
Payer: MEDICARE

## 2022-07-07 LAB
ALBUMIN SERPL-MCNC: 3.8 G/DL (ref 3.4–5)
ALBUMIN/GLOB SERPL: 1.5 {RATIO} (ref 0.8–1.7)
ALP SERPL-CCNC: 50 U/L (ref 45–117)
ALT SERPL-CCNC: 10 U/L (ref 13–56)
ANION GAP SERPL CALC-SCNC: 5 MMOL/L (ref 3–18)
AST SERPL-CCNC: 8 U/L (ref 10–38)
ATRIAL RATE: 117 BPM
BASOPHILS # BLD: 0 K/UL (ref 0–0.1)
BASOPHILS NFR BLD: 0 % (ref 0–2)
BILIRUB SERPL-MCNC: 0.4 MG/DL (ref 0.2–1)
BUN SERPL-MCNC: 16 MG/DL (ref 7–18)
BUN/CREAT SERPL: 29 (ref 12–20)
CA-I SERPL-SCNC: 1.27 MMOL/L (ref 1.12–1.32)
CALCIUM SERPL-MCNC: 9.3 MG/DL (ref 8.5–10.1)
CALCULATED P AXIS, ECG09: 50 DEGREES
CALCULATED R AXIS, ECG10: 55 DEGREES
CALCULATED T AXIS, ECG11: 62 DEGREES
CHLORIDE SERPL-SCNC: 108 MMOL/L (ref 100–111)
CO2 SERPL-SCNC: 26 MMOL/L (ref 21–32)
CREAT SERPL-MCNC: 0.55 MG/DL (ref 0.6–1.3)
DIAGNOSIS, 93000: NORMAL
DIFFERENTIAL METHOD BLD: ABNORMAL
EOSINOPHIL # BLD: 0.2 K/UL (ref 0–0.4)
EOSINOPHIL NFR BLD: 16 % (ref 0–5)
ERYTHROCYTE [DISTWIDTH] IN BLOOD BY AUTOMATED COUNT: 29.6 % (ref 11.6–14.5)
GLOBULIN SER CALC-MCNC: 2.5 G/DL (ref 2–4)
GLUCOSE BLD STRIP.AUTO-MCNC: 123 MG/DL (ref 70–110)
GLUCOSE BLD STRIP.AUTO-MCNC: 186 MG/DL (ref 70–110)
GLUCOSE BLD STRIP.AUTO-MCNC: 213 MG/DL (ref 70–110)
GLUCOSE BLD STRIP.AUTO-MCNC: 220 MG/DL (ref 70–110)
GLUCOSE SERPL-MCNC: 114 MG/DL (ref 74–99)
HCT VFR BLD AUTO: 27.5 % (ref 35–45)
HGB BLD-MCNC: 8.7 G/DL (ref 12–16)
IMM GRANULOCYTES # BLD AUTO: 0 K/UL
IMM GRANULOCYTES NFR BLD AUTO: 0 %
LYMPHOCYTES # BLD: 0.1 K/UL (ref 0.9–3.6)
LYMPHOCYTES NFR BLD: 12 % (ref 21–52)
MAGNESIUM SERPL-MCNC: 2 MG/DL (ref 1.6–2.6)
MCH RBC QN AUTO: 28.8 PG (ref 24–34)
MCHC RBC AUTO-ENTMCNC: 31.6 G/DL (ref 31–37)
MCV RBC AUTO: 91.1 FL (ref 78–100)
MONOCYTES # BLD: 0.3 K/UL (ref 0.05–1.2)
MONOCYTES NFR BLD: 24 % (ref 3–10)
MYELOCYTES NFR BLD MANUAL: 4 %
NEUTS SEG # BLD: 0.5 K/UL (ref 1.8–8)
NEUTS SEG NFR BLD: 44 % (ref 40–73)
NRBC # BLD: 0 K/UL (ref 0–0.01)
NRBC BLD-RTO: 0 PER 100 WBC
P-R INTERVAL, ECG05: 178 MS
PHOSPHATE SERPL-MCNC: 1.5 MG/DL (ref 2.5–4.9)
PLATELET # BLD AUTO: 114 K/UL (ref 135–420)
POTASSIUM SERPL-SCNC: 3.5 MMOL/L (ref 3.5–5.5)
PROT SERPL-MCNC: 6.3 G/DL (ref 6.4–8.2)
Q-T INTERVAL, ECG07: 316 MS
QRS DURATION, ECG06: 74 MS
QTC CALCULATION (BEZET), ECG08: 440 MS
RBC # BLD AUTO: 3.02 M/UL (ref 4.2–5.3)
RBC MORPH BLD: ABNORMAL
SODIUM SERPL-SCNC: 139 MMOL/L (ref 136–145)
TOTAL CELLS COUNTED SPEC: 25
VENTRICULAR RATE, ECG03: 117 BPM
WBC # BLD AUTO: 1.2 K/UL (ref 4.6–13.2)

## 2022-07-07 PROCEDURE — 99233 SBSQ HOSP IP/OBS HIGH 50: CPT | Performed by: NURSE PRACTITIONER

## 2022-07-07 PROCEDURE — 82962 GLUCOSE BLOOD TEST: CPT

## 2022-07-07 PROCEDURE — 74011000250 HC RX REV CODE- 250: Performed by: INTERNAL MEDICINE

## 2022-07-07 PROCEDURE — 74011000250 HC RX REV CODE- 250: Performed by: HOSPITALIST

## 2022-07-07 PROCEDURE — 94640 AIRWAY INHALATION TREATMENT: CPT

## 2022-07-07 PROCEDURE — 74011636637 HC RX REV CODE- 636/637: Performed by: HOSPITALIST

## 2022-07-07 PROCEDURE — 82330 ASSAY OF CALCIUM: CPT

## 2022-07-07 PROCEDURE — 80053 COMPREHEN METABOLIC PANEL: CPT

## 2022-07-07 PROCEDURE — 65270000046 HC RM TELEMETRY

## 2022-07-07 PROCEDURE — 74011250637 HC RX REV CODE- 250/637: Performed by: FAMILY MEDICINE

## 2022-07-07 PROCEDURE — 74011250636 HC RX REV CODE- 250/636: Performed by: INTERNAL MEDICINE

## 2022-07-07 PROCEDURE — 74011250636 HC RX REV CODE- 250/636: Performed by: HOSPITALIST

## 2022-07-07 PROCEDURE — 83735 ASSAY OF MAGNESIUM: CPT

## 2022-07-07 PROCEDURE — 71045 X-RAY EXAM CHEST 1 VIEW: CPT

## 2022-07-07 PROCEDURE — 84100 ASSAY OF PHOSPHORUS: CPT

## 2022-07-07 PROCEDURE — 74011000258 HC RX REV CODE- 258: Performed by: INTERNAL MEDICINE

## 2022-07-07 PROCEDURE — 74011000250 HC RX REV CODE- 250: Performed by: FAMILY MEDICINE

## 2022-07-07 PROCEDURE — P9047 ALBUMIN (HUMAN), 25%, 50ML: HCPCS | Performed by: HOSPITALIST

## 2022-07-07 PROCEDURE — 85025 COMPLETE CBC W/AUTO DIFF WBC: CPT

## 2022-07-07 PROCEDURE — C9113 INJ PANTOPRAZOLE SODIUM, VIA: HCPCS | Performed by: HOSPITALIST

## 2022-07-07 PROCEDURE — 36415 COLL VENOUS BLD VENIPUNCTURE: CPT

## 2022-07-07 PROCEDURE — 74011250636 HC RX REV CODE- 250/636: Performed by: FAMILY MEDICINE

## 2022-07-07 PROCEDURE — 77010033678 HC OXYGEN DAILY

## 2022-07-07 PROCEDURE — 74011250637 HC RX REV CODE- 250/637: Performed by: INTERNAL MEDICINE

## 2022-07-07 RX ORDER — INSULIN LISPRO 100 [IU]/ML
INJECTION, SOLUTION INTRAVENOUS; SUBCUTANEOUS
Status: DISCONTINUED | OUTPATIENT
Start: 2022-07-07 | End: 2022-07-16 | Stop reason: HOSPADM

## 2022-07-07 RX ORDER — POTASSIUM CHLORIDE 20 MEQ/1
40 TABLET, EXTENDED RELEASE ORAL
Status: COMPLETED | OUTPATIENT
Start: 2022-07-07 | End: 2022-07-07

## 2022-07-07 RX ORDER — FUROSEMIDE 10 MG/ML
20 INJECTION INTRAMUSCULAR; INTRAVENOUS EVERY 12 HOURS
Status: DISCONTINUED | OUTPATIENT
Start: 2022-07-07 | End: 2022-07-15

## 2022-07-07 RX ADMIN — FUROSEMIDE 20 MG: 10 INJECTION, SOLUTION INTRAMUSCULAR; INTRAVENOUS at 08:38

## 2022-07-07 RX ADMIN — PIPERACILLIN AND TAZOBACTAM 3.38 G: 3; .375 INJECTION, POWDER, LYOPHILIZED, FOR SOLUTION INTRAVENOUS at 17:41

## 2022-07-07 RX ADMIN — POTASSIUM CHLORIDE 40 MEQ: 20 TABLET, EXTENDED RELEASE ORAL at 05:13

## 2022-07-07 RX ADMIN — SODIUM PHOSPHATE, MONOBASIC, MONOHYDRATE: 276; 142 INJECTION, SOLUTION INTRAVENOUS at 06:56

## 2022-07-07 RX ADMIN — SODIUM CHLORIDE, PRESERVATIVE FREE 10 ML: 5 INJECTION INTRAVENOUS at 21:42

## 2022-07-07 RX ADMIN — PIPERACILLIN AND TAZOBACTAM 3.38 G: 3; .375 INJECTION, POWDER, LYOPHILIZED, FOR SOLUTION INTRAVENOUS at 09:00

## 2022-07-07 RX ADMIN — ALBUTEROL SULFATE 2.5 MG: 2.5 SOLUTION RESPIRATORY (INHALATION) at 20:21

## 2022-07-07 RX ADMIN — ALBUMIN (HUMAN) 25 G: 0.25 INJECTION, SOLUTION INTRAVENOUS at 11:43

## 2022-07-07 RX ADMIN — IPRATROPIUM BROMIDE 0.5 MG: 0.5 SOLUTION RESPIRATORY (INHALATION) at 20:21

## 2022-07-07 RX ADMIN — ENOXAPARIN SODIUM 50 MG: 100 INJECTION SUBCUTANEOUS at 11:42

## 2022-07-07 RX ADMIN — LEVOTHYROXINE SODIUM 125 MCG: 0.1 TABLET ORAL at 11:42

## 2022-07-07 RX ADMIN — SODIUM CHLORIDE, PRESERVATIVE FREE 10 ML: 5 INJECTION INTRAVENOUS at 05:13

## 2022-07-07 RX ADMIN — SODIUM CHLORIDE, PRESERVATIVE FREE 10 ML: 5 INJECTION INTRAVENOUS at 14:00

## 2022-07-07 RX ADMIN — FUROSEMIDE 20 MG: 10 INJECTION, SOLUTION INTRAMUSCULAR; INTRAVENOUS at 21:35

## 2022-07-07 RX ADMIN — VANCOMYCIN HYDROCHLORIDE 1000 MG: 1 INJECTION, POWDER, LYOPHILIZED, FOR SOLUTION INTRAVENOUS at 04:07

## 2022-07-07 RX ADMIN — Medication 3 UNITS: at 17:40

## 2022-07-07 RX ADMIN — ALBUMIN (HUMAN) 25 G: 0.25 INJECTION, SOLUTION INTRAVENOUS at 05:13

## 2022-07-07 RX ADMIN — FUROSEMIDE 20 MG: 10 INJECTION, SOLUTION INTRAMUSCULAR; INTRAVENOUS at 01:16

## 2022-07-07 RX ADMIN — VANCOMYCIN HYDROCHLORIDE 1000 MG: 1 INJECTION, POWDER, LYOPHILIZED, FOR SOLUTION INTRAVENOUS at 16:21

## 2022-07-07 RX ADMIN — SODIUM CHLORIDE, PRESERVATIVE FREE 40 MG: 5 INJECTION INTRAVENOUS at 08:39

## 2022-07-07 RX ADMIN — ALBUMIN (HUMAN) 25 G: 0.25 INJECTION, SOLUTION INTRAVENOUS at 17:41

## 2022-07-07 RX ADMIN — AMIODARONE HYDROCHLORIDE 100 MG: 200 TABLET ORAL at 08:39

## 2022-07-07 RX ADMIN — PIPERACILLIN AND TAZOBACTAM 3.38 G: 3; .375 INJECTION, POWDER, LYOPHILIZED, FOR SOLUTION INTRAVENOUS at 01:16

## 2022-07-07 RX ADMIN — Medication 6 UNITS: at 21:35

## 2022-07-07 RX ADMIN — Medication 6 UNITS: at 11:42

## 2022-07-07 NOTE — DIABETES MGMT
Diabetes/ Glycemic Control Plan of Care  Recommendations:   Corrective Humalog ACHS  - ordered per protocol  Consider initiation of basal insulin, suggest 5 units Lantus q 24 hours  Assessment: BG ranging 123-202 mg/dl over the last 24 hours. Patient is currently receiving corrective coverage, TDD - 15 units Humalog. Patient with 2 BG > 200 mg/dl in 24 hour, consider small dose of basal insulin. Patient receiving corrective Humalog q 6 hours, patient with diet order - recommend to change to ACHS        Recent Glucose Results:   Lab Results   Component Value Date/Time     (H) 07/07/2022 04:34 AM    GLUCPOC 123 (H) 07/07/2022 05:08 AM    GLUCPOC 202 (H) 07/06/2022 11:47 PM    GLUCPOC 172 (H) 07/06/2022 05:07 PM           BG within target range (non-ICU: <180; -180):  [] Yes    [x] No   Current insulin orders: corrective Humalog ACHS  Total Daily Dose previous 24 hours = 15 units Humalog      Plan/Goals:   Blood glucose will be within target of 70 - 180 mg/dl within 72 hours  Reinforce dietary and medication compliance at home.          Education:  [] Refer to Diabetes Education Record                       [x] Education not indicated at this time     Shahzad Antunez RD  Glycemic Control Team  [phone]    Monday-Friday   9 am - 3 pm

## 2022-07-07 NOTE — PROGRESS NOTES
[facility]  Pulmonary, Critical Care, and Sleep Medicine    Name: Neva Dyson MRN: [id number]   : 1940 Hospital: [facility] [place]    Date: 2022  Room: [address] [place] [address] Note                                              Consult requesting physician: Dr. Ezio Klein  Reason for Consult: Neutropenic fever, acute respiratory failure with hypoxia.     IMPRESSION:       Active Hospital Problems    Diagnosis Date Noted    Septic shock ([address] 2022    Neutropenic fever ([address] 2022    Acute respiratory failure with hypoxia ([address] 2022    Hypomagnesemia 2022    Diffuse large B cell lymphoma ([address] 2022    Hyponatremia 2022    Pancytopenia ([address] 2022    UTI (urinary tract infection) 2022    Sinusitis 2022    Myopia of both eyes with astigmatism 2022    Pleural effusion 2022    Cellulitis 2022    Severe protein-calorie malnutrition (HCC) 2022    GERD (gastroesophageal reflux disease)     Thyroid disease     Acute encephalopathy     Hypokalemia 2012   ·      Patient Active Problem List   Diagnosis Code    S/P total knee arthroplasty Z96.659    Hypoxia R09.02    Hypokalemia E87.6    Acute posthemorrhagic anemia D62    HTN (hypertension), benign I10    Neutropenic fever ([address] D70.9, R50.81    Acute respiratory failure with hypoxia ([address] J96.01    Hypomagnesemia E83.42    Diffuse large B cell lymphoma (HCC) C83.30    GERD (gastroesophageal reflux disease) K21.9    Thyroid disease E07.9    Acute encephalopathy G93.40    Hyponatremia E87.1    Pancytopenia (HCC) D61.818    UTI (urinary tract infection) N39.0    Sinusitis J32.9    Myopia of both eyes with astigmatism H52.13, H52.203    Pleural effusion J90    Cellulitis L03.90    Severe protein-calorie malnutrition (HCC) E43    Septic shock (HCC) A41.9, R65.21         · Code status: Full Code       RECOMMENDATIONS:     Respiratory: Acute hypoxic respiratory failure due to pulmonary vascular congestion and pleural effusion. On NC stable   Albumins and gentle diuresis  On NC  Add Madison and IC and bronchodilators   Patet is very weak  CXR atelectasis and pl effusions but BNP>5000  CTA chest 7/3/2022: No PE. Emphysema. Moderate pl effusions . Pulmonary vascular congestion. Moderate hiatal hernia. Multiple indeterminate splenic hypodensities. On 6 LPM NC; titrate to keep SPO2 more than 91%. On Lasix and albumins   Bronchodilators: brovana and pulmicort   Keep SPO2 >=92%. HOB 30 degree elevation all the time. Aggressive pulmonary toileting. Aspiration precautions. Incentive spirometry. CVS:   Ecoli urosepsis  Septic shock improved weaned pressors to off   Afib at home on amiodarone give 1 push and see if able to take po  Cardiology consulted   Ac an issue has anemia hold Eliquis change to lovenox no bleeding   Start Lovenox and monitor hb oncology on board  Echo diastiolic CHF/BNP >7277 gentle diuresis due to BP  Right leg edema; PVL LE pending. Pulmonary vascular congestion and pleural effusion; echo pending. On Lasix 20 mg daily with albumins   Hypotensive, likely shock; on Levophed; titrate to keep SBP more than 100 or MAP more than 65. Avoid central line due to pancytopenia abut ok to access port we spoke to oncology  ID:   Neutropenic fever/neutropenic sepsis. Wbc improved to 1.1  Primary  UTI on admission now gram negative billy final pending   On Vancomycin and zosyn  Doxy now off   Remove castrejon if able to tolerate   UTI gram negative Billy Ecoli     cdiff negative       Rapid COVID-19 7/3/2022: Negative. Rapid influenza 7/3/2022: Negative. Respiratory viral panel 7/3/2022: Pending. UA 7/3/2022: Negative nitrites but moderate leukocyte esterase; bacteria 2+ with WBC 21-35. Urine culture 7/3/2022: Pending. Blood culture 7/3/2022: NGTD.   Lactic acid normal.  Antibiotics: Continue Zosyn and vancomycin add doxy  Sepsis bundle followed. Follow cultures. Deescalate antibiotic when appropriate. ID consulted     Hematology/Oncology: Diffuse large B-cell lymphoma on R-CHOP, last received 6/28/2022. Splenic indeterminate hypodensities, could be early B-cell lymphoma process versus other etiologies. Neutropenic fever. Pancytopenia. Normal coags. Patient has received Neupogen at Dr. Ayad Cardenas office. Seen by Dr. Davidson Perdomo. pvl today  Renal:   Normal creatinine today   Hypokalemia being replaced. Mild hyponatremia; expected to improve with IVF. GI/:   Normal LFT. Splenic indeterminate hypodensities, could be early B-cell lymphoma process versus other etiologies. Endocrine: Monitor BS. SSI. Patient is on Synthroid 125 mcg daily; will change to IV 75 mcg daily for now. Check TSH, free T4, cortisol. Neurology: Alert awake oriented but lethargic due to sepsis. Nonfocal on exam.  CT head 7/3/2022: No acute findings. Chronic left sphenoid ethmoidal sinus disease, with questionable small left frontoethmoidal air-fluid level, potentially acute on chronic sinus disease. Asymmetric hypodensities right eye globe. Psychiatry: No acute issues. Toxicology: No acute issues. Pain/Sedation: No acute issues. Skin/Wound: No acute issues. Electrolytes: Replace electrolytes per ICU electrolyte replacement protocol. IVF: NS at 25 mill per hour. Nutrition: N.p.o. for now    Prophylaxis: DVT Prophylaxis: SCD (thrombocytopenia). GI Prophylaxis: Protonix. Restraints: none    Lines/Tubes: PIV  Midline will be placed. Lucero: 7/3/2022 (Medically necessary for strict input/output monitoring in critically ill patient, will remove it when not needed. Lucero bundle followed). PT/OT eval and treat. OOB. Advance Directive/Palliative Care: Consulted.      Will defer respective systems problem management to primary and other respective consultant and follow patient in ICU with primary and other medical team.  Further recommendations will be based on the patient's response to recommended treatment and results of the investigation ordered. Quality Care: PPI, DVT prophylaxis, HOB elevated, Infection control all reviewed and addressed. Care of plan d/w RN, RT, MDR, Dr. Campos Matt  D/w patient (answered all questions to satisfaction).  updated at the bedside     High complexity decision making was performed during the evaluation of this patient at high risk for decompensation with multiple organ involvement. Code status discussed full code  Family updated       Subjective/History of Present Illness:     Patient is a 78 y.o. female with PMHx significant for HTN, hypothyroidism, GERD, diffuse large B-cell lymphoma on R-CHOP, last received 6/28/2022; admitted with neutropenic fever and hypoxic respiratory failure, pleural effusion. COVID19 vaccine status: Patient has received Pfizer COVID-19 vaccine x2 followed by 1 booster. 7/7/2022 :   Seen in ICU room 105. Weak but awake, alert following commands   On NC  BP stable   Spoke to hospitalist ok to transfer  I will sign off once off ICU   Please call us if new issues     I/O last 24 hrs: Intake/Output Summary (Last 24 hours) at 7/7/2022 1150  Last data filed at 7/7/2022 0400  Gross per 24 hour   Intake 1705 ml   Output 1850 ml   Net -145 ml         The patient is critically ill and can not provide additional history due to Unable to comprehend    History taken from  EMR     Review of Systems:  ROS not obtained due to patient factor.        Allergies   Allergen Reactions    Adhesive Tape-Silicones Other (comments)     Takes skin off    Penicillin G Itching      Past Medical History:   Diagnosis Date    Arthritis     GERD (gastroesophageal reflux disease)     Hypertension     Osteoarthritis of right knee 9/19/2012    S/P total knee arthroplasty 9/19/2012    Thyroid disease       Past Surgical History:   Procedure Laterality Date    HX APPENDECTOMY  1950's    HX CHOLECYSTECTOMY      laparoscopic    HX HEENT      Surgery OD infection x 4    HX HEENT      throat surgery x 2    HX KNEE ARTHROSCOPY      right      Social History     Tobacco Use    Smoking status: Former Smoker     Quit date: 1999     Years since quittin.4    Smokeless tobacco: Never Used   Substance Use Topics    Alcohol use: Yes     Alcohol/week: 1.7 standard drinks     Types: 1 Glasses of wine, 1 Cans of beer per week      No family history on file. Prior to Admission medications    Medication Sig Start Date End Date Taking? Authorizing Provider   levothyroxine (Synthroid) 125 mcg tablet Take 125 mcg by mouth Daily (before breakfast). Yes Provider, Historical   acyclovir (ZOVIRAX) 400 mg tablet Take 400 mg by mouth two (2) times a day. Yes Provider, Historical   amiodarone (PACERONE) 100 mg tablet Take 100 mg by mouth daily. Yes Provider, Historical   apixaban (Eliquis) 5 mg tablet Take 5 mg by mouth two (2) times a day. Yes Provider, Historical   ezetimibe (Zetia) 10 mg tablet Take  by mouth. Yes Provider, Historical   SITagliptin (Januvia) 100 mg tablet Take 100 mg by mouth daily. Yes Provider, Historical   RABEprazole (ACIPHEX) 20 mg TbEC Take 20 mg by mouth daily. Yes Provider, Historical   melatonin 5 mg tablet Take 5 mg by mouth nightly. Yes Provider, Historical   magnesium oxide (MAG-OX) 400 mg tablet Take 400 mg by mouth two (2) times a day.    Yes Provider, Historical     Current Facility-Administered Medications   Medication Dose Route Frequency    furosemide (LASIX) injection 20 mg  20 mg IntraVENous Q12H    insulin lispro (HUMALOG) injection   SubCUTAneous AC&HS    levothyroxine (SYNTHROID) tablet 125 mcg  125 mcg Oral DAILY    vancomycin (VANCOCIN) 1,000 mg in 0.9% sodium chloride 250 mL (VIAL-MATE)  1,000 mg IntraVENous Q12H    piperacillin-tazobactam (ZOSYN) 3.375 g in 0.9% sodium chloride (MBP/ADV) 100 mL MBP  3.375 g IntraVENous Q8H    amiodarone (CORDARONE) tablet 100 mg  100 mg Oral DAILY    enoxaparin (LOVENOX) injection 50 mg  1 mg/kg SubCUTAneous Q12H    sodium chloride (NS) flush 5-40 mL  5-40 mL IntraVENous Q8H    pantoprazole (PROTONIX) 40 mg in 0.9% sodium chloride 10 mL injection  40 mg IntraVENous DAILY    Vancomycin-Pharmacy To Dose  1 Each Other Rx Dosing/Monitoring    albumin human 25% (BUMINATE) solution 25 g  25 g IntraVENous Q6H         Objective:   Vital Signs:    Visit Vitals  BP (!) 128/109   Pulse 87   Temp 99.2 °F (37.3 °C)   Resp 23   Ht 5' 7\" (1.702 m)   Wt 53.5 kg (118 lb)   SpO2 93%   BMI 18.48 kg/m²       O2 Device: Nasal cannula   O2 Flow Rate (L/min): 6 l/min   Temp (24hrs), Av.9 °F (37.2 °C), Min:98.4 °F (36.9 °C), Max:99.3 °F (37.4 °C)       Intake/Output:   Last shift:      No intake/output data recorded. Last 3 shifts:  1901 -  0700  In: 2744.1 [I.V.:2744.1]  Out: 3300 [Urine:3300]      Intake/Output Summary (Last 24 hours) at 2022 1150  Last data filed at 2022 0400  Gross per 24 hour   Intake 1705 ml   Output 1850 ml   Net -145 ml       Last 3 Recorded Weights in this Encounter    22 1649 22 1547   Weight: 53.5 kg (118 lb) 53.5 kg (118 lb)         No results for input(s): PHI, PHI, POC2, PCO2I, PO2, PO2I, HCO3, HCO3I, FIO2, FIO2I in the last 72 hours. Physical Exam:     General/Neurology: Alert, awake and oriented. Lethargic. Chronically ill looking. O2 NC. Right chest Mediport   Head:   Normocephalic, without obvious abnormality, atraumatic. Eye:   EOM intact. No scleral icterus, no pallor, no cyanosis. Nose:   No sinus tenderness  Throat:  Lips, mucosa, and tongue normal. No oral thrush. Neck:   Supple, symmetric. No lymphadenopathy. Trachea midline  Lung:   Reduced air entry at the bases. Bilateral extensive rhonchi. L>R No wheezing. No prolonged expiration. Heart:   Regular rate & rhythm. S1 S2 present. No murmur. No JVD. Abdomen:  Soft. NT. ND. +BS.  No masses. Extremities:  Right leg mild edema with erythema at the distal leg/ankle with warmth. 2+ dorsalis pedis pulses bilaterally. No cyanosis or clubbing. Pulses: 2+ and symmetric in DP. Capillary refill: normal  Lymphatic:  No cervical or supraclavicular palpable lymphadenopathy. Musculoskeletal: No joint swelling. No tenderness. Skin left lower extremity swelling, bruses upper extremities       Data:       Recent Results (from the past 24 hour(s))   GLUCOSE, POC    Collection Time: 07/06/22  5:07 PM   Result Value Ref Range    Glucose (POC) 172 (H) 70 - 110 mg/dL   POTASSIUM    Collection Time: 07/06/22  6:30 PM   Result Value Ref Range    Potassium 3.6 3.5 - 5.5 mmol/L   MAGNESIUM    Collection Time: 07/06/22  6:30 PM   Result Value Ref Range    Magnesium 2.1 1.6 - 2.6 mg/dL   GLUCOSE, POC    Collection Time: 07/06/22 11:47 PM   Result Value Ref Range    Glucose (POC) 202 (H) 70 - 110 mg/dL   CALCIUM, IONIZED    Collection Time: 07/07/22  4:33 AM   Result Value Ref Range    Ionized Calcium 1.27 1.12 - 1.32 MMOL/L   MAGNESIUM    Collection Time: 07/07/22  4:34 AM   Result Value Ref Range    Magnesium 2.0 1.6 - 2.6 mg/dL   PHOSPHORUS    Collection Time: 07/07/22  4:34 AM   Result Value Ref Range    Phosphorus 1.5 (L) 2.5 - 4.9 MG/DL   CBC WITH AUTOMATED DIFF    Collection Time: 07/07/22  4:34 AM   Result Value Ref Range    WBC 1.2 (L) 4.6 - 13.2 K/uL    RBC 3.02 (L) 4.20 - 5.30 M/uL    HGB 8.7 (L) 12.0 - 16.0 g/dL    HCT 27.5 (L) 35.0 - 45.0 %    MCV 91.1 78.0 - 100.0 FL    MCH 28.8 24.0 - 34.0 PG    MCHC 31.6 31.0 - 37.0 g/dL    RDW 29.6 (H) 11.6 - 14.5 %    PLATELET 772 (L) 022 - 420 K/uL    NRBC 0.0 0  WBC    ABSOLUTE NRBC 0.00 0.00 - 0.01 K/uL    NEUTROPHILS 44 40 - 73 %    LYMPHOCYTES 12 (L) 21 - 52 %    MONOCYTES 24 (H) 3 - 10 %    EOSINOPHILS 16 (H) 0 - 5 %    BASOPHILS 0 0 - 2 %    MYELOCYTES 4 (H) 0 %    IMMATURE GRANULOCYTES 0 %    TOTAL CELLS COUNTED 25      ABS.  NEUTROPHILS 0.5 (L) 1.8 - 8.0 K/UL    ABS. LYMPHOCYTES 0.1 (L) 0.9 - 3.6 K/UL    ABS. MONOCYTES 0.3 0.05 - 1.2 K/UL    ABS. EOSINOPHILS 0.2 0.0 - 0.4 K/UL    ABS. BASOPHILS 0.0 0.0 - 0.1 K/UL    ABS. IMM. GRANS. 0.0 K/UL    DF MANUAL      RBC COMMENTS ANISOCYTOSIS  2+        RBC COMMENTS POIKILOCYTOSIS  1+        RBC COMMENTS POLYCHROMASIA  1+       METABOLIC PANEL, COMPREHENSIVE    Collection Time: 07/07/22  4:34 AM   Result Value Ref Range    Sodium 139 136 - 145 mmol/L    Potassium 3.5 3.5 - 5.5 mmol/L    Chloride 108 100 - 111 mmol/L    CO2 26 21 - 32 mmol/L    Anion gap 5 3.0 - 18 mmol/L    Glucose 114 (H) 74 - 99 mg/dL    BUN 16 7.0 - 18 MG/DL    Creatinine 0.55 (L) 0.6 - 1.3 MG/DL    BUN/Creatinine ratio 29 (H) 12 - 20      GFR est AA >60 >60 ml/min/1.73m2    GFR est non-AA >60 >60 ml/min/1.73m2    Calcium 9.3 8.5 - 10.1 MG/DL    Bilirubin, total 0.4 0.2 - 1.0 MG/DL    ALT (SGPT) 10 (L) 13 - 56 U/L    AST (SGOT) 8 (L) 10 - 38 U/L    Alk.  phosphatase 50 45 - 117 U/L    Protein, total 6.3 (L) 6.4 - 8.2 g/dL    Albumin 3.8 3.4 - 5.0 g/dL    Globulin 2.5 2.0 - 4.0 g/dL    A-G Ratio 1.5 0.8 - 1.7     GLUCOSE, POC    Collection Time: 07/07/22  5:08 AM   Result Value Ref Range    Glucose (POC) 123 (H) 70 - 110 mg/dL   GLUCOSE, POC    Collection Time: 07/07/22 11:35 AM   Result Value Ref Range    Glucose (POC) 220 (H) 70 - 110 mg/dL         Chemistry Recent Labs     07/07/22  0434 07/06/22  1830 07/06/22  0504 07/05/22  1816 07/05/22  0530   *  --  99  --  147*     --  136  --  132*   K 3.5 3.6 3.4*   < > 3.7     --  105  --  102   CO2 26  --  25  --  24   BUN 16  --  13  --  9   CREA 0.55*  --  0.48*  --  0.53*   CA 9.3  --  8.7  --  8.4*   MG 2.0 2.1 1.6  --  1.9   PHOS 1.5*  --  1.3*  --  1.9*   AGAP 5  --  6  --  6   BUCR 29*  --  27*  --  17   AP 50  --  54  --  73   TP 6.3*  --  5.0*  --  4.9*   ALB 3.8  --  3.2*  --  2.8*   GLOB 2.5  --  1.8*  --  2.1   AGRAT 1.5  --  1.8*  --  1.3    < > = values in this interval not displayed. Lactic Acid Lactic acid   Date Value Ref Range Status   07/03/2022 2.0 0.4 - 2.0 MMOL/L Final     No results for input(s): LAC in the last 72 hours. Liver Enzymes Protein, total   Date Value Ref Range Status   07/07/2022 6.3 (L) 6.4 - 8.2 g/dL Final     Albumin   Date Value Ref Range Status   07/07/2022 3.8 3.4 - 5.0 g/dL Final     Globulin   Date Value Ref Range Status   07/07/2022 2.5 2.0 - 4.0 g/dL Final     A-G Ratio   Date Value Ref Range Status   07/07/2022 1.5 0.8 - 1.7   Final     Alk.  phosphatase   Date Value Ref Range Status   07/07/2022 50 45 - 117 U/L Final     Recent Labs     07/07/22  0434 07/06/22  0504 07/05/22  0530   TP 6.3* 5.0* 4.9*   ALB 3.8 3.2* 2.8*   GLOB 2.5 1.8* 2.1   AGRAT 1.5 1.8* 1.3   AP 50 54 73        CBC w/Diff Recent Labs     07/07/22  0434 07/06/22  0504 07/05/22  0530   WBC 1.2* 1.1* 0.5*   RBC 3.02* 3.03* 3.17*   HGB 8.7* 8.8* 9.1*   HCT 27.5* 27.4* 28.6*   * 101* 94*   GRANS 44 22* WBC TOO FEW TO DIFFERENTIATE   LYMPH 12* 22 WBC TOO FEW TO DIFFERENTIATE   EOS 16* 34* WBC TOO FEW TO DIFFERENTIATE        Cardiac Enzymes No results found for: CPK, CK, CKMMB, CKMB, RCK3, CKMBT, CKNDX, CKND1, HUMBERTO, TROPT, TROIQ, JANE, TROPT, TNIPOC, BNP, BNPP     BNP No results found for: BNP, BNPP, XBNPT     Coagulation Recent Labs     07/06/22  0504   PTP 15.0   INR 1.1         Thyroid  Lab Results   Component Value Date/Time    TSH 3.50 07/04/2022 04:40 AM       No results found for: T4     Urinalysis Lab Results   Component Value Date/Time    Color YELLOW 07/03/2022 05:00 PM    Appearance CLOUDY 07/03/2022 05:00 PM    Specific gravity 1.016 07/03/2022 05:00 PM    pH (UA) 7.0 07/03/2022 05:00 PM    Protein 300 (A) 07/03/2022 05:00 PM    Glucose Negative 07/03/2022 05:00 PM    Ketone Negative 07/03/2022 05:00 PM    Bilirubin Negative 07/03/2022 05:00 PM    Urobilinogen 0.2 07/03/2022 05:00 PM    Nitrites Negative 07/03/2022 05:00 PM    Leukocyte Esterase MODERATE (A) 07/03/2022 05:00 PM    Epithelial cells FEW 07/03/2022 05:00 PM    Bacteria 2+ (A) 07/03/2022 05:00 PM    WBC 21 to 35 07/03/2022 05:00 PM    RBC 11 to 20 07/03/2022 05:00 PM        Micro  No results for input(s): SDES, CULT in the last 72 hours. No results for input(s): CULT in the last 72 hours. Culture data during this hospitalization. All Micro Results     Procedure Component Value Units Date/Time    CULTURE, BLOOD [[id number]] Collected: 07/03/22 1645    Order Status: Completed Specimen: Blood Updated: 07/07/22 0910     Special Requests: NO SPECIAL REQUESTS        Culture result: NO GROWTH 4 DAYS       CULTURE, BLOOD [[id number]] Collected: 07/03/22 1650    Order Status: Completed Specimen: Blood Updated: 07/07/22 0910     Special Requests: NO SPECIAL REQUESTS        Culture result: NO GROWTH 4 DAYS       ENTERIC BACTERIA PANEL, DNA [[id number]] Collected: 07/05/22 1455    Order Status: Completed Specimen: Stool Updated: 07/06/22 2026     Shigella species, DNA Negative        Campylobacter species, DNA Negative        Vibrio species, DNA Negative        Enterotoxigen E Coli, DNA Negative        Shiga toxin producing, DNA Negative        Salmonella species, DNA Negative        P. shigelloides, DNA Negative        Y. enterocolitica, DNA Negative       CULTURE, URINE [[id number]]  (Abnormal)  (Susceptibility) Collected: 07/03/22 1700    Order Status: Completed Specimen: Urine from Clean catch Updated: 07/06/22 1315     Special Requests: NO SPECIAL REQUESTS        Flanagan Count --        >100,000  COLONIES/mL       Culture result: ESCHERICHIA COLI       C. DIFFICILE AG & TOXIN A/B [[id number]]  (Abnormal) Collected: 07/05/22 1455    Order Status: Completed Specimen: Miscellaneous sample Updated: 07/06/22 1133     C. difficile toxin Negative        PCR Reflex       See Reflex order for C. difficile (DNA)           INTERPRETATION       Indeterminate for Toxigenic C. difficile, DNA confirmation to follow. Ann-Marie Krueger DIFF MOLECULAR [[id number]] Collected: 07/05/22 1455    Order Status: Completed Specimen: Miscellaneous sample Updated: 07/06/22 1133     C Diff Molecular Negative        Comment: This specimen is negative for toxigenic C difficile by DNA amplification. Repeat testing is not recommended for confirmation, samples received within 7 days of this negative result will be rejected.        Dominique Shortp, URINE [[id number]] Collected: 07/04/22 1247    Order Status: Completed Specimen: Urine, random Updated: 07/04/22 2030     Strep pneumo Ag, urine Negative       LEGIONELLA PNEUMOPHILA AG, URINE [[id number]] Collected: 07/04/22 1247    Order Status: Completed Specimen: Urine, random Updated: 07/04/22 2030     Legionella Ag, urine Negative       RESPIRATORY VIRUS PANEL W/COVID-19, PCR [[id number]] Collected: 07/03/22 2110    Order Status: Completed Specimen: Nasopharyngeal Updated: 07/04/22 1229     Adenovirus Not detected        Coronavirus 229E Not detected        Coronavirus HKU1 Not detected        Coronavirus CVNL63 Not detected        Coronavirus OC43 Not detected        SARS-CoV-2, PCR Not detected        Metapneumovirus Not detected        Rhinovirus and Enterovirus Not detected        Influenza A Not detected        Influenza B Not detected        Parainfluenza 1 Not detected        Parainfluenza 2 Not detected        Parainfluenza 3 Not detected        Parainfluenza virus 4 Not detected        RSV by PCR Not detected        B. parapertussis, PCR Not detected        Bordetella pertussis - PCR Not detected        Chlamydophila pneumoniae DNA, QL, PCR Not detected        Mycoplasma pneumoniae DNA, QL, PCR Not detected       C. DIFFICILE AG & TOXIN A/B [[id number]]     Order Status: Canceled Specimen: Stool     COVID-19 RAPID TEST [[id number]] Collected: 07/03/22 1715    Order Status: Completed Specimen: Nasopharyngeal Updated: 07/03/22 1839     Specimen source Nasopharyngeal        COVID-19 rapid test Not detected Comment: Rapid Abbott ID Now       Rapid NAAT:  The specimen is NEGATIVE for SARS-CoV-2, the novel coronavirus associated with COVID-19. Negative results should be treated as presumptive and, if inconsistent with clinical signs and symptoms or necessary for patient management, should be tested with an alternative molecular assay. Negative results do not preclude SARS-CoV-2 infection and should not be used as the sole basis for patient management decisions. This test has been authorized by the FDA under an Emergency Use Authorization (EUA) for use by authorized laboratories. Fact sheet for Healthcare Providers: Eat.co[employer]nz  Fact sheet for Patients: Eat[employer]co[employer]nz       Methodology: Isothermal Nucleic Acid Amplification         INFLUENZA A & B AG (RAPID TEST) [[id number]] Collected: 07/03/22 1715    Order Status: Completed Specimen: Nasopharyngeal from Nasal washing Updated: 07/03/22 174     Influenza A Antigen Negative        Comment: A negative result does not exclude influenza virus infection, seasonal or H1N1 due to suboptimal sensitivity. If influenza is circulating in your community, a diagnosis of influenza should be considered based on a patients clinical presentation and empiric antiviral treatment should be considered, if indicated. Influenza B Antigen Negative                XR ANKLE RT MIN 3 V    Result Date: 7/3/2022  EXAM: ANKLE SERIES Indication: Swelling Technique: Frontal, , oblique, and lateral views of the right ankle. Comparison studies: None. _______________ FINDINGS: -OSSEOUS: No acute or destructive osseous abnormality. Tibiotalar degenerative changes with small degenerative plantar calcaneal spur. Normal alignment with preserved ankle mortise. . -SOFT TISSUES: Benign calcifications of the lower leg with mild diffuse edema of the distal leg to visualized midfoot. . No significant joint effusion. _____________    1. Nonspecific soft tissue edema, with no acute or destructive osseous abnormality. CT HEAD WO CONT    Result Date: 7/3/2022  EXAM: CT HEAD WO CONT CLINICAL INDICATION/HISTORY: ams -Additional: None COMPARISON: None TECHNIQUE: Axial CT imaging of the head was performed without intravenous contrast. One or more dose reduction techniques were used on this CT: automated exposure control, adjustment of the mAs and/or kVp according to patient size, and iterative reconstruction techniques. The specific techniques used on this CT exam have been documented in the patient's electronic medical record. Digital Imaging and Communications in Medicine (DICOM) format image data are available to nonaffiliated external healthcare facilities or entities on a secure, media free, reciprocally searchable basis with patient authorization for at least a 12-month period after this study. _______________ FINDINGS: BRAIN:   > Brain volume: Age appropriate.   > White matter: Little or no white matter disease. > Infarcts, encephalomalacia: None.   > Parenchymal mass: None.   > Parenchymal hemorrhage: None.   > Midline shift: None.   > Miscellaneous: None. EXTRA-AXIAL SPACES: Unremarkable. No fluid collections. CALVARIUM: Intact. SINUSES, MASTOIDS: Complete opacified left sphenoethmoidal air cells with mild mucosal thickening on the right and small left anterior frontal ethmoidal opacities. OTHER EXTRACRANIAL: Asymmetric dense right globe with findings of prior bilateral cataract surgery. _______________     1. No CT findings of an acute intracranial abnormality. Please note that noncontrast head CT may be normal in early acute infarct. 2. Chronic left sphenoethmoidal sinus disease, with questional small left frontoethmoidal air-fluid level potentially acute on chronic sinus disease. 3. Asymmetric hyperdense right globe. Recommend correlation with patient ocular history.      CTA CHEST W OR W WO CONT    Result Date: 7/3/2022  EXAM: CTA Chest INDICATION: Hypoxic. COMPARISON: No recent exams for direct comparison, most recent study from 9/21/2012. TECHNIQUE: Axial CT imaging from the thoracic inlet through the diaphragm with intravenous contrast. Coronal and sagittal MIP reformats were generated. One or more dose reduction techniques were used on this CT: automated exposure control, adjustment of the mAs and/or kVp according to patient size, and iterative reconstruction techniques. The specific techniques used on this CT exam have been documented in the patient's electronic medical record. Digital Imaging and Communications in Medicine (DICOM) format image data are available to nonaffiliated external healthcare facilities or entities on a secure, media free, reciprocally searchable basis with patient authorization for at least a 12-month period after this study. _______________ FINDINGS: EXAM QUALITY: Adequate bolus timing with mild diffuse respiratory motion artifact degradation. Ramez Founds PULMONARY ARTERIES: No evidence of central, interlobar or mid to proximal segmental branch pulmonary arterial filling defect. Asymmetric motion degradation involving the lower lobes. Normal sized main pulmonary artery. Pulmonary embolism. MEDIASTINUM: Normal heart size without pericardial effusion. Coronary artery and aortic atherosclerosis. A right upper chest Mediport is present with the catheter in the SVC. Moderate-sized hiatus hernia. LUNGS and PLEURA: Moderate to large left low attenuating layering pleural effusion. Marked diffuse emphysematous changes with chronic areas of scarring and atelectasis. Bilateral lower lobe groundglass, difficult to differentiate between dependent changes, atelectasis and/or edema. . AIRWAY: Normal. LYMPH NODES: No enlarged nodes. UPPER ABDOMEN: Multiple splenic hypodensities, largest anteriorly measuring 3 cm. Overall normal splenic size measuring 11.3 cm in AP dimension.  Prior cholecystectomy OTHER: No acute or aggressive osseous abnormalities identified. _______________     1. Respiratory motion degradation. Otherwise, No evidence of central pulmonary embolism. 2.  Interstitial and groundglass changes suggestive of underlying edema, with moderate to large left-sided pleural effusion. 3. Multiple Indeterminate splenic hypodensities, recommend comparison to any prior recent outside exams and patient history. 4. Moderate-sized hiatus hernia. XR CHEST PORT    Result Date: 7/3/2022  EXAM: XR CHEST PORT CLINICAL INDICATION/HISTORY: ams fever -Additional: None COMPARISON: None TECHNIQUE: Frontal view of the chest _______________ FINDINGS: SUPPORT LINES AND TUBES:Right-sided tunneled Mediport and catheter tip over the distal SVC. HEART AND MEDIASTINUM: Midline cardiac silhouette appreciable cardiomegaly. Hilar vascular congestion and cephalization. LUNGS AND PLEURAL SPACES: Diffuse bilateral hazy indistinct interstitial opacities with retrocardiac left basilar confluent opacity and blunted costophrenic angle/effusion. No significant right-sided pleural effusion. No pneumothorax. BONY THORAX AND SOFT TISSUES: No acute or destructive osseous abnormality. _______________     1. Findings of CHF versus fluid overload with edema and left pleural effusion. Images report reviewed by me:  CT (Most Recent) (CT chest reviewed by me) Results from Hospital Encounter encounter on 07/03/22    CTA CHEST W OR W WO CONT    Narrative  EXAM: CTA Chest    INDICATION: Hypoxic. COMPARISON: No recent exams for direct comparison, most recent study from  9/21/2012. TECHNIQUE: Axial CT imaging from the thoracic inlet through the diaphragm with  intravenous contrast. Coronal and sagittal MIP reformats were generated. One or more dose reduction techniques were used on this CT: automated exposure  control, adjustment of the mAs and/or kVp according to patient size, and  iterative reconstruction techniques.   The specific techniques used on this CT  exam have been documented in the patient's electronic medical record. Digital  Imaging and Communications in Medicine (DICOM) format image data are available  to nonaffiliated external healthcare facilities or entities on a secure, media  free, reciprocally searchable basis with patient authorization for at least a  12-month period after this study. _______________    FINDINGS:    EXAM QUALITY: Adequate bolus timing with mild diffuse respiratory motion  artifact degradation. Héctor Lin PULMONARY ARTERIES: No evidence of central, interlobar or mid to proximal  segmental branch pulmonary arterial filling defect. Asymmetric motion  degradation involving the lower lobes. Normal sized main pulmonary artery. Pulmonary embolism. MEDIASTINUM: Normal heart size without pericardial effusion. Coronary artery and  aortic atherosclerosis. A right upper chest Mediport is present with the  catheter in the SVC. Moderate-sized hiatus hernia. LUNGS and PLEURA: Moderate to large left low attenuating layering pleural  effusion. Marked diffuse emphysematous changes with chronic areas of scarring  and atelectasis. Bilateral lower lobe groundglass, difficult to differentiate  between dependent changes, atelectasis and/or edema. .    AIRWAY: Normal.    LYMPH NODES: No enlarged nodes. UPPER ABDOMEN: Multiple splenic hypodensities, largest anteriorly measuring 3  cm. Overall normal splenic size measuring 11.3 cm in AP dimension. Prior  cholecystectomy    OTHER: No acute or aggressive osseous abnormalities identified. _______________    Impression  1. Respiratory motion degradation. Otherwise, No evidence of central pulmonary  embolism. 2.  Interstitial and groundglass changes suggestive of underlying edema, with  moderate to large left-sided pleural effusion. 3. Multiple Indeterminate splenic hypodensities, recommend comparison to any  prior recent outside exams and patient history. 4. Moderate-sized hiatus hernia.          CXR reviewed by me:  XR (Most Recent). CXR  reviewed by me and compared with previous CXR Results from Hospital Encounter encounter on 07/03/22    XR CHEST PORT    Narrative  EXAM: XR CHEST PORT    CLINICAL INDICATION/HISTORY: hypoxia  -Additional: None    COMPARISON: Earlier the same day    TECHNIQUE: Portable frontal view of the chest    _______________    FINDINGS:    SUPPORT DEVICES: None. HEART AND MEDIASTINUM: Cardiomediastinal silhouette within normal limits. LUNGS AND PLEURAL SPACES: Bilateral interstitial and parenchymal opacities. Moderate left effusion/atelectasis. No pneumothorax.    _______________    Impression  Bilateral interstitial and parenchymal opacities. Moderate left  effusion/atelectasis. ·Please note: Voice-recognition software may have been used to generate this report, which may have resulted in some phonetic-based errors in grammar and contents. Even though attempts were made to correct all the mistakes, some may have been missed, and remained in the body of the document.       Garrett Brush MD  7/7/2022

## 2022-07-07 NOTE — PROGRESS NOTES
0730-Received pt resting in bed with eyes closed, easily awakened, no sign of distress, vss on 6LNC, will continue to monitor  0930-Pt status discussed with intensivist  1200-Pt remains stable,  at bedside  1530-TRANSFER - OUT REPORT:    Verbal report given to Koby Salazar RN(name) on George Amador  being transferred to [address] [place], [place] [address]unit) for routine progression of care       Report consisted of patients Situation, Background, Assessment and   Recommendations(SBAR). Information from the following report(s) SBAR, Kardex, Intake/Output, MAR, Recent Results and Cardiac Rhythm SR was reviewed with the receiving nurse. Lines:   Peripheral IV 07/04/22 Right;Proximal Cephalic (Active)   Site Assessment Clean, dry, & intact 07/07/22 1200   Phlebitis Assessment 0 07/07/22 1200   Infiltration Assessment 0 07/07/22 1200   Dressing Status Clean, dry, & intact 07/07/22 1200   Dressing Type Disk with Chlorhexadine gluconate (CHG) 07/07/22 0800   Hub Color/Line Status Green 07/07/22 0800   Action Taken Open ports on tubing capped 07/07/22 0800   Alcohol Cap Used Yes 07/07/22 0800       Peripheral IV 07/05/22 Left;Proximal Cephalic (Active)   Site Assessment Clean, dry, & intact 07/07/22 1200   Phlebitis Assessment 0 07/07/22 1200   Infiltration Assessment 0 07/07/22 1200   Dressing Status Clean, dry, & intact 07/07/22 1200   Dressing Type Disk with Chlorhexadine gluconate (CHG) 07/07/22 0800   Hub Color/Line Status Green 07/07/22 0800   Action Taken Open ports on tubing capped 07/07/22 0800   Alcohol Cap Used Yes 07/07/22 0800        Opportunity for questions and clarification was provided.       Patient transported with:   O2 @ 4 liters  Registered Nurse   Pt chart  Pt belongings

## 2022-07-07 NOTE — PROGRESS NOTES
Comprehensive Nutrition Assessment    Type and Reason for Visit: Reassess    Nutrition Recommendations/Plan:   1. Continue oral diet and ONS TID to meet nutritional needs. 2. Monitor intake of meals and supplement. Malnutrition Assessment:  Malnutrition Status:  Severe malnutrition (07/05/22 1053)    Context:  Acute illness     Findings of the 6 clinical characteristics of malnutrition:   Energy Intake:  50% or less of est energy requirements for 5 or more days  Weight Loss:  Greater than 5% over 1 month     Body Fat Loss: Moderate body fat loss, Orbital   Muscle Mass Loss: Moderate muscle mass loss, Temples (temporalis),Hand (interosseous)    Nutrition Assessment:    Admitted with Acute respiratory failure with hypoxia, pleural effusions, neutropenia fever. Nutrition Related Findings:    Phos 1.5. sodium phosphate, protonix, humalog, lasix. BM 7/7. Wound Type: None    Current Nutrition Intake & Therapies:  Average Meal Intake: 26-50%  Average Supplement Intake: None ordered  ADULT DIET Regular; 4 carb choices (60 gm/meal); no raw fruits or veggies  ADULT ORAL NUTRITION SUPPLEMENT Breakfast, Lunch, Dinner; Standard High Calorie/High Protein    Anthropometric Measures:  Height: 5' 7\" (170.2 cm)  Ideal Body Weight (IBW): 135 lbs (61 kg)  Admission Body Weight: 118 lb  Current Body Wt:  53.5 kg (118 lb), 87.4 % IBW.  Bed scale  Current BMI (kg/m2): 18.5  Usual Body Weight: 75.3 kg (166 lb) (4/8/2022)  % Weight Change (Calculated): -28.9  Weight Adjustment: No adjustment                 BMI Category: Underweight (BMI less than 22) age over 72    Estimated Daily Nutrient Needs:  Energy Requirements Based On: Kcal/kg  Weight Used for Energy Requirements: Current (30-35kcals)  Energy (kcal/day): 1431-7136  Weight Used for Protein Requirements: Current  Protein (g/day): 54-80  Method Used for Fluid Requirements: 1 ml/kcal  Fluid (ml/day): 5065-4866    Nutrition Diagnosis:   · Severe malnutrition related to inadequate protein-energy intake as evidenced by weight loss greater than or equal to 5% in 1 month,moderate loss of subcutaneous fat,moderate muscle loss,poor intake prior to admission    Nutrition Interventions:   Food and/or Nutrient Delivery: Continue current diet,Continue oral nutrition supplement  Nutrition Education/Counseling: No recommendations at this time  Coordination of Nutrition Care: Continue to monitor while inpatient       Goals:  Previous Goal Met: Progressing toward goal(s)  Goals: PO intake 75% or greater,by next RD assessment       Nutrition Monitoring and Evaluation:   Behavioral-Environmental Outcomes: None identified  Food/Nutrient Intake Outcomes: Food and nutrient intake,Supplement intake,Diet advancement/tolerance  Physical Signs/Symptoms Outcomes: Biochemical data,Meal time behavior,Nutrition focused physical findings,Skin,Weight,GI status    Discharge Planning:    Continue current diet,Continue oral nutrition supplement    Maecy Painter RD

## 2022-07-07 NOTE — ROUTINE PROCESS
Bedside shift change report given to Rodríguez Wade (oncoming nurse) by Jodee Neely RN (offgoing nurse). Report included the following information SBAR, Kardex, Intake/Output, MAR, Recent Results and Cardiac Rhythm sinus tach.    3

## 2022-07-07 NOTE — PROGRESS NOTES
conducted a follow up visit with Naldo Dewey, who is a 78 y.o.,female. Patient's  and two daughters were in the waiting room. We visited and then daughters left. I went with  back into the room where patient was sitting up and eating. She was much more conversant this morning. I asked if patient would like the sacraments. She said yes. Called Our [address] of [place] [address] (their [place]) and asked for a  to visit. Continued the relationship of care and support. Listened empathically. Offered prayer and assurance of continued prayer on patients behalf. Chart reviewed. Patient and family expressed gratitude for Spiritual Care visit. Chaplains will continue to follow and will provide pastoral care as needed or requested.  recommends bedside caregivers page the  on duty if patient shows signs of acute spiritual or emotional distress. [address], [place].    Board Certified   [phone] - Office

## 2022-07-07 NOTE — PROGRESS NOTES
Received female patient from ICU. Placed on tele monitor, sinus tach . Wheezing on 6 liters O2. Accompanied by . Incontinent of bowel and bladder, cleaned turned and repositioned. [place] area red and excoriated, cream applied.

## 2022-07-07 NOTE — PROGRESS NOTES
1940- Bedside and Verbal shift change report given to Susie Canchola RN (oncoming nurse) by Kristen Bauer RN (offgoing nurse). Report included the following information SBAR, Kardex, Intake/Output, MAR, Recent Results and Cardiac Rhythm NSR/ Sinus Tach. 2000- Shift assessment completed. Pt received full bath and linen change. Lung sounds are course on 6L NC. Pt A&O x4.     0000- Reassessment completed. Pt is intermittently confused and is A&O x2 to self and date. 200- Hospitalist paged due to pt O2 sats in the 70s; increased SOB and course lung sounds. Non rebreather mask placed on pt to maintain sats above 90% and new orders received and carried out for lasix. 0400- Reassessment completed. Pt placed on 6L NC with O2 sat maintained at 95%. 0700- Bedside and Verbal shift change report given to PAPI Bauer RN (oncoming nurse) by Susie Canchola RN (offgoing nurse). Report included the following information SBAR, Kardex, Intake/Output, MAR, Recent Results and Cardiac Rhythm NSR.

## 2022-07-07 NOTE — PROGRESS NOTES
Palliative Medicine    CODE STATUS: FULL CODE    AMD Status: none on file. Her , Silvia Khoury, is her legal next of kin     7/7/2022 1100 Seen today in room ICU 5 along with Bereket Esposito NP. Lying in bed with head of bed elevated. Awake, alert. Respirations tachypneic (mid 20s) with oxygen on at 6 lpm per NC. Frail appearing. Thin. Joins the conversation with short answers.  at bedside. Tentative plans for floor transfer today. Reviewed Dr Faust Sessions discussion from yesterday. Reviewed code status options including benefits and burdens of each choice. Mr Brandie Redman was clear that she would want an opportunity for resuscitation but would not want to linger on life support. Mrs Brandie Redman agreed with this statement by nodding her head and stating \"I want to live\". Disposition plan: to be determined    After meeting with patient and her , the goals of care have been defined. Code status remains: FULL CODE  AMD status: none on file. Her , Silvia Khoury, is her legal next of kin Will sign off but remain available for reconsult as needed/if appropriate.      Thank you for the Palliative Medicine consult and allowing us to participate in the care of [address] RN, MSN  Palliative Medicine   [phone]

## 2022-07-07 NOTE — PROGRESS NOTES
Hospitalist Progress Note-critical care note     Patient: [place] MRN: [id number]  CSN: [id number]    YOB: 1940  Age: 78 y.o. Sex: female    DOA: 7/3/2022 LOS:  LOS: 4 days            Chief complaint: septic shock. Neutropenic fever, lymphoma, gerd , dm    Assessment/Plan         Hospital Problems  Date Reviewed: 9/21/2012          Codes Class Noted POA    Septic shock ([facility] ICD-10-CM: A41.9, R65.21  ICD-9-CM: 038.9, 785.52, 995.92  7/4/2022 Yes        Neutropenic fever ([facility] ICD-10-CM: D70.9, R50.81  ICD-9-CM: 288.00, 780.61  7/3/2022 Yes        * (Principal) Acute respiratory failure with hypoxia [facility]) ICD-10-CM: J96.01  ICD-9-CM: 518.81  7/3/2022 Yes        Hypomagnesemia ICD-10-CM: E83.42  ICD-9-CM: 275.2  7/3/2022 Yes        Diffuse large B cell lymphoma ([facility] ICD-10-CM: C83.30  ICD-9-CM: 202.80  7/3/2022 Yes        GERD (gastroesophageal reflux disease) ICD-10-CM: K21.9  ICD-9-CM: 530.81  Unknown Yes        Thyroid disease ICD-10-CM: E07.9  ICD-9-CM: 246. 9  Unknown Unknown        Acute encephalopathy ICD-10-CM: G93.40  ICD-9-CM: 348.30  Unknown Yes        Hyponatremia ICD-10-CM: E87.1  ICD-9-CM: 276.1  7/3/2022 Yes        Pancytopenia ([facility] ICD-10-CM: M89.747  ICD-9-CM: 284.19  7/3/2022 Yes        UTI (urinary tract infection) ICD-10-CM: N39.0  ICD-9-CM: 599.0  7/3/2022 Yes        Sinusitis ICD-10-CM: J32.9  ICD-9-CM: 473.9  7/3/2022 Unknown        Myopia of both eyes with astigmatism ICD-10-CM: H52.13, H52.203  ICD-9-CM: 367.1, 367.20  7/3/2022 Unknown        Pleural effusion ICD-10-CM: J90  ICD-9-CM: 511.9  7/3/2022 Unknown        Cellulitis ICD-10-CM: L03.90  ICD-9-CM: 682.9  7/3/2022 Yes        Severe protein-calorie malnutrition ([facility] ICD-10-CM: E43  ICD-9-CM: 042  7/3/2022 Yes        Hypokalemia ICD-10-CM: E87.6  ICD-9-CM: 276.8  9/21/2012 Yes              Pt was admitted for respiratory failure and neutropenic fever     Acute respiratory failure with hypoxia with 6 L oxygen   Still on 6 L   Flu negative, rapid covid 19 negative -confirmed per pcr   cta no central PE   pulm f/u        Pleural effusion   Moderate and large effusion-will defer to pulmonologist decide for thoracentesis   Lasix -low dose         Cellulitis of rt leg, neutropenia fever , acute on chronic sinusitis , UTI-cellulitis resolving   Continue vanc and zosyn , id f/u        paf : flares up on amiodorone gtt -now on po amiodorone   Cardiologist f/u   Continue balance electrolytes and echo : ef wnl     Septic shock -resolved     Pancytopenia-improving   Due to chemo   lovenox hold due to thrombocytopenia   neutropenic fever :afebrile      Diffusion Large B lymphoma   On chemo therapy   Oncologist f/u    splenic hypodensities?           Hyponatremia , hypomagnesemia, hypokalemia -resolved   Will replace phose         DM type II   Ssi , hypoglycemia protocol      hypothyroidism   Continue synthyroid  ,ths and free t4 ordered        Acute encephalopath  Much better, mri-not need      Myopia         Mks: rt ankle swelling-resolved      Severe malnutrition      Hypoalbuminemia   Albumin infusion       Subjective I feel good, daughter was at the bedside     Will transfer to tele   Disposition :tbd,     Review of systems:    General : no fever /chills . Lung no sob , no wheezing  Heart : no chest pain, no palpitation   Gi : no n/v no abdomen pain   Vital signs/Intake and Output:  Visit Vitals  BP (!) 124/54   Pulse 91   Temp 99.1 °F (37.3 °C)   Resp 29   Ht 5' 7\" (1.702 m)   Wt 53.5 kg (118 lb)   SpO2 96%   BMI 18.48 kg/m²     Current Shift:  No intake/output data recorded. Last three shifts:  07/05 1901 - 07/07 0700  In: 2744.1 [I.V.:2744.1]  Out: 3300 [Urine:3300]    Physical Exam:  General: WD, WN. Alert,  cooperative,   HEENT: NC, Atraumatic. PERRLA, anicteric sclerae. Lungs: Decreased BS of left side of chest , cta of rt   Heart:  Regular  rhythm,  No murmur, No Rubs, No Gallops  Abdomen: Soft, Non distended, Non tender. +Bowel sounds,    Extremities: No c/c/e   Psych:   Not anxious or agitated. Neurologic:  No acute neuro deficit           Labs: Results:       Chemistry Recent Labs     07/07/22  0434 07/06/22  1830 07/06/22  0504 07/05/22  1816 07/05/22  0530   *  --  99  --  147*     --  136  --  132*   K 3.5 3.6 3.4*   < > 3.7     --  105  --  102   CO2 26  --  25  --  24   BUN 16  --  13  --  9   CREA 0.55*  --  0.48*  --  0.53*   CA 9.3  --  8.7  --  8.4*   AGAP 5  --  6  --  6   BUCR 29*  --  27*  --  17   AP 50  --  54  --  73   TP 6.3*  --  5.0*  --  4.9*   ALB 3.8  --  3.2*  --  2.8*   GLOB 2.5  --  1.8*  --  2.1   AGRAT 1.5  --  1.8*  --  1.3    < > = values in this interval not displayed. CBC w/Diff Recent Labs     07/07/22  0434 07/06/22  0504 07/05/22  0530   WBC 1.2* 1.1* 0.5*   RBC 3.02* 3.03* 3.17*   HGB 8.7* 8.8* 9.1*   HCT 27.5* 27.4* 28.6*   * 101* 94*   GRANS 44 22* WBC TOO FEW TO DIFFERENTIATE   LYMPH 12* 22 WBC TOO FEW TO DIFFERENTIATE   EOS 16* 34* WBC TOO FEW TO DIFFERENTIATE      Cardiac Enzymes No results for input(s): CPK, CKND1, HUMBERTO in the last 72 hours. No lab exists for component: CKRMB, TROIP   Coagulation Recent Labs     07/06/22  0504   PTP 15.0   INR 1.1       Lipid Panel No results found for: CHOL, CHOLPOCT, CHOLX, CHLST, CHOLV, 626752, HDL, HDLP, LDL, LDLC, DLDLP, 315834, VLDLC, VLDL, TGLX, TRIGL, TRIGP, TGLPOCT, CHHD, CHHDX   BNP No results for input(s): BNPP in the last 72 hours.    Liver Enzymes Recent Labs     07/07/22  0434   TP 6.3*   ALB 3.8   AP 50      Thyroid Studies Lab Results   Component Value Date/Time    TSH 3.50 07/04/2022 04:40 AM        Procedures/imaging: see electronic medical records for all procedures/Xrays and details which were not copied into this note but were reviewed prior to creation of Plan    XR ANKLE RT MIN 3 V    Result Date: 7/3/2022  EXAM: ANKLE SERIES Indication: Swelling Technique: Frontal, , oblique, and lateral views of the right ankle. Comparison studies: None. _______________ FINDINGS: -OSSEOUS: No acute or destructive osseous abnormality. Tibiotalar degenerative changes with small degenerative plantar calcaneal spur. Normal alignment with preserved ankle mortise. . -SOFT TISSUES: Benign calcifications of the lower leg with mild diffuse edema of the distal leg to visualized midfoot. . No significant joint effusion. _____________    1. Nonspecific soft tissue edema, with no acute or destructive osseous abnormality. CT HEAD WO CONT    Result Date: 7/3/2022  EXAM: CT HEAD WO CONT CLINICAL INDICATION/HISTORY: ams -Additional: None COMPARISON: None TECHNIQUE: Axial CT imaging of the head was performed without intravenous contrast. One or more dose reduction techniques were used on this CT: automated exposure control, adjustment of the mAs and/or kVp according to patient size, and iterative reconstruction techniques. The specific techniques used on this CT exam have been documented in the patient's electronic medical record. Digital Imaging and Communications in Medicine (DICOM) format image data are available to nonaffiliated external healthcare facilities or entities on a secure, media free, reciprocally searchable basis with patient authorization for at least a 12-month period after this study. _______________ FINDINGS: BRAIN:   > Brain volume: Age appropriate.   > White matter: Little or no white matter disease. > Infarcts, encephalomalacia: None.   > Parenchymal mass: None.   > Parenchymal hemorrhage: None.   > Midline shift: None.   > Miscellaneous: None. EXTRA-AXIAL SPACES: Unremarkable. No fluid collections. CALVARIUM: Intact. SINUSES, MASTOIDS: Complete opacified left sphenoethmoidal air cells with mild mucosal thickening on the right and small left anterior frontal ethmoidal opacities. OTHER EXTRACRANIAL: Asymmetric dense right globe with findings of prior bilateral cataract surgery. _______________     1.  No CT findings of an acute intracranial abnormality. Please note that noncontrast head CT may be normal in early acute infarct. 2. Chronic left sphenoethmoidal sinus disease, with questional small left frontoethmoidal air-fluid level potentially acute on chronic sinus disease. 3. Asymmetric hyperdense right globe. Recommend correlation with patient ocular history. CTA CHEST W OR W WO CONT    Result Date: 7/3/2022  EXAM: CTA Chest INDICATION: Hypoxic. COMPARISON: No recent exams for direct comparison, most recent study from 9/21/2012. TECHNIQUE: Axial CT imaging from the thoracic inlet through the diaphragm with intravenous contrast. Coronal and sagittal MIP reformats were generated. One or more dose reduction techniques were used on this CT: automated exposure control, adjustment of the mAs and/or kVp according to patient size, and iterative reconstruction techniques. The specific techniques used on this CT exam have been documented in the patient's electronic medical record. Digital Imaging and Communications in Medicine (DICOM) format image data are available to nonaffiliated external healthcare facilities or entities on a secure, media free, reciprocally searchable basis with patient authorization for at least a 12-month period after this study. _______________ FINDINGS: EXAM QUALITY: Adequate bolus timing with mild diffuse respiratory motion artifact degradation. [place] PULMONARY ARTERIES: No evidence of central, interlobar or mid to proximal segmental branch pulmonary arterial filling defect. Asymmetric motion degradation involving the lower lobes. Normal sized main pulmonary artery. Pulmonary embolism. MEDIASTINUM: Normal heart size without pericardial effusion. Coronary artery and aortic atherosclerosis. A right upper chest Mediport is present with the catheter in the SVC. Moderate-sized hiatus hernia. LUNGS and PLEURA: Moderate to large left low attenuating layering pleural effusion.  Marked diffuse emphysematous changes with chronic areas of scarring and atelectasis. Bilateral lower lobe groundglass, difficult to differentiate between dependent changes, atelectasis and/or edema. . AIRWAY: Normal. LYMPH NODES: No enlarged nodes. UPPER ABDOMEN: Multiple splenic hypodensities, largest anteriorly measuring 3 cm. Overall normal splenic size measuring 11.3 cm in AP dimension. Prior cholecystectomy OTHER: No acute or aggressive osseous abnormalities identified. _______________     1. Respiratory motion degradation. Otherwise, No evidence of central pulmonary embolism. 2.  Interstitial and groundglass changes suggestive of underlying edema, with moderate to large left-sided pleural effusion. 3. Multiple Indeterminate splenic hypodensities, recommend comparison to any prior recent outside exams and patient history. 4. Moderate-sized hiatus hernia. XR CHEST PORT    Result Date: 7/3/2022  EXAM: XR CHEST PORT CLINICAL INDICATION/HISTORY: ams fever -Additional: None COMPARISON: None TECHNIQUE: Frontal view of the chest _______________ FINDINGS: SUPPORT LINES AND TUBES:Right-sided tunneled Mediport and catheter tip over the distal SVC. HEART AND MEDIASTINUM: Midline cardiac silhouette appreciable cardiomegaly. Hilar vascular congestion and cephalization. LUNGS AND PLEURAL SPACES: Diffuse bilateral hazy indistinct interstitial opacities with retrocardiac left basilar confluent opacity and blunted costophrenic angle/effusion. No significant right-sided pleural effusion. No pneumothorax. BONY THORAX AND SOFT TISSUES: No acute or destructive osseous abnormality. _______________     1. Findings of CHF versus fluid overload with edema and left pleural effusion.       Katelynn Feliz MD

## 2022-07-07 NOTE — PROGRESS NOTES
[facility]  (A Division of [address] [place] [address])                                                                                                                      Toshia Brody MD  Office #: - Option [phone]  Fax #: [phone]     Date of Admission: 7/3/2022Date of Note: 7/7/2022  Reason for Referral: Evaluation and antibiotic management of Neutropenic Fever/ Septic shock. Current Antimicrobials:    Prior Antimicrobials:  Vancomycin and Zosyn  Zithromax 7/4-> doxycycline 7/5    Immunosuppressive drugs: chemotherapy-- R-CHOP last 6/28/22        Assessment- ID related:  --------------------------------------------------------------------------    Critically ill and neutropenic patient with:    · Septic Shock-- suspect urinary source- E.coli  --Doubt CRBSI  --Susp resp change more due to CHF/fluid . Line related/ possible sinus source-- no growth so far.   --UA is abnormal-pyuria  --CT lung: interstial edema/pleural effusion-- possible>> PNA( BNP slightly up)  --multiple hypodense lesion on spleen-- appeared smaller in April C/W Jan 2022 as well.  Tumor related?  --Urine ag- leg/Pneumon 7/4: Negative  --Elevated Procal 3.72  · Neutropenic Fever: Improving  · NHL-- post R-CHOP 6/28/22, received growth factor as well  · Hx of PNA/Septic shock at [facility] 3/2022-- 2/2 Pneumococcus( urine ag +)  · History of Bacteroides Bacteremia 01/2022    Other Medical Issues- Mx per respective team:    · DM T2  · HLD  · A fib with RVR history   · Hx of graves ds, R eye blind   Recommendation for ID issues I am following:  ------------------------------------------------------------------------------  WOJICECH Lentzel, ICU NP/ RN and pt     WBC improving-  today  DC doxycycline  --cont with Broad ABX- Vanco/Zosyn- GI well covered  --ANC improving, plan to [address]: remains negative  C.diff negative    Daily CBC/CMP    Type/Duration based on clinical progress and culture      ICU supportive care per team- remove Lucero if possible today                         Subjective:  Pt  reports no comlaints. Slept ok- feels \" little better\"   in room with her  No acute issues overnight  No F/C. Denies abd pain/ distension/N/V. Has lung infiltrate/ high Pro-BNP. On IV diuretics   Plans to transfer out of ICU today noted  Loose BM tested and negative for C.diff    CXR this am- one:    Bilateral interstitial and parenchymal opacities. Moderate left  effusion/atelectasis.         HPI:     Linnea Park is a 78 y.o. DECLINED with PMH as listed below-- she had her last chemo on 6/28 and received growth factor as well. She came to ED with fever/lethargy/ weakness and SOB of 1 day and found to have fever to 100.7, Neutropenia to 200, pyruria, and CT head/CT chest done as well as CXR and ankle X-ray R foot done and admitted to ICU. She is requiring levophed support for border line BP. During interview( hard of hearing too), she denies HA/sorethroat. Chest pain, abd pain, dysuria. She has no focal complaint during exam.  Denies viral/COVID infection at home. Feels SOB. She is currently receiving V/Z.     Care Everywhere-- shows admission in 01 and 04 2022 with diagnosis of sepsis- Bacteroides bacteremia in jan( also dx of cancer)  and PNA with septic shock in 04/2022        Active Hospital Problems    Diagnosis Date Noted    Septic shock ([address] 07/04/2022    Neutropenic fever ([address] 07/03/2022    Acute respiratory failure with hypoxia ([address] 07/03/2022    Hypomagnesemia 07/03/2022    Diffuse large B cell lymphoma ([address] 07/03/2022    Hyponatremia 07/03/2022    Pancytopenia ([address] 07/03/2022    UTI (urinary tract infection) 07/03/2022    Sinusitis 07/03/2022    Myopia of both eyes with astigmatism 07/03/2022    Pleural effusion 07/03/2022    Cellulitis 07/03/2022    Severe protein-calorie malnutrition ([address] 07/03/2022    GERD (gastroesophageal reflux disease)     Thyroid disease     Acute encephalopathy     Hypokalemia 2012     Past Medical History:   Diagnosis Date    Arthritis     GERD (gastroesophageal reflux disease)     Hypertension     Osteoarthritis of right knee 2012    S/P total knee arthroplasty 2012    Thyroid disease      Past Surgical History:   Procedure Laterality Date    HX APPENDECTOMY  65's    HX CHOLECYSTECTOMY      laparoscopic    HX HEENT      Surgery OD infection x 4    HX HEENT      throat surgery x 2    HX KNEE ARTHROSCOPY      right     No family history on file. Social History     Socioeconomic History    Marital status:      Spouse name: Not on file    Number of children: Not on file    Years of education: Not on file    Highest education level: Not on file   Occupational History    Not on file   Tobacco Use    Smoking status: Former Smoker     Quit date: 1999     Years since quittin.4    Smokeless tobacco: Never Used   Substance and Sexual Activity    Alcohol use: Yes     Alcohol/week: 1.7 standard drinks     Types: 1 Glasses of wine, 1 Cans of beer per week    Drug use: No    Sexual activity: Not on file   Other Topics Concern    Not on file   Social History Narrative    Not on file     Social Determinants of Health     Financial Resource Strain:     Difficulty of Paying Living Expenses: Not on file   Food Insecurity:     Worried About Running Out of Food in the Last Year: Not on file    Patricia of Food in the Last Year: Not on file   Transportation Needs:     Lack of Transportation (Medical): Not on file    Lack of Transportation (Non-Medical):  Not on file   Physical Activity:     Days of Exercise per Week: Not on file    Minutes of Exercise per Session: Not on file   Stress:     Feeling of Stress : Not on file   Social Connections:     Frequency of Communication with Friends and Family: Not on file    Frequency of Social Gatherings with Friends and Family: Not on file    Attends Anglican Services: Not on file    Active Member of Clubs or Organizations: Not on file    Attends Club or Organization Meetings: Not on file    Marital Status: Not on file   Intimate Partner Violence:     Fear of Current or Ex-Partner: Not on file    Emotionally Abused: Not on file    Physically Abused: Not on file    Sexually Abused: Not on file   Housing Stability:     Unable to Pay for Housing in the Last Year: Not on file    Number of Places Lived in the Last Year: Not on file    Unstable Housing in the Last Year: Not on file       Allergies:  Adhesive tape-silicones and Penicillin g     Medications:  Current Facility-Administered Medications   Medication Dose Route Frequency    furosemide (LASIX) injection 20 mg  20 mg IntraVENous Q12H    sodium phosphate 15 mmol in 0.9% sodium chloride 250 mL infusion   IntraVENous ONCE    insulin lispro (HUMALOG) injection   SubCUTAneous AC&HS    vancomycin (VANCOCIN) 1,000 mg in 0.9% sodium chloride 250 mL (VIAL-MATE)  1,000 mg IntraVENous Q12H    piperacillin-tazobactam (ZOSYN) 3.375 g in 0.9% sodium chloride (MBP/ADV) 100 mL MBP  3.375 g IntraVENous Q8H    PHENYLephrine (VISHAL-SYNEPHRINE) 30 mg in 0.9% sodium chloride 250 mL infusion   mcg/min IntraVENous TITRATE    doxycycline (VIBRAMYCIN) 100 mg in 0.9% sodium chloride (MBP/ADV) 100 mL MBP  100 mg IntraVENous Q12H    amiodarone (CORDARONE) tablet 100 mg  100 mg Oral DAILY    enoxaparin (LOVENOX) injection 50 mg  1 mg/kg SubCUTAneous Q12H    ipratropium (ATROVENT) 0.02 % nebulizer solution 0.5 mg  0.5 mg Nebulization TID PRN    albuterol (PROVENTIL VENTOLIN) nebulizer solution 2.5 mg  2.5 mg Nebulization Q4H PRN    sodium chloride (NS) flush 5-40 mL  5-40 mL IntraVENous Q8H    sodium chloride (NS) flush 5-40 mL  5-40 mL IntraVENous PRN    acetaminophen (TYLENOL) tablet 650 mg  650 mg Oral Q6H PRN    Or    acetaminophen (TYLENOL) suppository 650 mg  650 mg Rectal Q6H PRN    polyethylene glycol (MIRALAX) packet 17 g  17 g Oral DAILY PRN    ondansetron (ZOFRAN ODT) tablet 4 mg  4 mg Oral Q8H PRN    Or    ondansetron (ZOFRAN) injection 4 mg  4 mg IntraVENous Q6H PRN    ELECTROLYTE REPLACEMENT PROTOCOL - Potassium Standard Dosing   1 Each Other PRN    ELECTROLYTE REPLACEMENT PROTOCOL - Magnesium   1 Each Other PRN    ELECTROLYTE REPLACEMENT PROTOCOL - Phosphorus  Standard Dosing  1 Each Other PRN    ELECTROLYTE REPLACEMENT PROTOCOL - Calcium   1 Each Other PRN    pantoprazole (PROTONIX) 40 mg in 0.9% sodium chloride 10 mL injection  40 mg IntraVENous DAILY    Vancomycin-Pharmacy To Dose  1 Each Other Rx Dosing/Monitoring    albumin human 25% (BUMINATE) solution 25 g  25 g IntraVENous Q6H    glucose chewable tablet 16 g  4 Tablet Oral PRN    glucagon (GLUCAGEN) injection 1 mg  1 mg IntraMUSCular PRN    dextrose 10% infusion 0-250 mL  0-250 mL IntraVENous PRN      Physical Exam:    Temp (24hrs), Av.8 °F (37.1 °C), Min:98.4 °F (36.9 °C), Max:99.3 °F (37.4 °C)    Visit Vitals  BP (!) 125/55   Pulse 89   Temp 99.2 °F (37.3 °C)   Resp 24   Ht 5' 7\" (1.702 m)   Wt 53.5 kg (118 lb)   SpO2 96%   BMI 18.48 kg/m²          GEN: WD sick looking, on NC--not in resp distress. HEENT: Unicteric. Protruding eye-R>L, EOMI intact  No neck swelling  CHEST: Non laboured breathing. CTA  CVS:RRR, no mur/gallop  ABD: Obese/soft. Non tender. Non distended  ANN:Lucero in place  EXT: No apparent swelling or redness on UE/LE joints except:  R ankle has some mild redness- stable- doesn't seem to be cellulitis  Skin: Dry and intact. No rash- vesicular or other type, no redness. CNS: A, OX3. Moves all extremity. CN grossly ok except  [place].  Zonia Carrilloph eye blindness        Microbiology  All Micro Results     Procedure Component Value Units Date/Time    CULTURE, BLOOD [[id number]] Collected: 22 1645    Order Status: Completed Specimen: Blood Updated: 22 6592     Special Requests: NO SPECIAL REQUESTS        Culture result: NO GROWTH 4 DAYS       CULTURE, BLOOD [[id number]] Collected: 07/03/22 1650    Order Status: Completed Specimen: Blood Updated: 07/07/22 0910     Special Requests: NO SPECIAL REQUESTS        Culture result: NO GROWTH 4 DAYS       ENTERIC BACTERIA PANEL, DNA [[id number]] Collected: 07/05/22 1455    Order Status: Completed Specimen: Stool Updated: 07/06/22 2026     Shigella species, DNA Negative        Campylobacter species, DNA Negative        Vibrio species, DNA Negative        Enterotoxigen E Coli, DNA Negative        Shiga toxin producing, DNA Negative        Salmonella species, DNA Negative        P. shigelloides, DNA Negative        Y. enterocolitica, DNA Negative       CULTURE, URINE [[id number]]  (Abnormal)  (Susceptibility) Collected: 07/03/22 1700    Order Status: Completed Specimen: Urine from Clean catch Updated: 07/06/22 1315     Special Requests: NO SPECIAL REQUESTS        [place] Count --        >100,000  COLONIES/mL       Culture result: ESCHERICHIA COLI       C. DIFFICILE AG & TOXIN A/B [[id number]]  (Abnormal) Collected: 07/05/22 1455    Order Status: Completed Specimen: Miscellaneous sample Updated: 07/06/22 1133     C. difficile toxin Negative        PCR Reflex       See Reflex order for C. difficile (DNA)           INTERPRETATION       Indeterminate for Toxigenic C. difficile, DNA confirmation to follow. Brad MEDINA MOLECULAR [[id number]] Collected: 07/05/22 1455    Order Status: Completed Specimen: Miscellaneous sample Updated: 07/06/22 1133     C Diff Molecular Negative        Comment: This specimen is negative for toxigenic C difficile by DNA amplification. Repeat testing is not recommended for confirmation, samples received within 7 days of this negative result will be rejected.        Erma Evans, URINE [[id number]] Collected: 07/04/22 1247    Order Status: Completed Specimen: Urine, random Updated: 07/04/22 2030     Strep pneumo Ag, urine Negative       LEGIONELLA PNEUMOPHILA AG, URINE [[id number]] Collected: 07/04/22 1247    Order Status: Completed Specimen: Urine, random Updated: 07/04/22 2030     Legionella Ag, urine Negative       RESPIRATORY VIRUS PANEL W/COVID-19, PCR [[id number]] Collected: 07/03/22 2110    Order Status: Completed Specimen: Nasopharyngeal Updated: 07/04/22 1229     Adenovirus Not detected        Coronavirus 229E Not detected        Coronavirus HKU1 Not detected        Coronavirus CVNL63 Not detected        Coronavirus OC43 Not detected        SARS-CoV-2, PCR Not detected        Metapneumovirus Not detected        Rhinovirus and Enterovirus Not detected        Influenza A Not detected        Influenza B Not detected        Parainfluenza 1 Not detected        Parainfluenza 2 Not detected        Parainfluenza 3 Not detected        Parainfluenza virus 4 Not detected        RSV by PCR Not detected        B. parapertussis, PCR Not detected        Bordetella pertussis - PCR Not detected        Chlamydophila pneumoniae DNA, QL, PCR Not detected        Mycoplasma pneumoniae DNA, QL, PCR Not detected       C. DIFFICILE AG & TOXIN A/B [[id number]]     Order Status: Canceled Specimen: Stool     COVID-19 RAPID TEST [[id number]] Collected: 07/03/22 1715    Order Status: Completed Specimen: Nasopharyngeal Updated: 07/03/22 1839     Specimen source Nasopharyngeal        COVID-19 rapid test Not detected        Comment: Rapid Abbott ID Now       Rapid NAAT:  The specimen is NEGATIVE for SARS-CoV-2, the novel coronavirus associated with COVID-19. Negative results should be treated as presumptive and, if inconsistent with clinical signs and symptoms or necessary for patient management, should be tested with an alternative molecular assay. Negative results do not preclude SARS-CoV-2 infection and should not be used as the sole basis for patient management decisions.        This test has been authorized by the FDA under an Emergency Use Authorization (EUA) for use by authorized laboratories. Fact sheet for Healthcare Providers: ConventionUpdate.co.nz  Fact sheet for Patients: ConventionUpdate.co.nz       Methodology: Isothermal Nucleic Acid Amplification         INFLUENZA A & B AG (RAPID TEST) [[id number]] Collected: 07/03/22 1715    Order Status: Completed Specimen: Nasopharyngeal from Nasal washing Updated: 07/03/22 3003     Influenza A Antigen Negative        Comment: A negative result does not exclude influenza virus infection, seasonal or H1N1 due to suboptimal sensitivity. If influenza is circulating in your community, a diagnosis of influenza should be considered based on a patients clinical presentation and empiric antiviral treatment should be considered, if indicated. Influenza B Antigen Negative              Lab results:    Chemistry  Recent Labs     07/07/22  0434 07/06/22  1830 07/06/22  0504 07/05/22  1816 07/05/22  0530   *  --  99  --  147*     --  136  --  132*   K 3.5 3.6 3.4*   < > 3.7     --  105  --  102   CO2 26  --  25  --  24   BUN 16  --  13  --  9   CREA 0.55*  --  0.48*  --  0.53*   CA 9.3  --  8.7  --  8.4*   AGAP 5  --  6  --  6   BUCR 29*  --  27*  --  17   AP 50  --  54  --  73   TP 6.3*  --  5.0*  --  4.9*   ALB 3.8  --  3.2*  --  2.8*   GLOB 2.5  --  1.8*  --  2.1   AGRAT 1.5  --  1.8*  --  1.3    < > = values in this interval not displayed. CBC w/ Diff  Recent Labs     07/07/22  0434 07/06/22  0504 07/05/22  0530   WBC 1.2* 1.1* 0.5*   RBC 3.02* 3.03* 3.17*   HGB 8.7* 8.8* 9.1*   HCT 27.5* 27.4* 28.6*   * 101* 94*   GRANS 44 22* WBC TOO FEW TO DIFFERENTIATE   LYMPH 12* 22 WBC TOO FEW TO DIFFERENTIATE   EOS 16* 34* WBC TOO FEW TO DIFFERENTIATE       Imaging: report reviewed and as posted by radiologist    CT head- 7/3  1. No CT findings of an acute intracranial abnormality. Please note that  noncontrast head CT may be normal in early acute infarct.        2.  Chronic left sphenoethmoidal sinus disease, with questional small left  frontoethmoidal air-fluid level potentially acute on chronic sinus disease.     3. Asymmetric hyperdense right globe. Recommend correlation with patient ocular  History. 7/3: R ankle     1.  Nonspecific soft tissue edema, with no acute or destructive osseous  abnormality.

## 2022-07-07 NOTE — PROGRESS NOTES
Palliative Medicine Consult    Patient Name: Linh Diaz  YOB: 1940    Date of Initial Consult: 7/5/2022  Date of follow-up: 7/7/2022  Reason for Consult: Goals of care discussions  Requesting Provider: Dr. Mario Mcleod   Primary Care Physician: Will Blandon MD      SUMMARY:   Linh Diaz is a 78 y.o. with a past history of hypertension, hypothyroidism, atrial fibrillation, and lymphoma on chemo, who was admitted on 7/3/2022 from home with a diagnosis of septic shock, neutropenic fever, and lymphoma. Current medical issues leading to Palliative Medicine involvement include: Goals of care discussions. PALLIATIVE DIAGNOSES:   1. Goals of care discussions  2. Septic shock  3. Diffuse large B-cell lymphoma  4. Advanced age/debility       PLAN:   7/7/2022: Palliative medicine team including Louise Carvajal RN and I met with patient at patient's bedside. Patient's  Aure Merrill also present. Patient is awake, fatigued, in no acute distress. Appreciate Dr. Eagle Leroy discussion with patient and family regarding the consideration of DNR status. Reviewed this discussion from yesterday. Discussed the benefits and burdens of CPR in the event of cardiopulmonary arrest in the setting of advanced age with chronic comorbidities. Discussed the benefits and burdens of intubation in the event of respiratory decline pre-arrest.  Patient's  was clear that she would want an opportunity for CPR, but would not want to linger on life support.  states that if patient were not doing well postarrest, he would be willing to consider compassionate extubation. Patient agreed and stated \"I want to live\". Encouraged patient and  to continue open discussion regarding goals of care, and explained to patient that she has the right to update her goals of care at any time. Patient remains a full code with full interventions. Will sign off as goals of care have been established.  Please re-consult should it be warranted. Thank you for the opportunity to provide care to this patient. See previous discussions below:    7/5/2022: Goals of care: Palliative medicine team including  LUCY Joseph and I met with patient at patient's bedside. Patient is awake, alert, and oriented to person, place, and time. She is currently disoriented to situation, cannot express why she is hospitalized. Met with patient later in the morning when  was at bedside.  Gavino Velasco is appreciative of all the medical care patient is receiving. Introduced role as palliative medicine. Support offered as  expresses his desire to determine patient's response to her chemotherapy which was last received 6/28/2022. Patient and  are hopeful for remission status, as treatment had a curative intent; patient was scheduled to have a CAT scan tomorrow, but due to hospitalization will be unable to complete. Oncology following.  reports patient was admitted earlier this year with similar septic shock.  is hopeful that patient will respond to medical treatments. Reviewed CODE STATUS including intubation in the event of cardiopulmonary arrest or respiratory decline prearrest.  At this time patient remains a full code with full interventions. They are open to further discussions once they are aware of patient's lymphoma cancer status. We will continue to follow for support, and further goals of care conversations as appropriate. 1. Septic shock: ID following, on vasopressor support. On broad-spectrum antibiotic therapy. 2. Diffuse large B-cell lymphoma: Follows Dr. Domingo Bartlett from oncology, patient received her last chemotherapy session 6/28/2022, remission status is unknown as she was supposed to receive a CT scan as an outpatient but did not make appointment due to hospitalization. Treatment was curative intent. Oncology continuing to follow.   3. Advanced age/debility: 78-year-old female who lives at home with her . She has generalized weakness and malaise. Poor appetite, which has been improving since admission. At this time she needs assistance with all ADLs, including feeding. 4. Initial consult note routed to primary continuity provider  5. Communicated plan of care with: Palliative IDT       GOALS OF CARE / TREATMENT PREFERENCES:   [====Goals of Care====]  GOALS OF CARE: Full code with full interventions  Patient/Health Care Proxy Stated Goals: Prolong life      TREATMENT PREFERENCES:   Code Status: Full Code    Advance Care Planning:  Advance Care Planning 7/6/2022   Patient's Healthcare Decision Maker is: Legal Next of Kin   Confirm Advance Directive None   Patient Would Like to Complete Advance Directive No       Medical Interventions: Full interventions            The palliative care team has discussed with patient / health care proxy about goals of care / treatment preferences for patient.  [====Goals of Care====]         HISTORY:     History obtained from: patient, chart, spouse    CHIEF COMPLAINT: drowsy, appetite improving slowly    HPI/SUBJECTIVE:    The patient is:   [x] Verbal and participatory  [] Non-participatory due to: Fatigued but oriented x 4    Clinical Pain Assessment (nonverbal scale for severity on nonverbal patients):   Clinical Pain Assessment  Severity: 0    Adult Nonverbal Pain Scale  Face: No particular expression or smile  Activity (Movement): Lying quietly, normal position  Guarding: Lying quietly, no positioning of hands over areas of body  Physiology (Vital Signs):  Stable vital signs  Respiratory: Baseline RR/SpO2 compliant with ventilator  Total Score: 0       FUNCTIONAL ASSESSMENT:     Palliative Performance Scale (PPS):   PPS: 40       PSYCHOSOCIAL/SPIRITUAL SCREENING:     Advance Care Planning:  Advance Care Planning 7/6/2022   Patient's Healthcare Decision Maker is: Legal Next of Kin   Confirm Advance Directive None   Patient Would Like to Complete Advance Directive No        Any spiritual / Orthodox concerns:  [] Yes /  [x] No    Caregiver Burnout:  [] Yes /  [x] No /  [] No Caregiver Present      Anticipatory grief assessment:   [x] Normal  / [] Maladaptive          REVIEW OF SYSTEMS:     Positive and pertinent negative findings in ROS are noted above in HPI. The following systems were [x] reviewed / [] unable to be reviewed as noted in HPI  Other findings are noted below. Systems: constitutional, ears/nose/mouth/throat, respiratory, gastrointestinal, genitourinary, musculoskeletal, integumentary, neurologic, psychiatric, endocrine. Positive findings noted below. Modified ESAS Completed by: provider   Fatigue: 5       Pain: 0   Anxiety: 0 Nausea: 0   Anorexia: 0 Dyspnea: 0     Constipation: No              PHYSICAL EXAM:     From RN flowsheet:  Wt Readings from Last 3 Encounters:   07/05/22 53.5 kg (118 lb)   09/19/12 84.4 kg (186 lb 2 oz)   09/14/12 83.9 kg (185 lb)     Blood pressure (!) 124/54, pulse 91, temperature 99.1 °F (37.3 °C), resp. rate 29, height 5' 7\" (1.702 m), weight 53.5 kg (118 lb), SpO2 96 %.     Pain Scale 1: Numeric (0 - 10)  Pain Intensity 1: 0                      Constitutional: Awake, alert, NAD, appears chronically ill  Eyes: pupils equal, anicteric  ENMT: no nasal discharge, dry mucous membranes  Cardiovascular: regular rhythm, distal pulses intact  Respiratory: breathing not labored, symmetric, on NC  Gastrointestinal: soft non-tender   Musculoskeletal: no deformity, no tenderness to palpation  Skin: warm, dry  Neurologic: following commands, moving all extremities, oriented to person, place, time, and situation  Psychiatric: flat affect, no hallucinations       HISTORY:     Principal Problem:    Acute respiratory failure with hypoxia ([address] (7/3/2022)    Active Problems:    Hypokalemia (9/21/2012)      Neutropenic fever ([address] (7/3/2022)      Hypomagnesemia (7/3/2022)      Diffuse large B cell lymphoma ([address] (7/3/2022)      GERD (gastroesophageal reflux disease) ()      Thyroid disease ()      Acute encephalopathy ()      Hyponatremia (7/3/2022)      Pancytopenia ([address] (7/3/2022)      UTI (urinary tract infection) (7/3/2022)      Sinusitis (7/3/2022)      Myopia of both eyes with astigmatism (7/3/2022)      Pleural effusion (7/3/2022)      Cellulitis (7/3/2022)      Severe protein-calorie malnutrition ([address] (7/3/2022)      Septic shock ([address] (2022)      Past Medical History:   Diagnosis Date    Arthritis     GERD (gastroesophageal reflux disease)     Hypertension     Osteoarthritis of right knee 2012    S/P total knee arthroplasty 2012    Thyroid disease       Past Surgical History:   Procedure Laterality Date    HX APPENDECTOMY      HX CHOLECYSTECTOMY      laparoscopic    HX HEENT      Surgery OD infection x 4    HX HEENT      throat surgery x 2    HX KNEE ARTHROSCOPY      right      No family history on file. History reviewed, no pertinent family history. Social History     Tobacco Use    Smoking status: Former Smoker     Quit date: 1999     Years since quittin.4    Smokeless tobacco: Never Used   Substance Use Topics    Alcohol use:  Yes     Alcohol/week: 1.7 standard drinks     Types: 1 Glasses of wine, 1 Cans of beer per week     Allergies   Allergen Reactions    Adhesive Tape-Silicones Other (comments)     Takes skin off    Penicillin G Itching      Current Facility-Administered Medications   Medication Dose Route Frequency    furosemide (LASIX) injection 20 mg  20 mg IntraVENous Q12H    insulin lispro (HUMALOG) injection   SubCUTAneous AC&HS    levothyroxine (SYNTHROID) tablet 125 mcg  125 mcg Oral DAILY    vancomycin (VANCOCIN) 1,000 mg in 0.9% sodium chloride 250 mL (VIAL-MATE)  1,000 mg IntraVENous Q12H    piperacillin-tazobactam (ZOSYN) 3.375 g in 0.9% sodium chloride (MBP/ADV) 100 mL MBP  3.375 g IntraVENous Q8H    amiodarone (CORDARONE) tablet 100 mg  100 mg Oral DAILY    enoxaparin (LOVENOX) injection 50 mg  1 mg/kg SubCUTAneous Q12H    ipratropium (ATROVENT) 0.02 % nebulizer solution 0.5 mg  0.5 mg Nebulization TID PRN    albuterol (PROVENTIL VENTOLIN) nebulizer solution 2.5 mg  2.5 mg Nebulization Q4H PRN    sodium chloride (NS) flush 5-40 mL  5-40 mL IntraVENous Q8H    sodium chloride (NS) flush 5-40 mL  5-40 mL IntraVENous PRN    acetaminophen (TYLENOL) tablet 650 mg  650 mg Oral Q6H PRN    Or    acetaminophen (TYLENOL) suppository 650 mg  650 mg Rectal Q6H PRN    polyethylene glycol (MIRALAX) packet 17 g  17 g Oral DAILY PRN    ondansetron (ZOFRAN ODT) tablet 4 mg  4 mg Oral Q8H PRN    Or    ondansetron (ZOFRAN) injection 4 mg  4 mg IntraVENous Q6H PRN    ELECTROLYTE REPLACEMENT PROTOCOL - Potassium Standard Dosing   1 Each Other PRN    ELECTROLYTE REPLACEMENT PROTOCOL - Magnesium   1 Each Other PRN    ELECTROLYTE REPLACEMENT PROTOCOL - Phosphorus  Standard Dosing  1 Each Other PRN    ELECTROLYTE REPLACEMENT PROTOCOL - Calcium   1 Each Other PRN    pantoprazole (PROTONIX) 40 mg in 0.9% sodium chloride 10 mL injection  40 mg IntraVENous DAILY    Vancomycin-Pharmacy To Dose  1 Each Other Rx Dosing/Monitoring    albumin human 25% (BUMINATE) solution 25 g  25 g IntraVENous Q6H    glucose chewable tablet 16 g  4 Tablet Oral PRN    glucagon (GLUCAGEN) injection 1 mg  1 mg IntraMUSCular PRN    dextrose 10% infusion 0-250 mL  0-250 mL IntraVENous PRN          LAB AND IMAGING FINDINGS:     Lab Results   Component Value Date/Time    WBC 1.2 (L) 07/07/2022 04:34 AM    HGB 8.7 (L) 07/07/2022 04:34 AM    PLATELET 854 (L) 16/80/9930 04:34 AM     Lab Results   Component Value Date/Time    Sodium 139 07/07/2022 04:34 AM    Potassium 3.5 07/07/2022 04:34 AM    Chloride 108 07/07/2022 04:34 AM    CO2 26 07/07/2022 04:34 AM    BUN 16 07/07/2022 04:34 AM    Creatinine 0.55 (L) 07/07/2022 04:34 AM    Calcium 9.3 07/07/2022 04:34 AM    Magnesium 2.0 07/07/2022 04:34 AM Phosphorus 1.5 (L) 07/07/2022 04:34 AM      Lab Results   Component Value Date/Time    Alk. phosphatase 50 07/07/2022 04:34 AM    Protein, total 6.3 (L) 07/07/2022 04:34 AM    Albumin 3.8 07/07/2022 04:34 AM    Globulin 2.5 07/07/2022 04:34 AM     Lab Results   Component Value Date/Time    INR 1.1 07/06/2022 05:04 AM    Prothrombin time 15.0 07/06/2022 05:04 AM    aPTT 22.3 (L) 08/28/2012 08:50 AM      No results found for: IRON, FE, TIBC, IBCT, PSAT, FERR   No results found for: PH, PCO2, PO2  No components found for: GLPOC   No results found for: CPK, CKMB             Total time: 35 minutes  Counseling / coordination time, spent as noted above:   > 50% counseling / coordination?: yes, patient, family, and medical team    Prolonged service was provided for  []30 min   []75 min in face to face time in the presence of the patient, spent as noted above.   Time Start:   Time End:

## 2022-07-07 NOTE — PROGRESS NOTES
Pharmacy Note: Zosyn    Pt was on extended infusion of Zosyn while in the intensive care unit. Prior order:  Zosyn 3.375 grams IV q8h (over 240 minutes). Indication: Sepsis  Now that patient has been transferred to , Zosyn has been adjusted to standard intermittent dosing. Dose adjusted to:  Zosyn 3.375 grams IV q6h (infuse over 30 minutes)     Scr = 0.55   CrCl = 51.4 ml/min      Pharmacy to continue to monitor daily.   Jaime Dobbins, PharmD   [phone]

## 2022-07-08 ENCOUNTER — APPOINTMENT (OUTPATIENT)
Dept: GENERAL RADIOLOGY | Age: 82
DRG: 871 | End: 2022-07-08
Attending: FAMILY MEDICINE
Payer: MEDICARE

## 2022-07-08 LAB
ALBUMIN SERPL-MCNC: 4.2 G/DL (ref 3.4–5)
ALBUMIN/GLOB SERPL: 1.8 {RATIO} (ref 0.8–1.7)
ALP SERPL-CCNC: 51 U/L (ref 45–117)
ALT SERPL-CCNC: 10 U/L (ref 13–56)
ANION GAP SERPL CALC-SCNC: 8 MMOL/L (ref 3–18)
AST SERPL-CCNC: 8 U/L (ref 10–38)
BASOPHILS # BLD: 0.1 K/UL (ref 0–0.1)
BASOPHILS NFR BLD: 4 % (ref 0–2)
BILIRUB SERPL-MCNC: 0.5 MG/DL (ref 0.2–1)
BNP SERPL-MCNC: 1781 PG/ML (ref 0–1800)
BUN SERPL-MCNC: 23 MG/DL (ref 7–18)
BUN/CREAT SERPL: 40 (ref 12–20)
CA-I SERPL-SCNC: 1.18 MMOL/L (ref 1.12–1.32)
CALCIUM SERPL-MCNC: 9.3 MG/DL (ref 8.5–10.1)
CHLORIDE SERPL-SCNC: 103 MMOL/L (ref 100–111)
CO2 SERPL-SCNC: 27 MMOL/L (ref 21–32)
CREAT SERPL-MCNC: 0.58 MG/DL (ref 0.6–1.3)
DIFFERENTIAL METHOD BLD: ABNORMAL
EOSINOPHIL # BLD: 0.1 K/UL (ref 0–0.4)
EOSINOPHIL NFR BLD: 4 % (ref 0–5)
ERYTHROCYTE [DISTWIDTH] IN BLOOD BY AUTOMATED COUNT: 29.5 % (ref 11.6–14.5)
GLOBULIN SER CALC-MCNC: 2.3 G/DL (ref 2–4)
GLUCOSE BLD STRIP.AUTO-MCNC: 193 MG/DL (ref 70–110)
GLUCOSE BLD STRIP.AUTO-MCNC: 211 MG/DL (ref 70–110)
GLUCOSE SERPL-MCNC: 190 MG/DL (ref 74–99)
HCT VFR BLD AUTO: 27.8 % (ref 35–45)
HGB BLD-MCNC: 8.9 G/DL (ref 12–16)
IMM GRANULOCYTES # BLD AUTO: 0 K/UL
IMM GRANULOCYTES NFR BLD AUTO: 0 %
LACTATE SERPL-SCNC: 1.4 MMOL/L (ref 0.4–2)
LYMPHOCYTES # BLD: 0.3 K/UL (ref 0.9–3.6)
LYMPHOCYTES NFR BLD: 12 % (ref 21–52)
MAGNESIUM SERPL-MCNC: 1.4 MG/DL (ref 1.6–2.6)
MAGNESIUM SERPL-MCNC: 2.2 MG/DL (ref 1.6–2.6)
MCH RBC QN AUTO: 29.1 PG (ref 24–34)
MCHC RBC AUTO-ENTMCNC: 32 G/DL (ref 31–37)
MCV RBC AUTO: 90.8 FL (ref 78–100)
MONOCYTES # BLD: 0.6 K/UL (ref 0.05–1.2)
MONOCYTES NFR BLD: 24 % (ref 3–10)
NEUTS BAND NFR BLD MANUAL: 8 % (ref 0–5)
NEUTS SEG # BLD: 1.5 K/UL (ref 1.8–8)
NEUTS SEG NFR BLD: 48 % (ref 40–73)
NRBC # BLD: 0 K/UL (ref 0–0.01)
NRBC BLD-RTO: 0 PER 100 WBC
PHOSPHATE SERPL-MCNC: 1.2 MG/DL (ref 2.5–4.9)
PLATELET # BLD AUTO: 161 K/UL (ref 135–420)
PLATELET COMMENTS,PCOM: ABNORMAL
PMV BLD AUTO: 10 FL (ref 9.2–11.8)
POTASSIUM SERPL-SCNC: 2.9 MMOL/L (ref 3.5–5.5)
POTASSIUM SERPL-SCNC: 3.5 MMOL/L (ref 3.5–5.5)
PROT SERPL-MCNC: 6.5 G/DL (ref 6.4–8.2)
RBC # BLD AUTO: 3.06 M/UL (ref 4.2–5.3)
RBC MORPH BLD: ABNORMAL
RBC MORPH BLD: ABNORMAL
SODIUM SERPL-SCNC: 138 MMOL/L (ref 136–145)
TOTAL CELLS COUNTED SPEC: 50
WBC # BLD AUTO: 2.6 K/UL (ref 4.6–13.2)

## 2022-07-08 PROCEDURE — 36415 COLL VENOUS BLD VENIPUNCTURE: CPT

## 2022-07-08 PROCEDURE — P9047 ALBUMIN (HUMAN), 25%, 50ML: HCPCS | Performed by: HOSPITALIST

## 2022-07-08 PROCEDURE — 74011250636 HC RX REV CODE- 250/636: Performed by: INTERNAL MEDICINE

## 2022-07-08 PROCEDURE — 82330 ASSAY OF CALCIUM: CPT

## 2022-07-08 PROCEDURE — 71045 X-RAY EXAM CHEST 1 VIEW: CPT

## 2022-07-08 PROCEDURE — 87040 BLOOD CULTURE FOR BACTERIA: CPT

## 2022-07-08 PROCEDURE — 93005 ELECTROCARDIOGRAM TRACING: CPT

## 2022-07-08 PROCEDURE — 83605 ASSAY OF LACTIC ACID: CPT

## 2022-07-08 PROCEDURE — 74011250636 HC RX REV CODE- 250/636: Performed by: FAMILY MEDICINE

## 2022-07-08 PROCEDURE — 74011000258 HC RX REV CODE- 258: Performed by: INTERNAL MEDICINE

## 2022-07-08 PROCEDURE — 74011000250 HC RX REV CODE- 250: Performed by: HOSPITALIST

## 2022-07-08 PROCEDURE — 74011250637 HC RX REV CODE- 250/637: Performed by: INTERNAL MEDICINE

## 2022-07-08 PROCEDURE — 74011000258 HC RX REV CODE- 258: Performed by: FAMILY MEDICINE

## 2022-07-08 PROCEDURE — 74011250636 HC RX REV CODE- 250/636: Performed by: HOSPITALIST

## 2022-07-08 PROCEDURE — 94667 MNPJ CHEST WALL 1ST: CPT

## 2022-07-08 PROCEDURE — 84100 ASSAY OF PHOSPHORUS: CPT

## 2022-07-08 PROCEDURE — 65270000046 HC RM TELEMETRY

## 2022-07-08 PROCEDURE — 77010033678 HC OXYGEN DAILY

## 2022-07-08 PROCEDURE — 85025 COMPLETE CBC W/AUTO DIFF WBC: CPT

## 2022-07-08 PROCEDURE — C9113 INJ PANTOPRAZOLE SODIUM, VIA: HCPCS | Performed by: HOSPITALIST

## 2022-07-08 PROCEDURE — 83735 ASSAY OF MAGNESIUM: CPT

## 2022-07-08 PROCEDURE — 74011636637 HC RX REV CODE- 636/637: Performed by: HOSPITALIST

## 2022-07-08 PROCEDURE — 83880 ASSAY OF NATRIURETIC PEPTIDE: CPT

## 2022-07-08 PROCEDURE — 84132 ASSAY OF SERUM POTASSIUM: CPT

## 2022-07-08 PROCEDURE — 74011000250 HC RX REV CODE- 250: Performed by: FAMILY MEDICINE

## 2022-07-08 PROCEDURE — 82962 GLUCOSE BLOOD TEST: CPT

## 2022-07-08 RX ORDER — MAGNESIUM SULFATE 1 G/100ML
1 INJECTION INTRAVENOUS ONCE
Status: DISCONTINUED | OUTPATIENT
Start: 2022-07-08 | End: 2022-07-08

## 2022-07-08 RX ORDER — NYSTATIN 100000 [USP'U]/G
POWDER TOPICAL 2 TIMES DAILY
Status: DISCONTINUED | OUTPATIENT
Start: 2022-07-08 | End: 2022-07-16 | Stop reason: HOSPADM

## 2022-07-08 RX ORDER — POTASSIUM CHLORIDE 7.45 MG/ML
10 INJECTION INTRAVENOUS
Status: COMPLETED | OUTPATIENT
Start: 2022-07-08 | End: 2022-07-08

## 2022-07-08 RX ORDER — PANTOPRAZOLE SODIUM 40 MG/1
40 TABLET, DELAYED RELEASE ORAL
Status: DISCONTINUED | OUTPATIENT
Start: 2022-07-09 | End: 2022-07-16 | Stop reason: HOSPADM

## 2022-07-08 RX ORDER — METOPROLOL TARTRATE 5 MG/5ML
5 INJECTION INTRAVENOUS ONCE
Status: COMPLETED | OUTPATIENT
Start: 2022-07-08 | End: 2022-07-08

## 2022-07-08 RX ORDER — LIDOCAINE AND PRILOCAINE 25; 25 MG/G; MG/G
CREAM TOPICAL AS NEEDED
Status: DISCONTINUED | OUTPATIENT
Start: 2022-07-08 | End: 2022-07-16 | Stop reason: HOSPADM

## 2022-07-08 RX ORDER — MAGNESIUM SULFATE HEPTAHYDRATE 40 MG/ML
2 INJECTION, SOLUTION INTRAVENOUS ONCE
Status: COMPLETED | OUTPATIENT
Start: 2022-07-08 | End: 2022-07-08

## 2022-07-08 RX ORDER — MAGNESIUM SULFATE 1 G/100ML
1 INJECTION INTRAVENOUS
Status: COMPLETED | OUTPATIENT
Start: 2022-07-08 | End: 2022-07-08

## 2022-07-08 RX ORDER — MAGNESIUM SULFATE HEPTAHYDRATE 40 MG/ML
2 INJECTION, SOLUTION INTRAVENOUS ONCE
Status: DISCONTINUED | OUTPATIENT
Start: 2022-07-08 | End: 2022-07-08

## 2022-07-08 RX ADMIN — SODIUM CHLORIDE, PRESERVATIVE FREE 10 ML: 5 INJECTION INTRAVENOUS at 06:54

## 2022-07-08 RX ADMIN — MAGNESIUM SULFATE HEPTAHYDRATE 2 G: 40 INJECTION, SOLUTION INTRAVENOUS at 10:45

## 2022-07-08 RX ADMIN — CEFEPIME HYDROCHLORIDE 2 G: 2 INJECTION, POWDER, FOR SOLUTION INTRAVENOUS at 04:14

## 2022-07-08 RX ADMIN — NYSTATIN: 100000 POWDER TOPICAL at 21:00

## 2022-07-08 RX ADMIN — POTASSIUM CHLORIDE 10 MEQ: 7.46 INJECTION, SOLUTION INTRAVENOUS at 09:00

## 2022-07-08 RX ADMIN — SODIUM CHLORIDE, POTASSIUM CHLORIDE, SODIUM LACTATE AND CALCIUM CHLORIDE 500 ML: 600; 310; 30; 20 INJECTION, SOLUTION INTRAVENOUS at 04:13

## 2022-07-08 RX ADMIN — ALBUMIN (HUMAN) 25 G: 0.25 INJECTION, SOLUTION INTRAVENOUS at 06:48

## 2022-07-08 RX ADMIN — MAGNESIUM SULFATE HEPTAHYDRATE 1 G: 1 INJECTION, SOLUTION INTRAVENOUS at 07:00

## 2022-07-08 RX ADMIN — SODIUM CHLORIDE, PRESERVATIVE FREE 10 ML: 5 INJECTION INTRAVENOUS at 21:25

## 2022-07-08 RX ADMIN — SODIUM CHLORIDE, POTASSIUM CHLORIDE, SODIUM LACTATE AND CALCIUM CHLORIDE 500 ML: 600; 310; 30; 20 INJECTION, SOLUTION INTRAVENOUS at 06:38

## 2022-07-08 RX ADMIN — ALBUMIN (HUMAN) 25 G: 0.25 INJECTION, SOLUTION INTRAVENOUS at 17:56

## 2022-07-08 RX ADMIN — ALBUMIN (HUMAN) 25 G: 0.25 INJECTION, SOLUTION INTRAVENOUS at 12:00

## 2022-07-08 RX ADMIN — POTASSIUM CHLORIDE 10 MEQ: 7.46 INJECTION, SOLUTION INTRAVENOUS at 08:00

## 2022-07-08 RX ADMIN — ENOXAPARIN SODIUM 50 MG: 100 INJECTION SUBCUTANEOUS at 11:12

## 2022-07-08 RX ADMIN — AMIODARONE HYDROCHLORIDE 100 MG: 200 TABLET ORAL at 10:35

## 2022-07-08 RX ADMIN — ALBUMIN (HUMAN) 25 G: 0.25 INJECTION, SOLUTION INTRAVENOUS at 00:38

## 2022-07-08 RX ADMIN — POTASSIUM CHLORIDE 10 MEQ: 7.46 INJECTION, SOLUTION INTRAVENOUS at 07:00

## 2022-07-08 RX ADMIN — Medication 6 UNITS: at 11:30

## 2022-07-08 RX ADMIN — VANCOMYCIN HYDROCHLORIDE 1000 MG: 1 INJECTION, POWDER, LYOPHILIZED, FOR SOLUTION INTRAVENOUS at 04:14

## 2022-07-08 RX ADMIN — NYSTATIN: 100000 POWDER TOPICAL at 11:13

## 2022-07-08 RX ADMIN — PIPERACILLIN AND TAZOBACTAM 3.38 G: 3; .375 INJECTION, POWDER, LYOPHILIZED, FOR SOLUTION INTRAVENOUS at 00:38

## 2022-07-08 RX ADMIN — ENOXAPARIN SODIUM 50 MG: 100 INJECTION SUBCUTANEOUS at 00:38

## 2022-07-08 RX ADMIN — METOPROLOL TARTRATE 5 MG: 5 INJECTION INTRAVENOUS at 04:15

## 2022-07-08 RX ADMIN — POTASSIUM CHLORIDE 10 MEQ: 7.46 INJECTION, SOLUTION INTRAVENOUS at 06:41

## 2022-07-08 RX ADMIN — Medication 3 UNITS: at 21:23

## 2022-07-08 RX ADMIN — SODIUM CHLORIDE, PRESERVATIVE FREE 40 MG: 5 INJECTION INTRAVENOUS at 10:35

## 2022-07-08 RX ADMIN — LEVOTHYROXINE SODIUM 125 MCG: 0.1 TABLET ORAL at 06:53

## 2022-07-08 NOTE — PROGRESS NOTES
Chart reviewed and visited pt and spouse at bedside to discuss d/c planning, pt alert and oriented, aware of situation, spouse and pt would like to go to [facility] when discharged if pt does not meet criteria pt would like The [address] [place] [address], spouse informed cm that pt has completed her cancer treatments approx 2 weeks ago, plan for today includes transfer to floor when bed becomes available, order for PT and OT placed.

## 2022-07-08 NOTE — PROGRESS NOTES
@8710 pt transferred in from [address] [place] [address] 15 bath and incontinent care completed and pt placed on ICU monitor and oriented to ICU bed 1. Lungs sound course. Remains on 4L/nc . Denies pain at present. Meds administered as prescribed care continues. @273 Dr Ricardo Colin at bedside update given as requested care continues. @3296 Bedside and Verbal shift change report given to John Decker(oncoming nurse) by Linnette Francisco (offgoing nurse).  Report included the following information SBAR, Kardex, Intake/Output, MAR, Recent Results, Med Rec Status, Alarm Parameters  and Pre Procedure Checklist.

## 2022-07-08 NOTE — DIABETES MGMT
Diabetes/ Glycemic Control Plan of Care  Recommendations/Assessment: BG above target range (140-180 mg/dl) - ranging 186-220 mg/dl over the last 24 hours. Lab glucose this morning of 190 mg/dl. Patient currently receiving corrective insulin only at this time, TDD 15 units Humalog. She has used 10-21 units Humalog daily over the last 4 days. Patient is eating though not much, has Ensure ordered,  is bringing in food for her as well. Recommend a small amount of basal to minimize corrective insulin required, suggest 5 units Lantus (0.1 unit/kg). Recent Glucose Results:   Lab Results   Component Value Date/Time     (H) 07/08/2022 04:16 AM    GLUCPOC 213 (H) 07/07/2022 09:41 PM    GLUCPOC 186 (H) 07/07/2022 04:30 PM    GLUCPOC 220 (H) 07/07/2022 11:35 AM             BG within target range (non-ICU: <180; -180):  [] Yes    [x] No   Current insulin orders: corrective Humalog  Total Daily Dose previous 24 hours = 15 units Humalog     Plan/Goals:   Blood glucose will be within target of 70 - 180 mg/dl within 72 hours  Reinforce dietary and medication compliance at home.         Education:  [] Refer to Diabetes Education Record                       [x] Education not indicated at this time     David Curry RD  Glycemic Control Team  [phone]    Monday-Friday   9 am - 3 pm

## 2022-07-08 NOTE — PROGRESS NOTES
RN paged that HR is in the 150s. Tele tech reading it as sinus rhythm but I am suspicious of afib as she is on amiodarone. I will get STAT EKG. I recommended treating fever and I will get lactic acid, blood culture, and CXR. With neutropenic fever, I will change her antibiotics to cefepime.

## 2022-07-08 NOTE — PROGRESS NOTES
[facility]ists  Pulmonary, Critical Care, and Sleep Medicine    Name: Bert Araiza MRN: [id number]   : 1940 Hospital: [facility]    Date: 2022  Room: [address] [place] [address] Note                                              Consult requesting physician: Dr. Judge Medrano  Reason for Consult: Neutropenic fever, acute respiratory failure with hypoxia. IMPRESSION:   · Acute hypoxic respiratory failure - J96.01  · Pulmonary infiltrates - pulmonary vascular congestion and pleural effusion  · Sepsis with organ dysfunction - A 41.9, R65.20  · Neutropenic fever - D70.9, R50.81  · A fib with improved RVR  · Anemia     Patient Active Problem List   Diagnosis Code    S/P total knee arthroplasty Z96.659    Hypokalemia E87.6    HTN (hypertension), benign I10    Neutropenic fever ([address] D70.9, R50.81    Acute respiratory failure with hypoxia ([address] J96.01    Hypomagnesemia E83.42    Diffuse large B cell lymphoma (HCC) C83.30    GERD (gastroesophageal reflux disease) K21.9    Thyroid disease E07.9    Acute encephalopathy G93.40    Hyponatremia E87.1    Pancytopenia (HCC) D61.818    UTI (urinary tract infection) N39.0    Sinusitis J32.9    Myopia of both eyes with astigmatism H52.13, H52.203    Pleural effusion J90    Cellulitis L03.90    Severe protein-calorie malnutrition (HCC) E43    Septic shock (HCC) A41.9, R65.21   Code status: Full Code       RECOMMENDATIONS:     Respiratory: Acute hypoxic respiratory failure due to pulmonary vascular congestion and pleural effusion. On NC stable   Keep SPO2 >=92%. HOB 30 degree elevation all the time. Aggressive pulmonary toileting. Aspiration precautions. Incentive spirometry when patient able to perform. As needed bronchodilators: avoid LABA given HR issues    Patient is very weak, generalized deconditioning   CXR atelectasis and pl effusions with BNP>5000  CTA chest 7/3/2022: No PE. Emphysema. Moderate pl effusions .   Pulmonary vascular congestion. Moderate hiatal hernia. Multiple indeterminate splenic hypodensities. CVS: Overnight brief episode of A. fib with RVR responded to metoprolol and IV fluid bolus  Did not require vasopressor support  Hemodynamically stable. Continue albumin every 6 hours  Afib at home on amiodarone - 100 mg daily  Cardiology following. Will await cardiology input for any need for optimization of treatment  On Eliquis at home and changed to lovenox in the hospital; no bleeding   Pulmonary vascular congestion and pleural effusion on CTA chest 7/3/2022  Lasix 20 mg on hold given concern for iatrogenic hypotension overnight  Avoid central line due to pancytopenia but ok to access port as per oncology    Echo 7/5/2022    Left Ventricle: Normal left ventricular systolic function with a visually estimated EF of 60 - 65%. Left ventricle size is normal. Normal wall thickness. Normal wall motion. Indeterminate diastolic function due to atrial fibrillation.   Tricuspid Valve: The estimated PASP is 41 mmHg. PVL 7/5/22  · No evidence of deep vein thrombosis in the right lower extremity. · No evidence of deep vein thrombosis in the left lower extremity. ID: E. Coli urosepsis  Neutropenic fever/neutropenic sepsis. Wbc improved to 2.6; absolute neutrophil count > 1100  Antibiotics: Cefepime; prior vancomycin and zosyn, Doxy  Remove castrejon if able to tolerate as per ID  C. Diff negative     Rapid COVID-19 7/3/2022: Negative. Rapid influenza 7/3/2022: Negative. Respiratory viral panel 7/3/2022: Negative. UA 7/3/2022: Negative nitrites but moderate leukocyte esterase; bacteria 2+ with WBC 21-35. Urine culture 7/3/2022: E. coli. Blood culture 7/3/2022: NGTD. Blood culture 7/8/2022: NGTD  Lactic acid normal.  Sepsis bundle followed. Follow cultures. Deescalate antibiotic per ID when appropriate. ID following    Hematology/Oncology: Diffuse large B-cell lymphoma on R-CHOP, last received 6/28/2022.   This was to be her final chemotherapy prior to a PET/CT to determine further course per oncology  Splenic indeterminate hypodensities, could be early B-cell lymphoma process versus other etiologies. Normal coags. Patient has received Neupogen at Dr. Escalante Lingle office. Seen by CARRIE    Renal: Normal creatinine today   Hypokalemia, hypomagnesemia, hypophosphatemia being replaced. Resolved hyponatremia  Replace electrolytes per ICU protocol and recheck after replacement    GI/: Normal LFT. Splenic indeterminate hypodensities, could be early B-cell lymphoma process versus other etiologies. On Protonix for GI prophylaxis    Endocrine: Monitor FSBS. SSI as needed  May add Lantus low dose as needed  Patient is on Synthroid 125 mcg daily    Neurology: Alert awake but lethargic. Following simple command  Nonfocal on limited exam.  CT head 7/3/2022: No acute findings. Chronic left sphenoid ethmoidal sinus disease, with questionable small left frontoethmoidal air-fluid level, potentially acute on chronic sinus disease. Asymmetric hypodensities right eye globe. Toxicology: No acute issues. Pain/Sedation: Avoid sedative, hypnotic, opiate if possible    Skin/Wound: As per nursing care    Electrolytes: Replace electrolytes per ICU electrolyte replacement protocol. IVF: Albumin every 6 hours    Nutrition: Diet as tolerated    Prophylaxis: DVT Prophylaxis: SCD (thrombocytopenia)/Lovenox. GI Prophylaxis: Protonix. Restraints: none    Lines/Tubes: PIV. Midline. Patient has port and okay to access per oncology if needed  Lucero: 7/3/2022 (Medically necessary for strict input/output monitoring in critically ill patient, will remove it when not needed. Lucero bundle followed). PT/OT eval and treat. OOB. Advance Directive/Palliative Care: Consulted. Full code    Met  at bedside and answered all his questions to satisfaction.   Patient remains at increased risk for prolonged hospitalization, recurrent respiratory failure, hemodynamic instability, nosocomial infection, morbidity, mortality, other.  prefers to be transferred back to floor understanding patient remains at increased risk for recurrent ICU admissions. Explained the patient's  that cardiology input is pending  Will defer respective systems problem management to primary and other respective consultant and follow patient in ICU with primary and other medical team.  Further recommendations will be based on the patient's response to recommended treatment and results of the investigation ordered. Once transferred to floor, I will sign off. Quality Care: PPI, DVT prophylaxis, HOB elevated, Infection control all reviewed and addressed. Care of plan d/w RN, RT, URIEL, Dev Marin  D/w patient (answered all questions to satisfaction).  updated at the bedside     High complexity decision making was performed during the evaluation of this patient at high risk for decompensation with multiple organ involvement. CC min: 44 [excluding any family discussion, procedure]      Subjective/History of Present Illness:     Patient is a 78 y.o. female with PMHx significant for HTN, hypothyroidism, GERD, diffuse large B-cell lymphoma on R-CHOP, last received 6/28/2022; admitted with neutropenic fever and hypoxic respiratory failure, pleural effusion. COVID19 vaccine status: Patient has received Pfizer COVID-19 vaccine x2 followed by 1 booster. 7/8/2022 :   Patient seen at bedside in ICU room #101   at bedside  Patient was transferred to ICU after an episode of A. fib RVR following low-grade fever episode  She received metoprolol 5 mg x 1 with lowering of blood pressure without requiring vasopressor  Remains hemodynamically stable  Urine output good.   Tolerating p.o. at bedside though low intake  Required [facility] coverage for hyperglycemia  She has loose stools  No bleeding anywhere and tolerating Lovenox  PCCM was not contacted by staff on anything about patient overnight. I/O last 24 hrs:   No intake or output data in the 24 hours ending 22 1037    Review of Systems:  ROS not obtained due to patient factor. Allergies   Allergen Reactions    Adhesive Tape-Silicones Other (comments)     Takes skin off    Penicillin G Itching      Past Medical History:   Diagnosis Date    Arthritis     GERD (gastroesophageal reflux disease)     Hypertension     Osteoarthritis of right knee 2012    S/P total knee arthroplasty 2012    Thyroid disease       Past Surgical History:   Procedure Laterality Date    HX APPENDECTOMY  65's    HX CHOLECYSTECTOMY      laparoscopic    HX HEENT      Surgery OD infection x 4    HX HEENT      throat surgery x 2    HX KNEE ARTHROSCOPY      right      Social History     Tobacco Use    Smoking status: Former Smoker     Quit date: 1999     Years since quittin.4    Smokeless tobacco: Never Used   Substance Use Topics    Alcohol use: Yes     Alcohol/week: 1.7 standard drinks     Types: 1 Glasses of wine, 1 Cans of beer per week      No family history on file. Prior to Admission medications    Medication Sig Start Date End Date Taking? Authorizing Provider   levothyroxine (Synthroid) 125 mcg tablet Take 125 mcg by mouth Daily (before breakfast). Yes Provider, Historical   acyclovir (ZOVIRAX) 400 mg tablet Take 400 mg by mouth two (2) times a day. Yes Provider, Historical   amiodarone (PACERONE) 100 mg tablet Take 100 mg by mouth daily. Yes Provider, Historical   apixaban (Eliquis) 5 mg tablet Take 5 mg by mouth two (2) times a day. Yes Provider, Historical   ezetimibe (Zetia) 10 mg tablet Take  by mouth. Yes Provider, Historical   SITagliptin (Januvia) 100 mg tablet Take 100 mg by mouth daily. Yes Provider, Historical   RABEprazole (ACIPHEX) 20 mg TbEC Take 20 mg by mouth daily. Yes Provider, Historical   melatonin 5 mg tablet Take 5 mg by mouth nightly.    Yes Provider, Historical   magnesium oxide (MAG-OX) 400 mg tablet Take 400 mg by mouth two (2) times a day. Yes Provider, Historical     Current Facility-Administered Medications   Medication Dose Route Frequency    PHENYLephrine (VISHAL-SYNEPHRINE) 30 mg in 0.9% sodium chloride 250 mL infusion   mcg/min IntraVENous TITRATE    magnesium sulfate 1 g/100 ml IVPB (premix or compounded)  1 g IntraVENous NOW    magnesium sulfate 2 g/50 ml IVPB (premix or compounded)  2 g IntraVENous ONCE    cefepime (MAXIPIME) 2 g in 0.9% sodium chloride (MBP/ADV) 100 mL MBP  2 g IntraVENous Q12H    [Held by provider] furosemide (LASIX) injection 20 mg  20 mg IntraVENous Q12H    insulin lispro (HUMALOG) injection   SubCUTAneous AC&HS    levothyroxine (SYNTHROID) tablet 125 mcg  125 mcg Oral DAILY    amiodarone (CORDARONE) tablet 100 mg  100 mg Oral DAILY    enoxaparin (LOVENOX) injection 50 mg  1 mg/kg SubCUTAneous Q12H    sodium chloride (NS) flush 5-40 mL  5-40 mL IntraVENous Q8H    pantoprazole (PROTONIX) 40 mg in 0.9% sodium chloride 10 mL injection  40 mg IntraVENous DAILY    albumin human 25% (BUMINATE) solution 25 g  25 g IntraVENous Q6H         Objective:   Vital Signs:    Visit Vitals  BP (!) 117/53   Pulse 80   Temp 97.8 °F (36.6 °C)   Resp 16   Ht 5' 7\" (1.702 m)   Wt 53.5 kg (118 lb)   SpO2 92%   BMI 18.48 kg/m²       O2 Device: Nasal cannula   O2 Flow Rate (L/min): 6 l/min   Temp (24hrs), Av.9 °F (37.2 °C), Min:97.8 °F (36.6 °C), Max:100.4 °F (38 °C)       Intake/Output:   Last shift:      No intake/output data recorded.     Last 3 shifts:  1901 -  0700  In: 447.6 [I.V.:447.6]  Out: 1350 [Urine:1350]    No intake or output data in the 24 hours ending 22 1037    Last 3 Recorded Weights in this Encounter    22 1649 22 1547   Weight: 53.5 kg (118 lb) 53.5 kg (118 lb)         No results for input(s): PHI, PHI, POC2, PCO2I, PO2, PO2I, HCO3, HCO3I, FIO2, FIO2I in the last 72 hours.    Physical Exam:   General: awake, alert, following general commands, in no respiratory distress, cooperative, appears stated age, chronically ill looking, on O2 via nasal cannula at 6 L/min  HEENT: PERRL, throat normal without erythema or exudate  Neck: No abnormally enlarged lymph nodes or thyroid, supple  Chest: normal  Lungs: moderate air entry, few rhonchi scattered and no wheezes bilaterally, no tenderness/ rash  Heart: Regular rate and rhythm, S1S2 present, or without murmur or extra heart sounds  Abdomen: Nondistended, bowel sounds normoactive, tympanic, abdomen is soft without significant tenderness, masses, organomegaly or guarding, rigidity, rebound  Extremity: RT > LT leg edema, no cyanosis; peripheral pulses 2+ and symmetric, capillary refill normal bilateral  Neuro: alert, awake, moves all extremities well, no involuntary movements, exam limitations  Skin: Skin color, texture, turgor fair      Data:       Recent Results (from the past 24 hour(s))   GLUCOSE, POC    Collection Time: 07/07/22 11:35 AM   Result Value Ref Range    Glucose (POC) 220 (H) 70 - 110 mg/dL   GLUCOSE, POC    Collection Time: 07/07/22  4:30 PM   Result Value Ref Range    Glucose (POC) 186 (H) 70 - 110 mg/dL   GLUCOSE, POC    Collection Time: 07/07/22  9:41 PM   Result Value Ref Range    Glucose (POC) 213 (H) 70 - 110 mg/dL   MAGNESIUM    Collection Time: 07/08/22  4:16 AM   Result Value Ref Range    Magnesium 1.4 (L) 1.6 - 2.6 mg/dL   PHOSPHORUS    Collection Time: 07/08/22  4:16 AM   Result Value Ref Range    Phosphorus 1.2 (L) 2.5 - 4.9 MG/DL   CALCIUM, IONIZED    Collection Time: 07/08/22  4:16 AM   Result Value Ref Range    Ionized Calcium 1.18 1.12 - 1.32 MMOL/L   CBC WITH AUTOMATED DIFF    Collection Time: 07/08/22  4:16 AM   Result Value Ref Range    WBC 2.6 (L) 4.6 - 13.2 K/uL    RBC 3.06 (L) 4.20 - 5.30 M/uL    HGB 8.9 (L) 12.0 - 16.0 g/dL    HCT 27.8 (L) 35.0 - 45.0 %    MCV 90.8 78.0 - 100.0 FL    MCH 29.1 24.0 - 34.0 PG    MCHC 32.0 31.0 - 37.0 g/dL    RDW 29.5 (H) 11.6 - 14.5 %    PLATELET 930 482 - 227 K/uL    MPV 10.0 9.2 - 11.8 FL    NRBC 0.0 0  WBC    ABSOLUTE NRBC 0.00 0.00 - 0.01 K/uL    NEUTROPHILS 48 40 - 73 %    BAND NEUTROPHILS 8 (H) 0 - 5 %    LYMPHOCYTES 12 (L) 21 - 52 %    MONOCYTES 24 (H) 3 - 10 %    EOSINOPHILS 4 0 - 5 %    BASOPHILS 4 (H) 0 - 2 %    IMMATURE GRANULOCYTES 0 %    TOTAL CELLS COUNTED 50      ABS. NEUTROPHILS 1.5 (L) 1.8 - 8.0 K/UL    ABS. LYMPHOCYTES 0.3 (L) 0.9 - 3.6 K/UL    ABS. MONOCYTES 0.6 0.05 - 1.2 K/UL    ABS. EOSINOPHILS 0.1 0.0 - 0.4 K/UL    ABS. BASOPHILS 0.1 0.0 - 0.1 K/UL    ABS. IMM. GRANS. 0.0 K/UL    DF MANUAL      PLATELET COMMENTS ADEQUATE PLATELETS      RBC COMMENTS ANISOCYTOSIS  2+        RBC COMMENTS SCHISTOCYTES  1+       METABOLIC PANEL, COMPREHENSIVE    Collection Time: 07/08/22  4:16 AM   Result Value Ref Range    Sodium 138 136 - 145 mmol/L    Potassium 2.9 (LL) 3.5 - 5.5 mmol/L    Chloride 103 100 - 111 mmol/L    CO2 27 21 - 32 mmol/L    Anion gap 8 3.0 - 18 mmol/L    Glucose 190 (H) 74 - 99 mg/dL    BUN 23 (H) 7.0 - 18 MG/DL    Creatinine 0.58 (L) 0.6 - 1.3 MG/DL    BUN/Creatinine ratio 40 (H) 12 - 20      GFR est AA >60 >60 ml/min/1.73m2    GFR est non-AA >60 >60 ml/min/1.73m2    Calcium 9.3 8.5 - 10.1 MG/DL    Bilirubin, total 0.5 0.2 - 1.0 MG/DL    ALT (SGPT) 10 (L) 13 - 56 U/L    AST (SGOT) 8 (L) 10 - 38 U/L    Alk.  phosphatase 51 45 - 117 U/L    Protein, total 6.5 6.4 - 8.2 g/dL    Albumin 4.2 3.4 - 5.0 g/dL    Globulin 2.3 2.0 - 4.0 g/dL    A-G Ratio 1.8 (H) 0.8 - 1.7     NT-PRO BNP    Collection Time: 07/08/22  4:16 AM   Result Value Ref Range    NT pro-BNP 1,781 0 - 1,800 PG/ML   CULTURE, BLOOD    Collection Time: 07/08/22  4:43 AM    Specimen: Blood   Result Value Ref Range    Special Requests: NO SPECIAL REQUESTS      Culture result: NO GROWTH AFTER 2 HOURS     LACTIC ACID    Collection Time: 07/08/22  4:43 AM   Result Value Ref Range    Lactic acid 1.4 0.4 - 2.0 MMOL/L         Chemistry Recent Labs     07/08/22  0416 07/07/22  0434 07/06/22  1830 07/06/22  0504 07/06/22  0504   * 114*  --   --  99    139  --   --  136   K 2.9* 3.5 3.6   < > 3.4*    108  --   --  105   CO2 27 26  --   --  25   BUN 23* 16  --   --  13   CREA 0.58* 0.55*  --   --  0.48*   CA 9.3 9.3  --   --  8.7   MG 1.4* 2.0 2.1   < > 1.6   PHOS 1.2* 1.5*  --   --  1.3*   AGAP 8 5  --   --  6   BUCR 40* 29*  --   --  27*   AP 51 50  --   --  54   TP 6.5 6.3*  --   --  5.0*   ALB 4.2 3.8  --   --  3.2*   GLOB 2.3 2.5  --   --  1.8*   AGRAT 1.8* 1.5  --   --  1.8*    < > = values in this interval not displayed. Lactic Acid Lactic acid   Date Value Ref Range Status   07/08/2022 1.4 0.4 - 2.0 MMOL/L Final     Recent Labs     07/08/22  0443   LAC 1.4        Liver Enzymes Protein, total   Date Value Ref Range Status   07/08/2022 6.5 6.4 - 8.2 g/dL Final     Albumin   Date Value Ref Range Status   07/08/2022 4.2 3.4 - 5.0 g/dL Final     Globulin   Date Value Ref Range Status   07/08/2022 2.3 2.0 - 4.0 g/dL Final     A-G Ratio   Date Value Ref Range Status   07/08/2022 1.8 (H) 0.8 - 1.7   Final     Alk.  phosphatase   Date Value Ref Range Status   07/08/2022 51 45 - 117 U/L Final     Recent Labs     07/08/22  0416 07/07/22  0434 07/06/22  0504   TP 6.5 6.3* 5.0*   ALB 4.2 3.8 3.2*   GLOB 2.3 2.5 1.8*   AGRAT 1.8* 1.5 1.8*   AP 51 50 54        CBC w/Diff Recent Labs     07/08/22  0416 07/07/22  0434 07/06/22  0504   WBC 2.6* 1.2* 1.1*   RBC 3.06* 3.02* 3.03*   HGB 8.9* 8.7* 8.8*   HCT 27.8* 27.5* 27.4*    114* 101*   GRANS 48 44 22*   LYMPH 12* 12* 22   EOS 4 16* 34*        Cardiac Enzymes No results found for: CPK, CK, CKMMB, CKMB, RCK3, CKMBT, CKNDX, CKND1, HUMBERTO, TROPT, TROIQ, JANE, TROPT, TNIPOC, BNP, BNPP     BNP No results found for: BNP, BNPP, XBNPT     Coagulation Recent Labs     07/06/22  0504   PTP 15.0   INR 1.1         Thyroid  Lab Results   Component Value Date/Time    TSH 3.50 07/04/2022 04:40 AM       No results found for: T4     Urinalysis Lab Results   Component Value Date/Time    Color YELLOW 07/03/2022 05:00 PM    Appearance CLOUDY 07/03/2022 05:00 PM    Specific gravity 1.016 07/03/2022 05:00 PM    pH (UA) 7.0 07/03/2022 05:00 PM    Protein 300 (A) 07/03/2022 05:00 PM    Glucose Negative 07/03/2022 05:00 PM    Ketone Negative 07/03/2022 05:00 PM    Bilirubin Negative 07/03/2022 05:00 PM    Urobilinogen 0.2 07/03/2022 05:00 PM    Nitrites Negative 07/03/2022 05:00 PM    Leukocyte Esterase MODERATE (A) 07/03/2022 05:00 PM    Epithelial cells FEW 07/03/2022 05:00 PM    Bacteria 2+ (A) 07/03/2022 05:00 PM    WBC 21 to 35 07/03/2022 05:00 PM    RBC 11 to 20 07/03/2022 05:00 PM        Micro  Recent Labs     07/08/22 0443   CULT NO GROWTH AFTER 2 HOURS     Recent Labs     07/08/22 0443   CULT NO GROWTH AFTER 2 HOURS          Culture data during this hospitalization.    All Micro Results     Procedure Component Value Units Date/Time    CULTURE, BLOOD [[id number]] Collected: 07/08/22 0443    Order Status: Completed Specimen: Blood Updated: 07/08/22 0738     Special Requests: NO SPECIAL REQUESTS        Culture result: NO GROWTH AFTER 2 HOURS       CULTURE, BLOOD [[id number]] Collected: 07/03/22 1645    Order Status: Completed Specimen: Blood Updated: 07/08/22 0738     Special Requests: NO SPECIAL REQUESTS        Culture result: NO GROWTH 5 DAYS       CULTURE, BLOOD [[id number]] Collected: 07/03/22 1650    Order Status: Completed Specimen: Blood Updated: 07/08/22 0738     Special Requests: NO SPECIAL REQUESTS        Culture result: NO GROWTH 5 DAYS       ENTERIC BACTERIA PANEL, DNA [[id number]] Collected: 07/05/22 1455    Order Status: Completed Specimen: Stool Updated: 07/06/22 2026     Shigella species, DNA Negative        Campylobacter species, DNA Negative        Vibrio species, DNA Negative        Enterotoxigen E Coli, DNA Negative        Shiga toxin producing, DNA Negative        Salmonella species, DNA Negative        P. shigelloides, DNA Negative        Y. enterocolitica, DNA Negative       CULTURE, URINE [[id number]]  (Abnormal)  (Susceptibility) Collected: 07/03/22 1700    Order Status: Completed Specimen: Urine from Clean catch Updated: 07/06/22 1315     Special Requests: NO SPECIAL REQUESTS        [place] Count --        >100,000  COLONIES/mL       Culture result: ESCHERICHIA COLI       C. DIFFICILE AG & TOXIN A/B [[id number]]  (Abnormal) Collected: 07/05/22 1455    Order Status: Completed Specimen: Miscellaneous sample Updated: 07/06/22 1133     C. difficile toxin Negative        PCR Reflex       See Reflex order for C. difficile (DNA)           INTERPRETATION       Indeterminate for Toxigenic C. difficile, DNA confirmation to follow. Daksha Molina DIFF MOLECULAR [[id number]] Collected: 07/05/22 1455    Order Status: Completed Specimen: Miscellaneous sample Updated: 07/06/22 1133     C Diff Molecular Negative        Comment: This specimen is negative for toxigenic C difficile by DNA amplification. Repeat testing is not recommended for confirmation, samples received within 7 days of this negative result will be rejected.        Hope Lynch, URINE [[id number]] Collected: 07/04/22 1247    Order Status: Completed Specimen: Urine, random Updated: 07/04/22 2030     Strep pneumo Ag, urine Negative       LEGIONELLA PNEUMOPHILA AG, URINE [[id number]] Collected: 07/04/22 1247    Order Status: Completed Specimen: Urine, random Updated: 07/04/22 2030     Legionella Ag, urine Negative       RESPIRATORY VIRUS PANEL W/COVID-19, PCR [[id number]] Collected: 07/03/22 2110    Order Status: Completed Specimen: Nasopharyngeal Updated: 07/04/22 1229     Adenovirus Not detected        Coronavirus 229E Not detected        Coronavirus HKU1 Not detected        Coronavirus CVNL63 Not detected        Coronavirus OC43 Not detected        SARS-CoV-2, PCR Not detected        Metapneumovirus Not detected Rhinovirus and Enterovirus Not detected        Influenza A Not detected        Influenza B Not detected        Parainfluenza 1 Not detected        Parainfluenza 2 Not detected        Parainfluenza 3 Not detected        Parainfluenza virus 4 Not detected        RSV by PCR Not detected        B. parapertussis, PCR Not detected        Bordetella pertussis - PCR Not detected        Chlamydophila pneumoniae DNA, QL, PCR Not detected        Mycoplasma pneumoniae DNA, QL, PCR Not detected       C. DIFFICILE AG & TOXIN A/B [[id number]]     Order Status: Canceled Specimen: Stool     COVID-19 RAPID TEST [[id number]] Collected: 07/03/22 1715    Order Status: Completed Specimen: Nasopharyngeal Updated: 07/03/22 1839     Specimen source Nasopharyngeal        COVID-19 rapid test Not detected        Comment: Rapid Abbott ID Now       Rapid NAAT:  The specimen is NEGATIVE for SARS-CoV-2, the novel coronavirus associated with COVID-19. Negative results should be treated as presumptive and, if inconsistent with clinical signs and symptoms or necessary for patient management, should be tested with an alternative molecular assay. Negative results do not preclude SARS-CoV-2 infection and should not be used as the sole basis for patient management decisions. This test has been authorized by the FDA under an Emergency Use Authorization (EUA) for use by authorized laboratories. Fact sheet for Healthcare Providers: ConventionUpdate.co.nz  Fact sheet for Patients: ConventionUpdate.co.nz       Methodology: Isothermal Nucleic Acid Amplification         INFLUENZA A & B AG (RAPID TEST) [[id number]] Collected: 07/03/22 1715    Order Status: Completed Specimen: Nasopharyngeal from Nasal washing Updated: 07/03/22 4947     Influenza A Antigen Negative        Comment: A negative result does not exclude influenza virus infection, seasonal or H1N1 due to suboptimal sensitivity.  If influenza is circulating in your community, a diagnosis of influenza should be considered based on a patients clinical presentation and empiric antiviral treatment should be considered, if indicated. Influenza B Antigen Negative                XR ANKLE RT MIN 3 V    Result Date: 7/3/2022  EXAM: ANKLE SERIES Indication: Swelling Technique: Frontal, , oblique, and lateral views of the right ankle. Comparison studies: None. _______________ FINDINGS: -OSSEOUS: No acute or destructive osseous abnormality. Tibiotalar degenerative changes with small degenerative plantar calcaneal spur. Normal alignment with preserved ankle mortise. . -SOFT TISSUES: Benign calcifications of the lower leg with mild diffuse edema of the distal leg to visualized midfoot. . No significant joint effusion. _____________    1. Nonspecific soft tissue edema, with no acute or destructive osseous abnormality. CT HEAD WO CONT    Result Date: 7/3/2022  EXAM: CT HEAD WO CONT CLINICAL INDICATION/HISTORY: ams -Additional: None COMPARISON: None TECHNIQUE: Axial CT imaging of the head was performed without intravenous contrast. One or more dose reduction techniques were used on this CT: automated exposure control, adjustment of the mAs and/or kVp according to patient size, and iterative reconstruction techniques. The specific techniques used on this CT exam have been documented in the patient's electronic medical record. Digital Imaging and Communications in Medicine (DICOM) format image data are available to nonaffiliated external healthcare facilities or entities on a secure, media free, reciprocally searchable basis with patient authorization for at least a 12-month period after this study. _______________ FINDINGS: BRAIN:   > Brain volume: Age appropriate.   > White matter: Little or no white matter disease. > Infarcts, encephalomalacia: None.   > Parenchymal mass: None.   > Parenchymal hemorrhage: None.   > Midline shift: None.   > Miscellaneous: None. EXTRA-AXIAL SPACES: Unremarkable. No fluid collections. CALVARIUM: Intact. SINUSES, MASTOIDS: Complete opacified left sphenoethmoidal air cells with mild mucosal thickening on the right and small left anterior frontal ethmoidal opacities. OTHER EXTRACRANIAL: Asymmetric dense right globe with findings of prior bilateral cataract surgery. _______________     1. No CT findings of an acute intracranial abnormality. Please note that noncontrast head CT may be normal in early acute infarct. 2. Chronic left sphenoethmoidal sinus disease, with questional small left frontoethmoidal air-fluid level potentially acute on chronic sinus disease. 3. Asymmetric hyperdense right globe. Recommend correlation with patient ocular history. CTA CHEST W OR W WO CONT    Result Date: 7/3/2022  EXAM: CTA Chest INDICATION: Hypoxic. COMPARISON: No recent exams for direct comparison, most recent study from 9/21/2012. TECHNIQUE: Axial CT imaging from the thoracic inlet through the diaphragm with intravenous contrast. Coronal and sagittal MIP reformats were generated. One or more dose reduction techniques were used on this CT: automated exposure control, adjustment of the mAs and/or kVp according to patient size, and iterative reconstruction techniques. The specific techniques used on this CT exam have been documented in the patient's electronic medical record. Digital Imaging and Communications in Medicine (DICOM) format image data are available to nonaffiliated external healthcare facilities or entities on a secure, media free, reciprocally searchable basis with patient authorization for at least a 12-month period after this study. _______________ FINDINGS: EXAM QUALITY: Adequate bolus timing with mild diffuse respiratory motion artifact degradation. Rawleigh Billing PULMONARY ARTERIES: No evidence of central, interlobar or mid to proximal segmental branch pulmonary arterial filling defect. Asymmetric motion degradation involving the lower lobes.  Normal sized main pulmonary artery. Pulmonary embolism. MEDIASTINUM: Normal heart size without pericardial effusion. Coronary artery and aortic atherosclerosis. A right upper chest Mediport is present with the catheter in the SVC. Moderate-sized hiatus hernia. LUNGS and PLEURA: Moderate to large left low attenuating layering pleural effusion. Marked diffuse emphysematous changes with chronic areas of scarring and atelectasis. Bilateral lower lobe groundglass, difficult to differentiate between dependent changes, atelectasis and/or edema. . AIRWAY: Normal. LYMPH NODES: No enlarged nodes. UPPER ABDOMEN: Multiple splenic hypodensities, largest anteriorly measuring 3 cm. Overall normal splenic size measuring 11.3 cm in AP dimension. Prior cholecystectomy OTHER: No acute or aggressive osseous abnormalities identified. _______________     1. Respiratory motion degradation. Otherwise, No evidence of central pulmonary embolism. 2.  Interstitial and groundglass changes suggestive of underlying edema, with moderate to large left-sided pleural effusion. 3. Multiple Indeterminate splenic hypodensities, recommend comparison to any prior recent outside exams and patient history. 4. Moderate-sized hiatus hernia. XR CHEST PORT    Result Date: 7/3/2022  EXAM: XR CHEST PORT CLINICAL INDICATION/HISTORY: ams fever -Additional: None COMPARISON: None TECHNIQUE: Frontal view of the chest _______________ FINDINGS: SUPPORT LINES AND TUBES:Right-sided tunneled Mediport and catheter tip over the distal SVC. HEART AND MEDIASTINUM: Midline cardiac silhouette appreciable cardiomegaly. Hilar vascular congestion and cephalization. LUNGS AND PLEURAL SPACES: Diffuse bilateral hazy indistinct interstitial opacities with retrocardiac left basilar confluent opacity and blunted costophrenic angle/effusion. No significant right-sided pleural effusion. No pneumothorax. BONY THORAX AND SOFT TISSUES: No acute or destructive osseous abnormality.  _______________ 1. Findings of CHF versus fluid overload with edema and left pleural effusion. Images report reviewed by me:  CT (Most Recent) (CT chest reviewed by me) Results from Hospital Encounter encounter on 07/03/22    CTA CHEST W OR W WO CONT    Narrative  EXAM: CTA Chest    INDICATION: Hypoxic. COMPARISON: No recent exams for direct comparison, most recent study from  9/21/2012. TECHNIQUE: Axial CT imaging from the thoracic inlet through the diaphragm with  intravenous contrast. Coronal and sagittal MIP reformats were generated. One or more dose reduction techniques were used on this CT: automated exposure  control, adjustment of the mAs and/or kVp according to patient size, and  iterative reconstruction techniques. The specific techniques used on this CT  exam have been documented in the patient's electronic medical record. Digital  Imaging and Communications in Medicine (DICOM) format image data are available  to nonaffiliated external healthcare facilities or entities on a secure, media  free, reciprocally searchable basis with patient authorization for at least a  12-month period after this study. _______________    FINDINGS:    EXAM QUALITY: Adequate bolus timing with mild diffuse respiratory motion  artifact degradation. Mihai Ra PULMONARY ARTERIES: No evidence of central, interlobar or mid to proximal  segmental branch pulmonary arterial filling defect. Asymmetric motion  degradation involving the lower lobes. Normal sized main pulmonary artery. Pulmonary embolism. MEDIASTINUM: Normal heart size without pericardial effusion. Coronary artery and  aortic atherosclerosis. A right upper chest Mediport is present with the  catheter in the SVC. Moderate-sized hiatus hernia. LUNGS and PLEURA: Moderate to large left low attenuating layering pleural  effusion. Marked diffuse emphysematous changes with chronic areas of scarring  and atelectasis.  Bilateral lower lobe groundglass, difficult to differentiate  between dependent changes, atelectasis and/or edema. .    AIRWAY: Normal.    LYMPH NODES: No enlarged nodes. UPPER ABDOMEN: Multiple splenic hypodensities, largest anteriorly measuring 3  cm. Overall normal splenic size measuring 11.3 cm in AP dimension. Prior  cholecystectomy    OTHER: No acute or aggressive osseous abnormalities identified. _______________    Impression  1. Respiratory motion degradation. Otherwise, No evidence of central pulmonary  embolism. 2.  Interstitial and groundglass changes suggestive of underlying edema, with  moderate to large left-sided pleural effusion. 3. Multiple Indeterminate splenic hypodensities, recommend comparison to any  prior recent outside exams and patient history. 4. Moderate-sized hiatus hernia. CXR reviewed by me:  XR (Most Recent). CXR  reviewed by me and compared with previous CXR Results from Hospital Encounter encounter on 07/03/22    XR CHEST PORT    Narrative  EXAM: XR CHEST PORT    CLINICAL INDICATION/HISTORY: sepsis eval, neutropenic  -Additional: None    COMPARISON: One day prior    TECHNIQUE: Portable frontal view of the chest    _______________    FINDINGS:    SUPPORT DEVICES: Right chest wall port unchanged. HEART AND MEDIASTINUM: Cardiomediastinal silhouette within normal limits. LUNGS AND PLEURAL SPACES: Bilateral interstitial and parenchymal opacities. No  pneumothorax.    _______________    Impression  Bilateral interstitial and parenchymal opacities. ·Please note: Voice-recognition software may have been used to generate this report, which may have resulted in some phonetic-based errors in grammar and contents. Even though attempts were made to correct all the mistakes, some may have been missed, and remained in the body of the document.       Ange Eli MD  7/8/2022

## 2022-07-08 NOTE — PROGRESS NOTES
Hospitalist Progress Note    Patient: Bert Araiza MRN: [id number]  CSN: [id number]    YOB: 1940  Age: 78 y.o. 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 fever  Advanced up last night noted hypotensive and transferred back to the ICU  Heart rate elevated despite amiodarone  BP dropped after Lopressor IV currently not on any pressors  MAP over 72    Spoke with  anxious to be go to floor  Understands needs clearance by all specialty    Assessment/Plan     Pt was admitted for respiratory failure and neutropenic fever      Acute respiratory failure with hypoxia with 6 L oxygen   Still on 6 L   Flu negative, rapid covid 19 negative -confirmed per pcr   cta no central PE   pulm f/u         Pleural effusion   Moderate and large effusion-will defer to pulmonologist decide for thoracentesis   Lasix -low dose         Cellulitis of rt leg, neutropenia fever , acute on chronic sinusitis , UTI-cellulitis resolving   Antibiotics changed on vancomycin and cefepime infectious diseases following         paf : flares up on amiodorone gtt -now on po amiodorone   Cardiologist f/u   Continue balance electrolytes and echo : ef wnl      Septic shock -resolved      Pancytopenia-improving   Due to chemo   lovenox hold due to thrombocytopenia   neutropenic fever :afebrile      Diffusion Large B lymphoma   On chemo therapy   Oncologist f/u     splenic hypodensities?            Hyponatremia , hypomagnesemia, hypokalemia -resolved   Will replace phose          DM type II   Ssi , hypoglycemia protocol      hypothyroidism   Continue synthyroid  ,ths and free t4 ordered         Acute encephalopath  Much better, mri-not need      Myopia         Mks: rt ankle swelling-resolved      Severe malnutrition      Hypoalbuminemia   Albumin infusion                 Disposition :tbd,    Pt OT    Patient Active Problem List   Diagnosis Code    S/P total knee arthroplasty Z96.659    Hypoxia R09.02    Hypokalemia E87.6    Acute posthemorrhagic anemia D62    HTN (hypertension), benign I10    Neutropenic fever (HCC) D70.9, R50.81    Acute respiratory failure with hypoxia (HCC) J96.01    Hypomagnesemia E83.42    Diffuse large B cell lymphoma (HCC) C83.30    GERD (gastroesophageal reflux disease) K21.9    Thyroid disease E07.9    Acute encephalopathy G93.40    Hyponatremia E87.1    Pancytopenia (HCC) D61.818    UTI (urinary tract infection) N39.0    Sinusitis J32.9    Myopia of both eyes with astigmatism H52.13, H52.203    Pleural effusion J90    Cellulitis L03.90    Severe protein-calorie malnutrition (HCC) E43    Septic shock (HCC) A41.9, R65.21       Subjective:        Review of systems:    Constitutional: denies fevers, chills, myalgias  Respiratory: denies SOB, cough  Cardiovascular: denies chest pain, palpitations  Gastrointestinal: denies nausea, vomiting, diarrhea      Vital signs/Intake and Output:  Visit Vitals  BP (!) 113/50   Pulse 84   Temp 98.4 °F (36.9 °C)   Resp 29   Ht 5' 7\" (1.702 m)   Wt 53.5 kg (118 lb)   SpO2 91%   BMI 18.48 kg/m²     Current Shift:  No intake/output data recorded.   Last three shifts:  07/06 1901 - 07/08 0700  In: 447.6 [I.V.:447.6]  Out: 1350 [Urine:1350]    Exam:    General: Well developed, alert, NAD, OX3  Head/Neck: NCAT, supple, No masses, No lymphadenopathy  CVS:Regular rate and rhythm, no M/R/G, S1/S2 heard, no thrill  Lungs:Clear to auscultation bilaterally, no wheezes, rhonchi, or rales  Abdomen: Soft, Nontender, No distention, Normal Bowel sounds, No hepatomegaly  Extremities: No C/C/E, pulses palpable 2+  Skin:normal texture and turgor, no rashes, no lesions  Neuro:grossly normal , follows commands  Psych:appropriate                Labs: Results:       Chemistry Recent Labs     07/08/22  0416 07/07/22  0434 07/06/22  1830 07/06/22  0504 07/06/22  0504   * 114*  --   --  99    139  -- --  136   K 2.9* 3.5 3.6   < > 3.4*    108  --   --  105   CO2 27 26  --   --  25   BUN 23* 16  --   --  13   CREA 0.58* 0.55*  --   --  0.48*   CA 9.3 9.3  --   --  8.7   AGAP 8 5  --   --  6   BUCR 40* 29*  --   --  27*   AP 51 50  --   --  54   TP 6.5 6.3*  --   --  5.0*   ALB 4.2 3.8  --   --  3.2*   GLOB 2.3 2.5  --   --  1.8*   AGRAT 1.8* 1.5  --   --  1.8*    < > = values in this interval not displayed. CBC w/Diff Recent Labs     07/08/22  0416 07/07/22  0434 07/06/22  0504   WBC 2.6* 1.2* 1.1*   RBC 3.06* 3.02* 3.03*   HGB 8.9* 8.7* 8.8*   HCT 27.8* 27.5* 27.4*    114* 101*   GRANS 48 44 22*   LYMPH 12* 12* 22   EOS 4 16* 34*      Cardiac Enzymes No results for input(s): CPK, CKND1, HUMBERTO in the last 72 hours. No lab exists for component: CKRMB, TROIP   Coagulation Recent Labs     07/06/22  0504   PTP 15.0   INR 1.1       Lipid Panel No results found for: CHOL, CHOLPOCT, CHOLX, CHLST, CHOLV, 548337, HDL, HDLP, LDL, LDLC, DLDLP, 759065, VLDLC, VLDL, TGLX, TRIGL, TRIGP, TGLPOCT, CHHD, CHHDX   BNP No results for input(s): BNPP in the last 72 hours.    Liver Enzymes Recent Labs     07/08/22  0416   TP 6.5   ALB 4.2   AP 51      Thyroid Studies Lab Results   Component Value Date/Time    TSH 3.50 07/04/2022 04:40 AM        Procedures/imaging: see electronic medical records for all procedures/Xrays and details which were not copied into this note but were reviewed prior to creation of Wing Fracisco MD

## 2022-07-08 NOTE — PROGRESS NOTES
Will transfer pt to ICU for closer monitoring. Her blood pressure is lower after the metoprolol to try to control heart rate, but it is back in the 140s. I suspect sepsis from neutropenic fever. She may require vasopressors.

## 2022-07-08 NOTE — PROGRESS NOTES
[facility]  (A Division of [address] [place] [address])                                                                                                                      Mario Murray MD  Office #: - [phone]  Fax #: [phone]     Date of Admission: 7/3/2022Date of Note: 7/8/2022  Reason for Referral: Evaluation and antibiotic management of Neutropenic Fever/ Septic shock. Dr Shon Gaviria is on call this weekend. ( # [phone]). [facility], July 11, [facility]. I will resume rounding from Tuesday, July 12. Please call via  or Perfect Serve. Thanks. Current Antimicrobials:    Prior Antimicrobials:  Vancomycin 7/3 to date  Zosyn 7/3 -> cefepime 7/7 to date  Zithromax 7/4-> doxycycline 7/5  ABX # Day 5    Immunosuppressive drugs: chemotherapy-- R-CHOP last 6/28/22        Assessment- ID related:  --------------------------------------------------------------------------    · Septic Shock: Improved  -- suspect urinary source- E.coli  --Doubt CRBSI  --Susp resp change more due to CHF/fluid . Line related/ possible sinus source-- no growth so far.   --CT lung: interstial edema/pleural effusion-- possible>> PNA( BNP slightly up)  --multiple hypodense lesion on spleen-- appeared smaller in April C/W Jan 2022 as well.  Tumor related?  --Urine ag- leg/Pneumon 7/4: Negative  --C.diff test negative  --Elevated Procal 3.72  · Neutropenic Fever: Improved  ANC is 1500 today  · NHL-- post R-CHOP 6/28/22, received growth factor as well    Other Medical Issues- Mx per respective team:  · Hx of PNA/Septic shock at [facility] 3/2022-- 2/2 Pneumococcus( urine ag +)  · History of Bacteroides Bacteremia 01/2022  · DM T2  · HLD  · A fib with RVR history   · Hx of graves ds, R eye blind   Recommendation for ID issues I am following:  ------------------------------------------------------------------------------  WOJCIECH Richey, PRICILA and pt     Much better ANC, septic shock improved    Hypotension/ Tachycardia-- seems cardiac related  --DC vanco- no GPC isolated  --ABX  Switched to cefepime last night--OK for now, can de-escalate further to Levofloxacin 500 mg PO daily, to complete her rx for another week soon    FU new cultures until final    FU procal for am    Splenic lesions-- to be further assessed as OP with PET scan - as scheduled by Oncology team                 Subjective:  Pt  States\" not so good\"-- in room with her  C/O discomfort in her skin- wanted to have castrejon   Event noted-- she was transferred back to ICU for A.fib with rVR and hypotension following metoprolol  No F/C/R/resp failure  ABX switched from Zosyn to cefepime noted  BM is loose- suspect ABX driven    CXR this am- one:       Bilateral interstitial and parenchymal opacities.              HPI:     Filiberto Boyd is a 78 y.o. DECLINED with PMH as listed below-- she had her last chemo on 6/28 and received growth factor as well. She came to ED with fever/lethargy/ weakness and SOB of 1 day and found to have fever to 100.7, Neutropenia to 200, pyruria, and CT head/CT chest done as well as CXR and ankle X-ray R foot done and admitted to ICU. She is requiring levophed support for border line BP. During interview( hard of hearing too), she denies HA/sorethroat. Chest pain, abd pain, dysuria. She has no focal complaint during exam.  Denies viral/COVID infection at home. Feels SOB. She is currently receiving V/Z.     Care Everywhere-- shows admission in 01 and 04 2022 with diagnosis of sepsis- Bacteroides bacteremia in jan( also dx of cancer)  and PNA with septic shock in 04/2022        Active Hospital Problems    Diagnosis Date Noted    Septic shock ([facility] [address] 07/04/2022    Neutropenic fever ([facility] [address] 07/03/2022    Acute respiratory failure with hypoxia ([facility] [address] 07/03/2022    Hypomagnesemia 07/03/2022    Diffuse large B cell lymphoma (HCC) 07/03/2022    Hyponatremia 07/03/2022    Pancytopenia ([address] 2022    UTI (urinary tract infection) 2022    Sinusitis 2022    Myopia of both eyes with astigmatism 2022    Pleural effusion 2022    Cellulitis 2022    Severe protein-calorie malnutrition (HCC) 2022    GERD (gastroesophageal reflux disease)     Thyroid disease     Acute encephalopathy     Hypokalemia 2012     Past Medical History:   Diagnosis Date    Arthritis     GERD (gastroesophageal reflux disease)     Hypertension     Osteoarthritis of right knee 2012    S/P total knee arthroplasty 2012    Thyroid disease      Past Surgical History:   Procedure Laterality Date    HX APPENDECTOMY      HX CHOLECYSTECTOMY      laparoscopic    HX HEENT      Surgery OD infection x 4    HX HEENT      throat surgery x 2    HX KNEE ARTHROSCOPY      right     No family history on file. Social History     Socioeconomic History    Marital status:      Spouse name: Not on file    Number of children: Not on file    Years of education: Not on file    Highest education level: Not on file   Occupational History    Not on file   Tobacco Use    Smoking status: Former Smoker     Quit date: 1999     Years since quittin.4    Smokeless tobacco: Never Used   Substance and Sexual Activity    Alcohol use: Yes     Alcohol/week: 1.7 standard drinks     Types: 1 Glasses of wine, 1 Cans of beer per week    Drug use: No    Sexual activity: Not on file   Other Topics Concern    Not on file   Social History Narrative    Not on file     Social Determinants of Health     Financial Resource Strain:     Difficulty of Paying Living Expenses: Not on file   Food Insecurity:     Worried About Running Out of Food in the Last Year: Not on file    Patricia of Food in the Last Year: Not on file   Transportation Needs:     Lack of Transportation (Medical): Not on file    Lack of Transportation (Non-Medical):  Not on file   Physical Activity:     Days of Exercise per Week: Not on file    Minutes of Exercise per Session: Not on file   Stress:     Feeling of Stress : Not on file   Social Connections:     Frequency of Communication with Friends and Family: Not on file    Frequency of Social Gatherings with Friends and Family: Not on file    Attends Hinduism Services: Not on file    Active Member of Clubs or Organizations: Not on file    Attends Club or Organization Meetings: Not on file    Marital Status: Not on file   Intimate Partner Violence:     Fear of Current or Ex-Partner: Not on file    Emotionally Abused: Not on file    Physically Abused: Not on file    Sexually Abused: Not on file   Housing Stability:     Unable to Pay for Housing in the Last Year: Not on file    Number of Places Lived in the Last Year: Not on file    Unstable Housing in the Last Year: Not on file       Allergies:  Adhesive tape-silicones and Penicillin g     Medications:  Current Facility-Administered Medications   Medication Dose Route Frequency    lidocaine-prilocaine (EMLA) 2.5-2.5 % cream   Topical PRN    PHENYLephrine (VISHAL-SYNEPHRINE) 30 mg in 0.9% sodium chloride 250 mL infusion   mcg/min IntraVENous TITRATE    magnesium sulfate 1 g/100 ml IVPB (premix or compounded)  1 g IntraVENous NOW    magnesium sulfate 2 g/50 ml IVPB (premix or compounded)  2 g IntraVENous ONCE    cefepime (MAXIPIME) 2 g in 0.9% sodium chloride (MBP/ADV) 100 mL MBP  2 g IntraVENous Q12H    [Held by provider] furosemide (LASIX) injection 20 mg  20 mg IntraVENous Q12H    insulin lispro (HUMALOG) injection   SubCUTAneous AC&HS    levothyroxine (SYNTHROID) tablet 125 mcg  125 mcg Oral DAILY    amiodarone (CORDARONE) tablet 100 mg  100 mg Oral DAILY    enoxaparin (LOVENOX) injection 50 mg  1 mg/kg SubCUTAneous Q12H    ipratropium (ATROVENT) 0.02 % nebulizer solution 0.5 mg  0.5 mg Nebulization TID PRN    albuterol (PROVENTIL VENTOLIN) nebulizer solution 2.5 mg  2.5 mg Nebulization Q4H PRN    sodium chloride (NS) flush 5-40 mL  5-40 mL IntraVENous Q8H    sodium chloride (NS) flush 5-40 mL  5-40 mL IntraVENous PRN    acetaminophen (TYLENOL) tablet 650 mg  650 mg Oral Q6H PRN    Or    acetaminophen (TYLENOL) suppository 650 mg  650 mg Rectal Q6H PRN    polyethylene glycol (MIRALAX) packet 17 g  17 g Oral DAILY PRN    ondansetron (ZOFRAN ODT) tablet 4 mg  4 mg Oral Q8H PRN    Or    ondansetron (ZOFRAN) injection 4 mg  4 mg IntraVENous Q6H PRN    ELECTROLYTE REPLACEMENT PROTOCOL - Potassium Standard Dosing   1 Each Other PRN    ELECTROLYTE REPLACEMENT PROTOCOL - Magnesium   1 Each Other PRN    ELECTROLYTE REPLACEMENT PROTOCOL - Phosphorus  Standard Dosing  1 Each Other PRN    ELECTROLYTE REPLACEMENT PROTOCOL - Calcium   1 Each Other PRN    pantoprazole (PROTONIX) 40 mg in 0.9% sodium chloride 10 mL injection  40 mg IntraVENous DAILY    albumin human 25% (BUMINATE) solution 25 g  25 g IntraVENous Q6H    glucose chewable tablet 16 g  4 Tablet Oral PRN    glucagon (GLUCAGEN) injection 1 mg  1 mg IntraMUSCular PRN    dextrose 10% infusion 0-250 mL  0-250 mL IntraVENous PRN      Physical Exam:    Temp (24hrs), Av.9 °F (37.2 °C), Min:97.8 °F (36.6 °C), Max:100.4 °F (38 °C)    Visit Vitals  BP (!) 117/53   Pulse 80   Temp 97.8 °F (36.6 °C)   Resp 16   Ht 5' 7\" (1.702 m)   Wt 53.5 kg (118 lb)   SpO2 92%   BMI 18.48 kg/m²          GEN: WD sick looking, on NC--not in resp distress. HEENT: Unicteric. Protruding eye-R>L, EOMI intact  No neck swelling  CHEST: Non laboured breathing. CTA  CVS:RRR, no mur/gallop  ABD: Obese/soft. Non tender. Non distended  ANN:Lucero in place  EXT: No apparent swelling or redness on UE/LE joints except:  R ankle has some mild redness- stable- doesn't seem to be cellulitis  Skin: Dry and intact. No rash- vesicular or other type, no redness. CNS: A, OX3. Moves all extremity. CN grossly ok except  [place].  .R eye blindness        Microbiology  All Micro Results Procedure Component Value Units Date/Time    CULTURE, BLOOD [[id number]] Collected: 07/08/22 0443    Order Status: Completed Specimen: Blood Updated: 07/08/22 0738     Special Requests: NO SPECIAL REQUESTS        Culture result: NO GROWTH AFTER 2 HOURS       CULTURE, BLOOD [[id number]] Collected: 07/03/22 1645    Order Status: Completed Specimen: Blood Updated: 07/08/22 0738     Special Requests: NO SPECIAL REQUESTS        Culture result: NO GROWTH 5 DAYS       CULTURE, BLOOD [[id number]] Collected: 07/03/22 1650    Order Status: Completed Specimen: Blood Updated: 07/08/22 0738     Special Requests: NO SPECIAL REQUESTS        Culture result: NO GROWTH 5 DAYS       ENTERIC BACTERIA PANEL, DNA [[id number]] Collected: 07/05/22 1455    Order Status: Completed Specimen: Stool Updated: 07/06/22 2026     Shigella species, DNA Negative        Campylobacter species, DNA Negative        Vibrio species, DNA Negative        Enterotoxigen E Coli, DNA Negative        Shiga toxin producing, DNA Negative        Salmonella species, DNA Negative        P. shigelloides, DNA Negative        Y. enterocolitica, DNA Negative       CULTURE, URINE [[id number]]  (Abnormal)  (Susceptibility) Collected: 07/03/22 1700    Order Status: Completed Specimen: Urine from Clean catch Updated: 07/06/22 1315     Special Requests: NO SPECIAL REQUESTS        [place] Count --        >100,000  COLONIES/mL       Culture result: ESCHERICHIA COLI       C. DIFFICILE AG & TOXIN A/B [[id number]]  (Abnormal) Collected: 07/05/22 1455    Order Status: Completed Specimen: Miscellaneous sample Updated: 07/06/22 1133     C. difficile toxin Negative        PCR Reflex       See Reflex order for C. difficile (DNA)           INTERPRETATION       Indeterminate for Toxigenic C. difficile, DNA confirmation to follow.           Theoplis Prather DIFF MOLECULAR [[id number]] Collected: 07/05/22 1455    Order Status: Completed Specimen: Miscellaneous sample Updated: 07/06/22 1133     C Diff Molecular Negative        Comment: This specimen is negative for toxigenic C difficile by DNA amplification. Repeat testing is not recommended for confirmation, samples received within 7 days of this negative result will be rejected. Moreno Tomlinson, URINE [[id number]] Collected: 07/04/22 1247    Order Status: Completed Specimen: Urine, random Updated: 07/04/22 2030     Strep pneumo Ag, urine Negative       LEGIONELLA PNEUMOPHILA AG, URINE [[id number]] Collected: 07/04/22 1247    Order Status: Completed Specimen: Urine, random Updated: 07/04/22 2030     Legionella Ag, urine Negative       RESPIRATORY VIRUS PANEL W/COVID-19, PCR [[id number]] Collected: 07/03/22 2110    Order Status: Completed Specimen: Nasopharyngeal Updated: 07/04/22 1229     Adenovirus Not detected        Coronavirus 229E Not detected        Coronavirus HKU1 Not detected        Coronavirus CVNL63 Not detected        Coronavirus OC43 Not detected        SARS-CoV-2, PCR Not detected        Metapneumovirus Not detected        Rhinovirus and Enterovirus Not detected        Influenza A Not detected        Influenza B Not detected        Parainfluenza 1 Not detected        Parainfluenza 2 Not detected        Parainfluenza 3 Not detected        Parainfluenza virus 4 Not detected        RSV by PCR Not detected        B. parapertussis, PCR Not detected        Bordetella pertussis - PCR Not detected        Chlamydophila pneumoniae DNA, QL, PCR Not detected        Mycoplasma pneumoniae DNA, QL, PCR Not detected       C. DIFFICILE AG & TOXIN A/B [[id number]]     Order Status: Canceled Specimen: Stool     COVID-19 RAPID TEST [[id number]] Collected: 07/03/22 1715    Order Status: Completed Specimen: Nasopharyngeal Updated: 07/03/22 1839     Specimen source Nasopharyngeal        COVID-19 rapid test Not detected        Comment: Rapid Abbott ID Now       Rapid NAAT:  The specimen is NEGATIVE for SARS-CoV-2, the novel coronavirus associated with COVID-19.        Negative results should be treated as presumptive and, if inconsistent with clinical signs and symptoms or necessary for patient management, should be tested with an alternative molecular assay. Negative results do not preclude SARS-CoV-2 infection and should not be used as the sole basis for patient management decisions. This test has been authorized by the FDA under an Emergency Use Authorization (EUA) for use by authorized laboratories. Fact sheet for Healthcare Providers: [employer].ee  Fact sheet for Patients: [employer].ee       Methodology: Isothermal Nucleic Acid Amplification         INFLUENZA A & B AG (RAPID TEST) [[id number]] Collected: 07/03/22 171    Order Status: Completed Specimen: Nasopharyngeal from Nasal washing Updated: 07/03/22 3054     Influenza A Antigen Negative        Comment: A negative result does not exclude influenza virus infection, seasonal or H1N1 due to suboptimal sensitivity. If influenza is circulating in your community, a diagnosis of influenza should be considered based on a patients clinical presentation and empiric antiviral treatment should be considered, if indicated. Influenza B Antigen Negative              Lab results:    Chemistry  Recent Labs     07/08/22 0416 07/07/22 0434 07/06/22  1830 07/06/22  0504 07/06/22  0504   * 114*  --   --  99    139  --   --  136   K 2.9* 3.5 3.6   < > 3.4*    108  --   --  105   CO2 27 26  --   --  25   BUN 23* 16  --   --  13   CREA 0.58* 0.55*  --   --  0.48*   CA 9.3 9.3  --   --  8.7   AGAP 8 5  --   --  6   BUCR 40* 29*  --   --  27*   AP 51 50  --   --  54   TP 6.5 6.3*  --   --  5.0*   ALB 4.2 3.8  --   --  3.2*   GLOB 2.3 2.5  --   --  1.8*   AGRAT 1.8* 1.5  --   --  1.8*    < > = values in this interval not displayed.        CBC w/ Diff  Recent Labs     07/08/22 0416 07/07/22  0434 07/06/22  0504   WBC 2.6* 1.2* 1.1*   RBC 3.06* 3.02* 3.03*   HGB 8. 9* 8.7* 8.8*   HCT 27.8* 27.5* 27.4*    114* 101*   GRANS 48 44 22*   LYMPH 12* 12* 22   EOS 4 16* 34*       Imaging: report reviewed and as posted by radiologist    CT head- 7/3  1. No CT findings of an acute intracranial abnormality. Please note that  noncontrast head CT may be normal in early acute infarct.        2. Chronic left sphenoethmoidal sinus disease, with questional small left  frontoethmoidal air-fluid level potentially acute on chronic sinus disease.     3. Asymmetric hyperdense right globe. Recommend correlation with patient ocular  History. 7/3: R ankle     1.  Nonspecific soft tissue edema, with no acute or destructive osseous  abnormality.

## 2022-07-08 NOTE — PROGRESS NOTES
Bedside shift change report received from [facility]. Report included the following information SBAR, Kardex, Intake/Output, MAR, Recent Results, Med Rec Status and Cardiac Rhythm NSR and plan of care. Patient with frequent incontinence episodes and changed every 30-60min, does not tolerate purewick due to extreme excoriation in vaginal and rectal regions. Orders for nystatin received. TRANSFER - OUT REPORT:    Verbal report given to [employer] on Anna Perkins  being transferred to [facility] for routine progression of care       Report consisted of patients Situation, Background, Assessment and   Recommendations(SBAR). Information from the following report(s) SBAR, Kardex, [facility] and Cardiac Rhythm NSR was reviewed with the receiving nurse. Lines:   Peripheral IV 07/04/22 Right;Proximal Cephalic (Active)   Site Assessment Clean, dry, & intact 07/08/22 1602   Phlebitis Assessment 0 07/08/22 1602   Infiltration Assessment 0 07/08/22 1602   Dressing Status Clean, dry, & intact 07/08/22 1602   Dressing Type Transparent;Tape;Disk with Chlorhexadine gluconate (CHG) 07/08/22 1602   Hub Color/Line Status Infusing 07/08/22 1602   Action Taken Open ports on tubing capped 07/08/22 1602   Alcohol Cap Used Yes 07/08/22 1602       Peripheral IV 07/08/22 Right;Left;Proximal Basilic (Active)   Site Assessment Clean, dry, & intact 07/08/22 0812   Phlebitis Assessment 0 07/08/22 0812   Infiltration Assessment 0 07/08/22 0812   Dressing Status Clean, dry, & intact 07/08/22 0812   Dressing Type Gauze 07/08/22 0812   Hub Color/Line Status Green 07/08/22 4245   Action Taken Other (comment) 07/08/22 0812   Alcohol Cap Used No 07/08/22 6474        Opportunity for questions and clarification was provided.       Patient transported with:   Monitor  O2 @ 4 liters  Registered Nurse

## 2022-07-08 NOTE — ROUTINE PROCESS
Patient received via hospital bed. Patient placed on monitor and oriented to room and routine.  at bed side.

## 2022-07-08 NOTE — PROGRESS NOTES
Cefepime Extended Infusion    Renan Bradford, a 78 y.o. yo female, has been converted to an extended infusion of Cefepime while in the intensive care unit. A loading dose of Cefepime 2 gm was administered over 30 minutes. Extended infusions will run over 4 hours (240 minutes). Dose renally adjusted to:  Cefepime 2 grams IV q12h  x 7 days    (prior order: Cefepime 2 grams IV q8h x 7 days)     Recent Labs     07/08/22  0416 07/07/22  0434 07/06/22  0504   CREA 0.58* 0.55* 0.48*     Ht Readings from Last 1 Encounters:   07/07/22 170.2 cm (67\")     Wt Readings from Last 1 Encounters:   07/05/22 53.5 kg (118 lb)       CrCl : Serum creatinine: 0.58 mg/dL (L) 07/08/22 0416  Estimated creatinine clearance: 51.4 mL/min (A)    Pharmacy to continue to monitor daily.    Jaime Dobbins, PharmD    [phone]

## 2022-07-08 NOTE — PROGRESS NOTES
Pharmacy Dosing Services: IV - PO Conversion    This patient meets [facility] approved criteria for conversion from IV to oral therapy for the following medication:  Pantoprazole    Pt has a regular diet ordered and is taking other oral medications. Order adjusted to:  Pantoprazole 40 mg po daily     (prior order:  Pantoprazole 40 mg IV daily )     Pharmacy will continue to monitor the patient's status daily.    Signed BENIGNO [place] Contact information: [phone]

## 2022-07-08 NOTE — PROGRESS NOTES
EKG not transmitting despite repeated attempts. It looks like she is in sinus tachycardia upon review. I will try 5 mg metoprolol in case she is actually in afib that looks regular. Will also give a small fluid bolus. Tylenol has also been given.

## 2022-07-08 NOTE — PROGRESS NOTES
[facility]  Phone: [phone]  Paging : (  [address]) [phone]  [facility] [place] [phone]     Hematology / Oncology Progress Note    Impression:   Principal Problem:    Acute respiratory failure with hypoxia ([address] (7/3/2022)    Active Problems:    Hypokalemia (9/21/2012)      Neutropenic fever ([address] (7/3/2022)      Hypomagnesemia (7/3/2022)      Diffuse large B cell lymphoma (HCC) (7/3/2022)      GERD (gastroesophageal reflux disease) ()      Thyroid disease ()      Acute encephalopathy ()      Hyponatremia (7/3/2022)      Pancytopenia ([address] (7/3/2022)      UTI (urinary tract infection) (7/3/2022)      Sinusitis (7/3/2022)      Myopia of both eyes with astigmatism (7/3/2022)      Pleural effusion (7/3/2022)      Cellulitis (7/3/2022)      Severe protein-calorie malnutrition ([address] (7/3/2022)      Septic shock ([address] (7/4/2022)        Sepsis - Improved, out of ICU, now is back due to hypotension, improved after fluids bolus, not on pressors at this point. Diffuse Large B Cell Lymphoma - remission status undetermined. She was to receive a PET/CT as outpatient to determine remission status - treatment was curative intent but has had multiple complications    UTI    Splenic Hypodensities - ?infectious v. Lymphomatous. Neutropenia - the patient has already received growth factor    Anemia - dt/ chemotherapy    Pleural Effusion    Hx of AFib    Weakness - dt/ chemotherapy + illness    Plan:   Appreciate help of ICU + hospital medicine + ID  No role for growth factors - had received in office. WBC count is improving. Supportive care until recovery  Plan was for PET/CT as outpatient ~ 6 weeks after the last cycle of chemotherapy.   reviewed all labs, notes, imaging      Reason for Consultation:  George Amador is a 78 y.o. female who I've been asked to consult for suspected sepsis in the setting of lymphoma    HPI:      The patient is treated by my partner Dr. 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 She was undergoing chemotherapy with R-CHOP, last cycle given 6/28/22. This was to be her final chemotherapy prior to a PET/CT to determine remission status with some possibility for cure. Her treatment course has been complicated by multiple admissions for sepsis, pneumonia, Atrial Fibrillation with RVR and general weakness. I spoke with her  yesterday who reported severe weakness and I directed him to the emergency department for ongoing care with concerns that she may be septic or have some complication as related to her chemotherapy. Labwork reveaeld a hemoglobin of 9, WBC of 0.4 with an ANC of 0. She did receive Neulasta in the office. INR was 1.2  CMP was normal.   NT pro-BNP slightly elevated at 446 and lactic acid was 2. Influenza negative and Covid pending. CT Head 7/3/22 showed no concerning abnormality. CT of Chest identified interstitial ground glass changes suggestive of edema with a large L effusion and indeterminate splenic hypodensities. Upon arrival, she was found to be hypoxic and was requiring 6L of oxygen.         Hospital Medications:     Current Facility-Administered Medications   Medication Dose Route Frequency Provider Last Rate Last Admin    lidocaine-prilocaine (EMLA) 2.5-2.5 % cream   Topical PRN Gloria Arteaga MD        cefepime (MAXIPIME) 2 g in 0.9% sodium chloride (MBP/ADV) 100 mL MBP  2 g IntraVENous Q8H Gloria Arteaga  mL/hr at 07/08/22 0414 2 g at 07/08/22 0414    potassium chloride 10 mEq in 100 ml IVPB  10 mEq IntraVENous Q1H Gloria Arteaga  mL/hr at 07/08/22 0641 10 mEq at 07/08/22 0641    PHENYLephrine (VISHAL-SYNEPHRINE) 30 mg in 0.9% sodium chloride 250 mL infusion   mcg/min IntraVENous TITRATE Gloria Arteaga MD        magnesium sulfate 1 g/100 ml IVPB (premix or compounded)  1 g IntraVENous NOW Gloria Arteaga MD        magnesium sulfate 2 g/50 ml IVPB (premix or compounded)  2 g IntraVENous ONCE Shameka Anders MD        [Held by provider] furosemide (LASIX) injection 20 mg  20 mg IntraVENous Q12H Shameka Anders MD   20 mg at 22    insulin lispro (HUMALOG) injection   SubCUTAneous AC&HS Minus MD Oscar   6 Units at 22    levothyroxine (SYNTHROID) tablet 125 mcg  125 mcg Oral DAILY Bassam Lugo MD   125 mcg at 22 0653    vancomycin (VANCOCIN) 1,000 mg in 0.9% sodium chloride 250 mL (VIAL-MATE)  1,000 mg IntraVENous Q12H Minus MD Oscar 250 mL/hr at 22 0414 1,000 mg at 22 0414    amiodarone (CORDARONE) tablet 100 mg  100 mg Oral DAILY Bassam Lugo MD   100 mg at 22 0839    enoxaparin (LOVENOX) injection 50 mg  1 mg/kg SubCUTAneous Q12H Bassam Lugo MD   50 mg at 22 0038    ipratropium (ATROVENT) 0.02 % nebulizer solution 0.5 mg  0.5 mg Nebulization TID PRN Bassam Lugo MD   0.5 mg at 22    albuterol (PROVENTIL VENTOLIN) nebulizer solution 2.5 mg  2.5 mg Nebulization Q4H PRN Jeremiah Bruce MD   2.5 mg at 22    sodium chloride (NS) flush 5-40 mL  5-40 mL IntraVENous Q8H Minus MD Oscar   10 mL at 22 0654    sodium chloride (NS) flush 5-40 mL  5-40 mL IntraVENous PRN Minus MD Oscar        acetaminophen (TYLENOL) tablet 650 mg  650 mg Oral Q6H PRN Minus MD Oscar        Or   [place] Self acetaminophen (TYLENOL) suppository 650 mg  650 mg Rectal Q6H PRN Minus MD Oscar        polyethylene glycol (MIRALAX) packet 17 g  17 g Oral DAILY PRN Minus MD Oscar        ondansetron (ZOFRAN ODT) tablet 4 mg  4 mg Oral Q8H PRN Minus MD Oscar        Or    [facility]ron [facility]) injection 4 mg  4 mg IntraVENous Q6H PRN Minus MD Oscar   4 mg at 22 1052    ELECTROLYTE REPLACEMENT PROTOCOL - Potassium Standard Dosing   1 Each Other PRN Minus MD Oscar        ELECTROLYTE REPLACEMENT PROTOCOL - Magnesium   1 Each Other PRN Minus MD Oscar        ELECTROLYTE REPLACEMENT PROTOCOL - Phosphorus  Standard Dosing  1 Each Other PRN Katherine Ness MD        ELECTROLYTE REPLACEMENT PROTOCOL - Calcium   1 Each Other PRN Katherine Ness MD        pantoprazole (PROTONIX) 40 mg in 0.9% sodium chloride 10 mL injection  40 mg IntraVENous DAILY Katherine Ness MD   40 mg at 22 5105    Vancomycin-Pharmacy To Dose  1 Each Other Rx Dosing/Monitoring Katherine Ness MD        albumin human 25% (BUMINATE) solution 25 g  25 g IntraVENous Q6H Katherine Ness MD   25 g at 22 1654    glucose chewable tablet 16 g  4 Tablet Oral PRN Katherine Ness MD        glucagon [facility]) injection 1 mg  1 mg IntraMUSCular PRN Katherine Ness MD        dextrose 10% infusion 0-250 mL  0-250 mL IntraVENous PRN Katherine Ness MD           Review of Systems:   Constitutional:  Limited ROS, but denies fever or pain. Visit Vitals  BP (!) 115/57   Pulse 84   Temp 98.4 °F (36.9 °C)   Resp 26   Ht 5' 7\" (1.702 m)   Wt 53.5 kg (118 lb)   SpO2 93%   BMI 18.48 kg/m²       Temp (24hrs), Av.1 °F (37.3 °C), Min:98.4 °F (36.9 °C), Max:100.4 °F (38 °C)      General: no acute distress and laying in bed, comfortable  HEENT: no icterus, no oral lesions  Lym: no cervical or supraclavicular adenopathy  CV: rate is regular and normal and their is no murmur  Lung: clear to auscultation, no increased work of breathing  Abd: nontender, no organomegaly  Neuro: cnoversant and moving extremities  MSK: no sarcopenia, normal tone  Derm: no rash  Psych: normal affect  Per: warm, no edema    Labs:  Recent Labs     22  0416 22  0434 22  0504   WBC 2.6* 1.2* 1.1*   RBC 3.06* 3.02* 3.03*   HCT 27.8* 27.5* 27.4*   MCV 90.8 91.1 90.4   MCH 29.1 28.8 29.0   MCHC 32.0 31.6 32.1   RDW 29.5* 29.6* 29.1*       Recent Labs     22  0416 22  0434 22  0504   CO2 27 26 25   BUN 23* 16 13       No results for input(s): ALBUMIN in the last 72 hours.     No lab exists for component: BELA Huang    [email or web address]    [facility] MD POLINA Waldron  [facility]

## 2022-07-08 NOTE — PROGRESS NOTES
Cardiology Progress Note        Patient: Jose Freeman        Sex: female          DOA: 7/3/2022  YOB: 1940      Age:  78 y.o.        LOS:  LOS: 4 days      Patient seen and examined, chart reviewed. Assessment/Plan     Patient Active Problem List   Diagnosis Code    S/P total knee arthroplasty Z96.659    Hypoxia R09.02    Hypokalemia E87.6    Acute posthemorrhagic anemia D62    HTN (hypertension), benign I10    Neutropenic fever ([address] D70.9, R50.81    Acute respiratory failure with hypoxia ([address] J96.01    Hypomagnesemia E83.42    Diffuse large B cell lymphoma (HCC) C83.30    GERD (gastroesophageal reflux disease) K21.9    Thyroid disease E07.9    Acute encephalopathy G93.40    Hyponatremia E87.1    Pancytopenia (HCC) D61.818    UTI (urinary tract infection) N39.0    Sinusitis J32.9    Myopia of both eyes with astigmatism H52.13, H52.203    Pleural effusion J90    Cellulitis L03.90    Severe protein-calorie malnutrition (HCC) E43    Septic shock (HCC) A41.9, R65.21      Echocardiogram revealed    Echocardiogram revealed       Left Ventricle: Normal left ventricular systolic function with a visually estimated EF of 60 - 65%. Left ventricle size is normal. Normal wall thickness. Normal wall motion. Indeterminate diastolic function due to atrial fibrillation.   Tricuspid Valve: The estimated PASP is 41 mmHg. Plan:    Continue Amiodarone   Continue Full dose of Lovenx  Monitor and replace electrolytes as per hospitalist  Plan discussed with patient's family member at bed side              Subjective:    cc:  Denies any chest pain or shortness of breath      REVIEW OF SYSTEMS:     General: No fevers or chills. Cardiovascular: Positive shortness of breath, No chest pain,No palpitations, No orthopnea, No PND, No leg swelling, No claudication  Pulmonary: No dyspnea.    Gastrointestinal: No nausea, vomiting, bleeding  Neurology: No Dizziness    Objective: Visit Vitals  BP (!) 146/68   Pulse (!) 113   Temp 98.6 °F (37 °C)   Resp 29   Ht 5' 7\" (1.702 m)   Wt 53.5 kg (118 lb)   SpO2 93%   BMI 18.48 kg/m²     Body mass index is 18.48 kg/m². Physical Exam:  General Appearance: Looks chronically ill, Comfortable, not using accessory muscles of respiration. HEENT: MICHELINE. HEAD: Atraumatic  NECK: No JVD, no thyroidomeglay. CAROTIDS: No bruit  LUNGS: Clear bilaterally. HEART: S1+S2 audible, no murmur, no pericardial rub.    NEUROLOGICAL: Alert, follows verbal commands    Medication:  Current Facility-Administered Medications   Medication Dose Route Frequency    furosemide (LASIX) injection 20 mg  20 mg IntraVENous Q12H    insulin lispro (HUMALOG) injection   SubCUTAneous AC&HS    levothyroxine (SYNTHROID) tablet 125 mcg  125 mcg Oral DAILY    piperacillin-tazobactam (ZOSYN) 3.375 g in 0.9% sodium chloride (MBP/ADV) 100 mL MBP  3.375 g IntraVENous Q6H    vancomycin (VANCOCIN) 1,000 mg in 0.9% sodium chloride 250 mL (VIAL-MATE)  1,000 mg IntraVENous Q12H    amiodarone (CORDARONE) tablet 100 mg  100 mg Oral DAILY    enoxaparin (LOVENOX) injection 50 mg  1 mg/kg SubCUTAneous Q12H    ipratropium (ATROVENT) 0.02 % nebulizer solution 0.5 mg  0.5 mg Nebulization TID PRN    albuterol (PROVENTIL VENTOLIN) nebulizer solution 2.5 mg  2.5 mg Nebulization Q4H PRN    sodium chloride (NS) flush 5-40 mL  5-40 mL IntraVENous Q8H    sodium chloride (NS) flush 5-40 mL  5-40 mL IntraVENous PRN    acetaminophen (TYLENOL) tablet 650 mg  650 mg Oral Q6H PRN    Or    acetaminophen (TYLENOL) suppository 650 mg  650 mg Rectal Q6H PRN    polyethylene glycol (MIRALAX) packet 17 g  17 g Oral DAILY PRN    ondansetron (ZOFRAN ODT) tablet 4 mg  4 mg Oral Q8H PRN    Or    ondansetron (ZOFRAN) injection 4 mg  4 mg IntraVENous Q6H PRN    ELECTROLYTE REPLACEMENT PROTOCOL - Potassium Standard Dosing   1 Each Other PRN    ELECTROLYTE REPLACEMENT PROTOCOL - Magnesium   1 Each Other PRN  ELECTROLYTE REPLACEMENT PROTOCOL - Phosphorus  Standard Dosing  1 Each Other PRN    ELECTROLYTE REPLACEMENT PROTOCOL - Calcium   1 Each Other PRN    pantoprazole (PROTONIX) 40 mg in 0.9% sodium chloride 10 mL injection  40 mg IntraVENous DAILY    Vancomycin-Pharmacy To Dose  1 Each Other Rx Dosing/Monitoring    albumin human 25% (BUMINATE) solution 25 g  25 g IntraVENous Q6H    glucose chewable tablet 16 g  4 Tablet Oral PRN    glucagon (GLUCAGEN) injection 1 mg  1 mg IntraMUSCular PRN    dextrose 10% infusion 0-250 mL  0-250 mL IntraVENous PRN               Lab/Data Reviewed:       Recent Labs     07/07/22  0434 07/06/22  0504 07/05/22  0530   WBC 1.2* 1.1* 0.5*   HGB 8.7* 8.8* 9.1*   HCT 27.5* 27.4* 28.6*   * 101* 94*     Recent Labs     07/07/22  0434 07/06/22  1830 07/06/22  0504 07/05/22  1816 07/05/22  0530     --  136  --  132*   K 3.5 3.6 3.4*   < > 3.7     --  105  --  102   CO2 26  --  25  --  24   *  --  99  --  147*   BUN 16  --  13  --  9   CREA 0.55*  --  0.48*  --  0.53*   CA 9.3  --  8.7  --  8.4*    < > = values in this interval not displayed.        Signed By: Nicola Odonnell MD     July 7, 2022

## 2022-07-09 ENCOUNTER — APPOINTMENT (OUTPATIENT)
Dept: CT IMAGING | Age: 82
DRG: 871 | End: 2022-07-09
Attending: INTERNAL MEDICINE
Payer: MEDICARE

## 2022-07-09 LAB
ALBUMIN SERPL-MCNC: 4.2 G/DL (ref 3.4–5)
ALBUMIN/GLOB SERPL: 1.8 {RATIO} (ref 0.8–1.7)
ALP SERPL-CCNC: 49 U/L (ref 45–117)
ALT SERPL-CCNC: 9 U/L (ref 13–56)
ANION GAP SERPL CALC-SCNC: 4 MMOL/L (ref 3–18)
AST SERPL-CCNC: 5 U/L (ref 10–38)
BACTERIA SPEC CULT: NORMAL
BACTERIA SPEC CULT: NORMAL
BASOPHILS # BLD: 0 K/UL (ref 0–0.1)
BASOPHILS NFR BLD: 0 % (ref 0–2)
BILIRUB SERPL-MCNC: 0.4 MG/DL (ref 0.2–1)
BUN SERPL-MCNC: 31 MG/DL (ref 7–18)
BUN/CREAT SERPL: 58 (ref 12–20)
CA-I SERPL-SCNC: 1.23 MMOL/L (ref 1.12–1.32)
CALCIUM SERPL-MCNC: 9.7 MG/DL (ref 8.5–10.1)
CHLORIDE SERPL-SCNC: 105 MMOL/L (ref 100–111)
CO2 SERPL-SCNC: 30 MMOL/L (ref 21–32)
CREAT SERPL-MCNC: 0.53 MG/DL (ref 0.6–1.3)
DIFFERENTIAL METHOD BLD: ABNORMAL
EOSINOPHIL # BLD: 0.3 K/UL (ref 0–0.4)
EOSINOPHIL NFR BLD: 10 % (ref 0–5)
ERYTHROCYTE [DISTWIDTH] IN BLOOD BY AUTOMATED COUNT: 30.3 % (ref 11.6–14.5)
GLOBULIN SER CALC-MCNC: 2.3 G/DL (ref 2–4)
GLUCOSE BLD STRIP.AUTO-MCNC: 161 MG/DL (ref 70–110)
GLUCOSE BLD STRIP.AUTO-MCNC: 183 MG/DL (ref 70–110)
GLUCOSE BLD STRIP.AUTO-MCNC: 206 MG/DL (ref 70–110)
GLUCOSE BLD STRIP.AUTO-MCNC: 239 MG/DL (ref 70–110)
GLUCOSE SERPL-MCNC: 141 MG/DL (ref 74–99)
HCT VFR BLD AUTO: 27.2 % (ref 35–45)
HGB BLD-MCNC: 8.7 G/DL (ref 12–16)
IMM GRANULOCYTES # BLD AUTO: 0 K/UL
IMM GRANULOCYTES NFR BLD AUTO: 0 %
LYMPHOCYTES # BLD: 0.3 K/UL (ref 0.9–3.6)
LYMPHOCYTES NFR BLD: 11 % (ref 21–52)
MAGNESIUM SERPL-MCNC: 2.3 MG/DL (ref 1.6–2.6)
MCH RBC QN AUTO: 28.4 PG (ref 24–34)
MCHC RBC AUTO-ENTMCNC: 32 G/DL (ref 31–37)
MCV RBC AUTO: 88.9 FL (ref 78–100)
MONOCYTES # BLD: 0.5 K/UL (ref 0.05–1.2)
MONOCYTES NFR BLD: 15 % (ref 3–10)
MYELOCYTES NFR BLD MANUAL: 2 %
NEUTS SEG # BLD: 1.9 K/UL (ref 1.8–8)
NEUTS SEG NFR BLD: 62 % (ref 40–73)
NRBC # BLD: 0.03 K/UL (ref 0–0.01)
NRBC BLD-RTO: 1 PER 100 WBC
PHOSPHATE SERPL-MCNC: 0.8 MG/DL (ref 2.5–4.9)
PLATELET # BLD AUTO: 201 K/UL (ref 135–420)
PLATELET COMMENTS,PCOM: ABNORMAL
PMV BLD AUTO: 9.5 FL (ref 9.2–11.8)
POTASSIUM SERPL-SCNC: 3.2 MMOL/L (ref 3.5–5.5)
PROCALCITONIN SERPL-MCNC: 0.36 NG/ML
PROT SERPL-MCNC: 6.5 G/DL (ref 6.4–8.2)
RBC # BLD AUTO: 3.06 M/UL (ref 4.2–5.3)
RBC MORPH BLD: ABNORMAL
SERVICE CMNT-IMP: NORMAL
SERVICE CMNT-IMP: NORMAL
SODIUM SERPL-SCNC: 139 MMOL/L (ref 136–145)
WBC # BLD AUTO: 3.1 K/UL (ref 4.6–13.2)

## 2022-07-09 PROCEDURE — 74011000636 HC RX REV CODE- 636: Performed by: STUDENT IN AN ORGANIZED HEALTH CARE EDUCATION/TRAINING PROGRAM

## 2022-07-09 PROCEDURE — 74011250637 HC RX REV CODE- 250/637: Performed by: HOSPITALIST

## 2022-07-09 PROCEDURE — 74011250636 HC RX REV CODE- 250/636: Performed by: HOSPITALIST

## 2022-07-09 PROCEDURE — 84100 ASSAY OF PHOSPHORUS: CPT

## 2022-07-09 PROCEDURE — 97530 THERAPEUTIC ACTIVITIES: CPT

## 2022-07-09 PROCEDURE — 97161 PT EVAL LOW COMPLEX 20 MIN: CPT

## 2022-07-09 PROCEDURE — 74011000636 HC RX REV CODE- 636: Performed by: HOSPITALIST

## 2022-07-09 PROCEDURE — 94640 AIRWAY INHALATION TREATMENT: CPT

## 2022-07-09 PROCEDURE — 74011000250 HC RX REV CODE- 250: Performed by: FAMILY MEDICINE

## 2022-07-09 PROCEDURE — 74011250636 HC RX REV CODE- 250/636: Performed by: FAMILY MEDICINE

## 2022-07-09 PROCEDURE — 84145 PROCALCITONIN (PCT): CPT

## 2022-07-09 PROCEDURE — 74011250637 HC RX REV CODE- 250/637: Performed by: INTERNAL MEDICINE

## 2022-07-09 PROCEDURE — 74011000250 HC RX REV CODE- 250: Performed by: HOSPITALIST

## 2022-07-09 PROCEDURE — 74011636637 HC RX REV CODE- 636/637: Performed by: HOSPITALIST

## 2022-07-09 PROCEDURE — 83735 ASSAY OF MAGNESIUM: CPT

## 2022-07-09 PROCEDURE — 70491 CT SOFT TISSUE NECK W/DYE: CPT

## 2022-07-09 PROCEDURE — 82962 GLUCOSE BLOOD TEST: CPT

## 2022-07-09 PROCEDURE — 74011250636 HC RX REV CODE- 250/636: Performed by: INTERNAL MEDICINE

## 2022-07-09 PROCEDURE — 74177 CT ABD & PELVIS W/CONTRAST: CPT

## 2022-07-09 PROCEDURE — 65270000046 HC RM TELEMETRY

## 2022-07-09 PROCEDURE — 74011000258 HC RX REV CODE- 258: Performed by: HOSPITALIST

## 2022-07-09 PROCEDURE — 80053 COMPREHEN METABOLIC PANEL: CPT

## 2022-07-09 PROCEDURE — 74011000258 HC RX REV CODE- 258: Performed by: FAMILY MEDICINE

## 2022-07-09 PROCEDURE — P9047 ALBUMIN (HUMAN), 25%, 50ML: HCPCS | Performed by: HOSPITALIST

## 2022-07-09 PROCEDURE — 74011000250 HC RX REV CODE- 250: Performed by: INTERNAL MEDICINE

## 2022-07-09 PROCEDURE — 85025 COMPLETE CBC W/AUTO DIFF WBC: CPT

## 2022-07-09 PROCEDURE — 77010033678 HC OXYGEN DAILY

## 2022-07-09 PROCEDURE — 74011250637 HC RX REV CODE- 250/637: Performed by: FAMILY MEDICINE

## 2022-07-09 PROCEDURE — 82330 ASSAY OF CALCIUM: CPT

## 2022-07-09 PROCEDURE — 36415 COLL VENOUS BLD VENIPUNCTURE: CPT

## 2022-07-09 RX ORDER — DIGOXIN 0.25 MG/ML
100 INJECTION INTRAMUSCULAR; INTRAVENOUS DAILY
Status: DISCONTINUED | OUTPATIENT
Start: 2022-07-10 | End: 2022-07-15

## 2022-07-09 RX ORDER — POTASSIUM CHLORIDE 7.45 MG/ML
10 INJECTION INTRAVENOUS
Status: DISCONTINUED | OUTPATIENT
Start: 2022-07-09 | End: 2022-07-09

## 2022-07-09 RX ORDER — FLUTICASONE PROPIONATE 50 MCG
2 SPRAY, SUSPENSION (ML) NASAL DAILY
Status: DISCONTINUED | OUTPATIENT
Start: 2022-07-10 | End: 2022-07-16 | Stop reason: HOSPADM

## 2022-07-09 RX ORDER — POTASSIUM CHLORIDE 20 MEQ/1
40 TABLET, EXTENDED RELEASE ORAL
Status: COMPLETED | OUTPATIENT
Start: 2022-07-09 | End: 2022-07-09

## 2022-07-09 RX ORDER — PREDNISOLONE ACETATE 10 MG/ML
1 SUSPENSION/ DROPS OPHTHALMIC 2 TIMES DAILY
Status: DISCONTINUED | OUTPATIENT
Start: 2022-07-09 | End: 2022-07-16 | Stop reason: HOSPADM

## 2022-07-09 RX ADMIN — ENOXAPARIN SODIUM 50 MG: 100 INJECTION SUBCUTANEOUS at 23:56

## 2022-07-09 RX ADMIN — CEFEPIME HYDROCHLORIDE 2 G: 2 INJECTION, POWDER, FOR SOLUTION INTRAVENOUS at 04:46

## 2022-07-09 RX ADMIN — Medication 3 UNITS: at 16:52

## 2022-07-09 RX ADMIN — POTASSIUM CHLORIDE 40 MEQ: 20 TABLET, EXTENDED RELEASE ORAL at 08:25

## 2022-07-09 RX ADMIN — ENOXAPARIN SODIUM 50 MG: 100 INJECTION SUBCUTANEOUS at 12:03

## 2022-07-09 RX ADMIN — ALBUTEROL SULFATE 2.5 MG: 2.5 SOLUTION RESPIRATORY (INHALATION) at 19:44

## 2022-07-09 RX ADMIN — AMIODARONE HYDROCHLORIDE 100 MG: 200 TABLET ORAL at 08:24

## 2022-07-09 RX ADMIN — ALBUMIN (HUMAN) 25 G: 0.25 INJECTION, SOLUTION INTRAVENOUS at 12:03

## 2022-07-09 RX ADMIN — SODIUM CHLORIDE, PRESERVATIVE FREE 10 ML: 5 INJECTION INTRAVENOUS at 23:55

## 2022-07-09 RX ADMIN — Medication 6 UNITS: at 12:03

## 2022-07-09 RX ADMIN — ALBUMIN (HUMAN) 25 G: 0.25 INJECTION, SOLUTION INTRAVENOUS at 17:43

## 2022-07-09 RX ADMIN — SODIUM CHLORIDE, PRESERVATIVE FREE 10 ML: 5 INJECTION INTRAVENOUS at 15:04

## 2022-07-09 RX ADMIN — NYSTATIN: 100000 POWDER TOPICAL at 08:27

## 2022-07-09 RX ADMIN — SODIUM CHLORIDE, PRESERVATIVE FREE 10 ML: 5 INJECTION INTRAVENOUS at 05:42

## 2022-07-09 RX ADMIN — PREDNISOLONE ACETATE 1 DROP: 10 SUSPENSION/ DROPS OPHTHALMIC at 21:12

## 2022-07-09 RX ADMIN — CEFEPIME 2 G: 2 INJECTION, POWDER, FOR SOLUTION INTRAVENOUS at 16:51

## 2022-07-09 RX ADMIN — ENOXAPARIN SODIUM 50 MG: 100 INJECTION SUBCUTANEOUS at 00:58

## 2022-07-09 RX ADMIN — Medication 6 UNITS: at 21:11

## 2022-07-09 RX ADMIN — LEVOTHYROXINE SODIUM 125 MCG: 0.1 TABLET ORAL at 06:36

## 2022-07-09 RX ADMIN — POTASSIUM CHLORIDE 10 MEQ: 7.46 INJECTION, SOLUTION INTRAVENOUS at 06:54

## 2022-07-09 RX ADMIN — IOPAMIDOL 100 ML: 612 INJECTION, SOLUTION INTRAVENOUS at 13:19

## 2022-07-09 RX ADMIN — IOHEXOL: 240 INJECTION, SOLUTION INTRATHECAL; INTRAVASCULAR; INTRAVENOUS; ORAL at 12:03

## 2022-07-09 RX ADMIN — NYSTATIN: 100000 POWDER TOPICAL at 21:12

## 2022-07-09 RX ADMIN — Medication 3 UNITS: at 08:26

## 2022-07-09 RX ADMIN — ALBUMIN (HUMAN) 25 G: 0.25 INJECTION, SOLUTION INTRAVENOUS at 00:58

## 2022-07-09 RX ADMIN — ALBUMIN (HUMAN) 25 G: 0.25 INJECTION, SOLUTION INTRAVENOUS at 23:56

## 2022-07-09 RX ADMIN — ALBUMIN (HUMAN) 25 G: 0.25 INJECTION, SOLUTION INTRAVENOUS at 05:42

## 2022-07-09 RX ADMIN — PANTOPRAZOLE SODIUM 40 MG: 40 TABLET, DELAYED RELEASE ORAL at 08:24

## 2022-07-09 NOTE — PROGRESS NOTES
Cefepime Extended Infusion    Travis Siu, a 78 y.o. yo female, has been converted to an intermittent dosing of Cefepime while on [address]. A loading dose of Cefepime 2 gm was administered over 30 minutes. Infusions will run over (30 minutes). Dose renally adjusted to:  Cefepime 2 grams IV q12h  x 7 days    (prior order: Cefepime 2 grams IV q12h x 7 days) over 240 mins     Recent Labs     07/09/22  0149 07/08/22  0416 07/07/22  0434   CREA 0.53* 0.58* 0.55*     Ht Readings from Last 1 Encounters:   07/07/22 170.2 cm (67\")     Wt Readings from Last 1 Encounters:   07/05/22 53.5 kg (118 lb)       CrCl : Serum creatinine: 0.53 mg/dL (L) 07/09/22 0149  Estimated creatinine clearance: 51.4 mL/min (A)    Pharmacy to continue to monitor daily.    Vasquez Cortes, [facility]

## 2022-07-09 NOTE — PROGRESS NOTES
Hospitalist Progress Note    Patient: Parveen Novoa MRN: [id number]  CSN: [id number]    YOB: 1940  Age: 78 y.o. Sex: female    DOA: 7/3/2022 LOS:  LOS: 6 days            Assessment/Plan     Principal Problem:    Acute respiratory failure with hypoxia ([address] (7/3/2022)    Active Problems:    Hypokalemia (9/21/2012)      Neutropenic fever ([address] (7/3/2022)      Hypomagnesemia (7/3/2022)      Diffuse large B cell lymphoma (HCC) (7/3/2022)      GERD (gastroesophageal reflux disease) ()      Thyroid disease ()      Acute encephalopathy ()      Hyponatremia (7/3/2022)      Pancytopenia ([address] (7/3/2022)      UTI (urinary tract infection) (7/3/2022)      Sinusitis (7/3/2022)      Myopia of both eyes with astigmatism (7/3/2022)      Pleural effusion (7/3/2022)      Cellulitis (7/3/2022)      Severe protein-calorie malnutrition ([address] (7/3/2022)      Septic shock ([address] (7/4/2022)      Pt was admitted for respiratory failure and neutropenic fever. Patient was transferred to telemetry on July 8 from ICU.     Acute respiratory failure with hypoxia with 6 L oxygen   Still on 6 L . Flu negative, rapid covid 19 negative -confirmed per pcr   cta no central PE   pulm f/u      Pleural effusion: Moderate and large effusion-will defer to pulmonologist decide for thoracentesis   Lasix -low dose      Cellulitis of rt leg, neutropenia fever , acute on chronic sinusitis , UTI-cellulitis resolving   Antibiotics changed on vancomycin and cefepime infectious diseases following      PAF : flares up on amiodorone gtt -now on po amiodorone   Cardiologist f/u   Continue balance electrolytes and echo : ef wnl      Septic shock -resolved      Pancytopenia-improving   Due to chemo   lovenox hold due to thrombocytopenia   neutropenic fever :afebrile      Diffusion Large B lymphoma   On chemo therapy   Oncologist f/u     splenic hypodensities?: Pending CT /abdominal    Hyponatremia , hypomagnesemia/Resolved.  Hypokalemia: Will replace K    DM type II: Ssi , hypoglycemia protocol      Hypothyroidism: Continue synthyroid  ,ths and free t4 ordered      Acute encephalopath: Much better, mri-not need      Myopia      Mks: rt ankle swelling-resolved      Severe malnutrition      Hypoalbuminemia   Albumin infusion     Dispo: SNF    Long discussion with  in the presence of patient re medicines, hospital course. We reviewed and discussed assessment of condition and went over plans of care. Questions answered. Total time 45 min's, including more than 50% on discussion with family and coordinating care.       CC:  respiratory failure and neutropenic fever             Subjective:     Pt was seen and examined with the nurse in the morning round. \" I am feeling better\"    Her  was at the bedside    Review of systems  Limited 2nd to chronic illness    Objective:      Visit Vitals  /66   Pulse 89   Temp 97.7 °F (36.5 °C)   Resp 24   Ht 5' 7\" (1.702 m)   Wt 53.5 kg (118 lb)   SpO2 94%   BMI 18.48 kg/m²       Physical Exam:    Gen: NAD, chronic ill appearing  Heent:  MMM, NC, AT. Cor: s1s2 RRR. No MRG. PMI mid 5th intercostal space. Resp:  CTA b/l. No w/r/r. Nml effort and diaphragmatic excursion. Abd:  NT ND.  BS positive. No rebound or guarding. No masses. Ext: 1+ edema. Intake and Output:  Current Shift:  No intake/output data recorded.   Last three shifts:  07/07 1901 - 07/09 0700  In: 2000 [I.V.:2000]  Out: -     Labs: Results:       Chemistry Recent Labs     07/09/22  0149 07/08/22  1935 07/08/22  0416 07/07/22  0434 07/07/22  0434   *  --  190*  --  114*     --  138  --  139   K 3.2* 3.5 2.9*   < > 3.5     --  103  --  108   CO2 30  --  27  --  26   BUN 31*  --  23*  --  16   CREA 0.53*  --  0.58*  --  0.55*   CA 9.7  --  9.3  --  9.3   AGAP 4  --  8  --  5   BUCR 58*  --  40*  --  29*   AP 49  --  51  --  50   TP 6.5  --  6.5  --  6.3*   ALB 4.2  --  4.2  --  3.8   GLOB 2.3  --  2.3  --  2.5   AGRAT 1.8*  --  1.8*  --  1.5    < > = values in this interval not displayed. CBC w/Diff Recent Labs     07/09/22  0149 07/08/22  0416 07/07/22  0434   WBC 3.1* 2.6* 1.2*   RBC 3.06* 3.06* 3.02*   HGB 8.7* 8.9* 8.7*   HCT 27.2* 27.8* 27.5*    161 114*   GRANS 62 48 44   LYMPH 11* 12* 12*   EOS 10* 4 16*      Cardiac Enzymes No results for input(s): CPK, CKND1, HUMBERTO in the last 72 hours. No lab exists for component: CKRMB, TROIP   Coagulation No results for input(s): PTP, INR, APTT, INREXT in the last 72 hours. Lipid Panel No results found for: CHOL, CHOLPOCT, CHOLX, CHLST, CHOLV, 753729, HDL, HDLP, LDL, LDLC, DLDLP, 622439, VLDLC, VLDL, TGLX, TRIGL, TRIGP, TGLPOCT, CHHD, CHHDX   BNP No results for input(s): BNPP in the last 72 hours.    Liver Enzymes Recent Labs     07/09/22  0149   TP 6.5   ALB 4.2   AP 49      Thyroid Studies Lab Results   Component Value Date/Time    TSH 3.50 07/04/2022 04:40 AM        Procedures/imaging: see electronic medical records for all procedures/Xrays and details which were not copied into this note but were reviewed prior to creation of Plan      Medications Reviewed  Zenobia Gallo MD

## 2022-07-09 NOTE — PROGRESS NOTES
Cardiology Progress Note        Patient: Mirela Garcia        Sex: female          DOA: 7/3/2022  YOB: 1940      Age:  78 y.o.        LOS:  LOS: 6 days      Patient seen and examined, chart reviewed. Assessment/Plan     Patient Active Problem List   Diagnosis Code    S/P total knee arthroplasty Z96.659    Hypokalemia E87.6    HTN (hypertension), benign I10    Neutropenic fever ([address] D70.9, R50.81    Acute respiratory failure with hypoxia ([address] J96.01    Hypomagnesemia E83.42    Diffuse large B cell lymphoma (HCC) C83.30    GERD (gastroesophageal reflux disease) K21.9    Thyroid disease E07.9    Acute encephalopathy G93.40    Hyponatremia E87.1    Pancytopenia (HCC) D61.818    UTI (urinary tract infection) N39.0    Sinusitis J32.9    Myopia of both eyes with astigmatism H52.13, H52.203    Pleural effusion J90    Cellulitis L03.90    Severe protein-calorie malnutrition (HCC) E43    Septic shock (HCC) A41.9, R65.21      Echocardiogram revealed    Echocardiogram revealed       Left Ventricle: Normal left ventricular systolic function with a visually estimated EF of 60 - 65%. Left ventricle size is normal. Normal wall thickness. Normal wall motion. Indeterminate diastolic function due to atrial fibrillation.   Tricuspid Valve: The estimated PASP is 41 mmHg. She was transferred to ICU for episode of tachycardia. Plan:    Continue Amiodarone   Continue Full dose of Lovenx  Monitor and replace electrolytes as per hospitalist                Subjective:    cc:  Denies any chest pain or shortness of breath      REVIEW OF SYSTEMS:     General: No fevers or chills. Cardiovascular: Positive shortness of breath, No chest pain,No palpitations, No orthopnea, No PND, No leg swelling, No claudication  Pulmonary: No dyspnea.    Gastrointestinal: No nausea, vomiting, bleeding  Neurology: No Dizziness    Objective:      Visit Vitals  /66   Pulse 88   Temp 97.4 °F (36.3 °C)   Resp 22   Ht 5' 7\" (1.702 m)   Wt 53.5 kg (118 lb)   SpO2 97%   BMI 18.48 kg/m²     Body mass index is 18.48 kg/m². Physical Exam:  General Appearance: Looks chronically ill, Comfortable, not using accessory muscles of respiration. HEENT: MICHELINE. HEAD: Atraumatic  NECK: No JVD, no thyroidomeglay. CAROTIDS: No bruit  LUNGS: Clear bilaterally. HEART: S1+S2 audible, no murmur, no pericardial rub.    NEUROLOGICAL: Alert, follows verbal commands    Medication:  Current Facility-Administered Medications   Medication Dose Route Frequency    cefepime (MAXIPIME) 2 g in 0.9% sodium chloride (MBP/ADV) 100 mL MBP  2 g IntraVENous Q12H    prednisoLONE acetate (PRED FORTE) 1 % ophthalmic suspension 1 Drop  1 Drop Both Eyes BID    [START ON 7/10/2022] fluticasone propionate (FLONASE) 50 mcg/actuation nasal spray 2 Spray  2 Spray Both Nostrils DAILY    [START ON 7/10/2022] digoxin (LANOXIN) injection 100 mcg  100 mcg IntraVENous DAILY    lidocaine-prilocaine (EMLA) 2.5-2.5 % cream   Topical PRN    nystatin (MYCOSTATIN) 100,000 unit/gram powder   Topical BID    pantoprazole (PROTONIX) tablet 40 mg  40 mg Oral ACB    [Held by provider] furosemide (LASIX) injection 20 mg  20 mg IntraVENous Q12H    insulin lispro (HUMALOG) injection   SubCUTAneous AC&HS    levothyroxine (SYNTHROID) tablet 125 mcg  125 mcg Oral DAILY    amiodarone (CORDARONE) tablet 100 mg  100 mg Oral DAILY    enoxaparin (LOVENOX) injection 50 mg  1 mg/kg SubCUTAneous Q12H    ipratropium (ATROVENT) 0.02 % nebulizer solution 0.5 mg  0.5 mg Nebulization TID PRN    albuterol (PROVENTIL VENTOLIN) nebulizer solution 2.5 mg  2.5 mg Nebulization Q4H PRN    sodium chloride (NS) flush 5-40 mL  5-40 mL IntraVENous Q8H    sodium chloride (NS) flush 5-40 mL  5-40 mL IntraVENous PRN    acetaminophen (TYLENOL) tablet 650 mg  650 mg Oral Q6H PRN    Or    acetaminophen (TYLENOL) suppository 650 mg  650 mg Rectal Q6H PRN    polyethylene glycol (MIRALAX) packet 17 g  17 g Oral DAILY PRN    ondansetron (ZOFRAN ODT) tablet 4 mg  4 mg Oral Q8H PRN    Or    ondansetron (ZOFRAN) injection 4 mg  4 mg IntraVENous Q6H PRN    albumin human 25% (BUMINATE) solution 25 g  25 g IntraVENous Q6H    glucose chewable tablet 16 g  4 Tablet Oral PRN    glucagon (GLUCAGEN) injection 1 mg  1 mg IntraMUSCular PRN    dextrose 10% infusion 0-250 mL  0-250 mL IntraVENous PRN               Lab/Data Reviewed:       Recent Labs     07/09/22 0149 07/08/22 0416 07/07/22 0434   WBC 3.1* 2.6* 1.2*   HGB 8.7* 8.9* 8.7*   HCT 27.2* 27.8* 27.5*    161 114*     Recent Labs     07/09/22 0149 07/08/22  1935 07/08/22 0416 07/07/22 0434 07/07/22 0434     --  138  --  139   K 3.2* 3.5 2.9*   < > 3.5     --  103  --  108   CO2 30  --  27  --  26   *  --  190*  --  114*   BUN 31*  --  23*  --  16   CREA 0.53*  --  0.58*  --  0.55*   CA 9.7  --  9.3  --  9.3    < > = values in this interval not displayed.        Signed By: Evangelina Whitten MD     July 9, 2022

## 2022-07-09 NOTE — PROGRESS NOTES
Cardiology Progress Note        Patient: Renan Bradford        Sex: female          DOA: 7/3/2022  YOB: 1940      Age:  78 y.o.        LOS:  LOS: 6 days      Patient seen and examined, chart reviewed. Assessment/Plan     Patient Active Problem List   Diagnosis Code    S/P total knee arthroplasty Z96.659    Hypokalemia E87.6    HTN (hypertension), benign I10    Neutropenic fever ([address] D70.9, R50.81    Acute respiratory failure with hypoxia ([address] J96.01    Hypomagnesemia E83.42    Diffuse large B cell lymphoma (HCC) C83.30    GERD (gastroesophageal reflux disease) K21.9    Thyroid disease E07.9    Acute encephalopathy G93.40    Hyponatremia E87.1    Pancytopenia (HCC) D61.818    UTI (urinary tract infection) N39.0    Sinusitis J32.9    Myopia of both eyes with astigmatism H52.13, H52.203    Pleural effusion J90    Cellulitis L03.90    Severe protein-calorie malnutrition (HCC) E43    Septic shock (HCC) A41.9, R65.21        Paroxysmal atrial fibrillation    Echocardiogram revealed       Left Ventricle: Normal left ventricular systolic function with a visually estimated EF of 60 - 65%. Left ventricle size is normal. Normal wall thickness. Normal wall motion. Indeterminate diastolic function due to atrial fibrillation.   Tricuspid Valve: The estimated PASP is 41 mmHg. she had brief episode of tachycardia and was transferred back to ICU    Plan:    Continue Amiodarone     Start digoxin 100 mcg IV once a day  Continue Full dose of Lovenx  Monitor and replace electrolytes as per hospitalist  Plan discussed with patient's family member at bed side              Subjective:    cc:  Denies any chest pain or shortness of breath      REVIEW OF SYSTEMS:     General: No fevers or chills. Cardiovascular: Positive shortness of breath, No chest pain,No palpitations, No orthopnea, No PND, No leg swelling, No claudication  Pulmonary: No dyspnea.    Gastrointestinal: No nausea, vomiting, bleeding  Neurology: No Dizziness    Objective:      Visit Vitals  /66   Pulse 88   Temp 97.4 °F (36.3 °C)   Resp 22   Ht 5' 7\" (1.702 m)   Wt 53.5 kg (118 lb)   SpO2 97%   BMI 18.48 kg/m²     Body mass index is 18.48 kg/m². Physical Exam:  General Appearance: Looks chronically ill, Comfortable, not using accessory muscles of respiration. HEENT: MICHELINE. HEAD: Atraumatic  NECK: No JVD, no thyroidomeglay. CAROTIDS: No bruit  LUNGS: Clear bilaterally. HEART: S1+S2 audible, no murmur, no pericardial rub.    NEUROLOGICAL: Alert, follows verbal commands    Medication:  Current Facility-Administered Medications   Medication Dose Route Frequency    cefepime (MAXIPIME) 2 g in 0.9% sodium chloride (MBP/ADV) 100 mL MBP  2 g IntraVENous Q12H    prednisoLONE acetate (PRED FORTE) 1 % ophthalmic suspension 1 Drop  1 Drop Both Eyes BID    [START ON 7/10/2022] fluticasone propionate (FLONASE) 50 mcg/actuation nasal spray 2 Spray  2 Spray Both Nostrils DAILY    [START ON 7/10/2022] digoxin (LANOXIN) injection 100 mcg  100 mcg IntraVENous DAILY    lidocaine-prilocaine (EMLA) 2.5-2.5 % cream   Topical PRN    nystatin (MYCOSTATIN) 100,000 unit/gram powder   Topical BID    pantoprazole (PROTONIX) tablet 40 mg  40 mg Oral ACB    [Held by provider] furosemide (LASIX) injection 20 mg  20 mg IntraVENous Q12H    insulin lispro (HUMALOG) injection   SubCUTAneous AC&HS    levothyroxine (SYNTHROID) tablet 125 mcg  125 mcg Oral DAILY    amiodarone (CORDARONE) tablet 100 mg  100 mg Oral DAILY    enoxaparin (LOVENOX) injection 50 mg  1 mg/kg SubCUTAneous Q12H    ipratropium (ATROVENT) 0.02 % nebulizer solution 0.5 mg  0.5 mg Nebulization TID PRN    albuterol (PROVENTIL VENTOLIN) nebulizer solution 2.5 mg  2.5 mg Nebulization Q4H PRN    sodium chloride (NS) flush 5-40 mL  5-40 mL IntraVENous Q8H    sodium chloride (NS) flush 5-40 mL  5-40 mL IntraVENous PRN    acetaminophen (TYLENOL) tablet 650 mg  650 mg Oral Q6H PRN    Or    acetaminophen (TYLENOL) suppository 650 mg  650 mg Rectal Q6H PRN    polyethylene glycol (MIRALAX) packet 17 g  17 g Oral DAILY PRN    ondansetron (ZOFRAN ODT) tablet 4 mg  4 mg Oral Q8H PRN    Or    ondansetron (ZOFRAN) injection 4 mg  4 mg IntraVENous Q6H PRN    albumin human 25% (BUMINATE) solution 25 g  25 g IntraVENous Q6H    glucose chewable tablet 16 g  4 Tablet Oral PRN    glucagon (GLUCAGEN) injection 1 mg  1 mg IntraMUSCular PRN    dextrose 10% infusion 0-250 mL  0-250 mL IntraVENous PRN               Lab/Data Reviewed:       Recent Labs     07/09/22 0149 07/08/22 0416 07/07/22 0434   WBC 3.1* 2.6* 1.2*   HGB 8.7* 8.9* 8.7*   HCT 27.2* 27.8* 27.5*    161 114*     Recent Labs     07/09/22  0149 07/08/22  1935 07/08/22 0416 07/07/22 0434 07/07/22 0434     --  138  --  139   K 3.2* 3.5 2.9*   < > 3.5     --  103  --  108   CO2 30  --  27  --  26   *  --  190*  --  114*   BUN 31*  --  23*  --  16   CREA 0.53*  --  0.58*  --  0.55*   CA 9.7  --  9.3  --  9.3    < > = values in this interval not displayed.        Signed By: Rosalinda Lemus MD     July 9, 2022

## 2022-07-09 NOTE — PROGRESS NOTES
Occupational Therapy Evaluation Attempt     OT eval orders received. Chart reviewed. Attempted Occupational Therapy Evaluation, however, patient unable to be seen due to:  []  Nausea/vomiting  []  Eating  []  Pain  []  Patient too lethargic  []  Off Unit for testing/procedure  []  Dialysis treatment in progress  []  Telemetry Results  [x]  Other: Pt confused and requires frequent verbal redirection to participate with therapy. Defers participation at this time. Will f/u later as patient's schedule allows.   Lesly Verdugo, OTR/L

## 2022-07-09 NOTE — PROGRESS NOTES
26- Dr. Shari Paget paged regarding potassium replacement. 0800- Dr. Shari Paget stated to DC IV potassium and order a one time dose of PO 40 meQ. 65-  came to pt room with fresh flower, reviewed and explained neutropenic precautions.  states \"she lives with flowers\" and placed in water at bedside.

## 2022-07-09 NOTE — PROGRESS NOTES
Problem: Mobility Impaired (Adult and Pediatric)  Goal: *Acute Goals and Plan of Care (Insert Text)  Description: FUNCTIONAL STATUS PRIOR TO ADMISSION: Pt. Requires assistance for ADLs and Mod I with RW at baseline for short distances    [facility]: The patient lives with  who provides assistance as well as care aids who provide bathing once per week. Physical Therapy Goals  Initiated 7/9/2022  1. Patient will move from supine to sit and sit to supine  in bed with minimal assistance/contact guard assist within 7 day(s). 2.  Patient will transfer from bed to chair and chair to bed with minimal assistance/contact guard assist using the least restrictive device within 7 day(s). 3.  Patient will perform sit to stand with minimal assistance/contact guard assist within 7 day(s). 4.  Patient will ambulate with minimal assistance/contact guard assist for 50ft feet with the least restrictive device within 7 day(s). Outcome: Progressing Towards Goal  physical Therapy EVALUATION    Patient: Gia Martines (16 y.o. female)  Date: 7/9/2022  Primary Diagnosis: Neutropenic fever ([facility] [address] [D70.9, R50.81]        Precautions: PT, Fall       ASSESSMENT :  Based on the objective data described below, the patient presents with decreased functional mobility/strength/endurance from baseline. Pt. Is a poor historian oriented to person/place only  at bedside providing subjective information. Pt. Requires mod A for bed mobility, functional transfers with a RW, and lateral steps x 5 toward [facility] requiring several verbal/tactile cueing throughout for good performance. Pt. Struggles to advance BLE during OOB mobility and requires weight shifting tactile cueing and max A RW management for mobility. Patient will benefit from skilled intervention to address the above impairments.   Patients rehabilitation potential is considered to be Fair  Factors which may influence rehabilitation potential include:   [] None noted  []         Mental ability/status  []         Medical condition  []         Home/family situation and support systems  []         Safety awareness  []         Pain tolerance/management  []         Other:      PLAN :  Recommendations and Planned Interventions:  []           Bed Mobility Training             []    Neuromuscular Re-Education  []           Transfer Training                   []    Orthotic/Prosthetic Training  []           Gait Training                          []    Modalities  []           Therapeutic Exercises          []    Edema Management/Control  []           Therapeutic Activities            []    Patient and Family Training/Education  []           Other (comment):    Frequency/Duration: Patient will be followed by physical therapy 1-2 times per day to address goals. Discharge Recommendations: Alexei Neville  Further Equipment Recommendations for Discharge: N/A     SUBJECTIVE:   Patient stated im really tired.     OBJECTIVE DATA SUMMARY:     Past Medical History:   Diagnosis Date    Arthritis     GERD (gastroesophageal reflux disease)     Hypertension     Osteoarthritis of right knee 9/19/2012    S/P total knee arthroplasty 9/19/2012    Thyroid disease      Past Surgical History:   Procedure Laterality Date    HX APPENDECTOMY  1950's    HX CHOLECYSTECTOMY      laparoscopic    HX HEENT      Surgery OD infection x 4    HX HEENT      throat surgery x 2    HX KNEE ARTHROSCOPY      right     Barriers to Learning/Limitations: yes;  cognitive and altered mental status (i.e.Sedation, Confusion)  Compensate with: Verbal Cues, Tactile Cues, and Kinesthetic Cues  Prior Level of Function/Home Situation:   Home Situation  Home Environment: Private residence  # Steps to Enter: 1  Rails to Enter: No  One/Two Story Residence: One story  Living Alone: No  Support Systems: Spouse/Significant Other  Patient Expects to be Discharged to[de-identified] Skilled nursing facility  Current DME Used/Available at Home: Hospital bed,Lift chair,Walker, rolling  Critical Behavior:  Neurologic State: Confused  Orientation Level: Oriented to person;Disoriented to place  Cognition: Impaired decision making    Functional Mobility:  Bed Mobility:     Supine to Sit: Moderate assistance  Sit to Supine: Minimum assistance  Scooting: Moderate assistance  Transfers:  Sit to Stand: Moderate assistance  Stand to Sit: Moderate assistance    Activity Tolerance:   FAIR  Please refer to the flowsheet for vital signs taken during this treatment. After treatment:   []         Patient left in no apparent distress sitting up in chair  [x]         Patient left in no apparent distress in bed  [x]         Call bell left within reach  [x]         Nursing notified  [x]         Caregiver present  []         Bed alarm activated    COMMUNICATION/EDUCATION:   [x]         Fall prevention education was provided and the patient/caregiver indicated understanding. [x]         Patient/family have participated as able in goal setting and plan of care. [x]         Patient/family agree to work toward stated goals and plan of care. []         Patient understands intent and goals of therapy, but is neutral about his/her participation. []         Patient is unable to participate in goal setting and plan of care.     Thank you for this referral.  Penelope Peter, PT, DPT   Time Calculation: 25 mins

## 2022-07-10 LAB
ALBUMIN SERPL-MCNC: 4.7 G/DL (ref 3.4–5)
ALBUMIN/GLOB SERPL: 2.6 {RATIO} (ref 0.8–1.7)
ALP SERPL-CCNC: 46 U/L (ref 45–117)
ALT SERPL-CCNC: 10 U/L (ref 13–56)
ANION GAP SERPL CALC-SCNC: 4 MMOL/L (ref 3–18)
AST SERPL-CCNC: 9 U/L (ref 10–38)
BASOPHILS # BLD: 0 K/UL (ref 0–0.1)
BASOPHILS NFR BLD: 0 % (ref 0–2)
BILIRUB SERPL-MCNC: 0.5 MG/DL (ref 0.2–1)
BUN SERPL-MCNC: 27 MG/DL (ref 7–18)
BUN/CREAT SERPL: 66 (ref 12–20)
CA-I SERPL-SCNC: 1.25 MMOL/L (ref 1.12–1.32)
CALCIUM SERPL-MCNC: 10 MG/DL (ref 8.5–10.1)
CHLORIDE SERPL-SCNC: 107 MMOL/L (ref 100–111)
CO2 SERPL-SCNC: 30 MMOL/L (ref 21–32)
CREAT SERPL-MCNC: 0.41 MG/DL (ref 0.6–1.3)
DIFFERENTIAL METHOD BLD: ABNORMAL
EOSINOPHIL # BLD: 0.4 K/UL (ref 0–0.4)
EOSINOPHIL NFR BLD: 8 % (ref 0–5)
ERYTHROCYTE [DISTWIDTH] IN BLOOD BY AUTOMATED COUNT: ABNORMAL % (ref 11.6–14.5)
GLOBULIN SER CALC-MCNC: 1.8 G/DL (ref 2–4)
GLUCOSE BLD STRIP.AUTO-MCNC: 112 MG/DL (ref 70–110)
GLUCOSE BLD STRIP.AUTO-MCNC: 175 MG/DL (ref 70–110)
GLUCOSE BLD STRIP.AUTO-MCNC: 188 MG/DL (ref 70–110)
GLUCOSE BLD STRIP.AUTO-MCNC: 273 MG/DL (ref 70–110)
GLUCOSE SERPL-MCNC: 154 MG/DL (ref 74–99)
HCT VFR BLD AUTO: 27.7 % (ref 35–45)
HGB BLD-MCNC: 8.6 G/DL (ref 12–16)
IMM GRANULOCYTES # BLD AUTO: 0 K/UL
IMM GRANULOCYTES NFR BLD AUTO: 0 %
LYMPHOCYTES # BLD: 0.5 K/UL (ref 0.9–3.6)
LYMPHOCYTES NFR BLD: 12 % (ref 21–52)
MAGNESIUM SERPL-MCNC: 2.1 MG/DL (ref 1.6–2.6)
MCH RBC QN AUTO: 28.9 PG (ref 24–34)
MCHC RBC AUTO-ENTMCNC: 31 G/DL (ref 31–37)
MCV RBC AUTO: 93 FL (ref 78–100)
MONOCYTES # BLD: 0.5 K/UL (ref 0.05–1.2)
MONOCYTES NFR BLD: 10 % (ref 3–10)
NEUTS BAND NFR BLD MANUAL: 10 % (ref 0–5)
NEUTS SEG # BLD: 3.1 K/UL (ref 1.8–8)
NEUTS SEG NFR BLD: 60 % (ref 40–73)
NRBC # BLD: 0.07 K/UL (ref 0–0.01)
NRBC BLD-RTO: 1.5 PER 100 WBC
PHOSPHATE SERPL-MCNC: 1.4 MG/DL (ref 2.5–4.9)
PLATELET # BLD AUTO: 273 K/UL (ref 135–420)
PLATELET COMMENTS,PCOM: ABNORMAL
PMV BLD AUTO: 10.1 FL (ref 9.2–11.8)
POTASSIUM SERPL-SCNC: 3.5 MMOL/L (ref 3.5–5.5)
PROT SERPL-MCNC: 6.5 G/DL (ref 6.4–8.2)
RBC # BLD AUTO: 2.98 M/UL (ref 4.2–5.3)
RBC MORPH BLD: ABNORMAL
RBC MORPH BLD: ABNORMAL
SODIUM SERPL-SCNC: 141 MMOL/L (ref 136–145)
TOTAL CELLS COUNTED SPEC: 50
WBC # BLD AUTO: 4.5 K/UL (ref 4.6–13.2)

## 2022-07-10 PROCEDURE — 74011000258 HC RX REV CODE- 258: Performed by: HOSPITALIST

## 2022-07-10 PROCEDURE — 74011250637 HC RX REV CODE- 250/637: Performed by: INTERNAL MEDICINE

## 2022-07-10 PROCEDURE — 85025 COMPLETE CBC W/AUTO DIFF WBC: CPT

## 2022-07-10 PROCEDURE — 74011250636 HC RX REV CODE- 250/636: Performed by: HOSPITALIST

## 2022-07-10 PROCEDURE — P9047 ALBUMIN (HUMAN), 25%, 50ML: HCPCS | Performed by: HOSPITALIST

## 2022-07-10 PROCEDURE — 84100 ASSAY OF PHOSPHORUS: CPT

## 2022-07-10 PROCEDURE — 94640 AIRWAY INHALATION TREATMENT: CPT

## 2022-07-10 PROCEDURE — 65270000046 HC RM TELEMETRY

## 2022-07-10 PROCEDURE — 74011000250 HC RX REV CODE- 250: Performed by: INTERNAL MEDICINE

## 2022-07-10 PROCEDURE — 74011636637 HC RX REV CODE- 636/637: Performed by: HOSPITALIST

## 2022-07-10 PROCEDURE — 74011250636 HC RX REV CODE- 250/636: Performed by: INTERNAL MEDICINE

## 2022-07-10 PROCEDURE — 74011000250 HC RX REV CODE- 250: Performed by: HOSPITALIST

## 2022-07-10 PROCEDURE — 97535 SELF CARE MNGMENT TRAINING: CPT

## 2022-07-10 PROCEDURE — 77010033678 HC OXYGEN DAILY

## 2022-07-10 PROCEDURE — 82330 ASSAY OF CALCIUM: CPT

## 2022-07-10 PROCEDURE — 74011250637 HC RX REV CODE- 250/637: Performed by: FAMILY MEDICINE

## 2022-07-10 PROCEDURE — 97167 OT EVAL HIGH COMPLEX 60 MIN: CPT

## 2022-07-10 PROCEDURE — 74011250636 HC RX REV CODE- 250/636: Performed by: FAMILY MEDICINE

## 2022-07-10 PROCEDURE — 36415 COLL VENOUS BLD VENIPUNCTURE: CPT

## 2022-07-10 PROCEDURE — 74011250637 HC RX REV CODE- 250/637: Performed by: HOSPITALIST

## 2022-07-10 PROCEDURE — 80053 COMPREHEN METABOLIC PANEL: CPT

## 2022-07-10 PROCEDURE — 83735 ASSAY OF MAGNESIUM: CPT

## 2022-07-10 PROCEDURE — 82962 GLUCOSE BLOOD TEST: CPT

## 2022-07-10 RX ORDER — METRONIDAZOLE 500 MG/100ML
500 INJECTION, SOLUTION INTRAVENOUS EVERY 8 HOURS
Status: DISCONTINUED | OUTPATIENT
Start: 2022-07-10 | End: 2022-07-13

## 2022-07-10 RX ORDER — CLINDAMYCIN PHOSPHATE 600 MG/50ML
600 INJECTION INTRAVENOUS EVERY 8 HOURS
Status: DISCONTINUED | OUTPATIENT
Start: 2022-07-10 | End: 2022-07-10

## 2022-07-10 RX ADMIN — Medication 9 UNITS: at 16:42

## 2022-07-10 RX ADMIN — DIGOXIN 100 MCG: 250 INJECTION, SOLUTION INTRAMUSCULAR; INTRAVENOUS; PARENTERAL at 08:18

## 2022-07-10 RX ADMIN — ALBUTEROL SULFATE 2.5 MG: 2.5 SOLUTION RESPIRATORY (INHALATION) at 15:04

## 2022-07-10 RX ADMIN — PREDNISOLONE ACETATE 1 DROP: 10 SUSPENSION/ DROPS OPHTHALMIC at 08:20

## 2022-07-10 RX ADMIN — ALBUMIN (HUMAN) 25 G: 0.25 INJECTION, SOLUTION INTRAVENOUS at 13:13

## 2022-07-10 RX ADMIN — SODIUM CHLORIDE, PRESERVATIVE FREE 10 ML: 5 INJECTION INTRAVENOUS at 21:44

## 2022-07-10 RX ADMIN — FLUTICASONE PROPIONATE 2 SPRAY: 50 SPRAY, METERED NASAL at 08:19

## 2022-07-10 RX ADMIN — PANTOPRAZOLE SODIUM 40 MG: 40 TABLET, DELAYED RELEASE ORAL at 08:18

## 2022-07-10 RX ADMIN — METRONIDAZOLE 500 MG: 500 INJECTION, SOLUTION INTRAVENOUS at 21:43

## 2022-07-10 RX ADMIN — PREDNISOLONE ACETATE 1 DROP: 10 SUSPENSION/ DROPS OPHTHALMIC at 21:00

## 2022-07-10 RX ADMIN — LEVOTHYROXINE SODIUM 125 MCG: 0.1 TABLET ORAL at 06:39

## 2022-07-10 RX ADMIN — NYSTATIN: 100000 POWDER TOPICAL at 21:00

## 2022-07-10 RX ADMIN — NYSTATIN: 100000 POWDER TOPICAL at 08:17

## 2022-07-10 RX ADMIN — Medication 3 UNITS: at 08:17

## 2022-07-10 RX ADMIN — ALBUMIN (HUMAN) 25 G: 0.25 INJECTION, SOLUTION INTRAVENOUS at 18:20

## 2022-07-10 RX ADMIN — CEFEPIME 2 G: 2 INJECTION, POWDER, FOR SOLUTION INTRAVENOUS at 04:04

## 2022-07-10 RX ADMIN — ENOXAPARIN SODIUM 50 MG: 100 INJECTION SUBCUTANEOUS at 12:41

## 2022-07-10 RX ADMIN — CLINDAMYCIN PHOSPHATE 600 MG: 600 INJECTION, SOLUTION INTRAVENOUS at 16:49

## 2022-07-10 RX ADMIN — CEFEPIME 2 G: 2 INJECTION, POWDER, FOR SOLUTION INTRAVENOUS at 16:42

## 2022-07-10 RX ADMIN — AMIODARONE HYDROCHLORIDE 100 MG: 200 TABLET ORAL at 12:40

## 2022-07-10 RX ADMIN — ALBUMIN (HUMAN) 25 G: 0.25 INJECTION, SOLUTION INTRAVENOUS at 06:39

## 2022-07-10 RX ADMIN — Medication 3 UNITS: at 11:36

## 2022-07-10 RX ADMIN — SODIUM CHLORIDE, PRESERVATIVE FREE 10 ML: 5 INJECTION INTRAVENOUS at 05:39

## 2022-07-10 NOTE — CONSULTS
Brief Afar ID Note    Skulking before rounding tomorrow. Chart reviewed/pt NOT seen (yet) but will do so tomorrow after ICU rounds. It's her gut  Have tweaked the anaerobic agent to metronidazole from clindamycin for better activity. I'm mulling over tweaking the cefepime to something safer for an elderly lady, but will do so tomorrow.     Francois Montano MD  Cell [phone]  [facility]

## 2022-07-10 NOTE — PROGRESS NOTES
200- Dr. Asael Paige paged about NPO order for pt coughing after sips of water/thin liquids. Likely needs a speech therapy consult.

## 2022-07-10 NOTE — PROGRESS NOTES
Pt seen/examined. CT from 7/9 reviewed, labs reviewed. Pelvic fluid collection, likely abscess, likely diverticular in origin. Pt rel asymptomatic. Rec IR consultation to see if perc drain/catheter possible/recommended. Cont clinda, but f/u with cx. No plan for surgical intervention at this time.     Consult dictated #[id number]  RP

## 2022-07-10 NOTE — PROGRESS NOTES
OCCUPATIONAL THERAPY EVALUATION    Problem: Self Care Deficits Care Plan (Adult)  Goal: *Acute Goals and Plan of Care (Insert Text)  Outcome: Progressing Towards Goal  Note:   FUNCTIONAL STATUS PRIOR TO ADMISSION:  Pt confused unable to report history,  presents, reports she was ambulatory with walker but needed assistance with LB dressing, bathing, and some grooming tsaks. HOME SUPPORT: The patient lived with  and has caregivers come to assist with daily tasks. .    Initial Occupational Therapy Goals (7/10/2022) Within 7 day(s):  1. Patient will perform perform grooming at EOB with no posterior LOB with CGA to brush teeth and wash face for 5 minutes for increased independence in ADLs. 2. Patient will perform UB dressing with set up seated EOB for increased independence with ADLs. 3. Patient will perform LB dressing with min A & A/E PRN for increased independence with ADLs. 4. Patient will perform all aspects of toileting with min A for increased independence with ADLs. 5. Patient will perform LE ADLs utilizing body mechanics & adaptive strategies with 1 verbal cue for increased safety in ADLs. 6. Patient will independently apply energy conservation techniques with less than 3 verbal cue(s) for increased independence with ADLs. Patient: Anjali Story (26 y.o. female)  Date: 7/10/2022  Primary Diagnosis: Neutropenic fever ([facility] [address] [D70.9, R50.81]        Precautions:   Fall  PLOF: Pt  reports pt was ambulatory with walker, had care provided by aid for some daily tasks such as bathing and lower body dressing, pt was receiving home health PT and OT 2x/week prior to hospitalization. ASSESSMENT :  Based on the objective data described below, the patient presents with decreased independence in self care, decreased strength, decreased awareness impacting self and caregiver with daily activities. Pt presents supine in bed, had castrejon just placed a few hours ago and has some agitation.  Pt  present very helpful and caring. Pt inially not wanting to move but with coaching and encouragement is willing to. Upon moving legs EOB, pt begins to have involuntary loose bowel movement. Pt mod A supine to sit. Initially needs max A support to prevent postieor LOB, pt able to correct following 3 minutes of assistance. Mod to max A needed to stand with max A for bella care. Pt heavily leans to right side and on heels, educated on leaning forward and hands on walker which pt is able to do but unable to maintain balance. Following clean up pt returned to supine with mod A to continue clean up. No pressure ulcers noted on backside. Pt and caregiver educated on importance of rolling side to side to relieve pressure and check for bowels or moisture that may impact pain on backside. Pt left with all needs in reach. Will need SNF placement following d/c.     Education: Pt and caregiver educated on role of OT in acute setting. Patient will benefit from skilled intervention to address the above impairments.   Patient's rehabilitation potential is considered to be Fair  Factors which may influence rehabilitation potential include:   []             None noted  [x]             Mental ability/status  [x]             Medical condition  [x]             Home/family situation and support systems  [x]             Safety awareness  [x]             Pain tolerance/management  []             Other:      PLAN :  Recommendations and Planned Interventions:   [x]               Self Care Training                  [x]      Therapeutic Activities  [x]               Functional Mobility Training   [x]      Cognitive Retraining  [x]               Therapeutic Exercises           [x]      Endurance Activities  [x]               Balance Training                    [x]      Neuromuscular Re-Education  [x]               Visual/Perceptual Training     [x]      Home Safety Training  [x]               Patient Education                   [x] Family Training/Education  []               Other (comment):    Frequency/Duration: Patient will be followed by occupational therapy daily to address goals. Discharge Recommendations: Alexei Neville  Further Equipment Recommendations for Discharge: TBD      SUBJECTIVE:   Patient stated do I have to do this.     OBJECTIVE DATA SUMMARY:     Past Medical History:   Diagnosis Date    Arthritis     GERD (gastroesophageal reflux disease)     Hypertension     Osteoarthritis of right knee 9/19/2012    S/P total knee arthroplasty 9/19/2012    Thyroid disease      Past Surgical History:   Procedure Laterality Date    HX APPENDECTOMY  1950's    HX CHOLECYSTECTOMY      laparoscopic    HX HEENT      Surgery OD infection x 4    HX HEENT      throat surgery x 2    HX KNEE ARTHROSCOPY      right     Barriers to Learning/Limitations: None  Compensate with: visual, verbal, tactile, kinesthetic cues/model    Home Situation:   Home Situation  Home Environment: Private residence  # Steps to Enter: 1  Rails to Enter: No  One/Two Story Residence: One story  Living Alone: No  Support Systems: Spouse/Significant Other  Patient Expects to be Discharged to[de-identified] Skilled nursing facility  Current DME Used/Available at Home: Adaptive bathing aides,Commode, bedside,Shower chair,Hospital bed,Walker, rolling  Tub or Shower Type: Tub/Shower combination  [x]  Right hand dominant   []  Left hand dominant    Cognitive/Behavioral Status:  Neurologic State: Alert;Confused  Orientation Level: Oriented to person;Oriented to situation  Cognition: Follows commands  Safety/Judgement: Decreased awareness of environment; Fall prevention    Skin: intact, redness noted on backside  Edema: none noted    Vision/Perceptual:    Tracking: Able to track stimulus in all quadrants w/o difficulty            Coordination: BUE  Coordination: Generally decreased, functional  Fine Motor Skills-Upper: Left Intact; Right Impaired    Gross Motor Skills-Upper: Left Intact; Right Intact    Balance:  Sitting: Impaired  Sitting - Static: Fair (occasional)  Sitting - Dynamic: Poor (constant support)  Standing: Impaired  Standing - Static: Poor  Standing - Dynamic : Poor    Strength: BUE  Strength: Generally decreased, functional       Tone & Sensation: BUE  Tone: Abnormal  Sensation: Intact       Range of Motion: BUE  AROM: Generally decreased, functional    Functional Mobility and Transfers for ADLs:  Bed Mobility:     Supine to Sit: Moderate assistance  Sit to Supine: Moderate assistance  Scooting: Minimum assistance  Transfers:  Sit to Stand: Moderate assistance      ADL Assessment:   Feeding: Minimum assistance    Oral Facial Hygiene/Grooming: Minimum assistance    Bathing: Moderate assistance    Upper Body Dressing: Moderate assistance    Lower Body Dressing: Maximum assistance    Toileting: Maximum assistance       ADL Intervention:    Cognitive Retraining  Executive Functions: Managing time;Regulating behavior; Executing cognitive plans  Attention to Task: Single task  Following Commands: Follows one step commands/directions  Safety/Judgement: Decreased awareness of environment; Fall prevention      Pain:  Pain level pre-treatment: 5/10   Pain level post-treatment: 5/10   Pain Intervention(s): Medication provided by Nursing (see MAR); Rest, Ice, Repositioning   Response to intervention: Nurse notified, See doc flow sheet    Activity Tolerance:   Low, needs mod to max A to assist with tasks. Please refer to the flowsheet for vital signs taken during this treatment. After treatment:   [] Patient left in no apparent distress sitting up in chair  [x] Patient left in no apparent distress in bed  [x] Call bell left within reach  [x] Nursing notified  [x] Caregiver present  [] Bed alarm activated    COMMUNICATION/EDUCATION:   [x] Role of Occupational Therapy in the acute care setting  [x] Home safety education was provided and the patient/caregiver indicated understanding.   [x] Patient/family have participated as able in goal setting and plan of care. [x] Patient/family agree to work toward stated goals and plan of care. [] Patient understands intent and goals of therapy, but is neutral about his/her participation. [] Patient is unable to participate in goal setting and plan of care. Thank you for this referral.  Norman Palma OTD, OTR/L   Time Calculation: 34 mins    Eval Complexity: History: HIGH Complexity : Extensive review of history including physical, cognitive and psychosocial history ; Examination: HIGH Complexity : 5 or more performance deficits relating to physical, cognitive , or psychosocial skils that result in activity limitations and / or participation restrictions; Decision Making:HIGH Complexity : Patient presents with comorbidities that affect occupational performance.  Signifigant modification of tasks or assistance (eg, physical or verbal) with assessment (s) is necessary to enable patient to complete evaluation

## 2022-07-10 NOTE — PROGRESS NOTES
Problem: Risk for Spread of Infection  Goal: Prevent transmission of infectious organism to others  Description: Prevent the transmission of infectious organisms to other patients, staff members, and visitors. Outcome: Progressing Towards Goal     Problem: Patient Education:  Go to Education Activity  Goal: Patient/Family Education  Outcome: Progressing Towards Goal     Problem: Pressure Injury - Risk of  Goal: *Prevention of pressure injury  Description: Document Ronald Scale and appropriate interventions in the flowsheet. Outcome: Progressing Towards Goal  Note: Pressure Injury Interventions:  Sensory Interventions: Assess need for specialty bed,Discuss PT/OT consult with provider,Keep linens dry and wrinkle-free,Minimize linen layers    Moisture Interventions: Assess need for specialty bed,Absorbent underpads,Apply protective barrier, creams and emollients,Limit adult briefs,Minimize layers    Activity Interventions: PT/OT evaluation,Assess need for specialty bed,Pressure redistribution bed/mattress(bed type)    Mobility Interventions: PT/OT evaluation,Assess need for specialty bed,HOB 30 degrees or less    Nutrition Interventions: Offer support with meals,snacks and hydration,Discuss nutritional consult with provider,Document food/fluid/supplement intake    Friction and Shear Interventions: HOB 30 degrees or less,Lift sheet,Minimize layers,Transferring/repositioning devices                Problem: Patient Education: Go to Patient Education Activity  Goal: Patient/Family Education  Outcome: Progressing Towards Goal     Problem: Falls - Risk of  Goal: *Absence of Falls  Description: Document Mic Fall Risk and appropriate interventions in the flowsheet.   Outcome: Progressing Towards Goal  Note: Fall Risk Interventions:  Mobility Interventions: OT consult for ADLs,PT Consult for mobility concerns,Patient to call before getting OOB    Mentation Interventions: Bed/chair exit alarm,Adequate sleep, hydration, pain control,More frequent rounding,Reorient patient,Room close to nurse's station    Medication Interventions: Patient to call before getting OOB,Bed/chair exit alarm    Elimination Interventions: Call light in reach,Bed/chair exit alarm,Toilet paper/wipes in reach              Problem: Patient Education: Go to Patient Education Activity  Goal: Patient/Family Education  Outcome: Progressing Towards Goal     Problem: Diabetes Self-Management  Goal: *Disease process and treatment process  Description: Define diabetes and identify own type of diabetes; list 3 options for treating diabetes. Outcome: Progressing Towards Goal  Goal: *Incorporating nutritional management into lifestyle  Description: Describe effect of type, amount and timing of food on blood glucose; list 3 methods for planning meals. Outcome: Progressing Towards Goal  Goal: *Incorporating physical activity into lifestyle  Description: State effect of exercise on blood glucose levels. Outcome: Progressing Towards Goal  Goal: *Developing strategies to promote health/change behavior  Description: Define the ABC's of diabetes; identify appropriate screenings, schedule and personal plan for screenings. Outcome: Progressing Towards Goal  Goal: *Using medications safely  Description: State effect of diabetes medications on diabetes; name diabetes medication taking, action and side effects. Outcome: Progressing Towards Goal  Goal: *Monitoring blood glucose, interpreting and using results  Description: Identify recommended blood glucose targets  and personal targets. Outcome: Progressing Towards Goal  Goal: *Prevention, detection, treatment of acute complications  Description: List symptoms of hyper- and hypoglycemia; describe how to treat low blood sugar and actions for lowering  high blood glucose level.   Outcome: Progressing Towards Goal  Goal: *Prevention, detection and treatment of chronic complications  Description: Define the natural course of diabetes and describe the relationship of blood glucose levels to long term complications of diabetes.   Outcome: Progressing Towards Goal  Goal: *Developing strategies to address psychosocial issues  Description: Describe feelings about living with diabetes; identify support needed and support network  Outcome: Progressing Towards Goal  Goal: *Insulin pump training  Outcome: Progressing Towards Goal  Goal: *Sick day guidelines  Outcome: Progressing Towards Goal  Goal: *Patient Specific Goal (EDIT GOAL, INSERT TEXT)  Outcome: Progressing Towards Goal     Problem: Patient Education: Go to Patient Education Activity  Goal: Patient/Family Education  Outcome: Progressing Towards Goal     Problem: Patient Education: Go to Patient Education Activity  Goal: Patient/Family Education  Outcome: Progressing Towards Goal

## 2022-07-10 NOTE — PROGRESS NOTES
Hospitalist Progress Note    Patient: Benita Mendez MRN: [id number]  CSN: [id number]    YOB: 1940  Age: 78 y.o. Sex: female    DOA: 7/3/2022 LOS:  LOS: 7 days            Assessment/Plan     Principal Problem:    Acute respiratory failure with hypoxia ([address] (7/3/2022)    Active Problems:    Hypokalemia (9/21/2012)      Neutropenic fever ([address] (7/3/2022)      Hypomagnesemia (7/3/2022)      Diffuse large B cell lymphoma (HCC) (7/3/2022)      GERD (gastroesophageal reflux disease) ()      Thyroid disease ()      Acute encephalopathy ()      Hyponatremia (7/3/2022)      Pancytopenia ([address] (7/3/2022)      UTI (urinary tract infection) (7/3/2022)      Sinusitis (7/3/2022)      Myopia of both eyes with astigmatism (7/3/2022)      Pleural effusion (7/3/2022)      Cellulitis (7/3/2022)      Severe protein-calorie malnutrition ([address] (7/3/2022)      Septic shock ([address] (7/4/2022)      Pt was admitted for respiratory failure and neutropenic fever. Patient was transferred to telemetry on July 8 from ICU.     Acute respiratory failure with hypoxia with 6 L oxygen   Still on 6 L . Flu negative, rapid covid 19 negative -confirmed per pcr   cta no central PE   pulm f/u      Pleural effusion: Moderate and large effusion-will defer to pulmonologist decide for thoracentesis   Lasix -low dose     Cellulitis of rt leg, neutropenia fever , acute on chronic sinusitis   Antibiotics/ on cefepime infectious diseases following      PAF : flares up on amiodorone gtt -now on po amiodorone   Cardiologist f/u   Continue balance electrolytes and echo : ef wnl      Septic shock -resolved     Aspiration PNA: per CT. Discussed the case with ID.  Will add clindamycin      Pancytopenia-improving   Due to chemo   lovenox hold due to thrombocytopenia   neutropenic fever :afebrile      Diffusion Large B lymphoma   On chemo therapy   Oncologist f/u     splenic hypodensities?:CT /abdominal Done  IMPRESSION     1.  New lung base opacities and moderate volume left pleural effusion, likely  related to aspiration/pneumonia. Recommend repeat imaging following completion  of appropriate medical therapy. 2.  Rim-enhancing pelvic complex fluid collection containing air-fluid level  favored to represent diverticular abscess. 3.  Severe atherosclerosis including the coronary arteries, correlate for  cardiac dysfunction. 4.  Indeterminate splenic lesions, differential includes benign and malignant  etiologies. No splenomegaly. 5.  Hiatal hernia, osseous degenerative changes and additional findings, as detailed in the body of the report. Diverticular abscess.: Consult to Surgery and IR in am. Discussed the case with Dr. Mary Ford.      Pressure ulcer to buttock: Wound care, frequent turn. Urinary incontinence: Lucero insertion. Hyponatremia , hypomagnesemia/Resolved. Hypokalemia: Will replace K     DM type II: Ssi , hypoglycemia protocol      Hypothyroidism: Continue synthyroid  ,ths and free t4 ordered      Acute encephalopath: Much better, mri-not need      Myopia      Mks: rt ankle swelling-resolved      Severe malnutrition      Hypoalbuminemia   Albumin infusion     Poor prognosis      Dispo: SNF     Long discussion with /Her POA, daughter in the presence of patient re medicines, hospital course. We reviewed and discussed assessment of condition and went over plans of care. Questions answered. Total time 70 min's, including more than 50% on discussion with family and coordinating care.        CC:  respiratory failure and neutropenic fever         Subjective:     Pt was seen and examined with the nurse in the morning round. \" I am wet again. My buttock is sore and burning\"     was at the bedside. Review of systems  Limited due to chronic medical condition.      Objective:      Visit Vitals  /60   Pulse 85   Temp 97.7 °F (36.5 °C)   Resp 22   Ht 5' 7\" (1.702 m)   Wt 53.5 kg (118 lb)   SpO2 95%   BMI 18.48 kg/m² Physical Exam:    Gen: NAD, chronic ill appearing  Heent:  MMM, NC, AT. Cor: s1s2 RRR. No MRG. PMI mid 5th intercostal space. Resp:  Decreased BS b/l  Abd:  NT ND.  BS positive. No rebound or guarding. No masses. Ext: 1+ edema. + erythema to bilateral groin, genital and buttock       Intake and Output:  Current Shift:  No intake/output data recorded. Last three shifts:  No intake/output data recorded. Labs: Results:       Chemistry Recent Labs     07/10/22  0350 07/09/22  0149 07/08/22  1935 07/08/22  0416 07/08/22  0416   * 141*  --   --  190*    139  --   --  138   K 3.5 3.2* 3.5   < > 2.9*    105  --   --  103   CO2 30 30  --   --  27   BUN 27* 31*  --   --  23*   CREA 0.41* 0.53*  --   --  0.58*   CA 10.0 9.7  --   --  9.3   AGAP 4 4  --   --  8   BUCR 66* 58*  --   --  40*   AP 46 49  --   --  51   TP 6.5 6.5  --   --  6.5   ALB 4.7 4.2  --   --  4.2   GLOB 1.8* 2.3  --   --  2.3   AGRAT 2.6* 1.8*  --   --  1.8*    < > = values in this interval not displayed. CBC w/Diff Recent Labs     07/10/22  0350 07/09/22  0149 07/08/22  0416   WBC 4.5* 3.1* 2.6*   RBC 2.98* 3.06* 3.06*   HGB 8.6* 8.7* 8.9*   HCT 27.7* 27.2* 27.8*    201 161   GRANS 60 62 48   LYMPH 12* 11* 12*   EOS 8* 10* 4      Cardiac Enzymes No results for input(s): CPK, CKND1, HUMBERTO in the last 72 hours. No lab exists for component: CKRMB, TROIP   Coagulation No results for input(s): PTP, INR, APTT, INREXT in the last 72 hours. Lipid Panel No results found for: CHOL, CHOLPOCT, CHOLX, CHLST, CHOLV, 435354, HDL, HDLP, LDL, LDLC, DLDLP, 995691, VLDLC, VLDL, TGLX, TRIGL, TRIGP, TGLPOCT, CHHD, CHHDX   BNP No results for input(s): BNPP in the last 72 hours.    Liver Enzymes Recent Labs     07/10/22  0350   TP 6.5   ALB 4.7   AP 46      Thyroid Studies Lab Results   Component Value Date/Time    TSH 3.50 07/04/2022 04:40 AM        Procedures/imaging: see electronic medical records for all procedures/Xrays and details which were not copied into this note but were reviewed prior to creation of Plan      Medications Reviewed  Padmini Jarquin MD

## 2022-07-10 NOTE — PROGRESS NOTES
I have extra length of discussion with the Pt's , her 2 daughters in the presence of patient re medicines, hospital course, the goal of the care and at the [address] [place] [address]. I have also reviewed and interpreted the CT of the abdomen with the patient's family member. Reviewed treatment plan. we reviewed and discussed assessment of condition and went over plans of care. Per patient's daughter, patient had DNR form but being held by her .     I have had a discussion with patient regarding code status. Particularly, described potential options in event of cardiac or respiratory arrest. I have explained what being full code entails, including cardiorespiratory resuscitation attempts with chest compressions, potential cariodioversion/ \" shocks\" as well as intubation. I have also explained Do not resuscitate which would mean we allow a natural death with out aggressive interventions. The patient initially said that she wants to be DNR which upset the patient's , the patient  asked the patient again to make patient change her code to full code. Will consult palliative care again tomorrow.     Total time of care 50 minutes

## 2022-07-10 NOTE — PROGRESS NOTES
Cardiology Progress Note        Patient: Yasir Lozano        Sex: female          DOA: 7/3/2022  YOB: 1940      Age:  78 y.o.        LOS:  LOS: 7 days      Patient seen and examined, chart reviewed. Assessment/Plan     Patient Active Problem List   Diagnosis Code    S/P total knee arthroplasty Z96.659    Hypokalemia E87.6    HTN (hypertension), benign I10    Neutropenic fever ([address] D70.9, R50.81    Acute respiratory failure with hypoxia ([address] J96.01    Hypomagnesemia E83.42    Diffuse large B cell lymphoma (HCC) C83.30    GERD (gastroesophageal reflux disease) K21.9    Thyroid disease E07.9    Acute encephalopathy G93.40    Hyponatremia E87.1    Pancytopenia (HCC) D61.818    UTI (urinary tract infection) N39.0    Sinusitis J32.9    Myopia of both eyes with astigmatism H52.13, H52.203    Pleural effusion J90    Cellulitis L03.90    Severe protein-calorie malnutrition (HCC) E43    Septic shock (HCC) A41.9, R65.21        Paroxysmal atrial fibrillation    Echocardiogram revealed       Left Ventricle: Normal left ventricular systolic function with a visually estimated EF of 60 - 65%. Left ventricle size is normal. Normal wall thickness. Normal wall motion. Indeterminate diastolic function due to atrial fibrillation.   Tricuspid Valve: The estimated PASP is 41 mmHg. she had brief episode of tachycardia and was transferred back to ICU  No episode of PAF since yesterday   Plan:    Continue Amiodarone   Continue digoxin 100 mcg IV once a day  Continue Full dose of Lovenx  Monitor and replace electrolytes as per hospitalist  Plan discussed with patient's family member at bed side              Subjective:    cc:  Denies any chest pain or shortness of breath      REVIEW OF SYSTEMS:     General: No fevers or chills.   Cardiovascular: Positive shortness of breath, No chest pain,No palpitations, No orthopnea, No PND, No leg swelling, No claudication  Pulmonary: No dyspnea. Gastrointestinal: No nausea, vomiting, bleeding  Neurology: No Dizziness    Objective:      Visit Vitals  /66   Pulse 88   Temp 98.3 °F (36.8 °C)   Resp 22   Ht 5' 7\" (1.702 m)   Wt 53.5 kg (118 lb)   SpO2 97%   BMI 18.48 kg/m²     Body mass index is 18.48 kg/m². Physical Exam:  General Appearance: Looks chronically ill, Comfortable, not using accessory muscles of respiration. HEENT: MICHELINE. HEAD: Atraumatic  NECK: No JVD, no thyroidomeglay. CAROTIDS: No bruit  LUNGS: Clear bilaterally. HEART: S1+S2 audible, no murmur, no pericardial rub.    NEUROLOGICAL: Alert, follows verbal commands    Medication:  Current Facility-Administered Medications   Medication Dose Route Frequency    clindamycin (CLEOCIN) 600mg D5W 50mL IVPB (premix)  600 mg IntraVENous Q8H    cefepime (MAXIPIME) 2 g in 0.9% sodium chloride (MBP/ADV) 100 mL MBP  2 g IntraVENous Q12H    prednisoLONE acetate (PRED FORTE) 1 % ophthalmic suspension 1 Drop  1 Drop Both Eyes BID    fluticasone propionate (FLONASE) 50 mcg/actuation nasal spray 2 Spray  2 Spray Both Nostrils DAILY    digoxin (LANOXIN) injection 100 mcg  100 mcg IntraVENous DAILY    lidocaine-prilocaine (EMLA) 2.5-2.5 % cream   Topical PRN    nystatin (MYCOSTATIN) 100,000 unit/gram powder   Topical BID    pantoprazole (PROTONIX) tablet 40 mg  40 mg Oral ACB    [Held by provider] furosemide (LASIX) injection 20 mg  20 mg IntraVENous Q12H    insulin lispro (HUMALOG) injection   SubCUTAneous AC&HS    levothyroxine (SYNTHROID) tablet 125 mcg  125 mcg Oral DAILY    amiodarone (CORDARONE) tablet 100 mg  100 mg Oral DAILY    enoxaparin (LOVENOX) injection 50 mg  1 mg/kg SubCUTAneous Q12H    ipratropium (ATROVENT) 0.02 % nebulizer solution 0.5 mg  0.5 mg Nebulization TID PRN    albuterol (PROVENTIL VENTOLIN) nebulizer solution 2.5 mg  2.5 mg Nebulization Q4H PRN    sodium chloride (NS) flush 5-40 mL  5-40 mL IntraVENous Q8H    sodium chloride (NS) flush 5-40 mL 5-40 mL IntraVENous PRN    acetaminophen (TYLENOL) tablet 650 mg  650 mg Oral Q6H PRN    Or    acetaminophen (TYLENOL) suppository 650 mg  650 mg Rectal Q6H PRN    polyethylene glycol (MIRALAX) packet 17 g  17 g Oral DAILY PRN    ondansetron (ZOFRAN ODT) tablet 4 mg  4 mg Oral Q8H PRN    Or    ondansetron (ZOFRAN) injection 4 mg  4 mg IntraVENous Q6H PRN    albumin human 25% (BUMINATE) solution 25 g  25 g IntraVENous Q6H    glucose chewable tablet 16 g  4 Tablet Oral PRN    glucagon (GLUCAGEN) injection 1 mg  1 mg IntraMUSCular PRN    dextrose 10% infusion 0-250 mL  0-250 mL IntraVENous PRN               Lab/Data Reviewed:       Recent Labs     07/10/22  0350 07/09/22  0149 07/08/22  0416   WBC 4.5* 3.1* 2.6*   HGB 8.6* 8.7* 8.9*   HCT 27.7* 27.2* 27.8*    201 161     Recent Labs     07/10/22  0350 07/09/22  0149 07/08/22  1935 07/08/22 0416 07/08/22 0416    139  --   --  138   K 3.5 3.2* 3.5   < > 2.9*    105  --   --  103   CO2 30 30  --   --  27   * 141*  --   --  190*   BUN 27* 31*  --   --  23*   CREA 0.41* 0.53*  --   --  0.58*   CA 10.0 9.7  --   --  9.3    < > = values in this interval not displayed.        Signed By: Ashli Valdez MD     July 10, 2022 Pulmonology Critical Care

## 2022-07-11 ENCOUNTER — APPOINTMENT (OUTPATIENT)
Dept: CT IMAGING | Age: 82
DRG: 871 | End: 2022-07-11
Attending: INTERNAL MEDICINE
Payer: MEDICARE

## 2022-07-11 PROBLEM — K57.20 COLONIC DIVERTICULAR ABSCESS: Status: ACTIVE | Noted: 2022-07-11

## 2022-07-11 LAB
ALBUMIN SERPL-MCNC: 4.6 G/DL (ref 3.4–5)
ALBUMIN/GLOB SERPL: 2.4 {RATIO} (ref 0.8–1.7)
ALP SERPL-CCNC: 51 U/L (ref 45–117)
ALT SERPL-CCNC: 10 U/L (ref 13–56)
ANION GAP SERPL CALC-SCNC: 6 MMOL/L (ref 3–18)
AST SERPL-CCNC: 9 U/L (ref 10–38)
BASOPHILS # BLD: 0 K/UL (ref 0–0.1)
BASOPHILS NFR BLD: 0 % (ref 0–2)
BILIRUB SERPL-MCNC: 0.5 MG/DL (ref 0.2–1)
BUN SERPL-MCNC: 27 MG/DL (ref 7–18)
BUN/CREAT SERPL: 68 (ref 12–20)
CA-I SERPL-SCNC: 1.22 MMOL/L (ref 1.12–1.32)
CALCIUM SERPL-MCNC: 9.8 MG/DL (ref 8.5–10.1)
CHLORIDE SERPL-SCNC: 108 MMOL/L (ref 100–111)
CO2 SERPL-SCNC: 29 MMOL/L (ref 21–32)
CREAT SERPL-MCNC: 0.4 MG/DL (ref 0.6–1.3)
DIFFERENTIAL METHOD BLD: ABNORMAL
EOSINOPHIL # BLD: 0.2 K/UL (ref 0–0.4)
EOSINOPHIL NFR BLD: 3 % (ref 0–5)
ERYTHROCYTE [DISTWIDTH] IN BLOOD BY AUTOMATED COUNT: 31.3 % (ref 11.6–14.5)
GLOBULIN SER CALC-MCNC: 1.9 G/DL (ref 2–4)
GLUCOSE BLD STRIP.AUTO-MCNC: 138 MG/DL (ref 70–110)
GLUCOSE BLD STRIP.AUTO-MCNC: 180 MG/DL (ref 70–110)
GLUCOSE BLD STRIP.AUTO-MCNC: 197 MG/DL (ref 70–110)
GLUCOSE SERPL-MCNC: 188 MG/DL (ref 74–99)
HCT VFR BLD AUTO: 29.2 % (ref 35–45)
HGB BLD-MCNC: 8.8 G/DL (ref 12–16)
IMM GRANULOCYTES # BLD AUTO: 0 K/UL
IMM GRANULOCYTES NFR BLD AUTO: 0 %
LYMPHOCYTES # BLD: 0.5 K/UL (ref 0.9–3.6)
LYMPHOCYTES NFR BLD: 9 % (ref 21–52)
MAGNESIUM SERPL-MCNC: 2 MG/DL (ref 1.6–2.6)
MCH RBC QN AUTO: 28.7 PG (ref 24–34)
MCHC RBC AUTO-ENTMCNC: 30.1 G/DL (ref 31–37)
MCV RBC AUTO: 95.1 FL (ref 78–100)
METAMYELOCYTES NFR BLD MANUAL: 3 %
MONOCYTES # BLD: 0.5 K/UL (ref 0.05–1.2)
MONOCYTES NFR BLD: 8 % (ref 3–10)
MYELOCYTES NFR BLD MANUAL: 2 %
NEUTS BAND NFR BLD MANUAL: 1 % (ref 0–5)
NEUTS SEG # BLD: 4.4 K/UL (ref 1.8–8)
NEUTS SEG NFR BLD: 74 % (ref 40–73)
NRBC # BLD: 0.07 K/UL (ref 0–0.01)
NRBC BLD-RTO: 1.2 PER 100 WBC
PHOSPHATE SERPL-MCNC: 1.6 MG/DL (ref 2.5–4.9)
PLATELET # BLD AUTO: 349 K/UL (ref 135–420)
PLATELET COMMENTS,PCOM: ABNORMAL
PMV BLD AUTO: 10 FL (ref 9.2–11.8)
POTASSIUM SERPL-SCNC: 3.1 MMOL/L (ref 3.5–5.5)
PROT SERPL-MCNC: 6.5 G/DL (ref 6.4–8.2)
RBC # BLD AUTO: 3.07 M/UL (ref 4.2–5.3)
RBC MORPH BLD: ABNORMAL
SODIUM SERPL-SCNC: 143 MMOL/L (ref 136–145)
WBC # BLD AUTO: 5.8 K/UL (ref 4.6–13.2)

## 2022-07-11 PROCEDURE — 82962 GLUCOSE BLOOD TEST: CPT

## 2022-07-11 PROCEDURE — 74011250637 HC RX REV CODE- 250/637: Performed by: INTERNAL MEDICINE

## 2022-07-11 PROCEDURE — 74011000636 HC RX REV CODE- 636: Performed by: HOSPITALIST

## 2022-07-11 PROCEDURE — 74011636637 HC RX REV CODE- 636/637: Performed by: HOSPITALIST

## 2022-07-11 PROCEDURE — 65270000046 HC RM TELEMETRY

## 2022-07-11 PROCEDURE — 74011250636 HC RX REV CODE- 250/636: Performed by: INTERNAL MEDICINE

## 2022-07-11 PROCEDURE — P9047 ALBUMIN (HUMAN), 25%, 50ML: HCPCS | Performed by: HOSPITALIST

## 2022-07-11 PROCEDURE — 92526 ORAL FUNCTION THERAPY: CPT

## 2022-07-11 PROCEDURE — 97530 THERAPEUTIC ACTIVITIES: CPT

## 2022-07-11 PROCEDURE — 92610 EVALUATE SWALLOWING FUNCTION: CPT

## 2022-07-11 PROCEDURE — 80053 COMPREHEN METABOLIC PANEL: CPT

## 2022-07-11 PROCEDURE — 74177 CT ABD & PELVIS W/CONTRAST: CPT

## 2022-07-11 PROCEDURE — 74011250637 HC RX REV CODE- 250/637: Performed by: HOSPITALIST

## 2022-07-11 PROCEDURE — 82330 ASSAY OF CALCIUM: CPT

## 2022-07-11 PROCEDURE — 83735 ASSAY OF MAGNESIUM: CPT

## 2022-07-11 PROCEDURE — 77010033678 HC OXYGEN DAILY

## 2022-07-11 PROCEDURE — 74011000258 HC RX REV CODE- 258: Performed by: HOSPITALIST

## 2022-07-11 PROCEDURE — 74011000250 HC RX REV CODE- 250: Performed by: INTERNAL MEDICINE

## 2022-07-11 PROCEDURE — 74011250636 HC RX REV CODE- 250/636: Performed by: HOSPITALIST

## 2022-07-11 PROCEDURE — 74011000258 HC RX REV CODE- 258: Performed by: INTERNAL MEDICINE

## 2022-07-11 PROCEDURE — 74011000250 HC RX REV CODE- 250: Performed by: HOSPITALIST

## 2022-07-11 PROCEDURE — 36415 COLL VENOUS BLD VENIPUNCTURE: CPT

## 2022-07-11 PROCEDURE — 84100 ASSAY OF PHOSPHORUS: CPT

## 2022-07-11 PROCEDURE — 99232 SBSQ HOSP IP/OBS MODERATE 35: CPT | Performed by: NURSE PRACTITIONER

## 2022-07-11 PROCEDURE — 85025 COMPLETE CBC W/AUTO DIFF WBC: CPT

## 2022-07-11 PROCEDURE — 97110 THERAPEUTIC EXERCISES: CPT

## 2022-07-11 RX ADMIN — DIGOXIN 100 MCG: 250 INJECTION, SOLUTION INTRAMUSCULAR; INTRAVENOUS; PARENTERAL at 08:58

## 2022-07-11 RX ADMIN — PANTOPRAZOLE SODIUM 40 MG: 40 TABLET, DELAYED RELEASE ORAL at 08:58

## 2022-07-11 RX ADMIN — ALBUMIN (HUMAN) 25 G: 0.25 INJECTION, SOLUTION INTRAVENOUS at 00:11

## 2022-07-11 RX ADMIN — SODIUM CHLORIDE, PRESERVATIVE FREE 10 ML: 5 INJECTION INTRAVENOUS at 05:12

## 2022-07-11 RX ADMIN — IOPAMIDOL 100 ML: 612 INJECTION, SOLUTION INTRAVENOUS at 12:34

## 2022-07-11 RX ADMIN — SODIUM CHLORIDE, PRESERVATIVE FREE 10 ML: 5 INJECTION INTRAVENOUS at 21:24

## 2022-07-11 RX ADMIN — CEFEPIME 2 G: 2 INJECTION, POWDER, FOR SOLUTION INTRAVENOUS at 05:10

## 2022-07-11 RX ADMIN — ALBUMIN (HUMAN) 25 G: 0.25 INJECTION, SOLUTION INTRAVENOUS at 18:18

## 2022-07-11 RX ADMIN — POTASSIUM BICARBONATE 40 MEQ: 782 TABLET, EFFERVESCENT ORAL at 21:23

## 2022-07-11 RX ADMIN — FLUTICASONE PROPIONATE 2 SPRAY: 50 SPRAY, METERED NASAL at 08:57

## 2022-07-11 RX ADMIN — ALBUMIN (HUMAN) 25 G: 0.25 INJECTION, SOLUTION INTRAVENOUS at 05:11

## 2022-07-11 RX ADMIN — METRONIDAZOLE 500 MG: 500 INJECTION, SOLUTION INTRAVENOUS at 21:23

## 2022-07-11 RX ADMIN — CEFTRIAXONE 1 G: 1 INJECTION, POWDER, FOR SOLUTION INTRAMUSCULAR; INTRAVENOUS at 17:40

## 2022-07-11 RX ADMIN — AMIODARONE HYDROCHLORIDE 100 MG: 200 TABLET ORAL at 12:12

## 2022-07-11 RX ADMIN — ENOXAPARIN SODIUM 50 MG: 100 INJECTION SUBCUTANEOUS at 00:00

## 2022-07-11 RX ADMIN — SODIUM CHLORIDE, PRESERVATIVE FREE 10 ML: 5 INJECTION INTRAVENOUS at 13:58

## 2022-07-11 RX ADMIN — METRONIDAZOLE 500 MG: 500 INJECTION, SOLUTION INTRAVENOUS at 12:07

## 2022-07-11 RX ADMIN — ALBUMIN (HUMAN) 25 G: 0.25 INJECTION, SOLUTION INTRAVENOUS at 13:58

## 2022-07-11 RX ADMIN — POTASSIUM PHOSPHATE, MONOBASIC AND POTASSIUM PHOSPHATE, DIBASIC: 224; 236 INJECTION, SOLUTION, CONCENTRATE INTRAVENOUS at 15:11

## 2022-07-11 RX ADMIN — NYSTATIN: 100000 POWDER TOPICAL at 08:59

## 2022-07-11 RX ADMIN — PREDNISOLONE ACETATE 1 DROP: 10 SUSPENSION/ DROPS OPHTHALMIC at 08:57

## 2022-07-11 RX ADMIN — PREDNISOLONE ACETATE 1 DROP: 10 SUSPENSION/ DROPS OPHTHALMIC at 21:00

## 2022-07-11 RX ADMIN — Medication 3 UNITS: at 17:40

## 2022-07-11 RX ADMIN — LEVOTHYROXINE SODIUM 125 MCG: 0.1 TABLET ORAL at 05:10

## 2022-07-11 RX ADMIN — METRONIDAZOLE 500 MG: 500 INJECTION, SOLUTION INTRAVENOUS at 05:12

## 2022-07-11 RX ADMIN — POTASSIUM BICARBONATE 40 MEQ: 782 TABLET, EFFERVESCENT ORAL at 09:49

## 2022-07-11 RX ADMIN — NYSTATIN: 100000 POWDER TOPICAL at 21:00

## 2022-07-11 RX ADMIN — Medication 3 UNITS: at 09:04

## 2022-07-11 NOTE — PROGRESS NOTES
Hospitalist Progress Note    Patient: Anna Perkins MRN: [id number]  [facility]: [id number]    YOB: 1940  Age: 78 y.o. Sex: female    DOA: 7/3/2022 LOS:  LOS: 8 days            Assessment/Plan     Principal Problem:    Acute respiratory failure with hypoxia ([address] (7/3/2022)    Active Problems:    Hypokalemia (9/21/2012)      Neutropenic fever ([address] (7/3/2022)      Hypomagnesemia (7/3/2022)      Diffuse large B cell lymphoma (HCC) (7/3/2022)      GERD (gastroesophageal reflux disease) ()      Thyroid disease ()      Acute encephalopathy ()      Hyponatremia (7/3/2022)      Pancytopenia ([address] (7/3/2022)      UTI (urinary tract infection) (7/3/2022)      Sinusitis (7/3/2022)      Myopia of both eyes with astigmatism (7/3/2022)      Pleural effusion (7/3/2022)      Cellulitis (7/3/2022)      Severe protein-calorie malnutrition ([address] (7/3/2022)      Septic shock ([address] (7/4/2022)      Pt was admitted for respiratory failure and neutropenic fever.   Patient was [facility] to [facility]     Acute respiratory failure with hypoxia with 6 L oxygen   Still on 5-6 L . Flu negative, rapid covid 19 negative -confirmed per pcr   cta no central PE   pulm f/u      Pleural effusion: Moderate and large effusion-will defer to pulmonologist decide for thoracentesis   Lasix -low dose     Cellulitis of rt leg, neutropenia fever , acute on chronic sinusitis   Antibiotics/ on cefepime infectious diseases following      PAF : flares up on amiodorone gtt -now on po amiodorone   Cardiologist f/u   Continue balance electrolytes and echo : ef wnl      Septic shock -resolved     Aspiration PNA: per CT.     Pancytopenia-improving   Due to chemo   lovenox hold due to thrombocytopenia   neutropenic fever :afebrile      Diffusion Large B lymphoma   On chemo therapy   Oncologist f/u      splenic hypodensities?:CT /abdominal Done  IMPRESSION     1.  New lung base opacities and moderate volume left pleural effusion, likely  related to aspiration/pneumonia. Recommend repeat imaging following completion  of appropriate medical therapy. 2.  Rim-enhancing pelvic complex fluid collection containing air-fluid level  favored to represent diverticular abscess. 3.  Severe atherosclerosis including the coronary arteries, correlate for  cardiac dysfunction. 4.  Indeterminate splenic lesions, differential includes benign and malignant  etiologies. No splenomegaly. 5.  Hiatal hernia, osseous degenerative changes and additional findings, as detailed in the body of the report. Diverticular abscess.: IR  Consult placed for drainage     Pressure ulcer to buttock: Wound care, frequent turn. Urinary incontinence: Lucero insertion. Hyponatremia , hypomagnesemia/Resolved. Hypokalemia: Will replace K     DM type II: Ssi , hypoglycemia protocol      Hypothyroidism: Continue synthyroid  ,ths and free t4 ordered      Acute encephalopath: Much better, mri-not need      Myopia      Mks: rt ankle swelling-resolved      Severe malnutrition      Hypoalbuminemia   Albumin infusion     Poor prognosis      Dispo: SNF        CC:  respiratory failure and neutropenic fever  now with abdominal abscess and diarrhea fMS started       Subjective:     Pt was seen and examined with the  at bedside    Going for abdominal Ct     was at the bedside. Review of systems  Limited due to chronic medical condition. Objective:      Visit Vitals  /84   Pulse 89   Temp 97.8 °F (36.6 °C)   Resp 22   Ht 5' 7\" (1.702 m)   Wt 53.5 kg (118 lb)   SpO2 96%   BMI 18.48 kg/m²       Physical Exam:    Gen: NAD, chronic ill appearing  Heent:  MMM, NC, AT. Cor: s1s2 RRR. No MRG. PMI mid 5th intercostal space. Resp:  Decreased BS b/l  Abd:  NT ND.  BS positive. No rebound or guarding. No masses. Ext: 1+ edema.  + erythema to bilateral groin, genital and buttock       Intake and Output:  Current Shift:  07/11 0701 - 07/11 1900  In: -   Out: 650 [Urine:650]  Last three shifts:  07/09 1901 - 07/11 0700  In: 15   Out: -     Labs: Results:       Chemistry Recent Labs     07/11/22  0435 07/10/22  0350 07/09/22  0149   * 154* 141*    141 139   K 3.1* 3.5 3.2*    107 105   CO2 29 30 30   BUN 27* 27* 31*   CREA 0.40* 0.41* 0.53*   CA 9.8 10.0 9.7   AGAP 6 4 4   BUCR 68* 66* 58*   AP 51 46 49   TP 6.5 6.5 6.5   ALB 4.6 4.7 4.2   GLOB 1.9* 1.8* 2.3   AGRAT 2.4* 2.6* 1.8*      CBC w/Diff Recent Labs     07/11/22  0435 07/10/22  0350 07/09/22  0149   WBC 5.8 4.5* 3.1*   RBC 3.07* 2.98* 3.06*   HGB 8.8* 8.6* 8.7*   HCT 29.2* 27.7* 27.2*    273 201   GRANS 74* 60 62   LYMPH 9* 12* 11*   EOS 3 8* 10*      Cardiac Enzymes No results for input(s): CPK, CKND1, HUMBERTO in the last 72 hours. No lab exists for component: CKRMB, TROIP   Coagulation No results for input(s): PTP, INR, APTT, INREXT, INREXT in the last 72 hours. Lipid Panel No results found for: CHOL, CHOLPOCT, CHOLX, CHLST, CHOLV, 966213, HDL, HDLP, LDL, LDLC, DLDLP, 099219, VLDLC, VLDL, TGLX, TRIGL, TRIGP, TGLPOCT, CHHD, CHHDX   BNP No results for input(s): BNPP in the last 72 hours.    Liver Enzymes Recent Labs     07/11/22 0435   TP 6.5   ALB 4.6   AP 51      Thyroid Studies Lab Results   Component Value Date/Time    TSH 3.50 07/04/2022 04:40 AM        Procedures/imaging: see electronic medical records for all procedures/Xrays and details which were not copied into this note but were reviewed prior to creation of Plan      Medications Reviewed  Charity Clayton MD

## 2022-07-11 NOTE — PROGRESS NOTES
Hematology / Oncology Progress Note    Impression:   Principal Problem:    Acute respiratory failure with hypoxia ([address] (7/3/2022)    Active Problems:    Hypokalemia (9/21/2012)      Neutropenic fever ([address] (7/3/2022)      Hypomagnesemia (7/3/2022)      Diffuse large B cell lymphoma (HCC) (7/3/2022)      GERD (gastroesophageal reflux disease) ()      Thyroid disease ()      Acute encephalopathy ()      Hyponatremia (7/3/2022)      Pancytopenia ([address] (7/3/2022)      UTI (urinary tract infection) (7/3/2022)      Sinusitis (7/3/2022)      Myopia of both eyes with astigmatism (7/3/2022)      Pleural effusion (7/3/2022)      Cellulitis (7/3/2022)      Severe protein-calorie malnutrition ([address] (7/3/2022)      Septic shock ([address] (7/4/2022)        Sepsis/Fluid Collection: Pending IR Perc Drain for abscess    Diffuse Large B Cell Lymphoma -  Completed chemotherapy with R-CHOP on 6/28/22. This was to be her final chemotherapy prior to a PET/CT to determine remission status with some possibility for cure. She will still need a PET/CT as outpatient, but CT scans inhouse are very reassuring that she has had a full response to treatment with no evidence of lymphadenopathy. UTI    Splenic Hypodensities - ?infectious v. Lymphomatous. Anemia - dt/ chemotherapy    Pleural Effusion    Hx of AFib    Weakness - dt/ chemotherapy + illness.  May benefit from PT/OT when able    Plan:   Agree with perc drain for abscess  No plans for further chemotherapy while inhouse  Encourage ambulation when able  Will plan for PET/CT as outpatient as planned and follow up with Dr. Onelia Grayson  Further care per primary team  Will continue to follow    Francisco J Dominguez MD  [facility]      Subjective:     No acute overnight events  Noted to have a duodenal abscess and pending IR per drain/Cath  Sleepy this am, daughters note she is hungry  Spoke to  and discussed imaging with no enlarged LNs in the chest, abd, or pelvis suggesting she had a great response to [facility]    Hospital Medications:     Current Facility-Administered Medications   Medication Dose Route Frequency Provider Last Rate Last Admin    metroNIDAZOLE (FLAGYL) IVPB premix 500 mg  500 mg IntraVENous Q8H Иван Sinclair  mL/hr at 07/11/22 0512 500 mg at 07/11/22 0512    cefepime (MAXIPIME) 2 g in 0.9% sodium chloride (MBP/ADV) 100 mL MBP  2 g IntraVENous Q12H Malathi Warner  mL/hr at 07/11/22 0510 2 g at 07/11/22 0510    prednisoLONE acetate (PRED FORTE) 1 % ophthalmic suspension 1 Drop  1 Drop Both Eyes BID Iris Parrish MD   1 Drop at 07/11/22 0857    fluticasone propionate (FLONASE) 50 mcg/actuation nasal spray 2 Spray  2 Spray Both Nostrils DAILY Iris Parrish MD   2 [place] at 07/11/22 0857    digoxin (LANOXIN) injection 100 mcg  100 mcg IntraVENous DAILY Daiana VILLELA MD   100 mcg at 07/11/22 0858    lidocaine-prilocaine (EMLA) 2.5-2.5 % cream   Topical PRN Shari Flores MD        nystatin (MYCOSTATIN) 100,000 unit/gram powder   Topical BID Giovanni Bhakta MD   Given at 07/11/22 0859    pantoprazole (PROTONIX) tablet 40 mg  40 mg Oral ACB Malathi Warner MD   40 mg at 07/11/22 0858    [Held by provider] furosemide (LASIX) injection 20 mg  20 mg IntraVENous Q12H Shari Flores MD   20 mg at 07/07/22 2135    insulin lispro (HUMALOG) injection   SubCUTAneous AC&HS Malathi Warner MD   3 Units at 07/11/22 0904    levothyroxine (SYNTHROID) tablet 125 mcg  125 mcg Oral DAILY Gustavo Slade MD   125 mcg at 07/11/22 0510    amiodarone (CORDARONE) tablet 100 mg  100 mg Oral DAILY Gustavo Slade MD   100 mg at 07/10/22 1240    enoxaparin (LOVENOX) injection 50 mg  1 mg/kg SubCUTAneous Q12H Gustavo Slade MD   50 mg at 07/11/22 0000    ipratropium (ATROVENT) 0.02 % nebulizer solution 0.5 mg  0.5 mg Nebulization TID PRN Gustavo Slade MD   0.5 mg at 07/07/22 2021    albuterol (PROVENTIL VENTOLIN) nebulizer solution 2.5 mg  2.5 mg Nebulization Q4H PRN Natalie Hayes MD   2.5 mg at 07/10/22 1504    sodium chloride (NS) flush 5-40 mL  5-40 mL IntraVENous Q8H Katherine Ness MD   10 mL at 22 0512    sodium chloride (NS) flush 5-40 mL  5-40 mL IntraVENous PRN Katherine Ness MD        acetaminophen (TYLENOL) tablet 650 mg  650 mg Oral Q6H PRN Katherine Ness MD        Or    acetaminophen (TYLENOL) suppository 650 mg  650 mg Rectal Q6H PRN Katherine Ness MD        polyethylene glycol (MIRALAX) packet 17 g  17 g Oral DAILY PRN Katherine Ness MD        ondansetron (ZOFRAN ODT) tablet 4 mg  4 mg Oral Q8H PRN Katherine Ness MD        Or    ondansetron [facility]) injection 4 mg  4 mg IntraVENous Q6H PRN Katherine Ness MD   4 mg at 22 1052    albumin human 25% (BUMINATE) solution 25 g  25 g IntraVENous Q6H Katherine Ness MD   25 g at 22 0511    glucose chewable tablet 16 g  4 Tablet Oral PRN Katherine Ness MD        glucagon [facility]) injection 1 mg  1 mg IntraMUSCular PRN Katherine Ness MD        dextrose 10% infusion 0-250 mL  0-250 mL IntraVENous PRN Katherine Ness MD           Review of Systems:   Constitutional:  Limited ROS, but denies fever or pain.       Visit Vitals  /84   Pulse 89   Temp 97.8 °F (36.6 °C)   Resp 22   Ht 5' 7\" (1.702 m)   Wt 53.5 kg (118 lb)   SpO2 96%   BMI 18.48 kg/m²       Temp (24hrs), Av.9 °F (36.6 °C), Min:97.5 °F (36.4 °C), Max:98.3 °F (36.8 °C)      General: no acute distress and laying in bed, comfortable  HEENT: no icterus, no oral lesions  Lym: no cervical or supraclavicular adenopathy  CV: rate is regular and normal and their is no murmur  Lung: clear to auscultation, no increased work of breathing  Abd: nontender, no organomegaly  Neuro: cnoversant and moving extremities  MSK: no sarcopenia, normal tone  Derm: no rash  Psych: normal affect  Per: warm, no edema    Labs:  Recent Labs     22  0435 07/10/22  0350 22  0149   WBC 5.8 4.5* 3.1*   RBC 3.07* 2.98* 3.06*   HCT 29.2* 27.7* 27.2*   MCV 95.1 93.0 88.9   MCH 28.7 28.9 28.4   MCHC 30.1* 31.0 32.0   RDW 31.3* ABNORMAL 30.3*       Recent Labs     07/11/22  0435 07/10/22  0350 07/09/22  0149   CO2 29 30 30   BUN 27* 27* 31*       No results for input(s): ALBUMIN in the last 72 hours.     No lab exists for component: [facility]

## 2022-07-11 NOTE — WOUND CARE
Wound Care Note:    Chart audited for low chase score, patient with high risk for skin breakdown, no documented wounds at time of audit.      Skin Care & Pressure Relief Recommendations  Minimize layers of linen  Pads under patient to optimize support surface  Turn/reposition approximately every 2 hours  Pillow wedges  Manage incontinence   Promote continence; Skin Protective lotion/cream to buttocks and sacrum daily and as needed with incontinence care  Offload heels pillows    Consult wound care if any wounds/ skin care needs noted during admission

## 2022-07-11 NOTE — PROGRESS NOTES
D/C Plan Rehab    Noted plan Palliative Care consult today. CM to await outcome of Palliative Care consult to further assist with care transition.

## 2022-07-11 NOTE — PROGRESS NOTES
. Cardiology Progress Note        Patient: Capri Adamson        Sex: female          DOA: 7/3/2022  YOB: 1940      Age:  78 y.o.        LOS:  LOS: 8 days      Patient seen and examined, chart reviewed. Assessment/Plan     Patient Active Problem List   Diagnosis Code    S/P total knee arthroplasty Z96.659    Hypokalemia E87.6    HTN (hypertension), benign I10    Neutropenic fever ([address] D70.9, R50.81    Acute respiratory failure with hypoxia ([address] J96.01    Hypomagnesemia E83.42    Diffuse large B cell lymphoma (HCC) C83.30    GERD (gastroesophageal reflux disease) K21.9    Thyroid disease E07.9    Acute encephalopathy G93.40    Hyponatremia E87.1    Pancytopenia ([address] D61.818    UTI (urinary tract infection) N39.0    Sinusitis J32.9    Myopia of both eyes with astigmatism H52.13, H52.203    Pleural effusion J90    Cellulitis L03.90    Severe protein-calorie malnutrition (HCC) E43    Septic shock (HCC) A41.9, R65.21        Paroxysmal atrial fibrillation    Hypokalemia     Echocardiogram revealed       Left Ventricle: Normal left ventricular systolic function with a visually estimated EF of 60 - 65%. Left ventricle size is normal. Normal wall thickness. Normal wall motion. Indeterminate diastolic function due to atrial fibrillation.   Tricuspid Valve: The estimated PASP is 41 mmHg. she had brief episode of tachycardia and was transferred back to ICU    Plan:    Continue Amiodarone   Continue digoxin 100 mcg IV once a day  Continue Full dose of Lovenx  Monitor and replace electrolytes as per hospitalist                Subjective:    cc:  Denies any chest pain or shortness of breath      REVIEW OF SYSTEMS:     General: No fevers or chills. Cardiovascular: Positive shortness of breath, No chest pain,No palpitations, No orthopnea, No PND, No leg swelling, No claudication  Pulmonary: No dyspnea.    Gastrointestinal: No nausea, vomiting, bleeding  Neurology: No Dizziness    Objective:      Visit Vitals  BP (!) 146/59   Pulse 85   Temp 97.9 °F (36.6 °C)   Resp 22   Ht 5' 7\" (1.702 m)   Wt 53.5 kg (118 lb)   SpO2 92%   BMI 18.48 kg/m²     Body mass index is 18.48 kg/m². Physical Exam:  General Appearance: Looks chronically ill, Comfortable, not using accessory muscles of respiration. HEENT: MICHELINE. HEAD: Atraumatic  NECK: No JVD, no thyroidomeglay. CAROTIDS: No bruit  LUNGS: Clear bilaterally. HEART: S1+S2 audible, no murmur, no pericardial rub.    NEUROLOGICAL: Alert, follows verbal commands    Medication:  Current Facility-Administered Medications   Medication Dose Route Frequency    potassium bicarb-citric acid (EFFER-K) tablet 40 mEq  40 mEq Oral BID    potassium phosphate 20 mmol in 0.9% sodium chloride 250 mL infusion   IntraVENous ONCE    cefTRIAXone (ROCEPHIN) 1 g in 0.9% sodium chloride (MBP/ADV) 50 mL MBP  1 g IntraVENous Q24H    metroNIDAZOLE (FLAGYL) IVPB premix 500 mg  500 mg IntraVENous Q8H    prednisoLONE acetate (PRED FORTE) 1 % ophthalmic suspension 1 Drop  1 Drop Both Eyes BID    fluticasone propionate (FLONASE) 50 mcg/actuation nasal spray 2 Spray  2 Spray Both Nostrils DAILY    digoxin (LANOXIN) injection 100 mcg  100 mcg IntraVENous DAILY    lidocaine-prilocaine (EMLA) 2.5-2.5 % cream   Topical PRN    nystatin (MYCOSTATIN) 100,000 unit/gram powder   Topical BID    pantoprazole (PROTONIX) tablet 40 mg  40 mg Oral ACB    [Held by provider] furosemide (LASIX) injection 20 mg  20 mg IntraVENous Q12H    insulin lispro (HUMALOG) injection   SubCUTAneous AC&HS    levothyroxine (SYNTHROID) tablet 125 mcg  125 mcg Oral DAILY    amiodarone (CORDARONE) tablet 100 mg  100 mg Oral DAILY    ipratropium (ATROVENT) 0.02 % nebulizer solution 0.5 mg  0.5 mg Nebulization TID PRN    albuterol (PROVENTIL VENTOLIN) nebulizer solution 2.5 mg  2.5 mg Nebulization Q4H PRN    sodium chloride (NS) flush 5-40 mL  5-40 mL IntraVENous Q8H    sodium chloride (NS) flush 5-40 mL  5-40 mL IntraVENous PRN    acetaminophen (TYLENOL) tablet 650 mg  650 mg Oral Q6H PRN    Or    acetaminophen (TYLENOL) suppository 650 mg  650 mg Rectal Q6H PRN    polyethylene glycol (MIRALAX) packet 17 g  17 g Oral DAILY PRN    ondansetron (ZOFRAN ODT) tablet 4 mg  4 mg Oral Q8H PRN    Or    ondansetron (ZOFRAN) injection 4 mg  4 mg IntraVENous Q6H PRN    albumin human 25% (BUMINATE) solution 25 g  25 g IntraVENous Q6H    glucose chewable tablet 16 g  4 Tablet Oral PRN    glucagon (GLUCAGEN) injection 1 mg  1 mg IntraMUSCular PRN    dextrose 10% infusion 0-250 mL  0-250 mL IntraVENous PRN               Lab/Data Reviewed:       Recent Labs     07/11/22  0435 07/10/22  0350 07/09/22  0149   WBC 5.8 4.5* 3.1*   HGB 8.8* 8.6* 8.7*   HCT 29.2* 27.7* 27.2*    273 201     Recent Labs     07/11/22  0435 07/10/22  0350 07/09/22  0149    141 139   K 3.1* 3.5 3.2*    107 105   CO2 29 30 30   * 154* 141*   BUN 27* 27* 31*   CREA 0.40* 0.41* 0.53*   CA 9.8 10.0 9.7       Signed By: Radha Sprague MD     July 11, 2022

## 2022-07-11 NOTE — PROGRESS NOTES
Problem: Dysphagia (Adult)  Description: Patient will:  1. Tolerate PO trials with 0 s/s overt distress in 4/5 trials. 2. Utilize compensatory swallow strategies/maneuvers (decrease bite/sip, size/rate, alt. liq/sol) with min cues in 4/5 trials. 3. As medically indicated by MD, complete an objective swallow study (i.e., MBSS) to assess swallow integrity, r/o aspiration, and determine of safest LR    Rec:     Puree diet with mildly thick (nectar) liquids  Pt may require assistance with meal set up  Aspiration precautions  HOB >45 during po intake, remain >30 for 30-45 minutes after po   Small bites/sips; alternate liquid/solid with slow feeding rate   Oral care TID  Meds crushed in puree   Outcome: Progressing Towards Goal      SPEECH LANGUAGE PATHOLOGY BEDSIDE SWALLOW   EVALUATION & TREATMENT     Patient: Marlene Josue (48 y.o. female)  Date: 7/11/2022  Primary Diagnosis: Neutropenic fever ([facility] [D70.9, R50.81]        Precautions: Aspiration, Fall,  Neutropenic  PLOF: Regular, thin    ASSESSMENT :  Based on the objective data described below, the patient presents with suspected pharyngeal phase dysphagia. Patient seen for bedside swallow evaluation per MD orders following new onset PNA since admission on 7/3 and coughing with thin liquids at bedside. Per MD notes from 7/10, \"new lung base opacities and moderate volume left pleural effusion, likely related to aspiration/pneumonia\" evident on chest CT. Patient previously evaluated by ST on 7/4 and d/c from [address] [place] [address] on 7/5 due to no s/sx of aspiration. Patient is alert this morning and oriented x3 (disoriented to time). Patient on 6L O2 via NC. OM examination significant for reduced lingual strength and rate; likely 2/ generalized weakness. Breathy, weak vocal quality. Trials of ice chips, thin liquid, mildly thick, and puree attempted this morning. Patient refused trials of more advanced textures. Patient's spouse denies difficulties with swallowing.  Reported that patient coughs (at baseline) due to increased phlegm. MD paged and stated that patient has pulmonary reasons for new onset cough. Patient demo cough in 1/6 trials of ice chips and 1/10 trials of thin liquids via spoon/straw. Patient tolerated mildly thick (nectar) liquids, via straw, and puree with no overt s/sx of aspiration. Appearance of WFL oral prep, a/p transfer, and oral clearance. Suspect swallow initiation delay and decreased laryngeal elevation (to palpation). No change in vocal quality following PO. Patient appears at increased for aspiration. Discussed aspiration/ swallowing precautions with patient/patient's spouse. Patient stated, \"I don't like to sit up. I just won't eat. \" Patient's spouse verbalized understanding. Recommend puree diet and mildly thick liquids. MBSS also recommended to assess swallow function when patient medically able to complete test. Will assess more advanced textures when patient willing. Low threshold for diet downgrade/NPO if patient demo s/sx of aspiration. D/w nurse and Dr. Mireille Jaquez. TREATMENT :  Skilled therapy initiated; Educated pt on aspiration precautions and importance of compensatory swallow techniques to decrease aspiration risk (decrease rate of intake & sip/bite size, upright @HOB for all po intake and ~30 minutes after po); verbalized comprehension. SLP to follow as indicated. Patient will benefit from skilled intervention to address the above impairments.   Patient's rehabilitation potential is considered to be Fair  Factors which may influence rehabilitation potential include:   []            None noted  []            Mental ability/status  [x]            Medical condition  []            Home/family situation and support systems  []            Safety awareness  []            Pain tolerance/management  []            Other:      PLAN :  Recommendations and Planned Interventions:  See above  Frequency/Duration: Patient will be followed by speech-language pathology 3 times a week to address goals. Discharge Recommendations: To Be Determined     SUBJECTIVE:   Patient stated I just want water. OBJECTIVE:     Past Medical History:   Diagnosis Date    Arthritis     GERD (gastroesophageal reflux disease)     Hypertension     Osteoarthritis of right knee 9/19/2012    S/P total knee arthroplasty 9/19/2012    Thyroid disease      Past Surgical History:   Procedure Laterality Date    HX APPENDECTOMY  65's    HX CHOLECYSTECTOMY      laparoscopic    HX HEENT      Surgery OD infection x 4    HX HEENT      throat surgery x 2    HX KNEE ARTHROSCOPY      right     Home Situation:   Home Situation  Home Environment: Private residence  # Steps to Enter: 1  Rails to Enter: No  One/Two Story Residence: One story  Living Alone: No  Support Systems: Spouse/Significant Other  Patient Expects to be Discharged to[de-identified] Skilled nursing facility  Current DME Used/Available at Home: Adaptive bathing aides,Commode, bedside,Shower chair,Hospital bed,Walker, rolling  Tub or Shower Type: Tub/Shower combination    Diet prior to admission: Regular, thin  Current Diet:  NPO     Cognitive and Communication Status:  Neurologic State: Alert  Orientation Level: Oriented to person,Oriented to place,Oriented to situation  Cognition: Follows commands  Perception: Appears intact  Perseveration: No perseveration noted  Safety/Judgement: Decreased awareness of environment,Fall prevention    Oral Assessment:  Oral Assessment  Labial: No impairment  Dentition: Intact; Natural  Oral Hygiene:  (fair)  Lingual: Decreased rate;Decreased strength  Velum: No impairment  Mandible: No impairment  Gag Reflex:  (did not test)    P.O. Trials:  Patient Position:  (HOB 45 degrees)  Vocal quality prior to P.O.: Fatigue  Consistency Presented: Ice chips; Nectar thick liquid;Puree; Thin liquid  How Presented: SLP-fed/presented;Spoon;Straw     Bolus Acceptance: No impairment  Bolus Formation/Control: No impairment Propulsion: No impairment  Oral Residue: None  Initiation of Swallow: Other (comment) (suspect delay)  Laryngeal Elevation: Other (comment) (suspect weak/decreased to palpation)  Aspiration Signs/Symptoms: Clear throat;Weak cough  Pharyngeal Phase Characteristics: Easily fatigued ; Poor endurance  Effective Modifications: Alternate liquids/solids;Small sips and bites  Cues for Modifications: Minimal-moderate       Oral Phase Severity: No impairment  Pharyngeal Phase Severity : Other (comment) (suspect)    PAIN:  Pain level pre-treatment: 0/10   Pain level post-treatment: 0/10     After treatment:   []            Patient left in no apparent distress sitting up in chair  [x]            Patient left in no apparent distress in bed  []            Call bell left within reach  [x]            Nursing notified  []            Family present  [x]            Caregiver present  []            Bed alarm activated    COMMUNICATION/EDUCATION:   [x]            Aspiration precautions; swallow safety; compensatory techniques. [x]            Patient/family have participated as able in goal setting and plan of care. []            Patient/family agree to work toward stated goals and plan of care. []            Patient understands intent and goals of therapy; neutral about participation. []            Patient unable to participate in goal setting/plan of care; educ ongoing with interdisciplinary staff  []         Posted safety precautions in patient's room.     Thank you for this referral.  Twain Harte, SLP  Time Calculation: 30 mins  Evaluation Time: 20 minutes   Treatment Time: 10 minutes

## 2022-07-11 NOTE — PROGRESS NOTES
[facility]  (A Division of [address] [place] [address])    Follow-up Note    My partner Zander Mccall MD will  ID service tomorrow morning. Date of Admission: 7/3/2022       Date of Note:  7/11/2022     Summary:  Ms Travis Siu is a 78 y.o. WF with PMH as listed below-- she had her last chemo on 6/28 and received growth factor as well. She came to ED with fever/lethargy/ weakness and SOB of 1 day and found to have fever to 100.7, Neutropenia to 200, pyruria, and CT head/CT chest done as well as CXR and ankle X-ray R foot done and admitted to ICU. She is requiring levophed support for border line BP.     During interview( hard of hearing too), she denies HA/sorethroat. Chest pain, abd pain, dysuria. She has no focal complaint during exam.  Denies viral/COVID infection at home. Feels SOB. She is currently receiving V/Z.     Care Everywhere-- shows admission in 01 and 04 2022 with diagnosis of sepsis- Bacteroides bacteremia in jan( also dx of cancer)  and PNA with septic shock in 04/2022       CC:  \"Stronger\"  HPI:  Chart reviewed/pt seen bedside with both her  and a daughter present. Diverticular abscess present. Tolerated the pip/tzb last week fine despite her allergy, but switched to cefepime IV on FRI along with clindamycin that I switched out to metronidazole yesterday evening as I looked at her CT for better diverticular abscess activity. Standing up now with PT/RN support --  New/better. Family notes nocturnal myoclonus/jerking last night (Cefepime). Still with loose stools (FMS)   Afebrile  WBC recovering from last round of CHOP 6/26. Current Antimicrobials:    Prior Antimicrobials:  1. Cefepime IV (7/9-) #2  2. Metronidazole IV (7/10-) #1 1. Pip/tzb IV (7/3-9)  2. Vanco IV (7/3-9)  3. azithro IV (7/4)  4. Doxy IV (7/5-6)  5.clindamycin IV (7/10)       Assessment: Rec / Plan:   Diverticular abscess  Eris Lock points to this. Baseball sized or so. IF it can be tapped/drained, swell. Keep pushing the abx.   I have put her on metronidazole (last night) and will switch out the cefepime to CAX given her myoclonus (cefepime side-effect common in elderly) ->abx #8    Agree with IR yadira  Keep pushing the IV abx   Cefepime side-effect -- myoclonus    Diffuse Large B Cell lymphoma  Recent chemo (6/26)    Septic shock - see #1 above, better    Low BMI/nourishment        Microbiology:  7/8 - BCx (-)     7/5 - CDT  (+/-) with negative confirmatory molecular test     7/4 - Legionella/Spneumo Ag (-)     7/3 - RVP (-)      COVID-19 (-)      Flu (-)      UA (+) E coli (R amp/sulb)      BCx x2 (-)      Lines / Catheters: peripheral        Patient Active Problem List   Diagnosis Code    S/P total knee arthroplasty Z96.659    Hypokalemia E87.6    HTN (hypertension), benign I10    Neutropenic fever (HCC) D70.9, R50.81    Acute respiratory failure with hypoxia (HCC) J96.01    Hypomagnesemia E83.42    Diffuse large B cell lymphoma (HCC) C83.30    GERD (gastroesophageal reflux disease) K21.9    Thyroid disease E07.9    Acute encephalopathy G93.40    Hyponatremia E87.1    Pancytopenia (HCC) D61.818    UTI (urinary tract infection) N39.0    Sinusitis J32.9    Myopia of both eyes with astigmatism H52.13, H52.203    Pleural effusion J90    Cellulitis L03.90    Severe protein-calorie malnutrition (HCC) E43    Septic shock (HCC) A41.9, R65.21       Current Facility-Administered Medications   Medication Dose Route Frequency    potassium bicarb-citric acid (EFFER-K) tablet 40 mEq  40 mEq Oral BID    potassium phosphate 20 mmol in 0.9% sodium chloride 250 mL infusion   IntraVENous ONCE    cefTRIAXone (ROCEPHIN) 1 g in 0.9% sodium chloride (MBP/ADV) 50 mL MBP  1 g IntraVENous Q24H    metroNIDAZOLE (FLAGYL) IVPB premix 500 mg  500 mg IntraVENous Q8H    prednisoLONE acetate (PRED FORTE) 1 % ophthalmic suspension 1 Drop  1 Drop Both Eyes BID    fluticasone propionate (FLONASE) 50 mcg/actuation nasal spray 2 Spray  2 Spray Both Nostrils DAILY    digoxin (LANOXIN) injection 100 mcg  100 mcg IntraVENous DAILY    lidocaine-prilocaine (EMLA) 2.5-2.5 % cream   Topical PRN    nystatin (MYCOSTATIN) 100,000 unit/gram powder   Topical BID    pantoprazole (PROTONIX) tablet 40 mg  40 mg Oral ACB    [Held by provider] furosemide (LASIX) injection 20 mg  20 mg IntraVENous Q12H    insulin lispro (HUMALOG) injection   SubCUTAneous AC&HS    levothyroxine (SYNTHROID) tablet 125 mcg  125 mcg Oral DAILY    amiodarone (CORDARONE) tablet 100 mg  100 mg Oral DAILY    ipratropium (ATROVENT) 0.02 % nebulizer solution 0.5 mg  0.5 mg Nebulization TID PRN    albuterol (PROVENTIL VENTOLIN) nebulizer solution 2.5 mg  2.5 mg Nebulization Q4H PRN    sodium chloride (NS) flush 5-40 mL  5-40 mL IntraVENous Q8H    sodium chloride (NS) flush 5-40 mL  5-40 mL IntraVENous PRN    acetaminophen (TYLENOL) tablet 650 mg  650 mg Oral Q6H PRN    Or    acetaminophen (TYLENOL) suppository 650 mg  650 mg Rectal Q6H PRN    polyethylene glycol (MIRALAX) packet 17 g  17 g Oral DAILY PRN    ondansetron (ZOFRAN ODT) tablet 4 mg  4 mg Oral Q8H PRN    Or    ondansetron (ZOFRAN) injection 4 mg  4 mg IntraVENous Q6H PRN    albumin human 25% (BUMINATE) solution 25 g  25 g IntraVENous Q6H    glucose chewable tablet 16 g  4 Tablet Oral PRN    glucagon (GLUCAGEN) injection 1 mg  1 mg IntraMUSCular PRN    dextrose 10% infusion 0-250 mL  0-250 mL IntraVENous PRN         Review of Systems - General ROS: negative for - chills, fever or night sweats  Respiratory ROS: no cough, shortness of breath, or wheezing  Cardiovascular ROS: no chest pain or dyspnea on exertion  Gastrointestinal ROS: positive for - abdominal pain, appetite loss, diarrhea and gas/bloating  negative for - blood in stools       Objective:    Visit Vitals  BP (!) 146/59   Pulse 85   Temp 97.9 °F (36.6 °C)   Resp 22   Ht 5' 7\" (1.702 m)   Wt 53.5 kg (118 lb)   SpO2 92%   BMI 18.48 kg/m²       Temp (24hrs), Av.8 °F (36.6 °C), Min:97.5 °F (36.4 °C), Max:97.9 °F (36.6 °C)      GEN: Thin WF in NAD  HEENT: good OS  CHEST: CTA  CVS:RRR  ABD: Mod TTP LLQ; hypoactive BS  EXT: thin         Lab results:    Chemistry  Recent Labs     07/11/22  0435 07/10/22  0350 07/09/22  0149   * 154* 141*    141 139   K 3.1* 3.5 3.2*    107 105   CO2 29 30 30   BUN 27* 27* 31*   CREA 0.40* 0.41* 0.53*   CA 9.8 10.0 9.7   AGAP 6 4 4   BUCR 68* 66* 58*   AP 51 46 49   TP 6.5 6.5 6.5   ALB 4.6 4.7 4.2   GLOB 1.9* 1.8* 2.3   AGRAT 2.4* 2.6* 1.8*       CBC w/ Diff  Recent Labs     07/11/22  0435 07/10/22  0350 07/09/22  0149   WBC 5.8 4.5* 3.1*   RBC 3.07* 2.98* 3.06*   HGB 8.8* 8.6* 8.7*   HCT 29.2* 27.7* 27.2*    273 201   GRANS 74* 60 62   LYMPH 9* 12* 11*   EOS 3 8* 10*       Microbiology  All Micro Results     Procedure Component Value Units Date/Time    CULTURE, BLOOD [[id number]] Collected: 22 0443    Order Status: Completed Specimen: Blood Updated: 2224     Special Requests: NO SPECIAL REQUESTS        Culture result: NO GROWTH 3 DAYS       CULTURE, BLOOD [[id number]] Collected: 22 1645    Order Status: Completed Specimen: Blood Updated: 22     Special Requests: NO SPECIAL REQUESTS        Culture result: NO GROWTH 6 DAYS       CULTURE, BLOOD [[id number]] Collected: 22 1650    Order Status: Completed Specimen: Blood Updated: 22     Special Requests: NO SPECIAL REQUESTS        Culture result: NO GROWTH 6 DAYS       ENTERIC BACTERIA PANEL, DNA [[id number]] Collected: 22 1453    Order Status: Completed Specimen: Stool Updated: 22     Shigella species, DNA Negative        Campylobacter species, DNA Negative        Vibrio species, DNA Negative        Enterotoxigen E Coli, DNA Negative        Shiga toxin producing, DNA Negative        Salmonella species, DNA Negative        P. shigelloides, DNA Negative Y. enterocolitica, DNA Negative       CULTURE, URINE [[id number]]  (Abnormal)  (Susceptibility) Collected: 07/03/22 1700    Order Status: Completed Specimen: Urine from Clean catch Updated: 07/06/22 1315     Special Requests: NO SPECIAL REQUESTS        Matthews Count --        >100,000  COLONIES/mL       Culture result: ESCHERICHIA COLI       C. DIFFICILE AG & TOXIN A/B [[id number]]  (Abnormal) Collected: 07/05/22 1455    Order Status: Completed Specimen: Miscellaneous sample Updated: 07/06/22 1133     C. difficile toxin Negative        PCR Reflex       See Reflex order for C. difficile (DNA)           INTERPRETATION       Indeterminate for Toxigenic C. difficile, DNA confirmation to follow. Tuan Sofia DIFF MOLECULAR [[id number]] Collected: 07/05/22 1455    Order Status: Completed Specimen: Miscellaneous sample Updated: 07/06/22 1133     C Diff Molecular Negative        Comment: This specimen is negative for toxigenic C difficile by DNA amplification. Repeat testing is not recommended for confirmation, samples received within 7 days of this negative result will be rejected.        King Ferrell, URINE [[id number]] Collected: 07/04/22 1247    Order Status: Completed Specimen: Urine, random Updated: 07/04/22 2030     Strep pneumo Ag, urine Negative       LEGIONELLA PNEUMOPHILA AG, URINE [[id number]] Collected: 07/04/22 1247    Order Status: Completed Specimen: Urine, random Updated: 07/04/22 2030     Legionella Ag, urine Negative       RESPIRATORY VIRUS PANEL W/COVID-19, PCR [[id number]] Collected: 07/03/22 2110    Order Status: Completed Specimen: Nasopharyngeal Updated: 07/04/22 1229     Adenovirus Not detected        Coronavirus 229E Not detected        Coronavirus HKU1 Not detected        Coronavirus CVNL63 Not detected        Coronavirus OC43 Not detected        SARS-CoV-2, PCR Not detected        Metapneumovirus Not detected        Rhinovirus and Enterovirus Not detected        Influenza A Not detected Influenza B Not detected        Parainfluenza 1 Not detected        Parainfluenza 2 Not detected        Parainfluenza 3 Not detected        Parainfluenza virus 4 Not detected        RSV by PCR Not detected        B. parapertussis, PCR Not detected        Bordetella pertussis - PCR Not detected        Chlamydophila pneumoniae DNA, QL, PCR Not detected        Mycoplasma pneumoniae DNA, QL, PCR Not detected       C. DIFFICILE AG & TOXIN A/B [[id number]]     Order Status: Canceled Specimen: Stool     COVID-19 RAPID TEST [[id number]] Collected: 07/03/22 1715    Order Status: Completed Specimen: Nasopharyngeal Updated: 07/03/22 1839     Specimen source Nasopharyngeal        COVID-19 rapid test Not detected        Comment: Rapid Abbott ID Now       Rapid NAAT:  The specimen is NEGATIVE for SARS-CoV-2, the novel coronavirus associated with COVID-19. Negative results should be treated as presumptive and, if inconsistent with clinical signs and symptoms or necessary for patient management, should be tested with an alternative molecular assay. Negative results do not preclude SARS-CoV-2 infection and should not be used as the sole basis for patient management decisions. This test has been authorized by the FDA under an Emergency Use Authorization (EUA) for use by authorized laboratories. Fact sheet for Healthcare Providers: ConventionUpdate.co.nz  Fact sheet for Patients: ConventionUpdate.co.nz       Methodology: Isothermal Nucleic Acid Amplification         INFLUENZA A & B AG (RAPID TEST) [[id number]] Collected: 07/03/22 1715    Order Status: Completed Specimen: Nasopharyngeal from Nasal washing Updated: 07/03/22 1744     Influenza A Antigen Negative        Comment: A negative result does not exclude influenza virus infection, seasonal or H1N1 due to suboptimal sensitivity.  If influenza is circulating in your community, a diagnosis of influenza should be considered based on a patients clinical presentation and empiric antiviral treatment should be considered, if indicated.         Influenza B Antigen Negative              Sammy Garcia MD  Cell [phone]  [facility]   7/11/2022   4:30 PM

## 2022-07-11 NOTE — PROGRESS NOTES
Palliative Medicine    RECONSULTATION NOTE    CODE STATUS: FULL CODE    AMD Status: none on file. Her , Daksha Melendrez is her legal next of kin     7/11/2022 0930 Seen today in room 333 along with Eliu Silveira NP. Lying in bed with head of bed elevated.  at bedside. Awake, alert. Fatigued appearing. Affect flat. Respirations unlabored Oxygen on at 5 lpm per NC. Patient says she is breathing without difficulty,    After our sign off on 7/7/2022, she was transferred to the floor but quickly returned to the ICU for tachycardia and then returned to the floor on the afternoon of 7/8. We are reconsulted for further discussions of code status and overall goals of care. Awaiting input from medical team about possible IR procedure to address Rim-enhancing pelvic complex fluid collection containing air-fluid level favored to represent diverticular abscess that was found on CT abdomen/pelvis on 7/9/2022. They both expressed discouragement that awaiting this procedure may push back her ability to go to rehab and complete her post chemotherapy PET to evaluate for disease response. Disposition plan: to be determined based on response to treatment but anticipate discharge to rehab. Palliative care will continue to follow Capri Adamson  and her family during her hospitalization and support them as they make healthcare decisions and define goals of care.       Pipo Machuca, RN, MSN  Palliative Medicine  P: [phone]

## 2022-07-11 NOTE — PROGRESS NOTES
Problem: Dysphagia (Adult)  Description: Patient will:  1. Tolerate PO trials with 0 s/s overt distress in 4/5 trials. 2. Utilize compensatory swallow strategies/maneuvers (decrease bite/sip, size/rate, alt. liq/sol) with min cues in 4/5 trials. 3. As medically indicated by MD, complete an objective swallow study (i.e., MBSS) to assess swallow integrity, r/o aspiration, and determine of safest LR    Rec:     Easy to [employer] with mildly thick (nectar) liquids  Pt may require assistance with meal set up  Aspiration precautions  HOB >45 during po intake, remain >30 for 30-45 minutes after po   Small bites/sips; alternate liquid/solid with slow feeding rate   Oral care TID  Meds crushed in puree   7/11/2022 1446 by DEBORAH Salas  Outcome: Progressing Towards Goal    SPEECH [address] TREATMENT    Patient: Gia Martines (88 y.o. female)  Date: 7/11/2022  Diagnosis: Neutropenic fever ([facility] [place] [address] [D70.9, R50.81] Acute respiratory failure with hypoxia (HCC)       Precautions: Aspiration, Fall,  Neutropenic  PLOF: Regular/thin    ASSESSMENT:  Patient seen for dysphagia follow-up. A/Ox3. Spouse present in room. Swallowing precautions printed and reviewed with patient/patient's spouse. Posted in patient's room for reference. Patient agreeable to trials of solid textures and mildly thick liquid. Patient demo prolonged mastication likely 2/ dry mouth. Requested liquid wash to clear oral cavity. Patient demo + rotary chew, appearance of WFL a/p transfer, and oral clearance (with liquid wash). Suspect delayed swallow initiation and decreased laryngeal elevation (to palpation). Patient demo + s/sx of aspiration (cough) in 1/5 trials of thin liquid. Patient tolerated 5/5 trials of mildly thick liquid with no overt s/sx of aspiration. Patient required min-mod cues to take small, single sips. Patient's spouse requested that patient have nutrition supplement (Ensure shake).  Orders reviewed and Ensure shake order placed by RN. Kitchen stated that patient unable to have without specific instructions in diet order. MD paged and order modified to allow patient to have shake. Recommend Easy to [employer] and mildly thick liquids with strict aspiration precautions. Rec MBS when patient able to safely transport and complete study to assess swallow function. D/w nurse Carmenza Boyle and Dr. Kolby Phan. Progression toward goals:  []         Improving appropriately and progressing toward goals  [x]         Improving slowly and progressing toward goals  []         Not making progress toward goals and plan of care will be adjusted     PLAN:  Recommendations and Planned Interventions:  See above  Patient continues to benefit from skilled intervention to address the above impairments. Continue treatment per established plan of care. Discharge Recommendations: To Be Determined     SUBJECTIVE:   Patient stated I can't wait until I can have a big glass of water. OBJECTIVE:   Cognitive and Communication Status:  Neurologic State: Alert  Orientation Level: Oriented to person,Oriented to place,Oriented to situation  Cognition: Follows commands  Perception: Appears intact  Perseveration: No perseveration noted  Safety/Judgement: Fall prevention,Decreased awareness of environment  Dysphagia Treatment:  Oral Assessment:  Oral Assessment  Labial: No impairment  Dentition: Intact,Natural  Oral Hygiene:  (fair)  Lingual: Decreased rate,Decreased strength  Velum: No impairment  Mandible: No impairment  Gag Reflex:  (did not test)  P.O.  Trials:   Patient Position:  (HOB 45 degrees)   Vocal quality prior to P.O.: Breathy,Fatigue   Consistency Presented: Nectar thick liquid,Solid   How Presented: SLP-fed/presented,Straw       Bolus Acceptance: No impairment   Bolus Formation/Control: Impaired   Type of Impairment: Other (comment) (suspect premature spillage with liquids)   Propulsion: No impairment   Oral Residue: None   Initiation of Swallow: Other (comment) (suspect delay)   Laryngeal Elevation: Other (comment) (suspect decreased/weak)   Aspiration Signs/Symptoms: Clear throat,Weak cough   Pharyngeal Phase Characteristics: Easily fatigued ,Poor endurance   Effective Modifications: Alternate liquids/solids,Small sips and bites   Cues for Modifications: Minimal-moderate         Oral Phase Severity: No impairment   Pharyngeal Phase Severity : Other (comment) (suspect)          PAIN:  Pain level pre-treatment: 0/10   Pain level post-treatment: 0/10     After treatment:   []              Patient left in no apparent distress sitting up in chair  [x]              Patient left in no apparent distress in bed  []              Call bell left within reach  [x]              Nursing notified  [x]              Family present  []              Caregiver present  []              Bed alarm activated      COMMUNICATION/EDUCATION:   [x] Aspiration precautions; swallow safety; compensatory techniques  []        Patient unable to participate in education; education ongoing with staff  [x]  Posted safety precautions in patient's room.   [] Oral-motor/laryngeal strengthening exercises    DEBORAH Boyd  Time Calculation: 25 mins

## 2022-07-11 NOTE — PROGRESS NOTES
OCCUPATIONAL THERAPY TREATMENT    Problem: Self Care Deficits Care Plan (Adult)  Goal: *Acute Goals and Plan of Care (Insert Text)  Outcome: Progressing Towards Goal     Patient: Ronni Alonso (35 y.o. female)  Date: 7/11/2022  Diagnosis: Neutropenic fever ([facility] [address] [D70.9, R50.81] Acute respiratory failure with hypoxia ([facility] [address]       Precautions: Fall      Chart, occupational therapy assessment, plan of care, and goals were reviewed. ASSESSMENT:  Pt presents supine in bed, initally refusing therapy but with max encouragment is willing to sit EOB. Pt min A today supine to sit with better sitting balance and now bowel movement with core activation and hip flexor activation. Pt is able to sit un supported and sip on water though it was not thickened and had 2 coughing episodes. Updated speech therapy. Pt able to sit for about 9 minutes at EOB. Requresing to return to supine due to fatigue but willing to lay in side lying to alleviate pressure off bottom. Progression toward goals:  []          Improving appropriately and progressing toward goals  [x]          Improving slowly and progressing toward goals  []          Not making progress toward goals and plan of care will be adjusted     PLAN:  Patient continues to benefit from skilled intervention to address the above impairments. Continue treatment per established plan of care. Discharge Recommendations:  Skilled Nursing Facility  Further Equipment Recommendations for Discharge:  TBD     SUBJECTIVE:   Patient stated I dont want to.     OBJECTIVE DATA SUMMARY:   Cognitive/Behavioral Status:  Neurologic State: Alert  Orientation Level: Oriented to person,Oriented to place  Cognition: Follows commands  Safety/Judgement: Fall prevention,Decreased awareness of environment    Functional Mobility and Transfers for ADLs:   Bed Mobility:     Supine to Sit: Minimum assistance  Sit to Supine: Minimum assistance  Scooting: Minimum assistance    Balance:  Sitting: Intact  Sitting - Static: Good (unsupported)  Sitting - Dynamic: Fair (occasional)    ADL Intervention:    Cognitive Retraining  Executive Functions: Managing time;Regulating behavior  Organizing/Sequencing: Breaking task down  Safety/Judgement: Fall prevention;Decreased awareness of environment    Pain:  Pain level pre-treatment: 5/10   Pain level post-treatment: 5/10  Pain Intervention(s): Medication administered by RN (see MAR); Rest, Ice, Repositioning   Response to intervention: Nurse notified, See doc flow sheet    Activity Tolerance:    Pt motivation remains low though physically seems capable, continues to have heavy fatigue with minimal movement. Please refer to the flowsheet for vital signs taken during this treatment. After treatment:   []  Patient left in no apparent distress sitting up in chair  [x]  Patient left in no apparent distress in bed  [x]  Call bell left within reach  [x]  Nursing notified  [x]  Caregiver present  []  Bed alarm activated    COMMUNICATION/EDUCATION:   [x] Role of Occupational Therapy in the acute care setting  [x] Home safety education was provided and the patient/caregiver indicated understanding. [x] Patient/family have participated as able in working towards goals and plan of care. [x] Patient/family agree to work toward stated goals and plan of care. [] Patient understands intent and goals of therapy, but is neutral about his/her participation. [] Patient is unable to participate in goal setting and plan of care.       Thank you for this referral.  Edison STARR, OTR/L   Time Calculation: 19 mins

## 2022-07-11 NOTE — PROGRESS NOTES
Palliative Medicine Consult    Patient Name: Estuardo Al  YOB: 1940    Date of Initial Consult: 7/5/2022  Date of follow-up: 7/11/2022  Reason for Consult: Goals of care discussions  Requesting Provider: Dr. Solange Cormier   Primary Care Physician: Jo Ramirez MD      SUMMARY:   Estuardo Al is a 78 y.o. with a past history of hypertension, hypothyroidism, atrial fibrillation, and lymphoma on chemo, who was admitted on 7/3/2022 from home with a diagnosis of septic shock, neutropenic fever, and lymphoma. Current medical issues leading to Palliative Medicine involvement include: Goals of care discussions. PALLIATIVE DIAGNOSES:   1. Goals of care discussions  2. Septic shock  3. Diffuse large B-cell lymphoma  4. Advanced age/debility       PLAN:   7/11/2022: Palliative medicine team including Isabella Lo RN and I met with patient at patient's bedside. Patient's  Cam  also present. Patient is awake, fatigued, in no acute distress. Patient has a flat affect, not very engaged in goals of care discussions. Her responses are short (one to two words). We were re-consulted to readdress goals of care. Since our sign off, she was transferred to the floor but quickly returned to the ICU for tachycardia and then returned to the floor on the afternoon of 7/8. Patient pending eval for possible IR drainage catheter for possible diverticular abscess. Both patient and  express frustration that this is yet another set back from rehab and outpatient follow up with oncology to determine remission status of her lymphoma. At this time patient and  agree to continue full aggressive measures. We will continue to have goals of care discussions. Continue full code with full interventions. See previous discussions below:    7/7/2022: Palliative medicine team including Isabella Lo RN and I met with patient at patient's bedside. Patient's  Cam  also present.   Patient is awake, fatigued, in no acute distress. Appreciate Dr. Rory Beebe discussion with patient and family regarding the consideration of DNR status. Reviewed this discussion from yesterday. Discussed the benefits and burdens of CPR in the event of cardiopulmonary arrest in the setting of advanced age with chronic comorbidities. Discussed the benefits and burdens of intubation in the event of respiratory decline pre-arrest.  Patient's  was clear that she would want an opportunity for CPR, but would not want to linger on life support.  states that if patient were not doing well postarrest, he would be willing to consider compassionate extubation. Patient agreed and stated \"I want to live\". Encouraged patient and  to continue open discussion regarding goals of care, and explained to patient that she has the right to update her goals of care at any time. Patient remains a full code with full interventions. Will sign off as goals of care have been established. Please re-consult should it be warranted. Thank you for the opportunity to provide care to this patient. See previous discussions below:    7/5/2022: Goals of care: Palliative medicine team including  LUCY Mora and MANOHAR met with patient at patient's bedside. Patient is awake, alert, and oriented to person, place, and time. She is currently disoriented to situation, cannot express why she is hospitalized. Met with patient later in the morning when  was at bedside.  Mariama Feng is appreciative of all the medical care patient is receiving. Introduced role as palliative medicine. Support offered as  expresses his desire to determine patient's response to her chemotherapy which was last received 6/28/2022. Patient and  are hopeful for remission status, as treatment had a curative intent; patient was scheduled to have a CAT scan tomorrow, but due to hospitalization will be unable to complete. Oncology following.    reports patient was admitted earlier this year with similar septic shock.  is hopeful that patient will respond to medical treatments. Reviewed CODE STATUS including intubation in the event of cardiopulmonary arrest or respiratory decline prearrest.  At this time patient remains a full code with full interventions. They are open to further discussions once they are aware of patient's lymphoma cancer status. We will continue to follow for support, and further goals of care conversations as appropriate. 1. Septic shock: ID following, on vasopressor support. On broad-spectrum antibiotic therapy. 2. Diffuse large B-cell lymphoma: Follows Dr. Marquez Mckoy from oncology, patient received her last chemotherapy session 6/28/2022, remission status is unknown as she was supposed to receive a CT scan as an outpatient but did not make appointment due to hospitalization. Treatment was curative intent. Oncology continuing to follow. 3. Advanced age/debility: 78-year-old female who lives at home with her . She has generalized weakness and malaise. Poor appetite, which has been improving since admission. At this time she needs assistance with all ADLs, including feeding. 4. Initial consult note routed to primary continuity provider  5.  Communicated plan of care with: Palliative IDT       GOALS OF CARE / TREATMENT PREFERENCES:   [====Goals of Care====]  GOALS OF CARE: Full code with full interventions  Patient/Health Care Proxy Stated Goals: Prolong life      TREATMENT PREFERENCES:   Code Status: Full Code    Advance Care Planning:  Advance Care Planning 7/6/2022   Patient's Healthcare Decision Maker is: Legal Next of Kin   Confirm Advance Directive None   Patient Would Like to Complete Advance Directive No       Medical Interventions: Full interventions            The palliative care team has discussed with patient / health care proxy about goals of care / treatment preferences for patient.  [====Goals of Care====]         HISTORY:     History obtained from: patient, chart, spouse    CHIEF COMPLAINT: drowsy, appetite improving slowly    HPI/SUBJECTIVE:    The patient is:   [x] Verbal and participatory  [] Non-participatory due to: Fatigued but oriented x 4    Clinical Pain Assessment (nonverbal scale for severity on nonverbal patients):   Clinical Pain Assessment  Severity: 0    Adult Nonverbal Pain Scale  Face: No particular expression or smile  Activity (Movement): Lying quietly, normal position  Guarding: Lying quietly, no positioning of hands over areas of body  Physiology (Vital Signs): Stable vital signs  Respiratory: Baseline RR/SpO2 compliant with ventilator  Total Score: 0       FUNCTIONAL ASSESSMENT:     Palliative Performance Scale (PPS):   PPS: 50       PSYCHOSOCIAL/SPIRITUAL SCREENING:     Advance Care Planning:  Advance Care Planning 7/6/2022   Patient's Healthcare Decision Maker is: Legal Next of Kin   Confirm Advance Directive None   Patient Would Like to Complete Advance Directive No        Any spiritual / Uatsdin concerns:  [] Yes /  [x] No    Caregiver Burnout:  [] Yes /  [x] No /  [] No Caregiver Present      Anticipatory grief assessment:   [x] Normal  / [] Maladaptive          REVIEW OF SYSTEMS:     Positive and pertinent negative findings in ROS are noted above in HPI. The following systems were [x] reviewed / [] unable to be reviewed as noted in HPI  Other findings are noted below. Systems: constitutional, ears/nose/mouth/throat, respiratory, gastrointestinal, genitourinary, musculoskeletal, integumentary, neurologic, psychiatric, endocrine. Positive findings noted below.   Modified ESAS Completed by: provider   Fatigue: 5       Pain: 0   Anxiety: 0 Nausea: 0   Anorexia: 0 Dyspnea: 0     Constipation: No     Stool Occurrence(s): 1        PHYSICAL EXAM:     From RN flowsheet:  Wt Readings from Last 3 Encounters:   07/05/22 53.5 kg (118 lb)   09/19/12 84.4 kg (186 lb 2 oz)   09/14/12 83.9 kg (185 lb)     Blood pressure 118/84, pulse 89, temperature 97.8 °F (36.6 °C), resp. rate 22, height 5' 7\" (1.702 m), weight 53.5 kg (118 lb), SpO2 96 %.     Pain Scale 1: Numeric (0 - 10)  Pain Intensity 1: 0                      Constitutional: Awake, alert, NAD, appears chronically ill, not engaged in discussions, responds with one or two words  Eyes: pupils equal, anicteric  ENMT: no nasal discharge, dry mucous membranes  Cardiovascular: regular rhythm, distal pulses intact  Respiratory: breathing not labored, symmetric, on NC  Gastrointestinal: soft non-tender   Musculoskeletal: no deformity, no tenderness to palpation  Skin: warm, dry  Neurologic: following commands, moving all extremities, oriented to person, place, time, and situation  Psychiatric: flat affect, no hallucinations       HISTORY:     Principal Problem:    Acute respiratory failure with hypoxia ([address] (7/3/2022)    Active Problems:    Hypokalemia (9/21/2012)      Neutropenic fever ([address] (7/3/2022)      Hypomagnesemia (7/3/2022)      Diffuse large B cell lymphoma (HCC) (7/3/2022)      GERD (gastroesophageal reflux disease) ()      Thyroid disease ()      Acute encephalopathy ()      Hyponatremia (7/3/2022)      Pancytopenia (HCC) (7/3/2022)      UTI (urinary tract infection) (7/3/2022)      Sinusitis (7/3/2022)      Myopia of both eyes with astigmatism (7/3/2022)      Pleural effusion (7/3/2022)      Cellulitis (7/3/2022)      Severe protein-calorie malnutrition ([address] (7/3/2022)      Septic shock (HCC) (7/4/2022)      Past Medical History:   Diagnosis Date    Arthritis     GERD (gastroesophageal reflux disease)     Hypertension     Osteoarthritis of right knee 9/19/2012    S/P total knee arthroplasty 9/19/2012    Thyroid disease       Past Surgical History:   Procedure Laterality Date    HX APPENDECTOMY  1950's    HX CHOLECYSTECTOMY      laparoscopic    HX HEENT      Surgery OD infection x 4    HX HEENT      throat surgery x 2    HX KNEE ARTHROSCOPY      right      No family history on file. History reviewed, no pertinent family history. Social History     Tobacco Use    Smoking status: Former Smoker     Quit date: 1999     Years since quittin.5    Smokeless tobacco: Never Used   Substance Use Topics    Alcohol use:  Yes     Alcohol/week: 1.7 standard drinks     Types: 1 Glasses of wine, 1 Cans of beer per week     Allergies   Allergen Reactions    Adhesive Tape-Silicones Other (comments)     Takes skin off    Penicillin G Itching      Current Facility-Administered Medications   Medication Dose Route Frequency    potassium bicarb-citric acid (EFFER-K) tablet 40 mEq  40 mEq Oral BID    metroNIDAZOLE (FLAGYL) IVPB premix 500 mg  500 mg IntraVENous Q8H    cefepime (MAXIPIME) 2 g in 0.9% sodium chloride (MBP/ADV) 100 mL MBP  2 g IntraVENous Q12H    prednisoLONE acetate (PRED FORTE) 1 % ophthalmic suspension 1 Drop  1 Drop Both Eyes BID    fluticasone propionate (FLONASE) 50 mcg/actuation nasal spray 2 Spray  2 Spray Both Nostrils DAILY    digoxin (LANOXIN) injection 100 mcg  100 mcg IntraVENous DAILY    lidocaine-prilocaine (EMLA) 2.5-2.5 % cream   Topical PRN    nystatin (MYCOSTATIN) 100,000 unit/gram powder   Topical BID    pantoprazole (PROTONIX) tablet 40 mg  40 mg Oral ACB    [Held by provider] furosemide (LASIX) injection 20 mg  20 mg IntraVENous Q12H    insulin lispro (HUMALOG) injection   SubCUTAneous AC&HS    levothyroxine (SYNTHROID) tablet 125 mcg  125 mcg Oral DAILY    amiodarone (CORDARONE) tablet 100 mg  100 mg Oral DAILY    ipratropium (ATROVENT) 0.02 % nebulizer solution 0.5 mg  0.5 mg Nebulization TID PRN    albuterol (PROVENTIL VENTOLIN) nebulizer solution 2.5 mg  2.5 mg Nebulization Q4H PRN    sodium chloride (NS) flush 5-40 mL  5-40 mL IntraVENous Q8H    sodium chloride (NS) flush 5-40 mL  5-40 mL IntraVENous PRN    acetaminophen (TYLENOL) tablet 650 mg  650 mg Oral Q6H PRN    Or    acetaminophen (TYLENOL) suppository 650 mg  650 mg Rectal Q6H PRN    polyethylene glycol (MIRALAX) packet 17 g  17 g Oral DAILY PRN    ondansetron (ZOFRAN ODT) tablet 4 mg  4 mg Oral Q8H PRN    Or    ondansetron (ZOFRAN) injection 4 mg  4 mg IntraVENous Q6H PRN    albumin human 25% (BUMINATE) solution 25 g  25 g IntraVENous Q6H    glucose chewable tablet 16 g  4 Tablet Oral PRN    glucagon (GLUCAGEN) injection 1 mg  1 mg IntraMUSCular PRN    dextrose 10% infusion 0-250 mL  0-250 mL IntraVENous PRN          LAB AND IMAGING FINDINGS:     Lab Results   Component Value Date/Time    WBC 5.8 07/11/2022 04:35 AM    HGB 8.8 (L) 07/11/2022 04:35 AM    PLATELET 959 18/83/3516 04:35 AM     Lab Results   Component Value Date/Time    Sodium 143 07/11/2022 04:35 AM    Potassium 3.1 (L) 07/11/2022 04:35 AM    Chloride 108 07/11/2022 04:35 AM    CO2 29 07/11/2022 04:35 AM    BUN 27 (H) 07/11/2022 04:35 AM    Creatinine 0.40 (L) 07/11/2022 04:35 AM    Calcium 9.8 07/11/2022 04:35 AM    Magnesium 2.0 07/11/2022 04:35 AM    Phosphorus 1.6 (L) 07/11/2022 04:35 AM      Lab Results   Component Value Date/Time    Alk. phosphatase 51 07/11/2022 04:35 AM    Protein, total 6.5 07/11/2022 04:35 AM    Albumin 4.6 07/11/2022 04:35 AM    Globulin 1.9 (L) 07/11/2022 04:35 AM     Lab Results   Component Value Date/Time    INR 1.1 07/06/2022 05:04 AM    Prothrombin time 15.0 07/06/2022 05:04 AM    aPTT 22.3 (L) 08/28/2012 08:50 AM      No results found for: IRON, FE, TIBC, IBCT, PSAT, FERR   No results found for: PH, PCO2, PO2  No components found for: GLPOC   No results found for: CPK, CKMB             Total time: 25 minutes  Counseling / coordination time, spent as noted above:   > 50% counseling / coordination?: yes, patient, family, and medical team    Prolonged service was provided for  []30 min   []75 min in face to face time in the presence of the patient, spent as noted above.   Time Start:   Time End:

## 2022-07-12 ENCOUNTER — APPOINTMENT (OUTPATIENT)
Dept: GENERAL RADIOLOGY | Age: 82
DRG: 871 | End: 2022-07-12
Attending: STUDENT IN AN ORGANIZED HEALTH CARE EDUCATION/TRAINING PROGRAM
Payer: MEDICARE

## 2022-07-12 LAB
ALBUMIN SERPL-MCNC: 5.1 G/DL (ref 3.4–5)
ALBUMIN/GLOB SERPL: 3.2 {RATIO} (ref 0.8–1.7)
ALP SERPL-CCNC: 52 U/L (ref 45–117)
ALT SERPL-CCNC: 10 U/L (ref 13–56)
ANION GAP SERPL CALC-SCNC: 6 MMOL/L (ref 3–18)
AST SERPL-CCNC: 7 U/L (ref 10–38)
BASOPHILS # BLD: 0 K/UL (ref 0–0.1)
BASOPHILS NFR BLD: 0 % (ref 0–2)
BILIRUB SERPL-MCNC: 0.4 MG/DL (ref 0.2–1)
BUN SERPL-MCNC: 23 MG/DL (ref 7–18)
BUN/CREAT SERPL: 58 (ref 12–20)
CA-I SERPL-SCNC: 1.27 MMOL/L (ref 1.12–1.32)
CALCIUM SERPL-MCNC: 9.9 MG/DL (ref 8.5–10.1)
CHLORIDE SERPL-SCNC: 108 MMOL/L (ref 100–111)
CO2 SERPL-SCNC: 29 MMOL/L (ref 21–32)
CREAT SERPL-MCNC: 0.4 MG/DL (ref 0.6–1.3)
DIFFERENTIAL METHOD BLD: ABNORMAL
EOSINOPHIL # BLD: 0.2 K/UL (ref 0–0.4)
EOSINOPHIL NFR BLD: 4 % (ref 0–5)
ERYTHROCYTE [DISTWIDTH] IN BLOOD BY AUTOMATED COUNT: 31.4 % (ref 11.6–14.5)
GLOBULIN SER CALC-MCNC: 1.6 G/DL (ref 2–4)
GLUCOSE BLD STRIP.AUTO-MCNC: 125 MG/DL (ref 70–110)
GLUCOSE BLD STRIP.AUTO-MCNC: 165 MG/DL (ref 70–110)
GLUCOSE BLD STRIP.AUTO-MCNC: 192 MG/DL (ref 70–110)
GLUCOSE BLD STRIP.AUTO-MCNC: 217 MG/DL (ref 70–110)
GLUCOSE SERPL-MCNC: 148 MG/DL (ref 74–99)
HCT VFR BLD AUTO: 29 % (ref 35–45)
HGB BLD-MCNC: 8.8 G/DL (ref 12–16)
IMM GRANULOCYTES # BLD AUTO: 0 K/UL
IMM GRANULOCYTES NFR BLD AUTO: 0 %
LYMPHOCYTES # BLD: 0.9 K/UL (ref 0.9–3.6)
LYMPHOCYTES NFR BLD: 14 % (ref 21–52)
MAGNESIUM SERPL-MCNC: 2 MG/DL (ref 1.6–2.6)
MCH RBC QN AUTO: 28.7 PG (ref 24–34)
MCHC RBC AUTO-ENTMCNC: 30.3 G/DL (ref 31–37)
MCV RBC AUTO: 94.5 FL (ref 78–100)
METAMYELOCYTES NFR BLD MANUAL: 1 %
MONOCYTES # BLD: 0.8 K/UL (ref 0.05–1.2)
MONOCYTES NFR BLD: 13 % (ref 3–10)
NEUTS BAND NFR BLD MANUAL: 10 % (ref 0–5)
NEUTS SEG # BLD: 4.2 K/UL (ref 1.8–8)
NEUTS SEG NFR BLD: 58 % (ref 40–73)
NRBC # BLD: 0.05 K/UL (ref 0–0.01)
NRBC BLD-RTO: 0.8 PER 100 WBC
PHOSPHATE SERPL-MCNC: 3 MG/DL (ref 2.5–4.9)
PLATELET # BLD AUTO: 402 K/UL (ref 135–420)
PLATELET COMMENTS,PCOM: ABNORMAL
PMV BLD AUTO: 9.8 FL (ref 9.2–11.8)
POTASSIUM SERPL-SCNC: 3.3 MMOL/L (ref 3.5–5.5)
PROT SERPL-MCNC: 6.7 G/DL (ref 6.4–8.2)
RBC # BLD AUTO: 3.07 M/UL (ref 4.2–5.3)
RBC MORPH BLD: ABNORMAL
RBC MORPH BLD: ABNORMAL
SODIUM SERPL-SCNC: 143 MMOL/L (ref 136–145)
WBC # BLD AUTO: 6.2 K/UL (ref 4.6–13.2)

## 2022-07-12 PROCEDURE — 74011000258 HC RX REV CODE- 258: Performed by: INTERNAL MEDICINE

## 2022-07-12 PROCEDURE — 82962 GLUCOSE BLOOD TEST: CPT

## 2022-07-12 PROCEDURE — 74011250637 HC RX REV CODE- 250/637: Performed by: INTERNAL MEDICINE

## 2022-07-12 PROCEDURE — 92526 ORAL FUNCTION THERAPY: CPT

## 2022-07-12 PROCEDURE — 97530 THERAPEUTIC ACTIVITIES: CPT

## 2022-07-12 PROCEDURE — 84100 ASSAY OF PHOSPHORUS: CPT

## 2022-07-12 PROCEDURE — 74011250636 HC RX REV CODE- 250/636: Performed by: INTERNAL MEDICINE

## 2022-07-12 PROCEDURE — 80053 COMPREHEN METABOLIC PANEL: CPT

## 2022-07-12 PROCEDURE — 74011250636 HC RX REV CODE- 250/636: Performed by: FAMILY MEDICINE

## 2022-07-12 PROCEDURE — 74011250637 HC RX REV CODE- 250/637: Performed by: HOSPITALIST

## 2022-07-12 PROCEDURE — P9047 ALBUMIN (HUMAN), 25%, 50ML: HCPCS | Performed by: HOSPITALIST

## 2022-07-12 PROCEDURE — 74011000250 HC RX REV CODE- 250: Performed by: HOSPITALIST

## 2022-07-12 PROCEDURE — 99231 SBSQ HOSP IP/OBS SF/LOW 25: CPT | Performed by: NURSE PRACTITIONER

## 2022-07-12 PROCEDURE — 71046 X-RAY EXAM CHEST 2 VIEWS: CPT

## 2022-07-12 PROCEDURE — 74011636637 HC RX REV CODE- 636/637: Performed by: HOSPITALIST

## 2022-07-12 PROCEDURE — 77010033678 HC OXYGEN DAILY

## 2022-07-12 PROCEDURE — 85025 COMPLETE CBC W/AUTO DIFF WBC: CPT

## 2022-07-12 PROCEDURE — 36415 COLL VENOUS BLD VENIPUNCTURE: CPT

## 2022-07-12 PROCEDURE — 82330 ASSAY OF CALCIUM: CPT

## 2022-07-12 PROCEDURE — 74011250636 HC RX REV CODE- 250/636: Performed by: HOSPITALIST

## 2022-07-12 PROCEDURE — 65270000046 HC RM TELEMETRY

## 2022-07-12 PROCEDURE — 83735 ASSAY OF MAGNESIUM: CPT

## 2022-07-12 RX ORDER — POTASSIUM CHLORIDE 20 MEQ/1
40 TABLET, EXTENDED RELEASE ORAL
Status: DISCONTINUED | OUTPATIENT
Start: 2022-07-12 | End: 2022-07-12 | Stop reason: SDUPTHER

## 2022-07-12 RX ORDER — ENOXAPARIN SODIUM 100 MG/ML
40 INJECTION SUBCUTANEOUS EVERY 24 HOURS
Status: DISCONTINUED | OUTPATIENT
Start: 2022-07-13 | End: 2022-07-12

## 2022-07-12 RX ORDER — ENOXAPARIN SODIUM 100 MG/ML
1 INJECTION SUBCUTANEOUS EVERY 12 HOURS
Status: DISCONTINUED | OUTPATIENT
Start: 2022-07-12 | End: 2022-07-16

## 2022-07-12 RX ORDER — POTASSIUM CHLORIDE 20 MEQ/1
40 TABLET, EXTENDED RELEASE ORAL
Status: COMPLETED | OUTPATIENT
Start: 2022-07-12 | End: 2022-07-12

## 2022-07-12 RX ADMIN — PREDNISOLONE ACETATE 1 DROP: 10 SUSPENSION/ DROPS OPHTHALMIC at 08:46

## 2022-07-12 RX ADMIN — ALBUMIN (HUMAN) 25 G: 0.25 INJECTION, SOLUTION INTRAVENOUS at 17:40

## 2022-07-12 RX ADMIN — FLUTICASONE PROPIONATE 2 SPRAY: 50 SPRAY, METERED NASAL at 08:46

## 2022-07-12 RX ADMIN — SODIUM CHLORIDE, PRESERVATIVE FREE 10 ML: 5 INJECTION INTRAVENOUS at 05:00

## 2022-07-12 RX ADMIN — AMIODARONE HYDROCHLORIDE 100 MG: 200 TABLET ORAL at 12:00

## 2022-07-12 RX ADMIN — NYSTATIN: 100000 POWDER TOPICAL at 08:46

## 2022-07-12 RX ADMIN — Medication 3 UNITS: at 08:16

## 2022-07-12 RX ADMIN — DIGOXIN 100 MCG: 250 INJECTION, SOLUTION INTRAMUSCULAR; INTRAVENOUS; PARENTERAL at 09:00

## 2022-07-12 RX ADMIN — ALBUMIN (HUMAN) 25 G: 0.25 INJECTION, SOLUTION INTRAVENOUS at 13:17

## 2022-07-12 RX ADMIN — POTASSIUM CHLORIDE 40 MEQ: 20 TABLET, EXTENDED RELEASE ORAL at 10:45

## 2022-07-12 RX ADMIN — ALBUMIN (HUMAN) 25 G: 0.25 INJECTION, SOLUTION INTRAVENOUS at 05:00

## 2022-07-12 RX ADMIN — Medication 6 UNITS: at 16:26

## 2022-07-12 RX ADMIN — PANTOPRAZOLE SODIUM 40 MG: 40 TABLET, DELAYED RELEASE ORAL at 08:15

## 2022-07-12 RX ADMIN — POTASSIUM CHLORIDE 40 MEQ: 20 TABLET, EXTENDED RELEASE ORAL at 09:00

## 2022-07-12 RX ADMIN — METRONIDAZOLE 500 MG: 500 INJECTION, SOLUTION INTRAVENOUS at 12:02

## 2022-07-12 RX ADMIN — ALBUMIN (HUMAN) 25 G: 0.25 INJECTION, SOLUTION INTRAVENOUS at 00:02

## 2022-07-12 RX ADMIN — SODIUM CHLORIDE, PRESERVATIVE FREE 10 ML: 5 INJECTION INTRAVENOUS at 21:51

## 2022-07-12 RX ADMIN — Medication 3 UNITS: at 22:00

## 2022-07-12 RX ADMIN — METRONIDAZOLE 500 MG: 500 INJECTION, SOLUTION INTRAVENOUS at 21:50

## 2022-07-12 RX ADMIN — FUROSEMIDE 20 MG: 10 INJECTION, SOLUTION INTRAMUSCULAR; INTRAVENOUS at 21:50

## 2022-07-12 RX ADMIN — SODIUM CHLORIDE, PRESERVATIVE FREE 10 ML: 5 INJECTION INTRAVENOUS at 13:18

## 2022-07-12 RX ADMIN — METRONIDAZOLE 500 MG: 500 INJECTION, SOLUTION INTRAVENOUS at 04:58

## 2022-07-12 RX ADMIN — LEVOTHYROXINE SODIUM 125 MCG: 0.1 TABLET ORAL at 05:00

## 2022-07-12 RX ADMIN — CEFTRIAXONE 1 G: 1 INJECTION, POWDER, FOR SOLUTION INTRAMUSCULAR; INTRAVENOUS at 16:49

## 2022-07-12 NOTE — PROGRESS NOTES
Hematology / Oncology Progress Note    Impression:   Principal Problem:    Acute respiratory failure with hypoxia ([address] (7/3/2022)    Active Problems:    Hypokalemia (9/21/2012)      Neutropenic fever ([address] (7/3/2022)      Hypomagnesemia (7/3/2022)      Diffuse large B cell lymphoma (HCC) (7/3/2022)      GERD (gastroesophageal reflux disease) ()      Thyroid disease ()      Acute encephalopathy ()      Hyponatremia (7/3/2022)      Pancytopenia ([address] (7/3/2022)      UTI (urinary tract infection) (7/3/2022)      Sinusitis (7/3/2022)      Myopia of both eyes with astigmatism (7/3/2022)      Pleural effusion (7/3/2022)      Cellulitis (7/3/2022)      Severe protein-calorie malnutrition ([address] (7/3/2022)      Septic shock ([address] (7/4/2022)      Colonic diverticular abscess (7/11/2022)        Sepsis/Fluid Collection: Pending IR Perc Drain for abscess. Recommend procedure if possible as all other complications from UTI and Neutropenic fever now resolved. Potentially cured from a lymphoma perspective. On CTX/Flagyl    Diffuse Large B Cell Lymphoma -  Completed chemotherapy with R-CHOP on 6/28/22. CT Chest on 7/3/22 and CT Abdomen/Pelvis on 7/11/22 with no lymphadenopathy, suggestive of cure for her lymphoma. This is not her problem at this time. Will confirm this with PET/CT as outpatient, but overall prognosis from lymphoma standpoint is good. Anemia - dt/ chemotherapy. Hg steady at 8.8    Pleural Effusion, on 4 L NC    Hx of AFib    Weakness - dt/ chemotherapy + illness.  PT/OT    Plan:   Agree with perc drain for abscess  No plans for further chemotherapy while inhouse  Encourage ambulation when able  Will plan for PET/CT as outpatient as planned and follow up with Dr. Sharon You  Further care per primary team  Will continue to follow    Yamilet Nguyen MD  [facility]      Subjective:     No acute overnight events  Sleepy again this am, on 4L [facility] Medications:     Current Facility-Administered Medications   Medication Dose Route Frequency Provider Last Rate Last Admin    potassium chloride (K-DUR, KLOR-CON M20) SR tablet 40 mEq  40 mEq Oral NOW Jeevan Simms MD        [Held by provider] enoxaparin (LOVENOX) injection 50 mg  1 mg/kg SubCUTAneous Q12H Jeevan Simms MD        cefTRIAXone (ROCEPHIN) 1 g in 0.9% sodium chloride (MBP/ADV) 50 mL MBP  1 g IntraVENous Q24H Jenae Bravo  mL/hr at 07/11/22 1740 1 g at 07/11/22 1740    metroNIDAZOLE (FLAGYL) IVPB premix 500 mg  500 mg IntraVENous Q8H Jenae Bravo  mL/hr at 07/12/22 0458 500 mg at 07/12/22 0458    prednisoLONE acetate (PRED FORTE) 1 % ophthalmic suspension 1 Drop  1 Drop Both Eyes BID Matilde Clay MD   1 Drop at 07/12/22 0846    fluticasone propionate (FLONASE) 50 mcg/actuation nasal spray 2 Spray  2 Spray Both Nostrils DAILY Matilde Clay MD   2 Spray at 07/12/22 0846    digoxin (LANOXIN) injection 100 mcg  100 mcg IntraVENous DAILY Cooper VILLELA MD   100 mcg at 07/12/22 0900    lidocaine-prilocaine (EMLA) 2.5-2.5 % cream   Topical PRN Madhu Webb MD        nystatin (MYCOSTATIN) 100,000 unit/gram powder   Topical BID Tosin Bhakta MD   Given at 07/12/22 0846    pantoprazole (PROTONIX) tablet 40 mg  40 mg Oral ACB Jeevan Simms MD   40 mg at 07/12/22 0815    furosemide (LASIX) injection 20 mg  20 mg IntraVENous Q12H Madhu Webb MD   20 mg at 07/07/22 2135    insulin lispro (HUMALOG) injection   SubCUTAneous AC&HS Jeevan Simms MD   3 Units at 07/12/22 0816    levothyroxine (SYNTHROID) tablet 125 mcg  125 mcg Oral DAILY Lupillo Fernandez MD   125 mcg at 07/12/22 0500    amiodarone (CORDARONE) tablet 100 mg  100 mg Oral DAILY Lupillo Fernandez MD   100 mg at 07/11/22 1212    ipratropium (ATROVENT) 0.02 % nebulizer solution 0.5 mg  0.5 mg Nebulization TID PRN Lupillo Fernandez MD   0.5 mg at 07/07/22 2021    albuterol (PROVENTIL VENTOLIN) nebulizer solution 2.5 mg  2.5 mg Nebulization Q4H PRN Aleida Melchor MD 2.5 mg at 07/10/22 1504    sodium chloride (NS) flush 5-40 mL  5-40 mL IntraVENous Q8H Elena Vreas MD   10 mL at 22 0500    sodium chloride (NS) flush 5-40 mL  5-40 mL IntraVENous PRN Elena Veras MD        acetaminophen (TYLENOL) tablet 650 mg  650 mg Oral Q6H PRN Elena Veras MD        Or    acetaminophen (TYLENOL) suppository 650 mg  650 mg Rectal Q6H PRN Elena Veras MD        [Held by provider] polyethylene glycol (MIRALAX) packet 17 g  17 g Oral DAILY PRN Elena Veras MD        ondansetron (ZOFRAN ODT) tablet 4 mg  4 mg Oral Q8H PRN Elena Veras MD        Or    ondansetron [facility]) injection 4 mg  4 mg IntraVENous Q6H PRN Elena Veras MD   4 mg at 22 1052    albumin human 25% (BUMINATE) solution 25 g  25 g IntraVENous Q6H Elena Veras MD   25 g at 22 0500    glucose chewable tablet 16 g  4 Tablet Oral PRN Elena Veras MD        glucagon [facility]) injection 1 mg  1 mg IntraMUSCular PRN Elena Veras MD        dextrose 10% infusion 0-250 mL  0-250 mL IntraVENous PRN Elena Veras MD           Review of Systems:   Constitutional:  Limited ROS, but denies fever or pain.       Visit Vitals  /63   Pulse 78   Temp 97.4 °F (36.3 °C)   Resp 20   Ht 5' 7\" (1.702 m)   Wt 53.5 kg (118 lb)   SpO2 94%   BMI 18.48 kg/m²       Temp (24hrs), Av °F (36.7 °C), Min:97.4 °F (36.3 °C), Max:98.7 °F (37.1 °C)      General: no acute distress and laying in bed, comfortable  HEENT: no icterus, no oral lesions  Lym: no cervical or supraclavicular adenopathy  CV: rate is regular and normal and their is no murmur  Lung: clear to auscultation, no increased work of breathing  Abd: nontender, no organomegaly  Neuro: cnoversant and moving extremities  MSK: no sarcopenia, normal tone  Derm: no rash  Psych: normal affect  Per: warm, no edema    Labs:  Recent Labs     22  0210 22  0435 07/10/22  0350   WBC 6.2 5.8 4.5*   RBC 3.07* 3.07* 2.98*   HCT 29.0* 29.2* 27.7*   MCV 94.5 95.1 93.0   MCH 28.7 28.7 28.9   MCHC 30.3* 30.1* 31.0   RDW 31.4* 31.3* ABNORMAL       Recent Labs     07/12/22  0210 07/11/22  0435 07/10/22  0350   CO2 29 29 30   BUN 23* 27* 27*       No results for input(s): ALBUMIN in the last 72 hours.     No lab exists for component: [facility], [facility]

## 2022-07-12 NOTE — PROGRESS NOTES
Spoke with primary nurse regarding abscess drain order and explained that patient/appointment has been denied by Dr. Garett Gonzales.

## 2022-07-12 NOTE — PROGRESS NOTES
Problem: Mobility Impaired (Adult and Pediatric)  Goal: *Acute Goals and Plan of Care (Insert Text)  Description: FUNCTIONAL STATUS PRIOR TO ADMISSION: Pt. Requires assistance for ADLs and Mod I with RW at baseline for short distances    [facility]: The patient lives with  who provides assistance as well as care aids who provide bathing once per week. Physical Therapy Goals  Initiated 7/9/2022  1. Patient will move from supine to sit and sit to supine  in bed with minimal assistance/contact guard assist within 7 day(s). 2.  Patient will transfer from bed to chair and chair to bed with minimal assistance/contact guard assist using the least restrictive device within 7 day(s). 3.  Patient will perform sit to stand with minimal assistance/contact guard assist within 7 day(s). 4.  Patient will ambulate with minimal assistance/contact guard assist for 50ft feet with the least restrictive device within 7 day(s). Outcome: Progressing Towards Goal  physical Therapy TREATMENT    Patient: Parveen Novoa (41 y.o. female)  Date: 7/11/2022  Diagnosis: Neutropenic fever ([facility] [D70.9, R50.81] Acute respiratory failure with hypoxia [facility])  Precautions: Fall   Chart, physical therapy assessment, plan of care and goals were reviewed. ASSESSMENT:  Pt supine in bed and c/o buttock pain. Willing to participate with bed ex for strengthening LE's and performed same as noted below. Appears with generalized weakness and decr'd activity tolerance (rest break required intermittently t/o session). Encouraged to roll for pressure relief off buttocks. Requires min A/vc/tc to roll toward R, encouraged pt to incorporate use of LE's to complete turn. Pt daughter present during session. Expresses desire for pt to discharge to home.   Progress minimal.   Progression toward goals:  []      Improving appropriately and progressing toward goals  [x]      Improving slowly and progressing toward goals  [] Not making progress toward goals and plan of care will be adjusted     PLAN:  Patient continues to benefit from skilled intervention to address the above impairments. Continue treatment per established plan of care. Discharge Recommendations: To Be Determined  Further Equipment Recommendations for Discharge:  TBD     SUBJECTIVE:   Patient stated I wish you had come earlier. My butt hurts.     OBJECTIVE DATA SUMMARY:   Critical Behavior:  Neurologic State: Alert  Orientation Level: Oriented to person,Oriented to place,Oriented to situation  Cognition: Follows commands  Safety/Judgement: Fall prevention,Decreased awareness of environment  Functional Mobility Training:  Bed Mobility:  Rolling: Minimum assistance  Supine to Sit: Minimum assistance  Sit to Supine: Minimum assistance  Scooting: Total assistance ([place] bed utilized)  Balance:  Sitting: Intact  Sitting - Static: Good (unsupported)  Sitting - Dynamic: Fair (occasional)  Therapeutic Exercises:       EXERCISE   Sets   Reps   Active Active Assist   Passive Self ROM   Comments   Ankle Pumps/AC's 1 10  [x] [] [] []    Quad Sets/Glut Sets 1 10 [x] [] [] []    Heel Slides 1 10 [] [x] [] []       Pain:  Pain Scale 1: Numeric (0 - 10)  Pain Intensity 1: 0  Activity Tolerance:   Fair   Please refer to the flowsheet for vital signs taken during this treatment.   After treatment:   [] Patient left in no apparent distress sitting up in chair  [x] Patient left in no apparent distress in bed  [x] Call bell left within reach  [x] Nursing notified  [x] Caregiver present  [] Bed alarm activated      Irena Maldonado PT   Time Calculation: 23 mins

## 2022-07-12 NOTE — PROGRESS NOTES
Palliative Medicine Consult    Patient Name: Bert Araiza  YOB: 1940    Date of Initial Consult: 7/5/2022  Date of follow-up: 7/12/2022  Reason for Consult: Goals of care discussions  Requesting Provider: Dr. Judge Medrano   Primary Care Physician: Pau Rodrigues MD      SUMMARY:   Bert Araiza is a 78 y.o. with a past history of hypertension, hypothyroidism, atrial fibrillation, and lymphoma on chemo, who was admitted on 7/3/2022 from home with a diagnosis of septic shock, neutropenic fever, and lymphoma. Current medical issues leading to Palliative Medicine involvement include: Goals of care discussions. PALLIATIVE DIAGNOSES:   1. Goals of care discussions  2. Septic shock  3. Diffuse large B-cell lymphoma  4. Advanced age/debility       PLAN:   7/12/2022: Palliative medicine team including Barry Palomino RN and I met with patient at patient's bedside. Patient is drowsy this AM, not able to engage in goals of care discussions. Continuing to build a therapeutic relationship with patient and .  remains positive regarding prognosis, awaiting IR recommendations for abdominal abscess. Appreciate Dr. Samra Rodriguez input regarding need for PET/CT as an outpatient but that patient's CT scan as an inpatient was with no evidence of lymphadenopathy. Provided support to patient and . At this time patient remains a full code with full interventions, we will continue attempts at getting patient's input again. See previous discussions below:    7/11/2022: Palliative medicine team including Barry Palomino RN and I met with patient at patient's bedside. Patient's  Lisha Schreiber also present. Patient is awake, fatigued, in no acute distress. Patient has a flat affect, not very engaged in goals of care discussions. Her responses are short (one to two words). We were re-consulted to readdress goals of care.  Since our sign off, she was transferred to the floor but quickly returned to the ICU for tachycardia and then returned to the floor on the afternoon of 7/8. Patient pending eval for possible IR drainage catheter for possible diverticular abscess. Both patient and  express frustration that this is yet another set back from rehab and outpatient follow up with oncology to determine remission status of her lymphoma. At this time patient and  agree to continue full aggressive measures. We will continue to have goals of care discussions. Continue full code with full interventions. See previous discussions below:    7/7/2022: Palliative medicine team including Sylvia Samayoa RN and I met with patient at patient's bedside. Patient's  Mary Castellanos also present. Patient is awake, fatigued, in no acute distress. Appreciate Dr. Beth Hutchinson discussion with patient and family regarding the consideration of DNR status. Reviewed this discussion from yesterday. Discussed the benefits and burdens of CPR in the event of cardiopulmonary arrest in the setting of advanced age with chronic comorbidities. Discussed the benefits and burdens of intubation in the event of respiratory decline pre-arrest.  Patient's  was clear that she would want an opportunity for CPR, but would not want to linger on life support.  states that if patient were not doing well postarrest, he would be willing to consider compassionate extubation. Patient agreed and stated \"I want to live\". Encouraged patient and  to continue open discussion regarding goals of care, and explained to patient that she has the right to update her goals of care at any time. Patient remains a full code with full interventions. Will sign off as goals of care have been established. Please re-consult should it be warranted. Thank you for the opportunity to provide care to this patient.      See previous discussions below:    7/5/2022: Goals of care: Palliative medicine team including  LUCY Toledo and I met with patient at patient's bedside. Patient is awake, alert, and oriented to person, place, and time. She is currently disoriented to situation, cannot express why she is hospitalized. Met with patient later in the morning when  was at bedside.  Lupillo Fabian is appreciative of all the medical care patient is receiving. Introduced role as palliative medicine. Support offered as  expresses his desire to determine patient's response to her chemotherapy which was last received 6/28/2022. Patient and  are hopeful for remission status, as treatment had a curative intent; patient was scheduled to have a CAT scan tomorrow, but due to hospitalization will be unable to complete. Oncology following.  reports patient was admitted earlier this year with similar septic shock.  is hopeful that patient will respond to medical treatments. Reviewed CODE STATUS including intubation in the event of cardiopulmonary arrest or respiratory decline prearrest.  At this time patient remains a full code with full interventions. They are open to further discussions once they are aware of patient's lymphoma cancer status. We will continue to follow for support, and further goals of care conversations as appropriate. 1. Septic shock: ID following, on vasopressor support. On broad-spectrum antibiotic therapy. 2. Diffuse large B-cell lymphoma: Follows Dr. Flori Velazco from oncology, patient received her last chemotherapy session 6/28/2022, remission status is unknown as she was supposed to receive a CT scan as an outpatient but did not make appointment due to hospitalization. Treatment was curative intent. Oncology continuing to follow. 3. Advanced age/debility: 78-year-old female who lives at home with her . She has generalized weakness and malaise. Poor appetite, which has been improving since admission. At this time she needs assistance with all ADLs, including feeding.   4. Initial consult note routed to primary continuity provider  5. Communicated plan of care with: Palliative IDT       GOALS OF CARE / TREATMENT PREFERENCES:   [====Goals of Care====]  GOALS OF CARE: Full code with full interventions  Patient/Health Care Proxy Stated Goals: Prolong life      TREATMENT PREFERENCES:   Code Status: Full Code    Advance Care Planning:  Advance Care Planning 7/6/2022   Patient's Healthcare Decision Maker is: Legal Next of Kin   Confirm Advance Directive None   Patient Would Like to Complete Advance Directive No       Medical Interventions: Full interventions            The palliative care team has discussed with patient / health care proxy about goals of care / treatment preferences for patient.  [====Goals of Care====]         HISTORY:     History obtained from: patient, chart, spouse    CHIEF COMPLAINT: drowsy, appetite improving slowly    HPI/SUBJECTIVE:    The patient is:   [x] Verbal and participatory  [] Non-participatory due to:   drowsy but oriented x 4    Clinical Pain Assessment (nonverbal scale for severity on nonverbal patients):   Clinical Pain Assessment  Severity: 0    Adult Nonverbal Pain Scale  Face: No particular expression or smile  Activity (Movement): Lying quietly, normal position  Guarding: Lying quietly, no positioning of hands over areas of body  Physiology (Vital Signs):  Stable vital signs  Respiratory: Baseline RR/SpO2 compliant with ventilator  Total Score: 0       FUNCTIONAL ASSESSMENT:     Palliative Performance Scale (PPS):   PPS: 50       PSYCHOSOCIAL/SPIRITUAL SCREENING:     Advance Care Planning:  Advance Care Planning 7/6/2022   Patient's Healthcare Decision Maker is: Legal Next of Kin   Confirm Advance Directive None   Patient Would Like to Complete Advance Directive No        Any spiritual / Sikhism concerns:  [] Yes /  [x] No    Caregiver Burnout:  [] Yes /  [x] No /  [] No Caregiver Present      Anticipatory grief assessment:   [x] Normal  / [] Maladaptive REVIEW OF SYSTEMS:     Positive and pertinent negative findings in ROS are noted above in HPI. The following systems were [x] reviewed / [] unable to be reviewed as noted in HPI  Other findings are noted below. Systems: constitutional, ears/nose/mouth/throat, respiratory, gastrointestinal, genitourinary, musculoskeletal, integumentary, neurologic, psychiatric, endocrine. Positive findings noted below. Modified ESAS Completed by: provider   Fatigue: 8       Pain: 0   Anxiety: 0 Nausea: 0   Anorexia: 0 Dyspnea: 0     Constipation: No     Stool Occurrence(s): 1        PHYSICAL EXAM:     From RN flowsheet:  Wt Readings from Last 3 Encounters:   07/05/22 53.5 kg (118 lb)   09/19/12 84.4 kg (186 lb 2 oz)   09/14/12 83.9 kg (185 lb)     Blood pressure 130/63, pulse 78, temperature 97.4 °F (36.3 °C), resp. rate 20, height 5' 7\" (1.702 m), weight 53.5 kg (118 lb), SpO2 94 %.     Pain Scale 1: Numeric (0 - 10)  Pain Intensity 1: 0                      Constitutional:drowsy, NAD, appears chronically ill, too drowsy for goals of care discussions, responds with one or two words  Eyes: pupils equal, anicteric  ENMT: no nasal discharge, dry mucous membranes  Cardiovascular: regular rhythm, distal pulses intact  Respiratory: breathing not labored, symmetric, on NC  Gastrointestinal: soft non-tender   Musculoskeletal: no deformity, no tenderness to palpation  Skin: warm, dry  Neurologic: following commands, moving all extremities, oriented to person, place, time, and situation  Psychiatric: drowsy affect, no hallucinations       HISTORY:     Principal Problem:    Acute respiratory failure with hypoxia ([address] (7/3/2022)    Active Problems:    Hypokalemia (9/21/2012)      Neutropenic fever ([address] (7/3/2022)      Hypomagnesemia (7/3/2022)      Diffuse large B cell lymphoma ([facility]) (7/3/2022)      GERD (gastroesophageal reflux disease) ()      Thyroid disease ()      Acute encephalopathy ()      Hyponatremia (7/3/2022)      Pancytopenia ([address] (7/3/2022)      UTI (urinary tract infection) (7/3/2022)      Sinusitis (7/3/2022)      Myopia of both eyes with astigmatism (7/3/2022)      Pleural effusion (7/3/2022)      Cellulitis (7/3/2022)      Severe protein-calorie malnutrition ([address] (7/3/2022)      Septic shock ([facility] (2022)      Colonic diverticular abscess (2022)      Past Medical History:   Diagnosis Date    Arthritis     GERD (gastroesophageal reflux disease)     Hypertension     Osteoarthritis of right knee 2012    S/P total knee arthroplasty 2012    Thyroid disease       Past Surgical History:   Procedure Laterality Date    HX APPENDECTOMY      HX CHOLECYSTECTOMY      laparoscopic    HX HEENT      Surgery OD infection x 4    HX HEENT      throat surgery x 2    HX KNEE ARTHROSCOPY      right      No family history on file. History reviewed, no pertinent family history. Social History     Tobacco Use    Smoking status: Former Smoker     Quit date: 1999     Years since quittin.5    Smokeless tobacco: Never Used   Substance Use Topics    Alcohol use:  Yes     Alcohol/week: 1.7 standard drinks     Types: 1 Glasses of wine, 1 Cans of beer per week     Allergies   Allergen Reactions    Adhesive Tape-Silicones Other (comments)     Takes skin off    Penicillin G Itching      Current Facility-Administered Medications   Medication Dose Route Frequency    potassium chloride (K-DUR, KLOR-CON M20) SR tablet 40 mEq  40 mEq Oral NOW    [Held by provider] enoxaparin (LOVENOX) injection 50 mg  1 mg/kg SubCUTAneous Q12H    cefTRIAXone (ROCEPHIN) 1 g in 0.9% sodium chloride (MBP/ADV) 50 mL MBP  1 g IntraVENous Q24H    metroNIDAZOLE (FLAGYL) IVPB premix 500 mg  500 mg IntraVENous Q8H    prednisoLONE acetate (PRED FORTE) 1 % ophthalmic suspension 1 Drop  1 Drop Both Eyes BID    fluticasone propionate (FLONASE) 50 mcg/actuation nasal spray 2 Spray  2 Spray Both Nostrils DAILY    digoxin (LANOXIN) injection 100 mcg  100 mcg IntraVENous DAILY    lidocaine-prilocaine (EMLA) 2.5-2.5 % cream   Topical PRN    nystatin (MYCOSTATIN) 100,000 unit/gram powder   Topical BID    pantoprazole (PROTONIX) tablet 40 mg  40 mg Oral ACB    furosemide (LASIX) injection 20 mg  20 mg IntraVENous Q12H    insulin lispro (HUMALOG) injection   SubCUTAneous AC&HS    levothyroxine (SYNTHROID) tablet 125 mcg  125 mcg Oral DAILY    amiodarone (CORDARONE) tablet 100 mg  100 mg Oral DAILY    ipratropium (ATROVENT) 0.02 % nebulizer solution 0.5 mg  0.5 mg Nebulization TID PRN    albuterol (PROVENTIL VENTOLIN) nebulizer solution 2.5 mg  2.5 mg Nebulization Q4H PRN    sodium chloride (NS) flush 5-40 mL  5-40 mL IntraVENous Q8H    sodium chloride (NS) flush 5-40 mL  5-40 mL IntraVENous PRN    acetaminophen (TYLENOL) tablet 650 mg  650 mg Oral Q6H PRN    Or    acetaminophen (TYLENOL) suppository 650 mg  650 mg Rectal Q6H PRN    [Held by provider] polyethylene glycol (MIRALAX) packet 17 g  17 g Oral DAILY PRN    ondansetron (ZOFRAN ODT) tablet 4 mg  4 mg Oral Q8H PRN    Or    ondansetron (ZOFRAN) injection 4 mg  4 mg IntraVENous Q6H PRN    albumin human 25% (BUMINATE) solution 25 g  25 g IntraVENous Q6H    glucose chewable tablet 16 g  4 Tablet Oral PRN    glucagon (GLUCAGEN) injection 1 mg  1 mg IntraMUSCular PRN    dextrose 10% infusion 0-250 mL  0-250 mL IntraVENous PRN          LAB AND IMAGING FINDINGS:     Lab Results   Component Value Date/Time    WBC 6.2 07/12/2022 02:10 AM    HGB 8.8 (L) 07/12/2022 02:10 AM    PLATELET 376 20/78/6337 02:10 AM     Lab Results   Component Value Date/Time    Sodium 143 07/12/2022 02:10 AM    Potassium 3.3 (L) 07/12/2022 02:10 AM    Chloride 108 07/12/2022 02:10 AM    CO2 29 07/12/2022 02:10 AM    BUN 23 (H) 07/12/2022 02:10 AM    Creatinine 0.40 (L) 07/12/2022 02:10 AM    Calcium 9.9 07/12/2022 02:10 AM    Magnesium 2.0 07/12/2022 02:10 AM    Phosphorus 3.0 07/12/2022 02:10 AM      Lab Results Component Value Date/Time    Alk. phosphatase 52 07/12/2022 02:10 AM    Protein, total 6.7 07/12/2022 02:10 AM    Albumin 5.1 (H) 07/12/2022 02:10 AM    Globulin 1.6 (L) 07/12/2022 02:10 AM     Lab Results   Component Value Date/Time    INR 1.1 07/06/2022 05:04 AM    Prothrombin time 15.0 07/06/2022 05:04 AM    aPTT 22.3 (L) 08/28/2012 08:50 AM      No results found for: IRON, FE, TIBC, IBCT, PSAT, FERR   No results found for: PH, PCO2, PO2  No components found for: GLPOC   No results found for: CPK, CKMB             Total time: 15 minutes  Counseling / coordination time, spent as noted above:   > 50% counseling / coordination?: yes, patient, family, and medical team    Prolonged service was provided for  []30 min   []75 min in face to face time in the presence of the patient, spent as noted above.   Time Start:   Time End:

## 2022-07-12 NOTE — PROGRESS NOTES
Hospitalist Progress Note-critical care note     Patient: Rajwinder Moore MRN: [id number]  [facility]: [id number]    YOB: 1940  Age: 78 y.o. Sex: female    DOA: 7/3/2022 LOS:  LOS: 9 days            Chief complaint: septic shock. Neutropenic fever, lymphoma, gerd , dm    Assessment/Plan         Hospital Problems  Date Reviewed: 9/21/2012          Codes Class Noted POA    Colonic diverticular abscess ICD-10-CM: K57.20  ICD-9-CM: 562.11  7/11/2022 Unknown        Septic shock ([facility] [address] ICD-10-CM: A41.9, R65.21  ICD-9-CM: 038.9, 785.52, 995.92  7/4/2022 Yes        Neutropenic fever ([facility] [address] ICD-10-CM: D70.9, R50.81  ICD-9-CM: 288.00, 780.61  7/3/2022 Yes        * (Principal) Acute respiratory failure with hypoxia (HCC) ICD-10-CM: J96.01  ICD-9-CM: 518.81  7/3/2022 Yes        Hypomagnesemia ICD-10-CM: E83.42  ICD-9-CM: 275.2  7/3/2022 Yes        Diffuse large B cell lymphoma (HCC) ICD-10-CM: C83.30  ICD-9-CM: 202.80  7/3/2022 Yes        GERD (gastroesophageal reflux disease) ICD-10-CM: K21.9  ICD-9-CM: 530.81  Unknown Yes        Thyroid disease ICD-10-CM: E07.9  ICD-9-CM: 246. 9  Unknown Unknown        Acute encephalopathy ICD-10-CM: G93.40  ICD-9-CM: 348.30  Unknown Yes        Hyponatremia ICD-10-CM: E87.1  ICD-9-CM: 276.1  7/3/2022 Yes        Pancytopenia ([facility] [address] ICD-10-CM: P02.189  ICD-9-CM: 284.19  7/3/2022 Yes        UTI (urinary tract infection) ICD-10-CM: N39.0  ICD-9-CM: 599.0  7/3/2022 Yes        Sinusitis ICD-10-CM: J32.9  ICD-9-CM: 473.9  7/3/2022 Unknown        Myopia of both eyes with astigmatism ICD-10-CM: H52.13, H52.203  ICD-9-CM: 367.1, 367.20  7/3/2022 Unknown        Pleural effusion ICD-10-CM: J90  ICD-9-CM: 511.9  7/3/2022 Unknown        Cellulitis ICD-10-CM: L03.90  ICD-9-CM: 682.9  7/3/2022 Yes        Severe protein-calorie malnutrition ([facility] [address] ICD-10-CM: E43  ICD-9-CM: 458  7/3/2022 Yes        Hypokalemia ICD-10-CM: E87.6  ICD-9-CM: 276.8  9/21/2012 Yes              Pt was admitted for respiratory failure and neutropenic fever     Acute respiratory failure with hypoxia with 6 L oxygen on admission  Improving   Flu negative, rapid covid 19 negative -confirmed per pcr   cta no central PE   pulm f/u        Pleural effusion   Moderate and large effusion-will defer to pulmonologist decide for thoracentesis   Lasix -low dose-hold due to hypotension, now bp is good. Will restart it         Cellulitis of rt leg, neutropenia fever , acute on chronic sinusitis , UTI-resolved      Abdominal abscess : surgeon and ID f/u , recommended IR drain   abx will defer to ID, current on rocephin and flagyl        paf :  amiodorone and digoxin   Cardiologist f/u   Continue balance electrolytes and echo : ef wnl   Need full AC, lovenox hold for now for procedure -balance K and mg   K replacement     Septic shock -resolved     Pancytopenia resolved   Due to chemo   lovenox-will restart it   neutropenic fever :afebrile      Diffusion Large B lymphoma   On chemo therapy   Oncologist f/u             Hyponatremia , hypomagnesemia, hypokalemia -Na and mg good, K replacement   Will replace phose         DM type II   Ssi , hypoglycemia protocol      hypothyroidism   Continue synthyroid         Acute encephalopath  Resolved and back to her baseline      Myopia       Severe malnutrition      Hypoalbuminemia   Albumin infusion       Subjective I am ok ,    was at the bedside. All questions have been answered. 35 total min's spent on patient care including >50% on counseling/coordinating care. Discussed the above assessments. also discussed labs, medications and hospital course    Will let palliative care back to further discuss about code status   Review of systems:    General : no fever /chills .   Lung no sob , no wheezing  Heart : no chest pain, no palpitation   Gi : no n/v , + abdomen pain   Vital signs/Intake and Output:  Visit Vitals  /63   Pulse 78   Temp 97.4 °F (36.3 °C)   Resp 20   Ht 5' 7\" (1.702 m)   Wt 53.5 kg (118 lb) SpO2 94%   BMI 18.48 kg/m²     Current Shift:  No intake/output data recorded. Last three shifts:  07/10 1901 - 07/12 0700  In: -   Out: 1350 [Urine:1350]    Physical Exam:  General: WD, WN. Alert,  cooperative,   HEENT: NC, Atraumatic. PERRLA, anicteric sclerae. Lungs: Decreased BS of left side of chest , cta of rt   Heart:  Regular  rhythm,  No murmur, No Rubs, No Gallops  Abdomen: Soft, Non distended, + tender. +Bowel sounds,    Extremities: No c/c/e   Psych:   Not anxious or agitated. Neurologic:  No acute neuro deficit, answer questions-but slow           Labs: Results:       Chemistry Recent Labs     07/12/22  0210 07/11/22  0435 07/10/22  0350   * 188* 154*    143 141   K 3.3* 3.1* 3.5    108 107   CO2 29 29 30   BUN 23* 27* 27*   CREA 0.40* 0.40* 0.41*   CA 9.9 9.8 10.0   AGAP 6 6 4   BUCR 58* 68* 66*   AP 52 51 46   TP 6.7 6.5 6.5   ALB 5.1* 4.6 4.7   GLOB 1.6* 1.9* 1.8*   AGRAT 3.2* 2.4* 2.6*      CBC w/Diff Recent Labs     07/12/22  0210 07/11/22  0435 07/10/22  0350   WBC 6.2 5.8 4.5*   RBC 3.07* 3.07* 2.98*   HGB 8.8* 8.8* 8.6*   HCT 29.0* 29.2* 27.7*    349 273   GRANS 58 74* 60   LYMPH 14* 9* 12*   EOS 4 3 8*      Cardiac Enzymes No results for input(s): CPK, CKND1, HUMBERTO in the last 72 hours. No lab exists for component: CKRMB, TROIP   Coagulation No results for input(s): PTP, INR, APTT, INREXT, INREXT in the last 72 hours. Lipid Panel No results found for: CHOL, CHOLPOCT, CHOLX, CHLST, CHOLV, 058740, HDL, HDLP, LDL, LDLC, DLDLP, 430380, VLDLC, VLDL, TGLX, TRIGL, TRIGP, TGLPOCT, CHHD, CHHDX   BNP No results for input(s): BNPP in the last 72 hours.    Liver Enzymes Recent Labs     07/12/22  0210   TP 6.7   ALB 5.1*   AP 52      Thyroid Studies Lab Results   Component Value Date/Time    TSH 3.50 07/04/2022 04:40 AM        Procedures/imaging: see electronic medical records for all procedures/Xrays and details which were not copied into this note but were reviewed prior to creation of Plan    XR ANKLE RT MIN 3 V    Result Date: 7/3/2022  EXAM: ANKLE SERIES Indication: Swelling Technique: Frontal, , oblique, and lateral views of the right ankle. Comparison studies: None. _______________ FINDINGS: -OSSEOUS: No acute or destructive osseous abnormality. Tibiotalar degenerative changes with small degenerative plantar calcaneal spur. Normal alignment with preserved ankle mortise. . -SOFT TISSUES: Benign calcifications of the lower leg with mild diffuse edema of the distal leg to visualized midfoot. . No significant joint effusion. _____________    1. Nonspecific soft tissue edema, with no acute or destructive osseous abnormality. CT HEAD WO CONT    Result Date: 7/3/2022  EXAM: CT HEAD WO CONT CLINICAL INDICATION/HISTORY: ams -Additional: None COMPARISON: None TECHNIQUE: Axial CT imaging of the head was performed without intravenous contrast. One or more dose reduction techniques were used on this CT: automated exposure control, adjustment of the mAs and/or kVp according to patient size, and iterative reconstruction techniques. The specific techniques used on this CT exam have been documented in the patient's electronic medical record. Digital Imaging and Communications in Medicine (DICOM) format image data are available to nonaffiliated external healthcare facilities or entities on a secure, media free, reciprocally searchable basis with patient authorization for at least a 12-month period after this study. _______________ FINDINGS: BRAIN:   > Brain volume: Age appropriate.   > White matter: Little or no white matter disease. > Infarcts, encephalomalacia: None.   > Parenchymal mass: None.   > Parenchymal hemorrhage: None.   > Midline shift: None.   > Miscellaneous: None. EXTRA-AXIAL SPACES: Unremarkable. No fluid collections. CALVARIUM: Intact.  SINUSES, MASTOIDS: Complete opacified left sphenoethmoidal air cells with mild mucosal thickening on the right and small left anterior frontal ethmoidal opacities. OTHER EXTRACRANIAL: Asymmetric dense right globe with findings of prior bilateral cataract surgery. _______________     1. No CT findings of an acute intracranial abnormality. Please note that noncontrast head CT may be normal in early acute infarct. 2. Chronic left sphenoethmoidal sinus disease, with questional small left frontoethmoidal air-fluid level potentially acute on chronic sinus disease. 3. Asymmetric hyperdense right globe. Recommend correlation with patient ocular history. CTA CHEST W OR W WO CONT    Result Date: 7/3/2022  EXAM: CTA Chest INDICATION: Hypoxic. COMPARISON: No recent exams for direct comparison, most recent study from 9/21/2012. TECHNIQUE: Axial CT imaging from the thoracic inlet through the diaphragm with intravenous contrast. Coronal and sagittal MIP reformats were generated. One or more dose reduction techniques were used on this CT: automated exposure control, adjustment of the mAs and/or kVp according to patient size, and iterative reconstruction techniques. The specific techniques used on this CT exam have been documented in the patient's electronic medical record. Digital Imaging and Communications in Medicine (DICOM) format image data are available to nonaffiliated external healthcare facilities or entities on a secure, media free, reciprocally searchable basis with patient authorization for at least a 12-month period after this study. _______________ FINDINGS: EXAM QUALITY: Adequate bolus timing with mild diffuse respiratory motion artifact degradation. Brijesh Confer PULMONARY ARTERIES: No evidence of central, interlobar or mid to proximal segmental branch pulmonary arterial filling defect. Asymmetric motion degradation involving the lower lobes. Normal sized main pulmonary artery. Pulmonary embolism. MEDIASTINUM: Normal heart size without pericardial effusion. Coronary artery and aortic atherosclerosis.  A right upper chest Mediport is present with the catheter in the SVC. Moderate-sized hiatus hernia. LUNGS and PLEURA: Moderate to large left low attenuating layering pleural effusion. Marked diffuse emphysematous changes with chronic areas of scarring and atelectasis. Bilateral lower lobe groundglass, difficult to differentiate between dependent changes, atelectasis and/or edema. . AIRWAY: Normal. LYMPH NODES: No enlarged nodes. UPPER ABDOMEN: Multiple splenic hypodensities, largest anteriorly measuring 3 cm. Overall normal splenic size measuring 11.3 cm in AP dimension. Prior cholecystectomy OTHER: No acute or aggressive osseous abnormalities identified. _______________     1. Respiratory motion degradation. Otherwise, No evidence of central pulmonary embolism. 2.  Interstitial and groundglass changes suggestive of underlying edema, with moderate to large left-sided pleural effusion. 3. Multiple Indeterminate splenic hypodensities, recommend comparison to any prior recent outside exams and patient history. 4. Moderate-sized hiatus hernia. XR CHEST PORT    Result Date: 7/3/2022  EXAM: XR CHEST PORT CLINICAL INDICATION/HISTORY: ams fever -Additional: None COMPARISON: None TECHNIQUE: Frontal view of the chest _______________ FINDINGS: SUPPORT LINES AND TUBES:Right-sided tunneled Mediport and catheter tip over the distal SVC. HEART AND MEDIASTINUM: Midline cardiac silhouette appreciable cardiomegaly. Hilar vascular congestion and cephalization. LUNGS AND PLEURAL SPACES: Diffuse bilateral hazy indistinct interstitial opacities with retrocardiac left basilar confluent opacity and blunted costophrenic angle/effusion. No significant right-sided pleural effusion. No pneumothorax. BONY THORAX AND SOFT TISSUES: No acute or destructive osseous abnormality. _______________     1. Findings of CHF versus fluid overload with edema and left pleural effusion.       Sukhdev Dumont MD

## 2022-07-12 NOTE — PROGRESS NOTES
Problem: Mobility Impaired (Adult and Pediatric)  Goal: *Acute Goals and Plan of Care (Insert Text)  Description: FUNCTIONAL STATUS PRIOR TO ADMISSION: Pt. Requires assistance for ADLs and Mod I with RW at baseline for short distances    [address] [facility]: The patient lives with  who provides assistance as well as care aids who provide bathing once per week. Physical Therapy Goals  Initiated 7/9/2022  1. Patient will move from supine to sit and sit to supine  in bed with minimal assistance/contact guard assist within 7 day(s). 2.  Patient will transfer from bed to chair and chair to bed with minimal assistance/contact guard assist using the least restrictive device within 7 day(s). 3.  Patient will perform sit to stand with minimal assistance/contact guard assist within 7 day(s). 4.  Patient will ambulate with minimal assistance/contact guard assist for 50ft feet with the least restrictive device within 7 day(s). Outcome: Progressing Towards Goal    physical Therapy TREATMENT    Patient: Travis Siu (55 y.o. female)  Date: 7/12/2022  Diagnosis: Neutropenic fever ([facility] [address] [D70.9, R50.81] Acute respiratory failure with hypoxia [facility])  Precautions: Fall   Chart, physical therapy assessment, plan of care and goals were reviewed. ASSESSMENT:  Pt found supine in bed and 2 daughters present in room encouraging pt participation. Pt appears weak, and needs max encouragement for participation. Supine >sit with mod A of 2; Fair sitting balance EOB. Initially refused to stand; once agreeable, requires mod/max HHA of 2 with blocking of feet for max safety. Pt refuses use of RW during transfer. Upon standing, pt noted to be soiled, stood ~3 sec, then returned to sit EOB and to supine with max A of 2. [place] bed utilized to move pt up in bed. Pt left with all needs in each, castrejon catheter intact and family present.     Progression toward goals:  []      Improving appropriately and progressing toward goals  [x]      Improving slowly and progressing toward goals  []      Not making progress toward goals and plan of care will be adjusted     PLAN:  Patient continues to benefit from skilled intervention to address the above impairments. Continue treatment per established plan of care. Discharge Recommendations:  Home Physical Therapy pending progress   Further Equipment Recommendations for Discharge:  TBD     SUBJECTIVE:   Patient stated Do I have too? Shelly Leader    OBJECTIVE DATA SUMMARY:   Critical Behavior:  Neurologic State: Drowsy  Orientation Level: Oriented to person,Oriented to place  Cognition: Decreased attention/concentration,Follows commands  Safety/Judgement: Fall prevention,Decreased awareness of environment  Functional Mobility Training:  Bed Mobility:  Rolling: Moderate assistance  Supine to Sit: Moderate assistance;Assist x2;Bed Modified  Sit to Supine: Maximum assistance;Assist x2  Scooting: Total assistance (hercules bed utilized)  Transfers:  Sit to Stand: Moderate assistance;Maximum assistance;Assist x2  Stand to Sit: Moderate assistance;Maximum assistance;Assist x2  Balance:  Sitting: Impaired  Sitting - Static: Fair (occasional)  Sitting - Dynamic: Fair (occasional)  Standing: Impaired;Pull to stand; With support  Standing - Static: Poor;Constant support  Standing - Dynamic : Not tested  Pain:  Pain Scale 1: Numeric (0 - 10)  Pain Intensity 1: 0  Activity Tolerance:   Poor   Please refer to the flowsheet for vital signs taken during this treatment.   After treatment:   [] Patient left in no apparent distress sitting up in chair  [x] Patient left in no apparent distress in bed  [x] Call bell left within reach  [] Nursing notified  [x] Caregiver present  [] Bed alarm activated      Rafael Farmer, PT   Time Calculation: 18 mins

## 2022-07-12 NOTE — PROGRESS NOTES
Pt alert and oriented with occasional confusion. Family at bedside for the most part of the day. Pt had several runs of loose/watery stool in the morning for about 2hours with rawness noted on her sacral area, nystatin powder applied. MD made aware and fecal management ordered, prior to administration of fecal management, pt stopped having runs of bm. Will hold on for now. Report given to Senia Freed incoming shift.

## 2022-07-12 NOTE — PROGRESS NOTES
0900: MD Tatyana Abdul makes this nurse aware of order for CT to drain abcess and to call IR for time, matter to be addressed  Enrrique Miles RN    1850: this nurse made aware IR will not perform CT to drain abcess on pt, d/t pt condition, primary RN to page MD Shirley Hurst RN

## 2022-07-12 NOTE — PROGRESS NOTES
[facility]  (A Division of [address] [place] [address])    ID Follow-up Note      Date of Admission: 7/3/2022       Date of Note:  7/12/2022     Summary:  Ms Deanne Hill is a 78 y.o. WF with PMH as listed below-- she had her last chemo on 6/28 and received growth factor as well. She came to ED with fever/lethargy/ weakness and SOB of 1 day and found to have fever to 100.7, Neutropenia to 200, pyruria, and CT head/CT chest done as well as CXR and ankle X-ray R foot done and admitted to ICU. She is requiring levophed support for border line BP.     During interview( hard of hearing too), she denies HA/sorethroat. Chest pain, abd pain, dysuria. She has no focal complaint during exam.  Denies viral/COVID infection at home. Feels SOB. She is currently receiving V/Z.     Care Everywhere-- shows admission in 01 and 04 2022 with diagnosis of sepsis- Bacteroides bacteremia in jan( also dx of cancer)  and PNA with septic shock in 04/2022       CC: Want to sleep. .  (appears comfortable, but most of the talking/history from )  Rounded before noon, charted later    HPI:  Chart reviewed/pt seen bedside with both her  and patient  Diverticular abscess present. RN reports IR not placing drain-- no note from them    WBC recovered from last round of CHOP 6/26. Current Antimicrobials:    Prior Antimicrobials:  1. Cefepime IV (7/9-) #2  2. Metronidazole IV (7/10-) #1 1. Pip/tzb IV (7/3-9)  2. Vanco IV (7/3-9)  3. azithro IV (7/4)  4. Doxy IV (7/5-6)  5.clindamycin IV (7/10)       Assessment: Rec / Plan:   Diverticular abscess-  --3.8cmX2.3cm    IF it can be tapped/drained, swell. Keep pushing the abx.   On CTX/Flagyl ->abx #9    Drain placement if possible- per IR/surgical team    Will cont current regime of ABX-- Will need repeat scan for resolution/ improvement at interval   Cefepime side-effect -- myoclonus    Diffuse Large B Cell lymphoma  Recent chemo (6/26) ABX option will be Cefdinir 300mg BID + flagyl TID PO if she can do oral at DC or current abx as is   Septic shock - see #1 above, better    Low BMI/nourishment        Microbiology:  7/8 - BCx (-)     7/5 - CDT  (+/-) with negative confirmatory molecular test     7/4 - Legionella/Spneumo Ag (-)     7/3 - RVP (-)      COVID-19 (-)      Flu (-)      UA (+) E coli (R amp/sulb)      BCx x2 (-)      Lines / Catheters: peripheral        Patient Active Problem List   Diagnosis Code    S/P total knee arthroplasty Z96.659    Hypokalemia E87.6    HTN (hypertension), benign I10    Neutropenic fever (HCC) D70.9, R50.81    Acute respiratory failure with hypoxia (HCC) J96.01    Hypomagnesemia E83.42    Diffuse large B cell lymphoma (HCC) C83.30    GERD (gastroesophageal reflux disease) K21.9    Thyroid disease E07.9    Acute encephalopathy G93.40    Hyponatremia E87.1    Pancytopenia (HCC) D61.818    UTI (urinary tract infection) N39.0    Sinusitis J32.9    Myopia of both eyes with astigmatism H52.13, H52.203    Pleural effusion J90    Cellulitis L03.90    Severe protein-calorie malnutrition (HCC) E43    Septic shock (HCC) A41.9, R65.21    Colonic diverticular abscess K57.20       Current Facility-Administered Medications   Medication Dose Route Frequency    [Held by provider] enoxaparin (LOVENOX) injection 50 mg  1 mg/kg SubCUTAneous Q12H    cefTRIAXone (ROCEPHIN) 1 g in 0.9% sodium chloride (MBP/ADV) 50 mL MBP  1 g IntraVENous Q24H    metroNIDAZOLE (FLAGYL) IVPB premix 500 mg  500 mg IntraVENous Q8H    prednisoLONE acetate (PRED FORTE) 1 % ophthalmic suspension 1 Drop  1 Drop Both Eyes BID    fluticasone propionate (FLONASE) 50 mcg/actuation nasal spray 2 Spray  2 Spray Both Nostrils DAILY    digoxin (LANOXIN) injection 100 mcg  100 mcg IntraVENous DAILY    lidocaine-prilocaine (EMLA) 2.5-2.5 % cream   Topical PRN    nystatin (MYCOSTATIN) 100,000 unit/gram powder   Topical BID    pantoprazole (PROTONIX) tablet 40 mg 40 mg Oral ACB    furosemide (LASIX) injection 20 mg  20 mg IntraVENous Q12H    insulin lispro (HUMALOG) injection   SubCUTAneous AC&HS    levothyroxine (SYNTHROID) tablet 125 mcg  125 mcg Oral DAILY    amiodarone (CORDARONE) tablet 100 mg  100 mg Oral DAILY    ipratropium (ATROVENT) 0.02 % nebulizer solution 0.5 mg  0.5 mg Nebulization TID PRN    albuterol (PROVENTIL VENTOLIN) nebulizer solution 2.5 mg  2.5 mg Nebulization Q4H PRN    sodium chloride (NS) flush 5-40 mL  5-40 mL IntraVENous Q8H    sodium chloride (NS) flush 5-40 mL  5-40 mL IntraVENous PRN    acetaminophen (TYLENOL) tablet 650 mg  650 mg Oral Q6H PRN    Or    acetaminophen (TYLENOL) suppository 650 mg  650 mg Rectal Q6H PRN    [Held by provider] polyethylene glycol (MIRALAX) packet 17 g  17 g Oral DAILY PRN    ondansetron (ZOFRAN ODT) tablet 4 mg  4 mg Oral Q8H PRN    Or    ondansetron (ZOFRAN) injection 4 mg  4 mg IntraVENous Q6H PRN    albumin human 25% (BUMINATE) solution 25 g  25 g IntraVENous Q6H    glucose chewable tablet 16 g  4 Tablet Oral PRN    glucagon (GLUCAGEN) injection 1 mg  1 mg IntraMUSCular PRN    dextrose 10% infusion 0-250 mL  0-250 mL IntraVENous PRN         Review of Systems - General ROS: negative for - chills, fever or night sweats  Respiratory ROS: no cough, shortness of breath, or wheezing  Cardiovascular ROS: no chest pain or dyspnea on exertion  Gastrointestinal ROS: positive for - abdominal pain, appetite loss, diarrhea and gas/bloating  negative for - blood in stools       Objective:    Visit Vitals  /63   Pulse 78   Temp 97.4 °F (36.3 °C)   Resp 20   Ht 5' 7\" (1.702 m)   Wt 53.5 kg (118 lb)   SpO2 94%   BMI 18.48 kg/m²       Temp (24hrs), Av °F (36.7 °C), Min:97.4 °F (36.3 °C), Max:98.7 °F (37.1 °C)         GEN: WD chronically ill looking no acute distress. HEENT: Unicteric. EOMI intact  CHEST: Non laboured breathing  CVS:RRR, no mur/gallop  ABD: Obese/soft. Non tender.    ANN: Deferred  EXT: No apparent swelling or redness on UE/LE joints. Skin: Dry and intact. No rash, no redness. CNS: A, OX3. Moves all extremity. CN grossly ok.          Lab results:    Chemistry  Recent Labs     07/12/22  0210 07/11/22  0435 07/10/22  0350   * 188* 154*    143 141   K 3.3* 3.1* 3.5    108 107   CO2 29 29 30   BUN 23* 27* 27*   CREA 0.40* 0.40* 0.41*   CA 9.9 9.8 10.0   AGAP 6 6 4   BUCR 58* 68* 66*   AP 52 51 46   TP 6.7 6.5 6.5   ALB 5.1* 4.6 4.7   GLOB 1.6* 1.9* 1.8*   AGRAT 3.2* 2.4* 2.6*       CBC w/ Diff  Recent Labs     07/12/22  0210 07/11/22  0435 07/10/22  0350   WBC 6.2 5.8 4.5*   RBC 3.07* 3.07* 2.98*   HGB 8.8* 8.8* 8.6*   HCT 29.0* 29.2* 27.7*    349 273   GRANS 58 74* 60   LYMPH 14* 9* 12*   EOS 4 3 8*       Microbiology  All Micro Results     Procedure Component Value Units Date/Time    CULTURE, BLOOD [[id number]] Collected: 07/08/22 0443    Order Status: Completed Specimen: Blood Updated: 07/12/22 1131     Special Requests: NO SPECIAL REQUESTS        Culture result: NO GROWTH 4 DAYS       CULTURE, BLOOD [[id number]] Collected: 07/03/22 1645    Order Status: Completed Specimen: Blood Updated: 07/09/22 0720     Special Requests: NO SPECIAL REQUESTS        Culture result: NO GROWTH 6 DAYS       CULTURE, BLOOD [[id number]] Collected: 07/03/22 1650    Order Status: Completed Specimen: Blood Updated: 07/09/22 0720     Special Requests: NO SPECIAL REQUESTS        Culture result: NO GROWTH 6 DAYS       ENTERIC BACTERIA PANEL, DNA [[id number]] Collected: 07/05/22 1455    Order Status: Completed Specimen: Stool Updated: 07/06/22 2026     Shigella species, DNA Negative        Campylobacter species, DNA Negative        Vibrio species, DNA Negative        Enterotoxigen E Coli, DNA Negative        Shiga toxin producing, DNA Negative        Salmonella species, DNA Negative        P. shigelloides, DNA Negative        Y. enterocolitica, DNA Negative       CULTURE, URINE [[id number]]  (Abnormal) (Susceptibility) Collected: 07/03/22 1700    Order Status: Completed Specimen: Urine from Clean catch Updated: 07/06/22 1315     Special Requests: NO SPECIAL REQUESTS        [place] Count --        >100,000  COLONIES/mL       Culture result: ESCHERICHIA COLI       C. DIFFICILE AG & TOXIN A/B [[id number]]  (Abnormal) Collected: 07/05/22 1455    Order Status: Completed Specimen: Miscellaneous sample Updated: 07/06/22 1133     C. difficile toxin Negative        PCR Reflex       See Reflex order for C. difficile (DNA)           INTERPRETATION       Indeterminate for Toxigenic C. difficile, DNA confirmation to follow. Sanjeev Lay DIFF MOLECULAR [[id number]] Collected: 07/05/22 1455    Order Status: Completed Specimen: Miscellaneous sample Updated: 07/06/22 1133     C Diff Molecular Negative        Comment: This specimen is negative for toxigenic C difficile by DNA amplification. Repeat testing is not recommended for confirmation, samples received within 7 days of this negative result will be rejected.        Darlin Lutz, URINE [[id number]] Collected: 07/04/22 1247    Order Status: Completed Specimen: Urine, random Updated: 07/04/22 2030     Strep pneumo Ag, urine Negative       LEGIONELLA PNEUMOPHILA AG, URINE [[id number]] Collected: 07/04/22 1247    Order Status: Completed Specimen: Urine, random Updated: 07/04/22 2030     Legionella Ag, urine Negative       RESPIRATORY VIRUS PANEL W/COVID-19, PCR [[id number]] Collected: 07/03/22 2110    Order Status: Completed Specimen: Nasopharyngeal Updated: 07/04/22 1229     Adenovirus Not detected        Coronavirus 229E Not detected        Coronavirus HKU1 Not detected        Coronavirus CVNL63 Not detected        Coronavirus OC43 Not detected        SARS-CoV-2, PCR Not detected        Metapneumovirus Not detected        Rhinovirus and Enterovirus Not detected        Influenza A Not detected        Influenza B Not detected        Parainfluenza 1 Not detected        Parainfluenza 2 Not detected        Parainfluenza 3 Not detected        Parainfluenza virus 4 Not detected        RSV by PCR Not detected        B. parapertussis, PCR Not detected        Bordetella pertussis - PCR Not detected        Chlamydophila pneumoniae DNA, QL, PCR Not detected        Mycoplasma pneumoniae DNA, QL, PCR Not detected       C. DIFFICILE AG & TOXIN A/B [[id number]]     Order Status: Canceled Specimen: Stool     COVID-19 RAPID TEST [[id number]] Collected: 07/03/22 1715    Order Status: Completed Specimen: Nasopharyngeal Updated: 07/03/22 1839     Specimen source Nasopharyngeal        COVID-19 rapid test Not detected        Comment: Rapid Abbott ID Now       Rapid NAAT:  The specimen is NEGATIVE for SARS-CoV-2, the novel coronavirus associated with COVID-19. Negative results should be treated as presumptive and, if inconsistent with clinical signs and symptoms or necessary for patient management, should be tested with an alternative molecular assay. Negative results do not preclude SARS-CoV-2 infection and should not be used as the sole basis for patient management decisions. This test has been authorized by the FDA under an Emergency Use Authorization (EUA) for use by authorized laboratories. Fact sheet for Healthcare Providers: ConventionUpdate.co.nz  Fact sheet for Patients: ConventionUpdate.co.nz       Methodology: Isothermal Nucleic Acid Amplification         INFLUENZA A & B AG (RAPID TEST) [[id number]] Collected: 07/03/22 1715    Order Status: Completed Specimen: Nasopharyngeal from Nasal washing Updated: 07/03/22 1743     Influenza A Antigen Negative        Comment: A negative result does not exclude influenza virus infection, seasonal or H1N1 due to suboptimal sensitivity.  If influenza is circulating in your community, a diagnosis of influenza should be considered based on a patients clinical presentation and empiric antiviral treatment should be considered, if indicated.         Influenza B Antigen Negative

## 2022-07-12 NOTE — PROGRESS NOTES
D/C plan: Inpt rehab vs SNF pending medical progression and acceptance. Per chart; pt will not have abscess drain placed today via IR. CM noted per the pt's chart, the pt remains a full code s/p palliative care consult. Pt has been opened to [facility] for review. CM to continue to follow for medical stability for d/c. Care Management Interventions  PCP Verified by CM:  Yes  Palliative Care Criteria Met (RRAT>21 & CHF Dx)?: No  Mode of Transport at Discharge: BLS  Transition of Care Consult (CM Consult): SNF  Partner SNF: No  Reason Why Partner SNF Not Chosen: Friend/family recommendation  Physical Therapy Consult: Yes  Occupational Therapy Consult: Yes  Support Systems: Spouse/Significant Other  Confirm Follow Up Transport: Family  The Patient and/or Patient Representative was Provided with a Choice of Provider and Agrees with the Discharge Plan?: Yes  Freedom of Choice List was Provided with Basic Dialogue that Supports the Patient's Individualized Plan of Care/Goals, Treatment Preferences and Shares the Quality Data Associated with the Providers?: Yes  Discharge Location  Patient Expects to be Discharged to[de-identified] Skilled nursing facility

## 2022-07-12 NOTE — PROGRESS NOTES
Comprehensive Nutrition Assessment    Type and Reason for Visit: Reassess    Nutrition Recommendations/Plan:   1. Diet: advance diet when feasible to meet nutritional needs. Avoid prolong inadequate nutrition intake. 2. Please modify ensure enlive to ensure pudding TID or magic cup TID due to thicken liquids. Malnutrition Assessment:  Malnutrition Status:  Severe malnutrition (07/05/22 1053)    Context:  Acute illness     Findings of the 6 clinical characteristics of malnutrition:   Energy Intake:  50% or less of est energy requirements for 5 or more days  Weight Loss:  Greater than 5% over 1 month     Body Fat Loss: Moderate body fat loss, Orbital   Muscle Mass Loss: Moderate muscle mass loss, Temples (temporalis),Hand (interosseous)    Nutrition Assessment:    Admitted with Acute respiratory failure with hypoxia, pleural effusions, neutropenia fever. Pt was on easy to chew diet with nectar thicken liquids. However is now NPO. Ensure enlive is order (TID) but pt needs thicken liquids- recommend magic cup or ensure pudding. Noted per SLP, recommend continuing easy to chew with thicken liquids. Nutrition Related Findings:    K 3.3. KCl, protonix, synthroid, Humalog, lasix. BM 7/10- watery. Wound Type: None    Current Nutrition Intake & Therapies:  Average Meal Intake: NPO  Average Supplement Intake: NPO  ADULT ORAL NUTRITION SUPPLEMENT Lunch, Dinner, Breakfast; Standard High Calorie/High Protein  DIET NPO    Anthropometric Measures:  Height: 5' 7\" (170.2 cm)  Ideal Body Weight (IBW): 135 lbs (61 kg)  Admission Body Weight: 118 lb  Current Body Wt:  53.5 kg (118 lb), 87.4 % IBW.  Bed scale  Current BMI (kg/m2): 18.5  Usual Body Weight: 75.3 kg (166 lb) (4/8/2022)  % Weight Change (Calculated): -28.9  Weight Adjustment: No adjustment                 BMI Category: Underweight (BMI less than 22) age over 72    Estimated Daily Nutrient Needs:  Energy Requirements Based On: Kcal/kg  Weight Used for Energy Requirements: Current  Energy (kcal/day): 9722-1842  Weight Used for Protein Requirements: Current  Protein (g/day): 54-80  Method Used for Fluid Requirements: 1 ml/kcal  Fluid (ml/day): 8871-6499    Nutrition Diagnosis:   · Inadequate oral intake related to acute injury/trauma as evidenced by NPO or clear liquid status due to medical condition      Nutrition Interventions:   Food and/or Nutrient Delivery: Start oral diet,Modify oral nutrition supplement,Continue oral nutrition supplement  Nutrition Education/Counseling: No recommendations at this time  Coordination of Nutrition Care: Continue to monitor while inpatient       Goals:  Previous Goal Met: Progressing toward goal(s)  Goals: PO intake 75% or greater,by next RD assessment       Nutrition Monitoring and Evaluation:   Behavioral-Environmental Outcomes: None identified  Food/Nutrient Intake Outcomes: Diet advancement/tolerance  Physical Signs/Symptoms Outcomes: Biochemical data,Nutrition focused physical findings,Skin,Weight,GI status    Discharge Planning:    No discharge needs at this time    Reba Melvin, RD

## 2022-07-12 NOTE — PROGRESS NOTES
0730 Bedside shift change report given to [id number] [facility] (oncoming nurse) by Angela Godfrey nurse). Report included the following information SBAR, Kardex and [facility]    4883 Called Radiology about patient getting IR, was informed patient was denied IR because of everything going on with her the procedure would not be safe. Dr Mario Mcleod paged. [address] with Dr Stanley Wakefield about patient being denied the IR procedure    1909 Bedside shift change report given to Senia RN (oncoming nurse) by Cyrus Argueta RN (offgoing nurse). Report included the following information SBAR, Kardex and MAR.

## 2022-07-12 NOTE — PROGRESS NOTES
Palliative Medicine    CODE STATUS: FULL CODE    AMD Status: none on file. Her , Phillip Riddle, is her legal next of kin     7/12/2022 0920 Seen today in room 333 along with Jony Benson NP.  at bedside. Lying in bed with eyes closed most of the time. Looks more fatigued than yesterday. Respirations unlabored, snores at times when eyes closed. Oxygen on at 4 lpm per NC.  awaiting update about possible drainage of abdominal abscess. Disposition plan: anticipate discharge to rehab    Palliative care will continue to follow Erika Hernandez  and her family during her hospitalization and support them as they make healthcare decisions and define goals of care.       Fannie Christie RN, MSN  Palliative Medicine  P: [phone]

## 2022-07-12 NOTE — PROGRESS NOTES
Problem: Pressure Injury - Risk of  Goal: *Prevention of pressure injury  Description: Document Ronald Scale and appropriate interventions in the flowsheet. Outcome: Progressing Towards Goal  Note: Pressure Injury Interventions:  Sensory Interventions: Minimize linen layers    Moisture Interventions: Check for incontinence Q2 hours and as needed    Activity Interventions: Pressure redistribution bed/mattress(bed type)    Mobility Interventions: Assess need for specialty bed,HOB 30 degrees or less    Nutrition Interventions: Document food/fluid/supplement intake    Friction and Shear Interventions: HOB 30 degrees or less                Problem: Falls - Risk of  Goal: *Absence of Falls  Description: Document Mic Fall Risk and appropriate interventions in the flowsheet.   Outcome: Progressing Towards Goal  Note: Fall Risk Interventions:  Mobility Interventions: Patient to call before getting OOB,Communicate number of staff needed for ambulation/transfer    Mentation Interventions: Bed/chair exit alarm    Medication Interventions: Bed/chair exit alarm    Elimination Interventions: Call light in reach

## 2022-07-12 NOTE — PROGRESS NOTES
Problem: Risk for Spread of Infection  Goal: Prevent transmission of infectious organism to others  Description: Prevent the transmission of infectious organisms to other patients, staff members, and visitors. Outcome: Progressing Towards Goal     Problem: Patient Education:  Go to Education Activity  Goal: Patient/Family Education  Outcome: Progressing Towards Goal     Problem: Pressure Injury - Risk of  Goal: *Prevention of pressure injury  Description: Document Ronald Scale and appropriate interventions in the flowsheet. Outcome: Progressing Towards Goal  Note: Pressure Injury Interventions:  Sensory Interventions: Minimize linen layers    Moisture Interventions: Apply protective barrier, creams and emollients,Minimize layers    Activity Interventions: Assess need for specialty bed,Pressure redistribution bed/mattress(bed type),PT/OT evaluation    Mobility Interventions: Float heels,HOB 30 degrees or less,Pressure redistribution bed/mattress (bed type),PT/OT evaluation    Nutrition Interventions: Document food/fluid/supplement intake    Friction and Shear Interventions: HOB 30 degrees or less,Minimize layers                Problem: Patient Education: Go to Patient Education Activity  Goal: Patient/Family Education  Outcome: Progressing Towards Goal     Problem: Falls - Risk of  Goal: *Absence of Falls  Description: Document Mic Fall Risk and appropriate interventions in the flowsheet.   Outcome: Progressing Towards Goal  Note: Fall Risk Interventions:  Mobility Interventions: Bed/chair exit alarm    Mentation Interventions: Bed/chair exit alarm,Door open when patient unattended,Family/sitter at bedside    Medication Interventions: Bed/chair exit alarm,Patient to call before getting OOB    Elimination Interventions: Bed/chair exit alarm,Call light in reach              Problem: Patient Education: Go to Patient Education Activity  Goal: Patient/Family Education  Outcome: Progressing Towards Goal     Problem: Diabetes Self-Management  Goal: *Disease process and treatment process  Description: Define diabetes and identify own type of diabetes; list 3 options for treating diabetes. Outcome: Progressing Towards Goal  Goal: *Incorporating nutritional management into lifestyle  Description: Describe effect of type, amount and timing of food on blood glucose; list 3 methods for planning meals. Outcome: Progressing Towards Goal  Goal: *Incorporating physical activity into lifestyle  Description: State effect of exercise on blood glucose levels. Outcome: Progressing Towards Goal  Goal: *Developing strategies to promote health/change behavior  Description: Define the ABC's of diabetes; identify appropriate screenings, schedule and personal plan for screenings. Outcome: Progressing Towards Goal  Goal: *Using medications safely  Description: State effect of diabetes medications on diabetes; name diabetes medication taking, action and side effects. Outcome: Progressing Towards Goal  Goal: *Monitoring blood glucose, interpreting and using results  Description: Identify recommended blood glucose targets  and personal targets. Outcome: Progressing Towards Goal  Goal: *Prevention, detection, treatment of acute complications  Description: List symptoms of hyper- and hypoglycemia; describe how to treat low blood sugar and actions for lowering  high blood glucose level. Outcome: Progressing Towards Goal  Goal: *Prevention, detection and treatment of chronic complications  Description: Define the natural course of diabetes and describe the relationship of blood glucose levels to long term complications of diabetes.   Outcome: Progressing Towards Goal  Goal: *Developing strategies to address psychosocial issues  Description: Describe feelings about living with diabetes; identify support needed and support network  Outcome: Progressing Towards Goal  Goal: *Insulin pump training  Outcome: Progressing Towards Goal  Goal: *Sick day guidelines  Outcome: Progressing Towards Goal  Goal: *Patient Specific Goal (EDIT GOAL, INSERT TEXT)  Outcome: Progressing Towards Goal     Problem: Patient Education: Go to Patient Education Activity  Goal: Patient/Family Education  Outcome: Progressing Towards Goal     Problem: Patient Education: Go to Patient Education Activity  Goal: Patient/Family Education  Outcome: Progressing Towards Goal     Problem: Patient Education: Go to Patient Education Activity  Goal: Patient/Family Education  Outcome: Progressing Towards Goal     Problem: Patient Education: Go to Patient Education Activity  Goal: Patient/Family Education  Outcome: Progressing Towards Goal

## 2022-07-12 NOTE — PROGRESS NOTES
Problem: Dysphagia (Adult)  Description: Patient will:  1. Tolerate PO trials with 0 s/s overt distress in 4/5 trials. 2. Utilize compensatory swallow strategies/maneuvers (decrease bite/sip, size/rate, alt. liq/sol) with min cues in 4/5 trials. 3. As medically indicated by MD, complete an objective swallow study (i.e., MBSS) to assess swallow integrity, r/o aspiration, and determine of safest LR    Rec:     Easy to [employer] with mildly thick (nectar) liquids  Pt may require assistance with meal set up  Aspiration precautions  HOB >45 during po intake, remain >30 for 30-45 minutes after po   Small bites/sips; alternate liquid/solid with slow feeding rate   Oral care TID  Meds crushed in puree   Outcome: Progressing Towards Goal    SPEECH LANGUAGE PATHOLOGY DYSPHAGIA TREATMENT    Patient: Linh Diaz (55 y.o. female)  Date: 7/12/2022  Diagnosis: Neutropenic fever ([facility] [address] [D70.9, R50.81] Acute respiratory failure with hypoxia (HCC)       Precautions: Aspiration, Fall, Neutropenic    PLOF: Regular, thin    ASSESSMENT:  Patient seen for dysphagia follow-up. Alert upon arrival but quickly became drowsy during [address]. Spouse present in room and reported that patient \"slept well. \" Requested protein shakes, as patient still not received. Food services called and patient unable to have shakes due to mildly thickened liquid order; however, able to have Ensure pudding at this time. This was explained to patient's spouse. Patient verbalized that she has been taking \"small sips\" and denies episodes of coughing /choking since last session. Spouse re-educated on swallowing precautions and verbalized understanding. Limited trials of mildly thickened (nectar) liquid given today, as patient expressed desire to take a nap and demo decreased alertness during [address]. Patient tolerated 5/5 trials of mildly thickened liquid via straw with no overt s/sx of aspiration or distress. With one verbal cue, patient took small, single sips.  Recommend continue current diet order of Easy to Chew and mildly thickened liquids. ST to follow. D/w nurse. Progression toward goals:  []         Improving appropriately and progressing toward goals  [x]         Improving slowly and progressing toward goals  []         Not making progress toward goals and plan of care will be adjusted     PLAN:  Recommendations and Planned Interventions:  See above  Patient continues to benefit from skilled intervention to address the above impairments. Continue treatment per established plan of care. Discharge Recommendations: To Be Determined     SUBJECTIVE:   Patient stated I need a nap. OBJECTIVE:   Cognitive and Communication Status:  Neurologic State: Drowsy  Orientation Level: Oriented to person,Oriented to place  Cognition: Decreased attention/concentration,Follows commands  Perception: Appears intact  Perseveration: No perseveration noted  Safety/Judgement: Fall prevention,Decreased awareness of environment  Dysphagia Treatment:  Oral Assessment:  Oral Assessment  Labial: No impairment  Dentition: Intact,Natural  Oral Hygiene:  (fair)  Lingual: Decreased rate,Decreased strength  Velum: No impairment  Mandible: No impairment  Gag Reflex:  (did not test)  P.O. Trials:   Patient Position:  (HOB 45 degrees)   Vocal quality prior to P.O.: Breathy,Fatigue   Consistency Presented: Nectar thick liquid   How Presented: SLP-fed/presented,Straw       Bolus Acceptance: No impairment   Bolus Formation/Control: Impaired   Type of Impairment: Other (comment) (suspect premature spillage)   Propulsion: No impairment   Oral Residue: None   Initiation of Swallow: Other (comment) (suspect delay)   Laryngeal Elevation: Other (comment) (suspect decreased/weak)   Aspiration Signs/Symptoms: None   Pharyngeal Phase Characteristics: Easily fatigued ,Poor endurance   Effective Modifications:  Alternate liquids/solids,Small sips and bites   Cues for Modifications: Minimal-moderate         Oral Phase Severity: No impairment   Pharyngeal Phase Severity : Other (comment) (suspect)     Pain level pre-treatment: 0/10   Pain level post-treatment: 0/10     After treatment:   []              Patient left in no apparent distress sitting up in chair  [x]              Patient left in no apparent distress in bed  []              Call bell left within reach  [x]              Nursing notified  [x]              Family present  []              Caregiver present  []              Bed alarm activated      COMMUNICATION/EDUCATION:   [x] Aspiration precautions; swallow safety; compensatory techniques  []        Patient unable to participate in education; education ongoing with staff  [x]  Posted safety precautions in patient's room.   [] Oral-motor/laryngeal strengthening exercises    DEBORAH Houston  Time Calculation: 14 mins

## 2022-07-13 ENCOUNTER — APPOINTMENT (OUTPATIENT)
Dept: GENERAL RADIOLOGY | Age: 82
DRG: 871 | End: 2022-07-13
Attending: PHYSICIAN ASSISTANT
Payer: MEDICARE

## 2022-07-13 ENCOUNTER — APPOINTMENT (OUTPATIENT)
Dept: GENERAL RADIOLOGY | Age: 82
DRG: 871 | End: 2022-07-13
Attending: HOSPITALIST
Payer: MEDICARE

## 2022-07-13 ENCOUNTER — APPOINTMENT (OUTPATIENT)
Dept: ULTRASOUND IMAGING | Age: 82
DRG: 871 | End: 2022-07-13
Attending: HOSPITALIST
Payer: MEDICARE

## 2022-07-13 LAB
ALBUMIN SERPL-MCNC: 5 G/DL (ref 3.4–5)
ALBUMIN SERPL-MCNC: 5.1 G/DL (ref 3.4–5)
ALBUMIN/GLOB SERPL: 2.6 {RATIO} (ref 0.8–1.7)
ALBUMIN/GLOB SERPL: 2.9 {RATIO} (ref 0.8–1.7)
ALP SERPL-CCNC: 56 U/L (ref 45–117)
ALP SERPL-CCNC: 71 U/L (ref 45–117)
ALT SERPL-CCNC: 10 U/L (ref 13–56)
ALT SERPL-CCNC: 11 U/L (ref 13–56)
ANION GAP SERPL CALC-SCNC: 5 MMOL/L (ref 3–18)
ANION GAP SERPL CALC-SCNC: 6 MMOL/L (ref 3–18)
APPEARANCE FLD: ABNORMAL
AST SERPL-CCNC: 16 U/L (ref 10–38)
AST SERPL-CCNC: 9 U/L (ref 10–38)
BASOPHILS # BLD: 0 K/UL (ref 0–0.1)
BASOPHILS # BLD: 0 K/UL (ref 0–0.1)
BASOPHILS NFR BLD: 0 % (ref 0–2)
BASOPHILS NFR BLD: 0 % (ref 0–2)
BILIRUB SERPL-MCNC: 0.4 MG/DL (ref 0.2–1)
BILIRUB SERPL-MCNC: 0.4 MG/DL (ref 0.2–1)
BUN SERPL-MCNC: 29 MG/DL (ref 7–18)
BUN SERPL-MCNC: 36 MG/DL (ref 7–18)
BUN/CREAT SERPL: 57 (ref 12–20)
BUN/CREAT SERPL: 60 (ref 12–20)
CALCIUM SERPL-MCNC: 10 MG/DL (ref 8.5–10.1)
CALCIUM SERPL-MCNC: 9.9 MG/DL (ref 8.5–10.1)
CHLORIDE SERPL-SCNC: 105 MMOL/L (ref 100–111)
CHLORIDE SERPL-SCNC: 109 MMOL/L (ref 100–111)
CO2 SERPL-SCNC: 29 MMOL/L (ref 21–32)
CO2 SERPL-SCNC: 30 MMOL/L (ref 21–32)
COLOR FLD: ABNORMAL
CREAT SERPL-MCNC: 0.48 MG/DL (ref 0.6–1.3)
CREAT SERPL-MCNC: 0.63 MG/DL (ref 0.6–1.3)
DIFFERENTIAL METHOD BLD: ABNORMAL
DIFFERENTIAL METHOD BLD: ABNORMAL
EOSINOPHIL # BLD: 0 K/UL (ref 0–0.4)
EOSINOPHIL # BLD: 0.1 K/UL (ref 0–0.4)
EOSINOPHIL NFR BLD: 0 % (ref 0–5)
EOSINOPHIL NFR BLD: 1 % (ref 0–5)
EOSINOPHIL NFR FLD MANUAL: 0 %
ERYTHROCYTE [DISTWIDTH] IN BLOOD BY AUTOMATED COUNT: 30.8 % (ref 11.6–14.5)
ERYTHROCYTE [DISTWIDTH] IN BLOOD BY AUTOMATED COUNT: 31.1 % (ref 11.6–14.5)
GLOBULIN SER CALC-MCNC: 1.7 G/DL (ref 2–4)
GLOBULIN SER CALC-MCNC: 2 G/DL (ref 2–4)
GLUCOSE BLD STRIP.AUTO-MCNC: 169 MG/DL (ref 70–110)
GLUCOSE BLD STRIP.AUTO-MCNC: 177 MG/DL (ref 70–110)
GLUCOSE BLD STRIP.AUTO-MCNC: 210 MG/DL (ref 70–110)
GLUCOSE BLD STRIP.AUTO-MCNC: 218 MG/DL (ref 70–110)
GLUCOSE BLD STRIP.AUTO-MCNC: 279 MG/DL (ref 70–110)
GLUCOSE SERPL-MCNC: 177 MG/DL (ref 74–99)
GLUCOSE SERPL-MCNC: 288 MG/DL (ref 74–99)
HCT VFR BLD AUTO: 31.8 % (ref 35–45)
HCT VFR BLD AUTO: 34.2 % (ref 35–45)
HGB BLD-MCNC: 10.6 G/DL (ref 12–16)
HGB BLD-MCNC: 9.5 G/DL (ref 12–16)
IMM GRANULOCYTES # BLD AUTO: 0 K/UL
IMM GRANULOCYTES # BLD AUTO: 0 K/UL
IMM GRANULOCYTES NFR BLD AUTO: 0 %
IMM GRANULOCYTES NFR BLD AUTO: 0 %
LDH FLD L TO P-CCNC: 215 U/L
LDH SERPL L TO P-CCNC: 607 U/L (ref 81–234)
LYMPHOCYTES # BLD: 0.1 K/UL (ref 0.9–3.6)
LYMPHOCYTES # BLD: 0.5 K/UL (ref 0.9–3.6)
LYMPHOCYTES NFR BLD: 1 % (ref 21–52)
LYMPHOCYTES NFR BLD: 4 % (ref 21–52)
LYMPHOCYTES NFR FLD: 2 %
MACROPHAGES NFR FLD: 87 %
MAGNESIUM SERPL-MCNC: 1.8 MG/DL (ref 1.6–2.6)
MAGNESIUM SERPL-MCNC: 2.1 MG/DL (ref 1.6–2.6)
MCH RBC QN AUTO: 28.7 PG (ref 24–34)
MCH RBC QN AUTO: 29 PG (ref 24–34)
MCHC RBC AUTO-ENTMCNC: 29.9 G/DL (ref 31–37)
MCHC RBC AUTO-ENTMCNC: 31 G/DL (ref 31–37)
MCV RBC AUTO: 93.7 FL (ref 78–100)
MCV RBC AUTO: 96.1 FL (ref 78–100)
MONOCYTES # BLD: 0.8 K/UL (ref 0.05–1.2)
MONOCYTES # BLD: 1.1 K/UL (ref 0.05–1.2)
MONOCYTES NFR BLD: 11 % (ref 3–10)
MONOCYTES NFR BLD: 9 % (ref 3–10)
MONOCYTES NFR FLD: 0 %
MYELOCYTES NFR BLD MANUAL: 1 %
NEUTROPHILS NFR FLD: 11 %
NEUTS BAND # FLD: 0 %
NEUTS BAND NFR BLD MANUAL: 1 % (ref 0–5)
NEUTS BAND NFR BLD MANUAL: 8 % (ref 0–5)
NEUTS SEG # BLD: 10.4 K/UL (ref 1.8–8)
NEUTS SEG # BLD: 6.5 K/UL (ref 1.8–8)
NEUTS SEG NFR BLD: 79 % (ref 40–73)
NEUTS SEG NFR BLD: 85 % (ref 40–73)
NRBC # BLD: 0 K/UL (ref 0–0.01)
NRBC # BLD: 0.04 K/UL (ref 0–0.01)
NRBC BLD-RTO: 0 PER 100 WBC
NRBC BLD-RTO: 0.5 PER 100 WBC
NUC CELL # FLD: 802 /CU MM
PHOSPHATE SERPL-MCNC: 2.6 MG/DL (ref 2.5–4.9)
PLATELET # BLD AUTO: 505 K/UL (ref 135–420)
PLATELET # BLD AUTO: 505 K/UL (ref 135–420)
PLATELET COMMENTS,PCOM: ABNORMAL
PLATELET COMMENTS,PCOM: ABNORMAL
PMV BLD AUTO: 9.4 FL (ref 9.2–11.8)
PMV BLD AUTO: 9.4 FL (ref 9.2–11.8)
POTASSIUM SERPL-SCNC: 3.6 MMOL/L (ref 3.5–5.5)
POTASSIUM SERPL-SCNC: 4.2 MMOL/L (ref 3.5–5.5)
PROT FLD-MCNC: 5.4 G/DL
PROT SERPL-MCNC: 6.7 G/DL (ref 6.4–8.2)
PROT SERPL-MCNC: 7.1 G/DL (ref 6.4–8.2)
RBC # BLD AUTO: 3.31 M/UL (ref 4.2–5.3)
RBC # BLD AUTO: 3.65 M/UL (ref 4.2–5.3)
RBC # FLD: ABNORMAL /CU MM
RBC MORPH BLD: ABNORMAL
SODIUM SERPL-SCNC: 140 MMOL/L (ref 136–145)
SODIUM SERPL-SCNC: 144 MMOL/L (ref 136–145)
SPECIMEN SOURCE FLD: ABNORMAL
SPECIMEN SOURCE FLD: NORMAL
SPECIMEN SOURCE FLD: NORMAL
WBC # BLD AUTO: 12 K/UL (ref 4.6–13.2)
WBC # BLD AUTO: 7.5 K/UL (ref 4.6–13.2)
WBC MORPH BLD: ABNORMAL

## 2022-07-13 PROCEDURE — 74011000250 HC RX REV CODE- 250: Performed by: HOSPITALIST

## 2022-07-13 PROCEDURE — P9047 ALBUMIN (HUMAN), 25%, 50ML: HCPCS | Performed by: HOSPITALIST

## 2022-07-13 PROCEDURE — 77010033678 HC OXYGEN DAILY

## 2022-07-13 PROCEDURE — 87040 BLOOD CULTURE FOR BACTERIA: CPT

## 2022-07-13 PROCEDURE — 74011636637 HC RX REV CODE- 636/637: Performed by: HOSPITALIST

## 2022-07-13 PROCEDURE — 65270000046 HC RM TELEMETRY

## 2022-07-13 PROCEDURE — 71045 X-RAY EXAM CHEST 1 VIEW: CPT

## 2022-07-13 PROCEDURE — 80053 COMPREHEN METABOLIC PANEL: CPT

## 2022-07-13 PROCEDURE — 83735 ASSAY OF MAGNESIUM: CPT

## 2022-07-13 PROCEDURE — 97530 THERAPEUTIC ACTIVITIES: CPT

## 2022-07-13 PROCEDURE — 83615 LACTATE (LD) (LDH) ENZYME: CPT

## 2022-07-13 PROCEDURE — 77030041504 US THORACENTESIS LT NDL W IMAGE

## 2022-07-13 PROCEDURE — 74011250636 HC RX REV CODE- 250/636: Performed by: INTERNAL MEDICINE

## 2022-07-13 PROCEDURE — 92526 ORAL FUNCTION THERAPY: CPT

## 2022-07-13 PROCEDURE — 88112 CYTOPATH CELL ENHANCE TECH: CPT

## 2022-07-13 PROCEDURE — 74011000258 HC RX REV CODE- 258: Performed by: INTERNAL MEDICINE

## 2022-07-13 PROCEDURE — 74011250637 HC RX REV CODE- 250/637: Performed by: HOSPITALIST

## 2022-07-13 PROCEDURE — 36415 COLL VENOUS BLD VENIPUNCTURE: CPT

## 2022-07-13 PROCEDURE — 74230 X-RAY XM SWLNG FUNCJ C+: CPT

## 2022-07-13 PROCEDURE — 0W9B3ZZ DRAINAGE OF LEFT PLEURAL CAVITY, PERCUTANEOUS APPROACH: ICD-10-PCS | Performed by: RADIOLOGY

## 2022-07-13 PROCEDURE — 92611 MOTION FLUOROSCOPY/SWALLOW: CPT

## 2022-07-13 PROCEDURE — 85025 COMPLETE CBC W/AUTO DIFF WBC: CPT

## 2022-07-13 PROCEDURE — 74011250636 HC RX REV CODE- 250/636: Performed by: HOSPITALIST

## 2022-07-13 PROCEDURE — 84100 ASSAY OF PHOSPHORUS: CPT

## 2022-07-13 PROCEDURE — 89051 BODY FLUID CELL COUNT: CPT

## 2022-07-13 PROCEDURE — 74011250637 HC RX REV CODE- 250/637: Performed by: INTERNAL MEDICINE

## 2022-07-13 PROCEDURE — 88305 TISSUE EXAM BY PATHOLOGIST: CPT

## 2022-07-13 PROCEDURE — 82962 GLUCOSE BLOOD TEST: CPT

## 2022-07-13 PROCEDURE — 74011250636 HC RX REV CODE- 250/636: Performed by: FAMILY MEDICINE

## 2022-07-13 PROCEDURE — 84157 ASSAY OF PROTEIN OTHER: CPT

## 2022-07-13 RX ORDER — LIDOCAINE HYDROCHLORIDE 10 MG/ML
1-10 INJECTION, SOLUTION EPIDURAL; INFILTRATION; INTRACAUDAL; PERINEURAL
Status: COMPLETED | OUTPATIENT
Start: 2022-07-13 | End: 2022-07-13

## 2022-07-13 RX ADMIN — BARIUM SULFATE 20 G: 960 POWDER, FOR SUSPENSION ORAL at 12:16

## 2022-07-13 RX ADMIN — CEFTRIAXONE 1 G: 1 INJECTION, POWDER, FOR SOLUTION INTRAMUSCULAR; INTRAVENOUS at 16:15

## 2022-07-13 RX ADMIN — ALBUMIN (HUMAN) 25 G: 0.25 INJECTION, SOLUTION INTRAVENOUS at 06:00

## 2022-07-13 RX ADMIN — LEVOTHYROXINE SODIUM 125 MCG: 0.1 TABLET ORAL at 05:14

## 2022-07-13 RX ADMIN — LIDOCAINE HYDROCHLORIDE ANHYDROUS 9 ML: 10 INJECTION, SOLUTION INFILTRATION at 12:23

## 2022-07-13 RX ADMIN — Medication 9 UNITS: at 21:47

## 2022-07-13 RX ADMIN — NYSTATIN: 100000 POWDER TOPICAL at 21:47

## 2022-07-13 RX ADMIN — SODIUM CHLORIDE, PRESERVATIVE FREE 10 ML: 5 INJECTION INTRAVENOUS at 05:15

## 2022-07-13 RX ADMIN — PREDNISOLONE ACETATE 1 DROP: 10 SUSPENSION/ DROPS OPHTHALMIC at 21:47

## 2022-07-13 RX ADMIN — DIGOXIN 100 MCG: 250 INJECTION, SOLUTION INTRAMUSCULAR; INTRAVENOUS; PARENTERAL at 09:00

## 2022-07-13 RX ADMIN — FUROSEMIDE 20 MG: 10 INJECTION, SOLUTION INTRAMUSCULAR; INTRAVENOUS at 20:54

## 2022-07-13 RX ADMIN — NYSTATIN: 100000 POWDER TOPICAL at 09:19

## 2022-07-13 RX ADMIN — PANTOPRAZOLE SODIUM 40 MG: 40 TABLET, DELAYED RELEASE ORAL at 09:19

## 2022-07-13 RX ADMIN — BARIUM SULFATE 5 G: 400 PASTE ORAL at 12:17

## 2022-07-13 RX ADMIN — METRONIDAZOLE 500 MG: 500 INJECTION, SOLUTION INTRAVENOUS at 04:00

## 2022-07-13 RX ADMIN — PREDNISOLONE ACETATE 1 DROP: 10 SUSPENSION/ DROPS OPHTHALMIC at 09:19

## 2022-07-13 RX ADMIN — PIPERACILLIN AND TAZOBACTAM 3.38 G: 3; .375 INJECTION, POWDER, LYOPHILIZED, FOR SOLUTION INTRAVENOUS at 20:53

## 2022-07-13 RX ADMIN — Medication 3 UNITS: at 16:04

## 2022-07-13 RX ADMIN — SODIUM CHLORIDE, PRESERVATIVE FREE 10 ML: 5 INJECTION INTRAVENOUS at 15:30

## 2022-07-13 RX ADMIN — ALBUMIN (HUMAN) 25 G: 0.25 INJECTION, SOLUTION INTRAVENOUS at 00:00

## 2022-07-13 RX ADMIN — FUROSEMIDE 20 MG: 10 INJECTION, SOLUTION INTRAMUSCULAR; INTRAVENOUS at 09:19

## 2022-07-13 RX ADMIN — Medication 6 UNITS: at 13:32

## 2022-07-13 RX ADMIN — FLUTICASONE PROPIONATE 2 SPRAY: 50 SPRAY, METERED NASAL at 09:19

## 2022-07-13 RX ADMIN — ENOXAPARIN SODIUM 50 MG: 100 INJECTION SUBCUTANEOUS at 20:54

## 2022-07-13 RX ADMIN — AMIODARONE HYDROCHLORIDE 100 MG: 200 TABLET ORAL at 13:09

## 2022-07-13 RX ADMIN — SODIUM CHLORIDE, PRESERVATIVE FREE 10 ML: 5 INJECTION INTRAVENOUS at 20:56

## 2022-07-13 RX ADMIN — ACETAMINOPHEN 650 MG: 325 TABLET ORAL at 18:14

## 2022-07-13 RX ADMIN — Medication 3 UNITS: at 09:18

## 2022-07-13 RX ADMIN — METRONIDAZOLE 500 MG: 500 INJECTION, SOLUTION INTRAVENOUS at 13:09

## 2022-07-13 NOTE — PROGRESS NOTES
Problem: Self Care Deficits Care Plan (Adult)  Goal: *Acute Goals and Plan of Care (Insert Text)  Description: Initial Occupational Therapy Goals (7/10/2022) Within 7 day(s):  1. Patient will perform perform grooming at EOB with no posterior LOB with CGA to brush teeth and wash face for 5 minutes for increased independence in ADLs. 2. Patient will perform UB dressing with set up seated EOB for increased independence with ADLs. 3. Patient will perform LB dressing with min A & A/E PRN for increased independence with ADLs. 4. Patient will perform all aspects of toileting with min A for increased independence with ADLs. 5. Patient will perform LE ADLs utilizing body mechanics & adaptive strategies with 1 verbal cue for increased safety in ADLs. 6. Patient will independently apply energy conservation techniques with less than 3 verbal cue(s) for increased independence with ADLs. Outcome: Progressing Towards Goal    OCCUPATIONAL THERAPY TREATMENT    Patient: Laura Bernal (88 y.o. female)  Date: 7/13/2022  Diagnosis: Neutropenic fever ([facility] [address] [D70.9, R50.81] Acute respiratory failure with hypoxia ([facility] [address]       Precautions: Fall  PLOF:     Chart, occupational therapy assessment, plan of care, and goals were reviewed. ASSESSMENT:  Pt presented supine in bed, perseverating on back ache and defers OOB mobility initially. Requires max verbal encouragement to participate with therapy. Pt co-treat with PT for the need of another set of skilled hands and safety with transfers/ADLs. Pt performed bed mobility, supine<>sit, sit<>stand transfers and tolerate sitting EOB for ~5 minute and participate with communion. Sit<>stand transfers with mod A x2. Pt was left in side-lying in bed at the end of session in NAD.      Progression toward goals:  []          Improving appropriately and progressing toward goals  [x]          Improving slowly and progressing toward goals  []          Not making progress toward goals and plan of care will be adjusted     PLAN:  Patient continues to benefit from skilled intervention to address the above impairments. Continue treatment per established plan of care. Discharge Recommendations:  Rehab  Further Equipment Recommendations for Discharge:  N/A     SUBJECTIVE:   Patient stated  My back is hurting so much from the shot today.     OBJECTIVE DATA SUMMARY:   Cognitive/Behavioral Status:  Neurologic State: Alert  Orientation Level: Oriented to person  Cognition: Decreased attention/concentration  Safety/Judgement: Fall prevention    Functional Mobility and Transfers for ADLs:   Bed Mobility:  Rolling: Moderate assistance  Supine to Sit: Moderate assistance  Sit to Supine: Maximum assistance;Assist x2  Scooting: Maximum assistance; Total assistance (total A via [place] bed supine to scoot up)   Transfers:  Sit to Stand: Moderate assistance;Maximum assistance;Assist x2  Stand to Sit: Moderate assistance;Maximum assistance;Assist x2  Balance:  Sitting: Impaired; With support  Sitting - Static: Fair (occasional) (list R)  Sitting - Dynamic: Fair (occasional) (Fair -)  Standing: Impaired;Pull to stand; With support  Standing - Static: Poor  Standing - Dynamic : Not tested  ADL Intervention:  Cognitive Retraining  Safety/Judgement: Fall prevention  Pain:  Pain level pre-treatment: 9/10   Pain level post-treatment: 9/10  Pain Intervention(s): Medication (see MAR); Rest, Ice, Repositioning   Response to intervention: Nurse notified, See doc flow    Activity Tolerance:    Poor     Please refer to the flowsheet for vital signs taken during this treatment.   After treatment:   []  Patient left in no apparent distress sitting up in chair  [x]  Patient left in no apparent distress in bed  [x]  Call bell left within reach  [x]  Nursing notified  [x]  Caregiver present  []  Bed alarm activated    COMMUNICATION/EDUCATION:   [x] Role of Occupational Therapy in the acute care setting  [] Home safety education was provided and the patient/caregiver indicated understanding. [x] Patient/family have participated as able in working towards goals and plan of care. [x] Patient/family agree to work toward stated goals and plan of care. [] Patient understands intent and goals of therapy, but is neutral about his/her participation. [] Patient is unable to participate in goal setting and plan of care.       Thank you for this referral.  Davidson Sensor, OTR/L  Time Calculation: 34 mins

## 2022-07-13 NOTE — PROGRESS NOTES
SLP Note:       D/w patient, , RN Anaid Peres, and Dr. Low Nicely about completing MBSS today. All in agreement.      Barry Tolentino M.S., CFY-SLP  Speech-Language Pathologist

## 2022-07-13 NOTE — PROGRESS NOTES
7/13/2022  PT treatment not completed due to:  [] Off Unit for testing/procedure  [] Pain  [] Eating  [] Patient too lethargic  [] Nausea/vomiting  [] Dialysis treatment in progress   [x] Other: pt  Soiled and in need of hygiene care. Nursing notified. Pt spouse present and supportive. Will f/u at later time as pt schedule allows  (note per chart pt for US this am and for MBS this afternoon). Thank you.    Nancy Kilpatrick, PT

## 2022-07-13 NOTE — PROGRESS NOTES
0945:  makes this nurse aware pt to have a MBS today at 1300  A.  Maria Ines MCNULTY    [phone]: ultrasound dept makes this nurse aware transport paged to bring pt to come to the [facility]    12: ultrasound makes this nurse aware 1.4L of fld removed during thoracentesis and pt tolerated procedure well  Romain Dixon RN

## 2022-07-13 NOTE — PROGRESS NOTES
1969 Bedside shift change report given to [id number] [facility] (oncoming nurse) by Daniel Sim RN (offgoing nurse). Report included the following information SBAR, Kardex and MAR.     1200 Patient taken down for barium swallow    1303 Patient is back on floor    1821 Dr Karin Galvan paged to come see patient condition. She is running a fever, high blood pressure, and high pulse rate    1830 Spoke with Dr Karin Galvan, she ordered blood cultures to be done and wants us to watch her temperature since tylenol was just given    [place] 250 with lab about coming to draw lab cultures as soon as they can    1919 Bedside shift change report given to [address] (oncoming nurse) by [id number] [facility] (offgoing nurse). Report included the following information SBAR, Kardex and MAR.

## 2022-07-13 NOTE — PROGRESS NOTES
Problem: Dysphagia (Adult)  Goal: *Acute Goals and Plan of Care (Insert Text)  Description: Patient will:  1. Tolerate PO trials with 0 s/s overt distress in 4/5 trials. 2. Utilize compensatory swallow strategies/maneuvers (decrease bite/sip, size/rate, alt. liq/sol) with min cues in 4/5 trials. 3. As medically indicated by MD, complete an objective swallow study (i.e., MBSS) to assess swallow integrity, r/o aspiration, and determine of safest LR    Rec:     Regular with thin liquids  Pt may require assistance with meal set up  Aspiration precautions  HOB >45 during po intake, remain >30 for 30-45 minutes after po   Small bites/sips; alternate liquid/solid with slow feeding rate   Oral care TID  Meds crushed in puree   Outcome: Progressing Towards Goal     SPEECH PATHOLOGY MODIFIED BARIUM SWALLOW STUDY & TREATMENT    Patient: [place] (61 y.o. female)  Date: 7/13/2022  Primary Diagnosis: Neutropenic fever ([facility] [address] [D70.9, R50.81]  Precautions: Fall  PLOF:as per H&P    ASSESSMENT :  Based on the objective data described below, the patient presents with no overt s/sx of dysphagia visualized at this time. Pt demo 0 aspiration/penetration events for all consistencies consumed. PO trials: thin +/- straw, pudding, regular solid, 13 mm barium pill with thin liquid wash. Min residue visualized in valleculae after thin liquid trial (x1) and regular solid (x2) in which patient demonstrated independent double swallow to clear. Timely swallow initiation triggered at base of tongue with adequate laryngeal elevation visualized. Functional tongue base retraction, pharyngeal stripping wave, and 0 oral residue appreciated. Recommend regular diet with thin liquids, meds as tolerated. SLP following x1-2 to check diet tolerance.      Treatment:  Skilled therapy provided post diagnostic testing, including: oropharyngeal anatomy/physiology; MBS results; diet recommendations and compensatory strategies/positioning  Pt able to verbalized understanding; suspect limited carryover. SLP to follow as indicated. Patient will benefit from skilled intervention to address the above impairments. Patient's rehabilitation potential is considered to be Good  Factors which may influence rehabilitation potential include:   []              None noted  []              Mental ability/status  [x]              Medical condition  []              Home/family situation and support systems  []              Safety awareness  []              Pain tolerance/management  []              Other:      PLAN :  Recommendations and Planned Interventions:  See above  Frequency/Duration: Patient will be followed by speech-language pathology 1-2 times to check diet tolerance/safety. Discharge Recommendations: To Be Determined     SUBJECTIVE:   Patient stated am I done yet? .    OBJECTIVE:     Past Medical History:   Diagnosis Date    Arthritis     GERD (gastroesophageal reflux disease)     Hypertension     Osteoarthritis of right knee 9/19/2012    S/P total knee arthroplasty 9/19/2012    Thyroid disease      Past Surgical History:   Procedure Laterality Date    HX APPENDECTOMY  1950's    HX CHOLECYSTECTOMY      laparoscopic    HX HEENT      Surgery OD infection x 4    HX HEENT      throat surgery x 2    HX KNEE ARTHROSCOPY      right     Home Situation:   Home Situation  Home Environment: Private residence  # Steps to Enter: 1  Rails to Enter: No  One/Two Story Residence: One story  Living Alone: No  Support Systems: Spouse/Significant Other  Patient Expects to be Discharged to[de-identified] Skilled nursing facility  Current DME Used/Available at Home: Adaptive bathing aides,Com[employer], bedside,Shower chair,Hospital bed,Walker, rolling  Tub or Shower Type: Tub/Shower combination  Diet prior to admission: regular with thin liquids  Current Diet:  regular with thin liquids   Radiologist:    Film Views: Fluoro;AP  Patient Position: 90 in chair    Trial 1:   Consistency Presented: Solid; Thin liquid;Pudding;Pill/Tablet   How Presented: Cup/sip;Spoon;Straw;Successive swallows;SLP-fed/presented;Self-fed/presented   Bolus Acceptance: No impairment   Bolus Formation/Control: No impairment:     Propulsion: No impairment   Oral Residue: None   Initiation of Swallow: Triggered at base of tongue   Timing: No impairment   Penetration: None   Aspiration/Timing: No evidence of aspiration   Pharyngeal Clearance: Vallecular residue; Less than 10% (cleared with double swallow)   Effective Modifications: Alternate liquids/solids; Double swallow;Small sips and bites   Cues for Modifications: None       Decreased Tongue Base Retraction?: No  Laryngeal Elevation: WFL (within functional limits)  Aspiration/Penetration Score: 1 (No penetration or aspiration-Contrast does not enter the airway)  Pharyngeal Symmetry: Not assessed  Pharyngeal-Esophageal Segment: No impairment  Pharyngeal Dysfunction: None    Oral Phase Severity: No impairment  Pharyngeal Phase Severity: N/A  8-point Penetration-Aspiration Scale: Score 1    PAIN:  Pain level pre-treatment: 0/10   Pain level post-treatment: 0/10     COMMUNICATION/EDUCATION:   [x]  Patient educated regarding MBS results and diet recommendations. [x]  Patient/family have participated as able in goal setting and plan of care. []  Patient/family agree to work toward stated goals and plan of care. []  Patient understands intent and goals of therapy, but is neutral about his/her participation. []  Patient is unable to participate in goal setting and plan of care.     Thank you for this referral.  Pieadd Cervantes M.S., CFY-SLP  Speech-Language Pathologist

## 2022-07-13 NOTE — PROGRESS NOTES
Hospitalist Progress Note-critical care note     Patient: Sraoj Barrett MRN: [id number]  CSN: [id number]    YOB: 1940  Age: 78 y.o. Sex: female    DOA: 7/3/2022 LOS:  LOS: 10 days            Chief complaint: septic shock. Neutropenic fever, lymphoma, gerd , dm    Assessment/Plan         Hospital Problems  Date Reviewed: 9/21/2012          Codes Class Noted POA    Colonic diverticular abscess ICD-10-CM: K57.20  ICD-9-CM: 562.11  7/11/2022 Unknown        Septic shock ([address] ICD-10-CM: A41.9, R65.21  ICD-9-CM: 038.9, 785.52, 995.92  7/4/2022 Yes        Neutropenic fever ([address] ICD-10-CM: D70.9, R50.81  ICD-9-CM: 288.00, 780.61  7/3/2022 Yes        * (Principal) Acute respiratory failure with hypoxia (HCC) ICD-10-CM: J96.01  ICD-9-CM: 518.81  7/3/2022 Yes        Hypomagnesemia ICD-10-CM: E83.42  ICD-9-CM: 275.2  7/3/2022 Yes        Diffuse large B cell lymphoma (HCC) ICD-10-CM: C83.30  ICD-9-CM: 202.80  7/3/2022 Yes        GERD (gastroesophageal reflux disease) ICD-10-CM: K21.9  ICD-9-CM: 530.81  Unknown Yes        Thyroid disease ICD-10-CM: E07.9  ICD-9-CM: 246. 9  Unknown Unknown        Acute encephalopathy ICD-10-CM: G93.40  ICD-9-CM: 348.30  Unknown Yes        Hyponatremia ICD-10-CM: E87.1  ICD-9-CM: 276.1  7/3/2022 Yes        Pancytopenia ([address] ICD-10-CM: F61.883  ICD-9-CM: 284.19  7/3/2022 Yes        UTI (urinary tract infection) ICD-10-CM: N39.0  ICD-9-CM: 599.0  7/3/2022 Yes        Sinusitis ICD-10-CM: J32.9  ICD-9-CM: 473.9  7/3/2022 Unknown        Myopia of both eyes with astigmatism ICD-10-CM: H52.13, H52.203  ICD-9-CM: 367.1, 367.20  7/3/2022 Unknown        Pleural effusion ICD-10-CM: J90  ICD-9-CM: 511.9  7/3/2022 Unknown        Cellulitis ICD-10-CM: L03.90  ICD-9-CM: 682.9  7/3/2022 Yes        Severe protein-calorie malnutrition ([address] ICD-10-CM: E43  ICD-9-CM: 297  7/3/2022 Yes        Hypokalemia ICD-10-CM: E87.6  ICD-9-CM: 276.8  9/21/2012 Yes              Pt was admitted for respiratory failure and neutropenic fever     Acute respiratory failure with hypoxia with 6 L oxygen on admission  Improving   Flu negative, rapid covid 19 negative -confirmed per pcr   cta no central PE   pulm f/u        Pleural effusion   Moderate and large effusion-case discussed with Dr. María Elena Huerta -ok to  Tap, ir consulted 1.4 L fluid removed         Cellulitis of rt leg, neutropenia fever , acute on chronic sinusitis , UTI-resolved      Abdominal abscess : surgeon and ID f/u IR consulted for drain-recommend medical tx      paf :  amiodorone and digoxin   Cardiologist f/u   Continue balance electrolytes and echo : ef wnl   Need full AC, lovenox hold for now for procedure -balance K and mg   K replacement     Septic shock -resolved     Pancytopenia resolved   Due to chemo   lovenox-will restart it tonight   neutropenic fever :resolved      Diffusion Large B lymphoma   On chemo therapy   Oncologist f/u             Hyponatremia , hypomagnesemia, hypokalemia -Na and mg good, K replacement          DM type II   Ssi , hypoglycemia protocol      hypothyroidism   Continue synthyroid         Acute encephalopath  Resolved and back to her baseline      Myopia       Severe malnutrition      Hypoalbuminemia   Resolved       Subjective I feel fine     was at the bedside. All questions have been answered. He is ok for medical tx for abscess and thoracentesis. 35 total min's spent on patient care including >50% on counseling/coordinating care. Discussed the above assessments. also discussed labs, medications and hospital course    Continue pt/o. Dc lasix and albumin   D/c planning     Review of systems:    General : no fever /chills .   Lung no sob , no wheezing  Heart : no chest pain, no palpitation   Gi : no n/v , no  abdomen pain   Vital signs/Intake and Output:  Visit Vitals  BP (!) 107/46 (BP 1 Location: Right upper arm, BP Patient Position: Lying)   Pulse 83   Temp 98.1 °F (36.7 °C)   Resp 20   Ht 5' 7\" (1.702 m)   Wt 53.5 kg (118 lb)   SpO2 95%   BMI 18.48 kg/m²     Current Shift:  07/13 0701 - 07/13 1900  In: 240 [P.O.:240]  Out: 100 [Urine:100]  Last three shifts:  07/11 1901 - 07/13 0700  In: 350 [I.V.:350]  Out: 1950 [Urine:1950]    Physical Exam:  General: WD, WN. Alert,  cooperative,   HEENT: NC, Atraumatic. PERRLA, anicteric sclerae. Lungs: Decreased BS of left side of chest , cta of rt   Heart:  Regular  rhythm,  No murmur, No Rubs, No Gallops  Abdomen: Soft, Non distended, + tender. +Bowel sounds,    Extremities: No c/c/e   Psych:   Not anxious or agitated. Neurologic:  No acute neuro deficit, answer questions-but slow           Labs: Results:       Chemistry Recent Labs     07/13/22 0202 07/12/22 0210 07/11/22  0435   * 148* 188*    143 143   K 3.6 3.3* 3.1*    108 108   CO2 30 29 29   BUN 29* 23* 27*   CREA 0.48* 0.40* 0.40*   CA 10.0 9.9 9.8   AGAP 5 6 6   BUCR 60* 58* 68*   AP 56 52 51   TP 7.1 6.7 6.5   ALB 5.1* 5.1* 4.6   GLOB 2.0 1.6* 1.9*   AGRAT 2.6* 3.2* 2.4*      CBC w/Diff Recent Labs     07/13/22 0202 07/12/22 0210 07/11/22  0435   WBC 7.5 6.2 5.8   RBC 3.31* 3.07* 3.07*   HGB 9.5* 8.8* 8.8*   HCT 31.8* 29.0* 29.2*   * 402 349   GRANS 85* 58 74*   LYMPH 1* 14* 9*   EOS 1 4 3      Cardiac Enzymes No results for input(s): CPK, CKND1, HUMBERTO in the last 72 hours. No lab exists for component: CKRMB, TROIP   Coagulation No results for input(s): PTP, INR, APTT, INREXT, INREXT in the last 72 hours. Lipid Panel No results found for: CHOL, CHOLPOCT, CHOLX, CHLST, CHOLV, 584015, HDL, HDLP, LDL, LDLC, DLDLP, 694464, VLDLC, VLDL, TGLX, TRIGL, TRIGP, TGLPOCT, CHHD, CHHDX   BNP No results for input(s): BNPP in the last 72 hours.    Liver Enzymes Recent Labs     07/13/22  0202   TP 7.1   ALB 5.1*   AP 56      Thyroid Studies Lab Results   Component Value Date/Time    TSH 3.50 07/04/2022 04:40 AM        Procedures/imaging: see electronic medical records for all procedures/Xrays and details which were not copied into this note but were reviewed prior to creation of Plan    XR ANKLE RT MIN 3 V    Result Date: 7/3/2022  EXAM: ANKLE SERIES Indication: Swelling Technique: Frontal, , oblique, and lateral views of the right ankle. Comparison studies: None. _______________ FINDINGS: -OSSEOUS: No acute or destructive osseous abnormality. Tibiotalar degenerative changes with small degenerative plantar calcaneal spur. Normal alignment with preserved ankle mortise. . -SOFT TISSUES: Benign calcifications of the lower leg with mild diffuse edema of the distal leg to visualized midfoot. . No significant joint effusion. _____________    1. Nonspecific soft tissue edema, with no acute or destructive osseous abnormality. CT HEAD WO CONT    Result Date: 7/3/2022  EXAM: CT HEAD WO CONT CLINICAL INDICATION/HISTORY: ams -Additional: None COMPARISON: None TECHNIQUE: Axial CT imaging of the head was performed without intravenous contrast. One or more dose reduction techniques were used on this CT: automated exposure control, adjustment of the mAs and/or kVp according to patient size, and iterative reconstruction techniques. The specific techniques used on this CT exam have been documented in the patient's electronic medical record. Digital Imaging and Communications in Medicine (DICOM) format image data are available to nonaffiliated external healthcare facilities or entities on a secure, media free, reciprocally searchable basis with patient authorization for at least a 12-month period after this study. _______________ FINDINGS: BRAIN:   > Brain volume: Age appropriate.   > White matter: Little or no white matter disease. > Infarcts, encephalomalacia: None.   > Parenchymal mass: None.   > Parenchymal hemorrhage: None.   > Midline shift: None.   > Miscellaneous: None. EXTRA-AXIAL SPACES: Unremarkable. No fluid collections. CALVARIUM: Intact.  SINUSES, MASTOIDS: Complete opacified left sphenoethmoidal air cells with mild mucosal thickening on the right and small left anterior frontal ethmoidal opacities. OTHER EXTRACRANIAL: Asymmetric dense right globe with findings of prior bilateral cataract surgery. _______________     1. No CT findings of an acute intracranial abnormality. Please note that noncontrast head CT may be normal in early acute infarct. 2. Chronic left sphenoethmoidal sinus disease, with questional small left frontoethmoidal air-fluid level potentially acute on chronic sinus disease. 3. Asymmetric hyperdense right globe. Recommend correlation with patient ocular history. CTA CHEST W OR W WO CONT    Result Date: 7/3/2022  EXAM: CTA Chest INDICATION: Hypoxic. COMPARISON: No recent exams for direct comparison, most recent study from 9/21/2012. TECHNIQUE: Axial CT imaging from the thoracic inlet through the diaphragm with intravenous contrast. Coronal and sagittal MIP reformats were generated. One or more dose reduction techniques were used on this CT: automated exposure control, adjustment of the mAs and/or kVp according to patient size, and iterative reconstruction techniques. The specific techniques used on this CT exam have been documented in the patient's electronic medical record. Digital Imaging and Communications in Medicine (DICOM) format image data are available to nonaffiliated external healthcare facilities or entities on a secure, media free, reciprocally searchable basis with patient authorization for at least a 12-month period after this study. _______________ FINDINGS: EXAM QUALITY: Adequate bolus timing with mild diffuse respiratory motion artifact degradation. Lakshmi Graven PULMONARY ARTERIES: No evidence of central, interlobar or mid to proximal segmental branch pulmonary arterial filling defect. Asymmetric motion degradation involving the lower lobes. Normal sized main pulmonary artery. Pulmonary embolism. MEDIASTINUM: Normal heart size without pericardial effusion. Coronary artery and aortic atherosclerosis. A right upper chest Mediport is present with the catheter in the SVC. Moderate-sized hiatus hernia. LUNGS and PLEURA: Moderate to large left low attenuating layering pleural effusion. Marked diffuse emphysematous changes with chronic areas of scarring and atelectasis. Bilateral lower lobe groundglass, difficult to differentiate between dependent changes, atelectasis and/or edema. . AIRWAY: Normal. LYMPH NODES: No enlarged nodes. UPPER ABDOMEN: Multiple splenic hypodensities, largest anteriorly measuring 3 cm. Overall normal splenic size measuring 11.3 cm in AP dimension. Prior cholecystectomy OTHER: No acute or aggressive osseous abnormalities identified. _______________     1. Respiratory motion degradation. Otherwise, No evidence of central pulmonary embolism. 2.  Interstitial and groundglass changes suggestive of underlying edema, with moderate to large left-sided pleural effusion. 3. Multiple Indeterminate splenic hypodensities, recommend comparison to any prior recent outside exams and patient history. 4. Moderate-sized hiatus hernia. XR CHEST PORT    Result Date: 7/3/2022  EXAM: XR CHEST PORT CLINICAL INDICATION/HISTORY: ams fever -Additional: None COMPARISON: None TECHNIQUE: Frontal view of the chest _______________ FINDINGS: SUPPORT LINES AND TUBES:Right-sided tunneled Mediport and catheter tip over the distal SVC. HEART AND MEDIASTINUM: Midline cardiac silhouette appreciable cardiomegaly. Hilar vascular congestion and cephalization. LUNGS AND PLEURAL SPACES: Diffuse bilateral hazy indistinct interstitial opacities with retrocardiac left basilar confluent opacity and blunted costophrenic angle/effusion. No significant right-sided pleural effusion. No pneumothorax. BONY THORAX AND SOFT TISSUES: No acute or destructive osseous abnormality. _______________     1. Findings of CHF versus fluid overload with edema and left pleural effusion.       Michelle Calderon MD

## 2022-07-13 NOTE — ROUTINE PROCESS
Ultrasound guided Left thoracentesis was performed. Pt tolerated procedure well. 1,400 mL of pleural fluid was drained, taken to lab for testing. X-ray done bedside. Pt given over to Transportation in stable condition. Nurse [facility] given report.

## 2022-07-13 NOTE — PROCEDURES
Interventional Radiology Brief Procedure Note    Performed By: Winston Tao PA-C    Attending Radiologist: Chad Lopez MD    Pre-operative Diagnosis:  Left pleural effusion    Post-operative Diagnosis: Same as pre-op diagnosis    Procedure(s) Performed:  US guided left thoracentesis    Anesthesia:  Local anesthesia    Findings:  1400 cc of serosanguinous pleural fluid was removed. Please see dictated report for details. Complications: None immediate, CXR pending    Estimated Blood Loss:  Minimal    Tubes and Drains: None    Specimens: Sent to the lab, as requested    Condition: Good    Disposition:  Return to nursing unit.   Plan per primary team.      Winston Tao PA-C  [facility]  Vascular & Interventional Radiology  7/13/2022

## 2022-07-13 NOTE — PROGRESS NOTES
Cardiology Progress Note        Patient: Naldo Dewey        Sex: female          DOA: 7/3/2022  YOB: 1940      Age:  78 y.o.        LOS:  LOS: 10 days      Patient seen and examined, chart reviewed. Assessment/Plan     Patient Active Problem List   Diagnosis Code    S/P total knee arthroplasty Z96.659    Hypokalemia E87.6    HTN (hypertension), benign I10    Neutropenic fever ([address] D70.9, R50.81    Acute respiratory failure with hypoxia ([address] J96.01    Hypomagnesemia E83.42    Diffuse large B cell lymphoma (HCC) C83.30    GERD (gastroesophageal reflux disease) K21.9    Thyroid disease E07.9    Acute encephalopathy G93.40    Hyponatremia E87.1    Pancytopenia ([address] D61.818    UTI (urinary tract infection) N39.0    Sinusitis J32.9    Myopia of both eyes with astigmatism H52.13, H52.203    Pleural effusion J90    Cellulitis L03.90    Severe protein-calorie malnutrition ([address] E43    Septic shock (HCC) A41.9, R65.21    Colonic diverticular abscess K57.20        Paroxysmal atrial fibrillation        Echocardiogram revealed       Left Ventricle: Normal left ventricular systolic function with a visually estimated EF of 60 - 65%. Left ventricle size is normal. Normal wall thickness. Normal wall motion. Indeterminate diastolic function due to atrial fibrillation.   Tricuspid Valve: The estimated PASP is 41 mmHg. Plan:    Continue Amiodarone   Continue digoxin 100 mcg IV once a day  Continue Full dose of Lovenx  Monitor and replace electrolytes as per hospitalist                Subjective:    cc:  Denies any chest pain or shortness of breath      REVIEW OF SYSTEMS:     General: No fevers or chills. Cardiovascular: Positive shortness of breath, No chest pain,No palpitations, No orthopnea, No PND, No leg swelling, No claudication  Pulmonary: No dyspnea.    Gastrointestinal: No nausea, vomiting, bleeding  Neurology: No Dizziness    Objective:      Visit Vitals  BP (!) 148/84 (BP 1 Location: Left upper arm, BP Patient Position: Lying)   Pulse (!) 102   Temp 98.3 °F (36.8 °C)   Resp 20   Ht 5' 7\" (1.702 m)   Wt 53.5 kg (118 lb)   SpO2 90%   BMI 18.48 kg/m²     Body mass index is 18.48 kg/m². Physical Exam:  General Appearance: Looks chronically ill, Comfortable, not using accessory muscles of respiration. HEENT: MICHELINE. HEAD: Atraumatic  NECK: No JVD, no thyroidomeglay. CAROTIDS: No bruit  LUNGS: Clear bilaterally. HEART: S1+S2 audible, no murmur, no pericardial rub.    NEUROLOGICAL: Alert, follows verbal commands    Medication:  Current Facility-Administered Medications   Medication Dose Route Frequency    enoxaparin (LOVENOX) injection 50 mg  1 mg/kg SubCUTAneous Q12H    cefTRIAXone (ROCEPHIN) 1 g in 0.9% sodium chloride (MBP/ADV) 50 mL MBP  1 g IntraVENous Q24H    metroNIDAZOLE (FLAGYL) IVPB premix 500 mg  500 mg IntraVENous Q8H    prednisoLONE acetate (PRED FORTE) 1 % ophthalmic suspension 1 Drop  1 Drop Both Eyes BID    fluticasone propionate (FLONASE) 50 mcg/actuation nasal spray 2 Spray  2 Spray Both Nostrils DAILY    digoxin (LANOXIN) injection 100 mcg  100 mcg IntraVENous DAILY    lidocaine-prilocaine (EMLA) 2.5-2.5 % cream   Topical PRN    nystatin (MYCOSTATIN) 100,000 unit/gram powder   Topical BID    pantoprazole (PROTONIX) tablet 40 mg  40 mg Oral ACB    furosemide (LASIX) injection 20 mg  20 mg IntraVENous Q12H    insulin lispro (HUMALOG) injection   SubCUTAneous AC&HS    levothyroxine (SYNTHROID) tablet 125 mcg  125 mcg Oral DAILY    amiodarone (CORDARONE) tablet 100 mg  100 mg Oral DAILY    ipratropium (ATROVENT) 0.02 % nebulizer solution 0.5 mg  0.5 mg Nebulization TID PRN    albuterol (PROVENTIL VENTOLIN) nebulizer solution 2.5 mg  2.5 mg Nebulization Q4H PRN    sodium chloride (NS) flush 5-40 mL  5-40 mL IntraVENous Q8H    sodium chloride (NS) flush 5-40 mL  5-40 mL IntraVENous PRN    acetaminophen (TYLENOL) tablet 650 mg 650 mg Oral Q6H PRN    Or    acetaminophen (TYLENOL) suppository 650 mg  650 mg Rectal Q6H PRN    [Held by provider] polyethylene glycol (MIRALAX) packet 17 g  17 g Oral DAILY PRN    ondansetron (ZOFRAN ODT) tablet 4 mg  4 mg Oral Q8H PRN    Or    ondansetron (ZOFRAN) injection 4 mg  4 mg IntraVENous Q6H PRN    glucose chewable tablet 16 g  4 Tablet Oral PRN    glucagon (GLUCAGEN) injection 1 mg  1 mg IntraMUSCular PRN    dextrose 10% infusion 0-250 mL  0-250 mL IntraVENous PRN               Lab/Data Reviewed:       Recent Labs     07/13/22  0202 07/12/22  0210 07/11/22  0435   WBC 7.5 6.2 5.8   HGB 9.5* 8.8* 8.8*   HCT 31.8* 29.0* 29.2*   * 402 349     Recent Labs     07/13/22  0202 07/12/22  0210 07/11/22  0435    143 143   K 3.6 3.3* 3.1*    108 108   CO2 30 29 29   * 148* 188*   BUN 29* 23* 27*   CREA 0.48* 0.40* 0.40*   CA 10.0 9.9 9.8       Signed By: Dasha Rainey MD     July 13, 2022

## 2022-07-13 NOTE — PROGRESS NOTES
Hematology / Oncology Progress Note    Impression:   Principal Problem:    Acute respiratory failure with hypoxia ([address] (7/3/2022)    Active Problems:    Hypokalemia (9/21/2012)      Neutropenic fever ([address] (7/3/2022)      Hypomagnesemia (7/3/2022)      Diffuse large B cell lymphoma (HCC) (7/3/2022)      GERD (gastroesophageal reflux disease) ()      Thyroid disease ()      Acute encephalopathy ()      Hyponatremia (7/3/2022)      Pancytopenia ([address] (7/3/2022)      UTI (urinary tract infection) (7/3/2022)      Sinusitis (7/3/2022)      Myopia of both eyes with astigmatism (7/3/2022)      Pleural effusion (7/3/2022)      Cellulitis (7/3/2022)      Severe protein-calorie malnutrition ([address] (7/3/2022)      Septic shock ([address] (7/4/2022)      Colonic diverticular abscess (7/11/2022)        Sepsis/Fluid Collection: Appreciate GenSurg and IR input. No plan for surgical intervention given poor ECOG/small abscess and location of abscess makes it difficult for drainage catheter placement. Vitals within normal. WBC normal. Plan for continued medical management. Pleural Effusion: CXR 7/12/22 with ongoing L Moderate pleural effusion. Spoke to IR and they feel it is safe to proceed with thoracentesis. Diffuse Large B Cell Lymphoma -  Completed chemotherapy with R-CHOP on 6/28/22. CT Chest on 7/3/22 and CT Abdomen/Pelvis on 7/11/22 with no lymphadenopathy, suggestive of possible cure of her lymphoma. This is not her problem at this time. Will confirm this with PET/CT as outpatient, but overall prognosis from lymphoma standpoint is good. Anemia - dt/ chemotherapy. Hg 9.5, improving    Hx of AFib    Weakness - dt/ chemotherapy + illness.  PT/OT    Plan:   Thoracentesis by IR  Continued medical management for abscess  No plans for further chemotherapy while inhouse  PT/OT  Further care per primary team  PET/CT as outpatient  Will continue to follow    Mele Schreiber MD  [facility]      Subjective: No acute overnight events  Notes some weakness, but otherwise well  Remains on 3-4 L NC  CXR with moderate L sided effusion     Hospital Medications:     Current Facility-Administered Medications   Medication Dose Route Frequency Provider Last Rate Last Admin    [Held by provider] enoxaparin (LOVENOX) injection 50 mg  1 mg/kg SubCUTAneous Q12H Royal Bernardino MD        cefTRIAXone (ROCEPHIN) 1 g in 0.9% sodium chloride (MBP/ADV) 50 mL MBP  1 g IntraVENous Q24H Johny Licona  mL/hr at 07/12/22 1649 1 g at 07/12/22 1649    metroNIDAZOLE (FLAGYL) IVPB premix 500 mg  500 mg IntraVENous Q8H Johny Licona  mL/hr at 07/13/22 0400 500 mg at 07/13/22 0400    prednisoLONE acetate (PRED FORTE) 1 % ophthalmic suspension 1 Drop  1 Drop Both Eyes BID Braulio Mcgrath MD   1 Drop at 07/12/22 0846    fluticasone propionate (FLONASE) 50 mcg/actuation nasal spray 2 Spray  2 Spray Both Nostrils DAILY Braulio Mcgrath MD   2 [place] at 07/12/22 0846    digoxin (LANOXIN) injection 100 mcg  100 mcg IntraVENous DAILY Liana VILLELA MD   100 mcg at 07/12/22 0900    lidocaine-prilocaine (EMLA) 2.5-2.5 % cream   Topical PRN Burgess Wilfredo MD        nystatin (MYCOSTATIN) 100,000 unit/gram powder   Topical BID Gallito Bhakta MD   Given at 07/12/22 0846    pantoprazole (PROTONIX) tablet 40 mg  40 mg Oral ACB Royal Bernardino MD   40 mg at 07/12/22 0815    furosemide (LASIX) injection 20 mg  20 mg IntraVENous Q12H Burgess Wilfredo MD   20 mg at 07/12/22 2150    insulin lispro (HUMALOG) injection   SubCUTAneous AC&HS Royal Bernardino MD   3 Units at 07/12/22 2200    levothyroxine (SYNTHROID) tablet 125 mcg  125 mcg Oral DAILY Cathie Gasca MD   125 mcg at 07/13/22 0514    amiodarone (CORDARONE) tablet 100 mg  100 mg Oral DAILY Cathie Gasca MD   100 mg at 07/12/22 1200    ipratropium (ATROVENT) 0.02 % nebulizer solution 0.5 mg  0.5 mg Nebulization TID PRN Cathie Gasca MD   0.5 mg at 07/07/22 2021    albuterol (PROVENTIL VENTOLIN) nebulizer solution 2.5 mg  2.5 mg Nebulization Q4H PRN Windy Canales MD   2.5 mg at 07/10/22 1504    sodium chloride (NS) flush 5-40 mL  5-40 mL IntraVENous Q8H Kofi Aleman MD   10 mL at 22 0515    sodium chloride (NS) flush 5-40 mL  5-40 mL IntraVENous PRN Kofi Aleman MD        acetaminophen (TYLENOL) tablet 650 mg  650 mg Oral Q6H PRN Kofi Aleman MD        Or   Mary Jo Waggoner acetaminophen (TYLENOL) suppository 650 mg  650 mg Rectal Q6H PRN Kofi Aleman MD        [Held by provider] polyethylene glycol (MIRALAX) packet 17 g  17 g Oral DAILY PRN Kofi Aleman MD        ondansetron (ZOFRAN ODT) tablet 4 mg  4 mg Oral Q8H PRN Kofi Aleman MD        Or    ondansetron [facility]) injection 4 mg  4 mg IntraVENous Q6H PRN Kofi Aleman MD   4 mg at 22 1052    albumin human 25% (BUMINATE) solution 25 g  25 g IntraVENous Q6H Kofi Aleman MD   25 g at 22 0600    glucose chewable tablet 16 g  4 Tablet Oral PRN Kofi Aleman MD        glucagon [facility]) injection 1 mg  1 mg IntraMUSCular PRN Kofi Aleman MD        dextrose 10% infusion 0-250 mL  0-250 mL IntraVENous PRN Kofi Aleman MD           Review of Systems:   Constitutional:  Limited ROS, but denies fever or pain.       Visit Vitals  /65   Pulse 71   Temp 98.5 °F (36.9 °C)   Resp 18   Ht 5' 7\" (1.702 m)   Wt 53.5 kg (118 lb)   SpO2 94%   BMI 18.48 kg/m²       Temp (24hrs), Av °F (36.7 °C), Min:97.3 °F (36.3 °C), Max:98.5 °F (36.9 °C)      General: no acute distress and laying in bed, comfortable  HEENT: no icterus, no oral lesions  Lym: no cervical or supraclavicular adenopathy  CV: rate is regular and normal and their is no murmur  Lung: clear to auscultation, no increased work of breathing  Abd: nontender, no organomegaly  Neuro: cnoversant and moving extremities  MSK: no sarcopenia, normal tone  Derm: no rash  Psych: normal affect  Per: warm, no edema    Labs:  Recent Labs     22  0202 22  0210 22  0435   WBC 7.5 6.2 5.8   RBC 3.31* 3.07* 3.07*   HCT 31.8* 29.0* 29.2*   MCV 96.1 94.5 95.1   MCH 28.7 28.7 28.7   MCHC 29.9* 30.3* 30.1*   RDW 31.1* 31.4* 31.3*       Recent Labs     07/13/22  0202 07/12/22  0210 07/11/22  0435   CO2 30 29 29   BUN 29* 23* 27*       No results for input(s): ALBUMIN in the last 72 hours.     No lab exists for component: [facility]

## 2022-07-13 NOTE — DIABETES MGMT
Diabetes/ Glycemic Control Plan of Care  Recommendations:   Recommend to initiate basal insulin, suggest 5-7 units Lantus q 24 hours. Assessment: BG remains just above target range. POC ranging 169-217 mg/dl over the last 24 hours. Recent Glucose Results:   Lab Results   Component Value Date/Time     (H) 07/13/2022 02:02 AM    GLUCPOC 169 (H) 07/13/2022 08:17 AM    GLUCPOC 192 (H) 07/12/2022 09:26 PM    GLUCPOC 217 (H) 07/12/2022 03:41 PM           BG within target range (non-ICU: <180; -180):  [] Yes    [x] No   Current insulin orders: corrective Humalog - very insulin resistant scale  Total Daily Dose previous 24 hours = 15 units Humalog     Plan/Goals:   Blood glucose will be within target of 70 - 180 mg/dl within 72 hours  Reinforce dietary and medication compliance at home.           Education:  [] Refer to Diabetes Education Record                       [x] Education not indicated at this time     Lord Eugene RD  Glycemic Control Team  [phone]    Monday-Friday   9 am - 3 pm

## 2022-07-13 NOTE — PROGRESS NOTES
[facility]  (A Division of [address] [place] [address])    ID Follow-up Note  Dr Domingo Montalvo will cover by Phone tomorrow and the weekend. Dr Africa Rebolledo will cover on Friday. I will resume service on Monday. Please call via  or Perfect Serve. Thanks. Date of Admission: 7/3/2022       Date of Note:  7/13/2022     Summary:  Ms Susana Ocasio is a 78 y.o. WF with PMH as listed below-- she had her last chemo on 6/28 and received growth factor as well. She came to ED with fever/lethargy/ weakness and SOB of 1 day and found to have fever to 100.7, Neutropenia to 200, pyruria, and CT head/CT chest done as well as CXR and ankle X-ray R foot done and admitted to ICU. She is requiring levophed support for border line BP.     During interview( hard of hearing too), she denies HA/sorethroat. Chest pain, abd pain, dysuria. She has no focal complaint during exam.  Denies viral/COVID infection at home. Feels SOB. She is currently receiving V/Z.     Care Everywhere-- shows admission in 01 and 04 2022 with diagnosis of sepsis- Bacteroides bacteremia in jan( also dx of cancer)  and PNA with septic shock in 04/2022       CC:      Subjective:     by her bedside, reports she doesn't seem ok to him, has hand shakes/tremor- R>L  Low grade fever noted, she seems to be staring to space, doesn't follow verbal command  DW primary team  --had 1400cc of transudative fluid removed from left lung effusion    WBC recovered from last round of CHOP 6/26. Current Antimicrobials:    Prior Antimicrobials:  1. Cefepime IV (7/9-) to date  2. Metronidazole IV (7/10-) to date 1. Pip/tzb IV (7/3-9)  2. Vanco IV (7/3-9)  3. azithro IV (7/4)  4. Doxy IV (7/5-6)  5.clindamycin IV (7/10)       Assessment: Rec / Plan:   Diverticular abscess-  --3.8cmX2.3cm    IF it can be tapped/drained, swell. Keep pushing the abx.   On CTX/Flagyl          · Encephalopathic ->post procedure LGF- agree with blood culture, monitor    On CTX/Flagyl IV at this time, medical treatment, abscess not drainable  -> switch to Zosyn to broaden GNR coverage after blood cultures    Guarded prognosis   Cefepime side-effect -- myoclonus.  Has baseline tremors    Diffuse Large B Cell lymphoma  Recent chemo (6/26)    Septic shock - see #1 above, better    Low BMI/nourishment        Microbiology:  7/8 - BCx (-)     7/5 - CDT  (+/-) with negative confirmatory molecular test     7/4 - Legionella/Spneumo Ag (-)     7/3 - RVP (-)      COVID-19 (-)      Flu (-)      UA (+) E coli (R amp/sulb)      BCx x2 (-)      Lines / Catheters: peripheral        Patient Active Problem List   Diagnosis Code    S/P total knee arthroplasty Z96.659    Hypokalemia E87.6    HTN (hypertension), benign I10    Neutropenic fever (HCC) D70.9, R50.81    Acute respiratory failure with hypoxia (HCC) J96.01    Hypomagnesemia E83.42    Diffuse large B cell lymphoma (HCC) C83.30    GERD (gastroesophageal reflux disease) K21.9    Thyroid disease E07.9    Acute encephalopathy G93.40    Hyponatremia E87.1    Pancytopenia (HCC) D61.818    UTI (urinary tract infection) N39.0    Sinusitis J32.9    Myopia of both eyes with astigmatism H52.13, H52.203    Pleural effusion J90    Cellulitis L03.90    Severe protein-calorie malnutrition (HCC) E43    Septic shock (HCC) A41.9, R65.21    Colonic diverticular abscess K57.20       Current Facility-Administered Medications   Medication Dose Route Frequency    enoxaparin (LOVENOX) injection 50 mg  1 mg/kg SubCUTAneous Q12H    cefTRIAXone (ROCEPHIN) 1 g in 0.9% sodium chloride (MBP/ADV) 50 mL MBP  1 g IntraVENous Q24H    metroNIDAZOLE (FLAGYL) IVPB premix 500 mg  500 mg IntraVENous Q8H    prednisoLONE acetate (PRED FORTE) 1 % ophthalmic suspension 1 Drop  1 Drop Both Eyes BID    fluticasone propionate (FLONASE) 50 mcg/actuation nasal spray 2 Spray  2 Spray Both Nostrils DAILY    digoxin (LANOXIN) injection 100 mcg  100 mcg IntraVENous DAILY    lidocaine-prilocaine (EMLA) 2.5-2.5 % cream   Topical PRN    nystatin (MYCOSTATIN) 100,000 unit/gram powder   Topical BID    pantoprazole (PROTONIX) tablet 40 mg  40 mg Oral ACB    furosemide (LASIX) injection 20 mg  20 mg IntraVENous Q12H    insulin lispro (HUMALOG) injection   SubCUTAneous AC&HS    levothyroxine (SYNTHROID) tablet 125 mcg  125 mcg Oral DAILY    amiodarone (CORDARONE) tablet 100 mg  100 mg Oral DAILY    ipratropium (ATROVENT) 0.02 % nebulizer solution 0.5 mg  0.5 mg Nebulization TID PRN    albuterol (PROVENTIL VENTOLIN) nebulizer solution 2.5 mg  2.5 mg Nebulization Q4H PRN    sodium chloride (NS) flush 5-40 mL  5-40 mL IntraVENous Q8H    sodium chloride (NS) flush 5-40 mL  5-40 mL IntraVENous PRN    acetaminophen (TYLENOL) tablet 650 mg  650 mg Oral Q6H PRN    Or    acetaminophen (TYLENOL) suppository 650 mg  650 mg Rectal Q6H PRN    [Held by provider] polyethylene glycol (MIRALAX) packet 17 g  17 g Oral DAILY PRN    ondansetron (ZOFRAN ODT) tablet 4 mg  4 mg Oral Q8H PRN    Or    ondansetron (ZOFRAN) injection 4 mg  4 mg IntraVENous Q6H PRN    glucose chewable tablet 16 g  4 Tablet Oral PRN    glucagon (GLUCAGEN) injection 1 mg  1 mg IntraMUSCular PRN    dextrose 10% infusion 0-250 mL  0-250 mL IntraVENous PRN         Review of Systems - General ROS: negative for - chills, fever or night sweats  Respiratory ROS: no cough, shortness of breath, or wheezing  Cardiovascular ROS: no chest pain or dyspnea on exertion  Gastrointestinal ROS: positive for - abdominal pain, appetite loss, diarrhea and gas/bloating  negative for - blood in stools       Objective:    Visit Vitals  BP (!) 145/87 (BP 1 Location: Left upper arm, BP Patient Position: Lying)   Pulse (!) 115   Temp 100.3 °F (37.9 °C)   Resp 20   Ht 5' 7\" (1.702 m)   Wt 53.5 kg (118 lb)   SpO2 90%   BMI 18.48 kg/m²       Temp (24hrs), Av.6 °F (37 °C), Min:97.3 °F (36.3 °C), Max:100.7 °F (38.2 °C) GEN: WD chronically ill looking on RA- stares to ceiling, counting numbers  HEENT: Unicteric. EOMI intact  CHEST: Non laboured breathing  CVS:RRR, no mur/gallop  ABD: Obese/soft. Non distended. ABS-- doesn't react to deep palpation  ANN: Deferred  EXT: No apparent swelling or redness on UE/LE joints. Skin: Dry and intact. No rash, no redness. CNS: A, OX3. Moves all extremity. CN grossly ok.          Lab results:    Chemistry  Recent Labs     07/13/22 0202 07/12/22 0210 07/11/22  0435   * 148* 188*    143 143   K 3.6 3.3* 3.1*    108 108   CO2 30 29 29   BUN 29* 23* 27*   CREA 0.48* 0.40* 0.40*   CA 10.0 9.9 9.8   AGAP 5 6 6   BUCR 60* 58* 68*   AP 56 52 51   TP 7.1 6.7 6.5   ALB 5.1* 5.1* 4.6   GLOB 2.0 1.6* 1.9*   AGRAT 2.6* 3.2* 2.4*       CBC w/ Diff  Recent Labs     07/13/22 0202 07/12/22 0210 07/11/22  0435   WBC 7.5 6.2 5.8   RBC 3.31* 3.07* 3.07*   HGB 9.5* 8.8* 8.8*   HCT 31.8* 29.0* 29.2*   * 402 349   GRANS 85* 58 74*   LYMPH 1* 14* 9*   EOS 1 4 3       Microbiology  All Micro Results     Procedure Component Value Units Date/Time    CULTURE, BLOOD [[id number]]     Order Status: Sent Specimen: Blood     CULTURE, BLOOD [[id number]]     Order Status: Sent Specimen: Blood     CULTURE, BLOOD [[id number]] Collected: 07/08/22 0443    Order Status: Completed Specimen: Blood Updated: 07/13/22 1346     Special Requests: NO SPECIAL REQUESTS        Culture result: NO GROWTH 5 DAYS       CULTURE, BLOOD [[id number]] Collected: 07/03/22 1645    Order Status: Completed Specimen: Blood Updated: 07/09/22 0720     Special Requests: NO SPECIAL REQUESTS        Culture result: NO GROWTH 6 DAYS       CULTURE, BLOOD [[id number]] Collected: 07/03/22 1650    Order Status: Completed Specimen: Blood Updated: 07/09/22 0720     Special Requests: NO SPECIAL REQUESTS        Culture result: NO GROWTH 6 DAYS       ENTERIC BACTERIA PANEL, DNA [[id number]] Collected: 07/05/22 1455    Order Status: Completed Specimen: Stool Updated: 07/06/22 2026     Shigella species, DNA Negative        Campylobacter species, DNA Negative        Vibrio species, DNA Negative        Enterotoxigen E Coli, DNA Negative        Shiga toxin producing, DNA Negative        Salmonella species, DNA Negative        P. shigelloides, DNA Negative        Y. enterocolitica, DNA Negative       CULTURE, URINE [[id number]]  (Abnormal)  (Susceptibility) Collected: 07/03/22 1700    Order Status: Completed Specimen: Urine from Clean catch Updated: 07/06/22 1315     Special Requests: NO SPECIAL REQUESTS        [place] Count --        >100,000  COLONIES/mL       Culture result: ESCHERICHIA COLI       C. DIFFICILE AG & TOXIN A/B [[id number]]  (Abnormal) Collected: 07/05/22 1455    Order Status: Completed Specimen: Miscellaneous sample Updated: 07/06/22 1133     C. difficile toxin Negative        PCR Reflex       See Reflex order for C. difficile (DNA)           INTERPRETATION       Indeterminate for Toxigenic C. difficile, DNA confirmation to follow. Nafisa Tapia DIFF MOLECULAR [[id number]] Collected: 07/05/22 1455    Order Status: Completed Specimen: Miscellaneous sample Updated: 07/06/22 1133     C Diff Molecular Negative        Comment: This specimen is negative for toxigenic C difficile by DNA amplification. Repeat testing is not recommended for confirmation, samples received within 7 days of this negative result will be rejected.        Erik Lynn, URINE [[id number]] Collected: 07/04/22 1247    Order Status: Completed Specimen: Urine, random Updated: 07/04/22 2030     Strep pneumo Ag, urine Negative       LEGIONELLA PNEUMOPHILA AG, URINE [[id number]] Collected: 07/04/22 1247    Order Status: Completed Specimen: Urine, random Updated: 07/04/22 2030     Legionella Ag, urine Negative       RESPIRATORY VIRUS PANEL W/COVID-19, PCR [[id number]] Collected: 07/03/22 2110    Order Status: Completed Specimen: Nasopharyngeal Updated: 07/04/22 1229     Adenovirus Not detected        Coronavirus 229E Not detected        Coronavirus HKU1 Not detected        Coronavirus CVNL63 Not detected        Coronavirus OC43 Not detected        SARS-CoV-2, PCR Not detected        Metapneumovirus Not detected        Rhinovirus and Enterovirus Not detected        Influenza A Not detected        Influenza B Not detected        Parainfluenza 1 Not detected        Parainfluenza 2 Not detected        Parainfluenza 3 Not detected        Parainfluenza virus 4 Not detected        RSV by PCR Not detected        B. parapertussis, PCR Not detected        Bordetella pertussis - PCR Not detected        Chlamydophila pneumoniae DNA, QL, PCR Not detected        Mycoplasma pneumoniae DNA, QL, PCR Not detected       C. DIFFICILE AG & TOXIN A/B [[id number]]     Order Status: Canceled Specimen: Stool     COVID-19 RAPID TEST [[id number]] Collected: 07/03/22 1715    Order Status: Completed Specimen: Nasopharyngeal Updated: 07/03/22 1839     Specimen source Nasopharyngeal        COVID-19 rapid test Not detected        Comment: Rapid Abbott ID Now       Rapid NAAT:  The specimen is NEGATIVE for SARS-CoV-2, the novel coronavirus associated with COVID-19. Negative results should be treated as presumptive and, if inconsistent with clinical signs and symptoms or necessary for patient management, should be tested with an alternative molecular assay. Negative results do not preclude SARS-CoV-2 infection and should not be used as the sole basis for patient management decisions. This test has been authorized by the FDA under an Emergency Use Authorization (EUA) for use by authorized laboratories.    Fact sheet for Healthcare Providers: ConventionUpdate.co.nz  Fact sheet for Patients: ConventionUpdate.co.nz       Methodology: Isothermal Nucleic Acid Amplification         INFLUENZA A & B AG (RAPID TEST) [[id number]] Collected: 07/03/22 1715    Order Status: Completed Specimen: Nasopharyngeal from Nasal washing Updated: 07/03/22 1744     Influenza A Antigen Negative        Comment: A negative result does not exclude influenza virus infection, seasonal or H1N1 due to suboptimal sensitivity. If influenza is circulating in your community, a diagnosis of influenza should be considered based on a patients clinical presentation and empiric antiviral treatment should be considered, if indicated.         Influenza B Antigen Negative

## 2022-07-13 NOTE — PROGRESS NOTES
Palliative Medicine    CODE STATUS: FULL CODE    AMD Status: none on file. Her , Toyin Lucia, is her legal next of kin     7/13/2022 0930 Seen today in room 333 along with Delfino Shirley NP. Lying in bed with head of bed elevated. Awake, alert. More engaged than during previous visits. Respirations unlabored with oxygen on at 4 lpm per NC. Medical team has decided to follow abdominal abscess via physical exam and imaging. Patient and  comfortable with this decision. Disposition plan: anticipate discharge to rehab or home with home health. Palliative care will continue to follow Abida Pham  and her family during her hospitalization and support them as they make healthcare decisions and define goals of care.       Ivania Holm RN, MSN  Palliative Medicine  P: [phone]

## 2022-07-13 NOTE — PROGRESS NOTES
Problem: Mobility Impaired (Adult and Pediatric)  Goal: *Acute Goals and Plan of Care (Insert Text)  Description: FUNCTIONAL STATUS PRIOR TO ADMISSION: Pt. Requires assistance for ADLs and Mod I with RW at baseline for short distances    [address] [facility]: The patient lives with  who provides assistance as well as care aids who provide bathing once per week. Physical Therapy Goals  Initiated 7/9/2022  1. Patient will move from supine to sit and sit to supine  in bed with minimal assistance/contact guard assist within 7 day(s). 2.  Patient will transfer from bed to chair and chair to bed with minimal assistance/contact guard assist using the least restrictive device within 7 day(s). 3.  Patient will perform sit to stand with minimal assistance/contact guard assist within 7 day(s). 4.  Patient will ambulate with minimal assistance/contact guard assist for 50ft feet with the least restrictive device within 7 day(s). Outcome: Progressing Towards Goal  physical Therapy TREATMENT    Patient: Ronni Alonso (2 y.o. female)  Date: 7/13/2022  Diagnosis: Neutropenic fever ([facility] [address] [D70.9, R50.81] Acute respiratory failure with hypoxia [facility])  Precautions: Fall   Chart, physical therapy assessment, plan of care and goals were reviewed. ASSESSMENT:  Pt found supine in bed. Seen with OT this date. Agreeable to participate once told that visitor waiting to give pt communion. C/o back pain at thoracentesis site \"severe\" on arrival an \"almost severe\" post session. Pt requires mod assist ow 1-2, vc/tc for completion of bed mobility. STS with RW/mod/max assist of 2 with vc/tc and encouragement to perform task. Standing tolerance limited to not >10sec. Poor tolerance and poor balance for same. Pt spouse present and encourages pt, though somewhat with hypervigilance. Pt returned to supine max/total assist and moved up in bed with total assist via [address] bed.   Note pt has frequent BM's and requires brief application before sessions. Rec SNF for continued PT at discharge. Progression toward goals:  []      Improving appropriately and progressing toward goals  [x]      Improving slowly and progressing toward goals  []      Not making progress toward goals and plan of care will be adjusted     PLAN:  Patient continues to benefit from skilled intervention to address the above impairments. Continue treatment per established plan of care. Discharge Recommendations:  Skilled Nursing Facility  Further Equipment Recommendations for Discharge:  N/A     SUBJECTIVE:   Patient c/o pain from \"injection in my back.     OBJECTIVE DATA SUMMARY:   Critical Behavior:  Neurologic State: Alert  Orientation Level: Disoriented to situation,Oriented to person,Oriented to place  Cognition: Appropriate for age attention/concentration,Impaired decision making,Poor safety awareness  Safety/Judgement: Fall prevention,Decreased awareness of environment  Functional Mobility Training:  Bed Mobility:  Rolling: Moderate assistance  Supine to Sit: Moderate assistance  Sit to Supine: Maximum assistance;Assist x2  Scooting: Maximum assistance; Total assistance (total A via [place] bed supine to scoot up)  Transfers:  Sit to Stand: Moderate assistance;Maximum assistance;Assist x2  Stand to Sit: Moderate assistance;Maximum assistance;Assist x2  Balance:  Sitting: Impaired; With support  Sitting - Static: Fair (occasional) (list R)  Sitting - Dynamic: Fair (occasional) (Fair -)  Standing: Impaired;Pull to stand; With support  Standing - Static: Poor  Standing - Dynamic : Not tested  Pain:  Pain Scale 1: Numeric (0 - 10)  Pain Intensity 1: 0  Activity Tolerance:   Fair: limited by pain and weakness  Please refer to the flowsheet for vital signs taken during this treatment.   After treatment:   [] Patient left in no apparent distress sitting up in chair  [x] Patient left in no apparent distress in bed  [x] Call bell left within reach  [x] Nursing notified  [x] Caregiver present  [] Bed alarm activated      Huan Guerra PT   Time Calculation: 34 mins

## 2022-07-13 NOTE — PROGRESS NOTES
Problem: Risk for Spread of Infection  Goal: Prevent transmission of infectious organism to others  Description: Prevent the transmission of infectious organisms to other patients, staff members, and visitors. Outcome: Progressing Towards Goal     Problem: Patient Education:  Go to Education Activity  Goal: Patient/Family Education  Outcome: Progressing Towards Goal     Problem: Pressure Injury - Risk of  Goal: *Prevention of pressure injury  Description: Document Ronald Scale and appropriate interventions in the flowsheet. Outcome: Progressing Towards Goal  Note: Pressure Injury Interventions:  Sensory Interventions: Minimize linen layers,Assess changes in LOC,Assess need for specialty bed,Keep linens dry and wrinkle-free,Pressure redistribution bed/mattress (bed type)    Moisture Interventions: Absorbent underpads,Apply protective barrier, creams and emollients,Check for incontinence Q2 hours and as needed    Activity Interventions: Assess need for specialty bed,Pressure redistribution bed/mattress(bed type),PT/OT evaluation    Mobility Interventions: Assess need for specialty bed,Float heels,HOB 30 degrees or less,Pressure redistribution bed/mattress (bed type),PT/OT evaluation    Nutrition Interventions: Document food/fluid/supplement intake    Friction and Shear Interventions: Apply protective barrier, creams and emollients,HOB 30 degrees or less,Minimize layers                Problem: Patient Education: Go to Patient Education Activity  Goal: Patient/Family Education  Outcome: Progressing Towards Goal     Problem: Falls - Risk of  Goal: *Absence of Falls  Description: Document Mic Fall Risk and appropriate interventions in the flowsheet.   Outcome: Progressing Towards Goal  Note: Fall Risk Interventions:  Mobility Interventions: Bed/chair exit alarm,PT Consult for mobility concerns,PT Consult for assist device competence    Mentation Interventions: Bed/chair exit alarm,Door open when patient unattended,More frequent rounding,Reorient patient,Room close to nurse's station,Toileting rounds    Medication Interventions: Bed/chair exit alarm,Patient to call before getting OOB,Teach patient to arise slowly    Elimination Interventions: Bed/chair exit alarm,Call light in reach,Patient to call for help with toileting needs              Problem: Patient Education: Go to Patient Education Activity  Goal: Patient/Family Education  Outcome: Progressing Towards Goal     Problem: Diabetes Self-Management  Goal: *Disease process and treatment process  Description: Define diabetes and identify own type of diabetes; list 3 options for treating diabetes. Outcome: Progressing Towards Goal  Goal: *Incorporating nutritional management into lifestyle  Description: Describe effect of type, amount and timing of food on blood glucose; list 3 methods for planning meals. Outcome: Progressing Towards Goal  Goal: *Incorporating physical activity into lifestyle  Description: State effect of exercise on blood glucose levels. Outcome: Progressing Towards Goal  Goal: *Developing strategies to promote health/change behavior  Description: Define the ABC's of diabetes; identify appropriate screenings, schedule and personal plan for screenings. Outcome: Progressing Towards Goal  Goal: *Using medications safely  Description: State effect of diabetes medications on diabetes; name diabetes medication taking, action and side effects. Outcome: Progressing Towards Goal  Goal: *Monitoring blood glucose, interpreting and using results  Description: Identify recommended blood glucose targets  and personal targets. Outcome: Progressing Towards Goal  Goal: *Prevention, detection, treatment of acute complications  Description: List symptoms of hyper- and hypoglycemia; describe how to treat low blood sugar and actions for lowering  high blood glucose level.   Outcome: Progressing Towards Goal  Goal: *Prevention, detection and treatment of chronic complications  Description: Define the natural course of diabetes and describe the relationship of blood glucose levels to long term complications of diabetes.   Outcome: Progressing Towards Goal  Goal: *Developing strategies to address psychosocial issues  Description: Describe feelings about living with diabetes; identify support needed and support network  Outcome: Progressing Towards Goal  Goal: *Insulin pump training  Outcome: Progressing Towards Goal  Goal: *Sick day guidelines  Outcome: Progressing Towards Goal  Goal: *Patient Specific Goal (EDIT GOAL, INSERT TEXT)  Outcome: Progressing Towards Goal     Problem: Patient Education: Go to Patient Education Activity  Goal: Patient/Family Education  Outcome: Progressing Towards Goal     Problem: Patient Education: Go to Patient Education Activity  Goal: Patient/Family Education  Outcome: Progressing Towards Goal     Problem: Patient Education: Go to Patient Education Activity  Goal: Patient/Family Education  Outcome: Progressing Towards Goal     Problem: Patient Education: Go to Patient Education Activity  Goal: Patient/Family Education  Outcome: Progressing Towards Goal

## 2022-07-13 NOTE — PROGRESS NOTES
D/C plan: acute rehab vs SNF pending medical progression and acceptance. CM met w/ pt and spouse at bedside. Confirmed that the plan put in place by previous CM continues to be the plan for d/c. Pt has been to [facility] before. The pt and her spouse would like her to d/c to one of those rehab facilities. Pt is open to both and they are reviewing the pt. CM to f/u. Care Management Interventions  PCP Verified by CM: Yes  Palliative Care Criteria Met (RRAT>21 & CHF Dx)?: No  Mode of Transport at Discharge: [facility]  Transition of Care Consult (CM Consult): Acute Rehab,SNF  Partner SNF: No  Reason Why Partner SNF Not Chosen: Friend/family recommendation  Discharge Durable Medical Equipment: No  Physical Therapy Consult: Yes  Occupational Therapy Consult: Yes  Speech Therapy Consult: Yes  Support Systems: Spouse/Significant Other,Child(wayne)  Confirm Follow Up Transport: Family  The Plan for Transition of Care is Related to the Following Treatment Goals : acute rehab vs SNF  The Patient and/or Patient Representative was Provided with a Choice of Provider and Agrees with the Discharge Plan?: Yes  Freedom of Choice List was Provided with Basic Dialogue that Supports the Patient's Individualized Plan of Care/Goals, Treatment Preferences and Shares the Quality Data Associated with the Providers? :  (Per previous CM; [address] )  Discharge Location  Patient Expects to be Discharged to[de-identified] Skilled nursing facility

## 2022-07-13 NOTE — CONSULTS
[id number] [facility]    Name:  Tiny Willard  MR#:   [id number]  :  1940  ACCOUNT #:  [de-identified]  DATE OF SERVICE:  07/10/2022    GENERAL SURGERY CONSULTATION    REASON FOR CONSULTATION:  Sigmoid diverticulitis with abscess. HISTORY OF PRESENT ILLNESS:  The patient is an 58-year-old female with a history of lymphoma, prior to this admission, receiving chemotherapy. She was admitted to the Hospitalist Service on  with pancytopenia, acute encephalopathy, acute respiratory failure, and neutropenic fever. Since that time, by notes, she has improved both clinically and by laboratory parameters. General Surgery consultation was requested on 07/10 after a CT done on , for reasons unknown, of the abdomen showed a relatively small fluid collection, likely associated with adjacent sigmoid diverticulitis deep in the pelvis. The patient, when I saw her in the evening of 07/10, was minimally responsive. Her  was at the bedside, was able to answer some of the questions but not all, so much of the subsequent history was obtained from the chart record. The patient said she is not having any abdominal pain. Bowel movements have been occasionally loose but otherwise normal.  No reported fevers. She denies any chest pain, shortness of breath, or other symptoms at this time. PAST MEDICAL HISTORY:  Lymphoma, hypertension, gastroesophageal reflux disease, arthritis, and thyroid disease. PAST SURGICAL HISTORY:  Appendectomy, laparoscopic cholecystectomy, some kind of unlisted throat/HEENT head and neck surgery, and right knee arthroscopy. CURRENT MEDICATIONS:  Please see med rec sheet. ALLERGIES:  LISTED TO ADHESIVE TAPE WHICH CAUSES LOCALIZED IRRIGATION AND PENICILLIN WHICH CAUSES ITCHING. SOCIAL HISTORY:  She does not smoke, drink alcohol, or use any illicit drugs. FAMILY HISTORY:  Noncontributory.     REVIEW OF SYSTEMS:  Not able to be obtained from the patient directly due to her overall clinical status. PHYSICAL EXAMINATION:  GENERAL:  She is well-developed, well-nourished, in no acute distress. VITAL SIGNS:  She has been afebrile for more than the last 48 hours; blood pressure is normal, not tachycardic; normal respirations; saturating between 92% and 96% on room air. HEENT:  Normocephalic and atraumatic. Pupils are equal, round, and reactive to light and accommodation. Unable to assess extraocular movements. Sclerae are anicteric bilaterally. NECK:  Supple. No JVD. CARDIOVASCULAR:  Regular rate and rhythm. LUNGS:  Clear to auscultation. Slightly decreased bilateral bases although exam is somewhat limited as the patient is unable to cooperate with physical exam somewhat. ABDOMEN:  Soft, nondistended, and nontender. Good bowel sounds in all four quadrants. Visible surgical scars are all well-healed without hernia, tenderness, or other clinical issue. RECTAL:  Deferred. EXTREMITIES:  No clubbing or cyanosis. Good capillary refill. No calf tenderness. ANCILLARY STUDIES:  White count at the time of my evaluation had improved to 4.5 from 3.1 the day prior, 2.6 the day prior to that; H and H essentially stable, values on 07/10 were 8.6 and 27.7; and platelets 396. Coags:  Last PT and INR recorded on 07/06 were essentially normal; INR 1.1, PT 15. BMP:  Normal apart from elevated glucose 154. BUN minimally elevated at 27, creatinine normal at 0.4. Albumin actually normal at 4.7. LFTs showed no abnormalities. RADIOLOGY:  CT of the abdomen and pelvis with contrast, the only indication for that was because of her history of large B-cell lymphoma, showed diverticulosis throughout the sigmoid colon with a rim-enhancing complex fluid collection containing air, I think from the diverticula in the distal sigmoid colon, very deep in the pelvis, into the rectovaginal space measuring 3.2 x 2.2 cm.     ASSESSMENT:  An 64-year-old female with:  1.  B-cell lymphoma. 2.  Pancytopenia, improving. 3.  Sigmoid diverticulitis with small associated perforation and contained abscess. PLAN:  No urgent surgical intervention is necessary at this time. The patient is obviously not an ideal candidate for surgery. We would recommend conservative/nonoperative management if at all possible. We would consult Interventional Radiology to see if they are able to access such a deep pelvic and relatively small abscess through guided measures alone. If they are able to, we would asked them to put a drainage catheter and obviously take culture so that antibiotic can be adjusted accordingly. If they are unable to place catheter, we would still continue with conservative management with p.o. as tolerated, continue antibiotics as already written for, with ID followup. As she has a clinically benign abdomen with other hematologic parameters improving, we would not plan for any surgical intervention at this time.   Consider repeat imaging if her clinical status deteriorates or if she develops clinically worsening signs on abdominal exam.      Slade Huff MD      [id number]  D:  07/12/2022 16:48  T:  07/12/2022 21:51  JOB #:  [id number]

## 2022-07-14 ENCOUNTER — APPOINTMENT (OUTPATIENT)
Dept: CT IMAGING | Age: 82
DRG: 871 | End: 2022-07-14
Attending: HOSPITALIST
Payer: MEDICARE

## 2022-07-14 PROBLEM — R53.81 DEBILITY: Status: ACTIVE | Noted: 2022-07-14

## 2022-07-14 PROBLEM — K57.20 ABSCESS OF SIGMOID COLON DUE TO DIVERTICULITIS: Status: ACTIVE | Noted: 2022-07-11

## 2022-07-14 LAB
ALBUMIN SERPL-MCNC: 4.7 G/DL (ref 3.4–5)
ALBUMIN/GLOB SERPL: 2.2 {RATIO} (ref 0.8–1.7)
ALP SERPL-CCNC: 67 U/L (ref 45–117)
ALT SERPL-CCNC: 11 U/L (ref 13–56)
ANION GAP SERPL CALC-SCNC: 6 MMOL/L (ref 3–18)
AST SERPL-CCNC: 12 U/L (ref 10–38)
BACTERIA SPEC CULT: NORMAL
BASOPHILS # BLD: 0 K/UL (ref 0–0.1)
BASOPHILS NFR BLD: 0 % (ref 0–2)
BILIRUB SERPL-MCNC: 0.4 MG/DL (ref 0.2–1)
BUN SERPL-MCNC: 41 MG/DL (ref 7–18)
BUN/CREAT SERPL: 53 (ref 12–20)
CALCIUM SERPL-MCNC: 9.8 MG/DL (ref 8.5–10.1)
CHLORIDE SERPL-SCNC: 104 MMOL/L (ref 100–111)
CO2 SERPL-SCNC: 30 MMOL/L (ref 21–32)
CREAT SERPL-MCNC: 0.78 MG/DL (ref 0.6–1.3)
DIFFERENTIAL METHOD BLD: ABNORMAL
EOSINOPHIL # BLD: 0.3 K/UL (ref 0–0.4)
EOSINOPHIL NFR BLD: 3 % (ref 0–5)
ERYTHROCYTE [DISTWIDTH] IN BLOOD BY AUTOMATED COUNT: 31.2 % (ref 11.6–14.5)
GLOBULIN SER CALC-MCNC: 2.1 G/DL (ref 2–4)
GLUCOSE BLD STRIP.AUTO-MCNC: 152 MG/DL (ref 70–110)
GLUCOSE BLD STRIP.AUTO-MCNC: 156 MG/DL (ref 70–110)
GLUCOSE BLD STRIP.AUTO-MCNC: 206 MG/DL (ref 70–110)
GLUCOSE BLD STRIP.AUTO-MCNC: 224 MG/DL (ref 70–110)
GLUCOSE SERPL-MCNC: 192 MG/DL (ref 74–99)
HCT VFR BLD AUTO: 34.6 % (ref 35–45)
HGB BLD-MCNC: 10.5 G/DL (ref 12–16)
IMM GRANULOCYTES # BLD AUTO: 0 K/UL
IMM GRANULOCYTES NFR BLD AUTO: 0 %
LYMPHOCYTES # BLD: 0.4 K/UL (ref 0.9–3.6)
LYMPHOCYTES NFR BLD: 4 % (ref 21–52)
MAGNESIUM SERPL-MCNC: 2.1 MG/DL (ref 1.6–2.6)
MCH RBC QN AUTO: 29.2 PG (ref 24–34)
MCHC RBC AUTO-ENTMCNC: 30.3 G/DL (ref 31–37)
MCV RBC AUTO: 96.4 FL (ref 78–100)
METAMYELOCYTES NFR BLD MANUAL: 2 %
MONOCYTES # BLD: 0.5 K/UL (ref 0.05–1.2)
MONOCYTES NFR BLD: 5 % (ref 3–10)
NEUTS BAND NFR BLD MANUAL: 3 % (ref 0–5)
NEUTS SEG # BLD: 9 K/UL (ref 1.8–8)
NEUTS SEG NFR BLD: 83 % (ref 40–73)
NRBC # BLD: 0.02 K/UL (ref 0–0.01)
NRBC BLD-RTO: 0.2 PER 100 WBC
PHOSPHATE SERPL-MCNC: 2.7 MG/DL (ref 2.5–4.9)
PLATELET # BLD AUTO: 455 K/UL (ref 135–420)
PLATELET COMMENTS,PCOM: ABNORMAL
PMV BLD AUTO: 9.6 FL (ref 9.2–11.8)
POTASSIUM SERPL-SCNC: 3.3 MMOL/L (ref 3.5–5.5)
PROT SERPL-MCNC: 6.8 G/DL (ref 6.4–8.2)
RBC # BLD AUTO: 3.59 M/UL (ref 4.2–5.3)
RBC MORPH BLD: ABNORMAL
SERVICE CMNT-IMP: NORMAL
SODIUM SERPL-SCNC: 140 MMOL/L (ref 136–145)
WBC # BLD AUTO: 10.5 K/UL (ref 4.6–13.2)

## 2022-07-14 PROCEDURE — 82962 GLUCOSE BLOOD TEST: CPT

## 2022-07-14 PROCEDURE — 74011250636 HC RX REV CODE- 250/636: Performed by: INTERNAL MEDICINE

## 2022-07-14 PROCEDURE — 83735 ASSAY OF MAGNESIUM: CPT

## 2022-07-14 PROCEDURE — 74011250637 HC RX REV CODE- 250/637: Performed by: HOSPITALIST

## 2022-07-14 PROCEDURE — 84100 ASSAY OF PHOSPHORUS: CPT

## 2022-07-14 PROCEDURE — 97530 THERAPEUTIC ACTIVITIES: CPT

## 2022-07-14 PROCEDURE — 92526 ORAL FUNCTION THERAPY: CPT

## 2022-07-14 PROCEDURE — 74011250637 HC RX REV CODE- 250/637: Performed by: INTERNAL MEDICINE

## 2022-07-14 PROCEDURE — 71260 CT THORAX DX C+: CPT

## 2022-07-14 PROCEDURE — 74011000636 HC RX REV CODE- 636: Performed by: HOSPITALIST

## 2022-07-14 PROCEDURE — 36415 COLL VENOUS BLD VENIPUNCTURE: CPT

## 2022-07-14 PROCEDURE — 74011000258 HC RX REV CODE- 258: Performed by: INTERNAL MEDICINE

## 2022-07-14 PROCEDURE — 97116 GAIT TRAINING THERAPY: CPT

## 2022-07-14 PROCEDURE — 74011000250 HC RX REV CODE- 250: Performed by: HOSPITALIST

## 2022-07-14 PROCEDURE — 97110 THERAPEUTIC EXERCISES: CPT

## 2022-07-14 PROCEDURE — 74011250636 HC RX REV CODE- 250/636: Performed by: HOSPITALIST

## 2022-07-14 PROCEDURE — 85025 COMPLETE CBC W/AUTO DIFF WBC: CPT

## 2022-07-14 PROCEDURE — 74011250636 HC RX REV CODE- 250/636: Performed by: FAMILY MEDICINE

## 2022-07-14 PROCEDURE — 74011636637 HC RX REV CODE- 636/637: Performed by: HOSPITALIST

## 2022-07-14 PROCEDURE — 99232 SBSQ HOSP IP/OBS MODERATE 35: CPT | Performed by: NURSE PRACTITIONER

## 2022-07-14 PROCEDURE — 65270000046 HC RM TELEMETRY

## 2022-07-14 PROCEDURE — 80053 COMPREHEN METABOLIC PANEL: CPT

## 2022-07-14 RX ORDER — POTASSIUM CHLORIDE 20 MEQ/1
40 TABLET, EXTENDED RELEASE ORAL
Status: COMPLETED | OUTPATIENT
Start: 2022-07-14 | End: 2022-07-14

## 2022-07-14 RX ADMIN — PIPERACILLIN AND TAZOBACTAM 3.38 G: 3; .375 INJECTION, POWDER, LYOPHILIZED, FOR SOLUTION INTRAVENOUS at 04:28

## 2022-07-14 RX ADMIN — AMIODARONE HYDROCHLORIDE 100 MG: 200 TABLET ORAL at 12:01

## 2022-07-14 RX ADMIN — PREDNISOLONE ACETATE 1 DROP: 10 SUSPENSION/ DROPS OPHTHALMIC at 20:08

## 2022-07-14 RX ADMIN — SODIUM CHLORIDE, PRESERVATIVE FREE 10 ML: 5 INJECTION INTRAVENOUS at 16:50

## 2022-07-14 RX ADMIN — IOPAMIDOL 100 ML: 612 INJECTION, SOLUTION INTRAVENOUS at 11:08

## 2022-07-14 RX ADMIN — Medication 6 UNITS: at 16:50

## 2022-07-14 RX ADMIN — PANTOPRAZOLE SODIUM 40 MG: 40 TABLET, DELAYED RELEASE ORAL at 08:51

## 2022-07-14 RX ADMIN — POTASSIUM CHLORIDE 40 MEQ: 1500 TABLET, EXTENDED RELEASE ORAL at 09:10

## 2022-07-14 RX ADMIN — ENOXAPARIN SODIUM 50 MG: 100 INJECTION SUBCUTANEOUS at 09:00

## 2022-07-14 RX ADMIN — PIPERACILLIN AND TAZOBACTAM 3.38 G: 3; .375 INJECTION, POWDER, LYOPHILIZED, FOR SOLUTION INTRAVENOUS at 20:07

## 2022-07-14 RX ADMIN — SODIUM CHLORIDE, PRESERVATIVE FREE 10 ML: 5 INJECTION INTRAVENOUS at 20:14

## 2022-07-14 RX ADMIN — PIPERACILLIN AND TAZOBACTAM 3.38 G: 3; .375 INJECTION, POWDER, LYOPHILIZED, FOR SOLUTION INTRAVENOUS at 08:51

## 2022-07-14 RX ADMIN — PREDNISOLONE ACETATE 1 DROP: 10 SUSPENSION/ DROPS OPHTHALMIC at 08:51

## 2022-07-14 RX ADMIN — ENOXAPARIN SODIUM 50 MG: 100 INJECTION SUBCUTANEOUS at 22:13

## 2022-07-14 RX ADMIN — PIPERACILLIN AND TAZOBACTAM 3.38 G: 3; .375 INJECTION, POWDER, LYOPHILIZED, FOR SOLUTION INTRAVENOUS at 16:50

## 2022-07-14 RX ADMIN — FLUTICASONE PROPIONATE 2 SPRAY: 50 SPRAY, METERED NASAL at 08:52

## 2022-07-14 RX ADMIN — FUROSEMIDE 20 MG: 10 INJECTION, SOLUTION INTRAMUSCULAR; INTRAVENOUS at 08:51

## 2022-07-14 RX ADMIN — Medication 3 UNITS: at 22:14

## 2022-07-14 RX ADMIN — Medication 6 UNITS: at 12:01

## 2022-07-14 RX ADMIN — NYSTATIN: 100000 POWDER TOPICAL at 20:09

## 2022-07-14 RX ADMIN — FUROSEMIDE 20 MG: 10 INJECTION, SOLUTION INTRAMUSCULAR; INTRAVENOUS at 20:07

## 2022-07-14 RX ADMIN — Medication 3 UNITS: at 08:50

## 2022-07-14 RX ADMIN — LEVOTHYROXINE SODIUM 125 MCG: 0.1 TABLET ORAL at 06:43

## 2022-07-14 RX ADMIN — NYSTATIN: 100000 POWDER TOPICAL at 08:53

## 2022-07-14 RX ADMIN — DIGOXIN 100 MCG: 250 INJECTION, SOLUTION INTRAMUSCULAR; INTRAVENOUS; PARENTERAL at 08:51

## 2022-07-14 NOTE — PROGRESS NOTES
Problem: Risk for Spread of Infection  Goal: Prevent transmission of infectious organism to others  Description: Prevent the transmission of infectious organisms to other patients, staff members, and visitors. Outcome: Resolved/Met     Problem: Patient Education:  Go to Education Activity  Goal: Patient/Family Education  Outcome: Resolved/Met     Problem: Pressure Injury - Risk of  Goal: *Prevention of pressure injury  Description: Document Ronald Scale and appropriate interventions in the flowsheet. Outcome: Progressing Towards Goal  Note: Pressure Injury Interventions:  Sensory Interventions: Assess changes in LOC    Moisture Interventions: Absorbent underpads    Activity Interventions: Increase time out of bed    Mobility Interventions: Float heels    Nutrition Interventions: Document food/fluid/supplement intake,Offer support with meals,snacks and hydration    Friction and Shear Interventions: Apply protective barrier, creams and emollients,HOB 30 degrees or less                Problem: Patient Education: Go to Patient Education Activity  Goal: Patient/Family Education  Outcome: Progressing Towards Goal     Problem: Falls - Risk of  Goal: *Absence of Falls  Description: Document Mic Fall Risk and appropriate interventions in the flowsheet. Outcome: Progressing Towards Goal  Note: Fall Risk Interventions:  Mobility Interventions: Assess mobility with egress test    Mentation Interventions: Adequate sleep, hydration, pain control    Medication Interventions: Bed/chair exit alarm    Elimination Interventions: Toileting schedule/hourly rounds              Problem: Diabetes Self-Management  Goal: *Disease process and treatment process  Description: Define diabetes and identify own type of diabetes; list 3 options for treating diabetes.   Outcome: Progressing Towards Goal  Goal: *Incorporating nutritional management into lifestyle  Description: Describe effect of type, amount and timing of food on blood glucose; list 3 methods for planning meals. Outcome: Progressing Towards Goal  Goal: *Incorporating physical activity into lifestyle  Description: State effect of exercise on blood glucose levels. Outcome: Progressing Towards Goal  Goal: *Developing strategies to promote health/change behavior  Description: Define the ABC's of diabetes; identify appropriate screenings, schedule and personal plan for screenings. Outcome: Progressing Towards Goal  Goal: *Using medications safely  Description: State effect of diabetes medications on diabetes; name diabetes medication taking, action and side effects. Outcome: Progressing Towards Goal  Goal: *Monitoring blood glucose, interpreting and using results  Description: Identify recommended blood glucose targets  and personal targets. Outcome: Progressing Towards Goal  Goal: *Prevention, detection, treatment of acute complications  Description: List symptoms of hyper- and hypoglycemia; describe how to treat low blood sugar and actions for lowering  high blood glucose level. Outcome: Progressing Towards Goal  Goal: *Prevention, detection and treatment of chronic complications  Description: Define the natural course of diabetes and describe the relationship of blood glucose levels to long term complications of diabetes.   Outcome: Progressing Towards Goal  Goal: *Developing strategies to address psychosocial issues  Description: Describe feelings about living with diabetes; identify support needed and support network  Outcome: Progressing Towards Goal

## 2022-07-14 NOTE — PROGRESS NOTES
Comprehensive Nutrition Assessment    Type and Reason for Visit: Reassess    Nutrition Recommendations/Plan:   1. Continue with oral diet. Continue with ensure enlive to help meet needs. 2. Monitor intake of meals and supplement. Malnutrition Assessment:  Malnutrition Status:  Severe malnutrition (07/05/22 1053)    Context:  Acute illness     Findings of the 6 clinical characteristics of malnutrition:   Energy Intake:  50% or less of est energy requirements for 5 or more days  Weight Loss:  Greater than 5% over 1 month     Body Fat Loss: Moderate body fat loss, Orbital   Muscle Mass Loss: Moderate muscle mass loss, Temples (temporalis),Hand (interosseous)    Nutrition Assessment:    Admitted with Acute respiratory failure with hypoxia (improved), pleural effusions, neutropenia fever. Pt has MBS 7/13 - regular diet with thin liquids. Ensure enlive remains ordered. Pt was asleep- daughter at bedside. Per daughter, pt had breakfast this AM (~25% of omelette, applesauce, ensure). Daughter was unsure of intake prior to this AM as  is usually with pt. Daughter with no questions or concerns. Will continue with Ensure enlive to meet nutritional needs. Nutrition Related Findings:    K 3.3. Protonix, synthroid, humalog, lasix. BM 7/13. Intake 51-75% x2 (7/13/22). Wound Type: None    Current Nutrition Intake & Therapies:  Average Meal Intake: 26-50%  Average Supplement Intake: Unable to assess  ADULT ORAL NUTRITION SUPPLEMENT Lunch, Dinner, Breakfast; Standard High Calorie/High Protein  ADULT DIET Regular; Low Fat/Low Chol/High Fiber/DUSTIN    Anthropometric Measures:  Height: 5' 7\" (170.2 cm)  Ideal Body Weight (IBW): 135 lbs (61 kg)  Admission Body Weight: 118 lb  Current Body Wt:  53.5 kg (118 lb), 87.4 % IBW.  Bed scale  Current BMI (kg/m2): 18.5  Usual Body Weight: 75.3 kg (166 lb) (4/8/2022)  % Weight Change (Calculated): -28.9  Weight Adjustment: No adjustment                 BMI Category: Underweight (BMI less than 22) age over 72    Estimated Daily Nutrient Needs:  Energy Requirements Based On: Kcal/kg  Weight Used for Energy Requirements: Current  Energy (kcal/day): 7202-0432  Weight Used for Protein Requirements: Current  Protein (g/day): 54-80  Method Used for Fluid Requirements: 1 ml/kcal  Fluid (ml/day): 4245-5197    Nutrition Diagnosis:   · Severe malnutrition related to inadequate protein-energy intake as evidenced by weight loss greater than or equal to 5% in 1 month,moderate loss of subcutaneous fat,moderate muscle loss,poor intake prior to admission      Nutrition Interventions:   Food and/or Nutrient Delivery: Continue current diet,Continue oral nutrition supplement  Nutrition Education/Counseling: No recommendations at this time  Coordination of Nutrition Care: Continue to monitor while inpatient       Goals:  Previous Goal Met: Progressing toward goal(s)  Goals: PO intake 75% or greater,by next RD assessment       Nutrition Monitoring and Evaluation:   Behavioral-Environmental Outcomes: None identified  Food/Nutrient Intake Outcomes: Food and nutrient intake,Supplement intake  Physical Signs/Symptoms Outcomes: Biochemical data,Meal time behavior,Nutrition focused physical findings,Skin,Weight    Discharge Planning:    Continue current diet,Continue oral nutrition supplement    Low Peoples RD

## 2022-07-14 NOTE — PROGRESS NOTES
Problem: Risk for Spread of Infection  Goal: Prevent transmission of infectious organism to others  Description: Prevent the transmission of infectious organisms to other patients, staff members, and visitors. Outcome: Progressing Towards Goal     Problem: Patient Education:  Go to Education Activity  Goal: Patient/Family Education  Outcome: Progressing Towards Goal     Problem: Pressure Injury - Risk of  Goal: *Prevention of pressure injury  Description: Document Ronald Scale and appropriate interventions in the flowsheet. Outcome: Progressing Towards Goal  Note: Pressure Injury Interventions:  Sensory Interventions: Assess changes in LOC,Avoid rigorous massage over bony prominences,Check visual cues for pain,Discuss PT/OT consult with provider,Float heels,Keep linens dry and wrinkle-free,Minimize linen layers,Maintain/enhance activity level,Monitor skin under medical devices,Pad between skin to skin,Pressure redistribution bed/mattress (bed type),Turn and reposition approx. every two hours (pillows and wedges if needed)    Moisture Interventions: Absorbent underpads,Apply protective barrier, creams and emollients,Check for incontinence Q2 hours and as needed,Internal/External urinary devices,Limit adult briefs,Maintain skin hydration (lotion/cream),Minimize layers,Moisture barrier    Activity Interventions: Increase time out of bed,Pressure redistribution bed/mattress(bed type),PT/OT evaluation    Mobility Interventions: Float heels,HOB 30 degrees or less,Pressure redistribution bed/mattress (bed type),PT/OT evaluation,Turn and reposition approx.  every two hours(pillow and wedges)    Nutrition Interventions: Document food/fluid/supplement intake,Offer support with meals,snacks and hydration    Friction and Shear Interventions: Apply protective barrier, creams and emollients,Foam dressings/transparent film/skin sealants,HOB 30 degrees or less,Lift sheet,Lift team/patient mobility team,Minimize layers,Transferring/repositioning devices                Problem: Patient Education: Go to Patient Education Activity  Goal: Patient/Family Education  Outcome: Progressing Towards Goal     Problem: Falls - Risk of  Goal: *Absence of Falls  Description: Document Alaina Causey Fall Risk and appropriate interventions in the flowsheet.   Outcome: Progressing Towards Goal  Note: Fall Risk Interventions:  Mobility Interventions: Assess mobility with egress test,Bed/chair exit alarm,Communicate number of staff needed for ambulation/transfer,OT consult for ADLs,Patient to call before getting OOB,PT Consult for mobility concerns,PT Consult for assist device competence,Strengthening exercises (ROM-active/passive),Utilize walker, cane, or other assistive device    Mentation Interventions: Adequate sleep, hydration, pain control,Bed/chair exit alarm,Door open when patient unattended,Evaluate medications/consider consulting pharmacy,Increase mobility,Reorient patient,Room close to nurse's station,Toileting rounds,Update white board    Medication Interventions: Bed/chair exit alarm,Evaluate medications/consider consulting pharmacy,Patient to call before getting OOB,Teach patient to arise slowly    Elimination Interventions: Bed/chair exit alarm,Call light in reach,Patient to call for help with toileting needs,Stay With Me (per policy),Toilet paper/wipes in reach,Toileting schedule/hourly rounds              Problem: Patient Education: Go to Patient Education Activity  Goal: Patient/Family Education  Outcome: Progressing Towards Goal

## 2022-07-14 NOTE — PROGRESS NOTES
Pharmacy Dosing Services: Zosyn    Pharmacist Renal Dosing Progress Note for    Physician Dr. Vladimir Langston    The following medication: Zosyn was automatically dose-adjusted per THE [facility] P&T Committee Protocol, with respect to renal function. Consult provided for this   78 y.o. , female , for the indication of Intra-Abdominal.    Pt Weight:   Wt Readings from Last 1 Encounters:   07/05/22 53.5 kg (118 lb)         Previous Regimen Zosyn 3.375g q8h   Serum Creatinine Lab Results   Component Value Date/Time    Creatinine 0.48 (L) 07/13/2022 02:02 AM       Creatinine Clearance Estimated Creatinine Clearance: 51.4 mL/min (A) (by C-G formula based on SCr of 0.48 mg/dL (L)). BUN Lab Results   Component Value Date/Time    BUN 29 (H) 07/13/2022 02:02 AM       Dosage changed to:  Zosyn 3.375g q6h    Pharmacy to continue to monitor patient daily. Will make dosage adjustments based upon changing renal function. Candice Sanchez Dr PharmD.  Contact information:  [phone]

## 2022-07-14 NOTE — PROGRESS NOTES
Bedside and Verbal shift change report given to SHAWN Scott RN (oncoming nurse) by FLOYD Andrade RN (offgoing nurse). Report included the following information SBAR, Kardex, Intake/Output, MAR and Recent Results.

## 2022-07-14 NOTE — PROGRESS NOTES
Hematology / Oncology Progress Note    Impression:   Principal Problem:    Acute respiratory failure with hypoxia ([address] (7/3/2022)    Active Problems:    Hypokalemia (9/21/2012)      Neutropenic fever ([address] (7/3/2022)      Hypomagnesemia (7/3/2022)      Diffuse large B cell lymphoma (HCC) (7/3/2022)      GERD (gastroesophageal reflux disease) ()      Thyroid disease ()      Acute encephalopathy ()      Hyponatremia (7/3/2022)      Pancytopenia ([address] (7/3/2022)      UTI (urinary tract infection) (7/3/2022)      Sinusitis (7/3/2022)      Myopia of both eyes with astigmatism (7/3/2022)      Pleural effusion (7/3/2022)      Cellulitis (7/3/2022)      Severe protein-calorie malnutrition ([address] (7/3/2022)      Septic shock ([address] (7/4/2022)      Colonic diverticular abscess (7/11/2022)        Sepsis/Fluid Collection: Appreciate GenSurg and IR input. No plan for surgical intervention given poor ECOG/small abscess and location of abscess makes it difficult for drainage catheter placement. Vitals within normal. WBC normal. Plan for continued medical management. Followed by ID. Broadened to Zosyn    Pleural Effusion: CXR 7/12/22 with ongoing L Moderate pleural effusion. S/p L Thoracentesis on 7/13 with 1.4L removed. Will follow up cytology. Diffuse Large B Cell Lymphoma -  Completed chemotherapy with R-CHOP on 6/28/22. CT Chest on 7/3/22 and CT Abdomen/Pelvis on 7/11/22 with no lymphadenopathy, suggestive of possible cure of her lymphoma. This is not her problem at this time. Will confirm this with PET/CT as outpatient, but overall prognosis from lymphoma standpoint is good. Anemia - dt/ chemotherapy. Hg 10.5, improving    Hx of AFib    Weakness - dt/ chemotherapy + illness.  PT/OT with rec for SNF    Plan:     Continued medical management for abscess  Encouraged PT/OT  Further care per primary team  PET/CT as outpatient  Will continue to follow    Mitchell Vital MD  [facility]      Subjective: No acute overnight events  S/p L thoracentesis yesterday with 1.4L of fluid removed  Notes improved breathing    Hospital Medications:     Current Facility-Administered Medications   Medication Dose Route Frequency Provider Last Rate Last Admin    piperacillin-tazobactam (ZOSYN) 3.375 g in 0.9% sodium chloride (MBP/ADV) 100 mL MBP  3.375 g IntraVENous Q6H Cathy Cervantes  mL/hr at 07/14/22 0428 3.375 g at 07/14/22 0428    enoxaparin (LOVENOX) injection 50 mg  1 mg/kg SubCUTAneous Q12H Gini Severe, MD   50 mg at 07/13/22 2054    prednisoLONE acetate (PRED FORTE) 1 % ophthalmic suspension 1 Drop  1 Drop Both Eyes BID Harman Bolaños MD   1 Drop at 07/13/22 2147    fluticasone propionate (FLONASE) 50 mcg/actuation nasal spray 2 Spray  2 Spray Both Nostrils DAILY Harman Bolaños MD   2 Eva at 07/13/22 0919    digoxin (LANOXIN) injection 100 mcg  100 mcg IntraVENous DAILY Estefani VILLELA MD   100 mcg at 07/13/22 0900    lidocaine-prilocaine (EMLA) 2.5-2.5 % cream   Topical PRN Becky Fragoso MD        nystatin (MYCOSTATIN) 100,000 unit/gram powder   Topical BID Marietta Bhakta MD   Given at 07/13/22 2147    pantoprazole (PROTONIX) tablet 40 mg  40 mg Oral ACB Gini Severe, MD   40 mg at 07/13/22 0919    furosemide (LASIX) injection 20 mg  20 mg IntraVENous Q12H Becky Fragoso MD   20 mg at 07/13/22 2054    insulin lispro (HUMALOG) injection   SubCUTAneous AC&HS Gini Severe, MD   9 Units at 07/13/22 2147    levothyroxine (SYNTHROID) tablet 125 mcg  125 mcg Oral DAILY Olivia Jordan MD   125 mcg at 07/13/22 0514    amiodarone (CORDARONE) tablet 100 mg  100 mg Oral DAILY Olivia Jordan MD   100 mg at 07/13/22 1309    ipratropium (ATROVENT) 0.02 % nebulizer solution 0.5 mg  0.5 mg Nebulization TID PRN Olivia Jordan MD   0.5 mg at 07/07/22 2021    albuterol (PROVENTIL VENTOLIN) nebulizer solution 2.5 mg  2.5 mg Nebulization Q4H PRN Van Escobar MD   2.5 mg at 07/10/22 1504    sodium chloride (NS) flush 5-40 mL  5-40 mL IntraVENous Q8H Safia Jacobo MD   10 mL at 22    sodium chloride (NS) flush 5-40 mL  5-40 mL IntraVENous PRN Safia Jacobo MD        acetaminophen (TYLENOL) tablet 650 mg  650 mg Oral Q6H PRN Safia Jacobo MD   650 mg at 22    Or    acetaminophen (TYLENOL) suppository 650 mg  650 mg Rectal Q6H PRN Safia Jacobo MD        [Held by provider] polyethylene glycol (MIRALAX) packet 17 g  17 g Oral DAILY PRN Safia Jacobo MD        ondansetron (ZOFRAN ODT) tablet 4 mg  4 mg Oral Q8H PRN Safia Jacobo MD        Or    ondansetron [facility] injection 4 mg  4 mg IntraVENous Q6H PRN Safia Jacobo MD   4 mg at 22 1052    glucose chewable tablet 16 g  4 Tablet Oral PRN Safia Jacobo MD        glucagon [facility]) injection 1 mg  1 mg IntraMUSCular PRN Safia Jacobo MD        dextrose 10% infusion 0-250 mL  0-250 mL IntraVENous PRN Safia Jacobo MD           Review of Systems:   Constitutional:  Limited ROS, but denies fever or pain.       Visit Vitals  BP (!) 107/59   Pulse 86   Temp 97.8 °F (36.6 °C)   Resp 18   Ht 5' 7\" (1.702 m)   Wt 53.5 kg (118 lb)   SpO2 98%   BMI 18.48 kg/m²       Temp (24hrs), Av °F (37.2 °C), Min:97.7 °F (36.5 °C), Max:100.7 °F (38.2 °C)      General: no acute distress and laying in bed, comfortable  HEENT: no icterus, no oral lesions  Lym: no cervical or supraclavicular adenopathy  CV: rate is regular and normal and their is no murmur  Lung: clear to auscultation, no increased work of breathing  Abd: nontender, no organomegaly  Neuro: cnoversant and moving extremities  MSK: no sarcopenia, normal tone  Derm: no rash  Psych: normal affect  Per: warm, no edema    Labs:  Recent Labs     22  0025 0722   WBC 10.5 12.0 7.5   RBC 3.59* 3.65* 3.31*   HCT 34.6* 34.2* 31.8*   MCV 96.4 93.7 96.1   MCH 29.2 29.0 28.7   MCHC 30.3* 31.0 29.9*   RDW 31.2* 30.8* 31.1*       Recent Labs     22  0025 22  020   CO2 30 29 30   BUN 41* 36* 29*       No results for input(s): ALBUMIN in the last 72 hours.     No lab exists for component: [facility], [facility]

## 2022-07-14 NOTE — DIABETES MGMT
Diabetes/ Glycemic Control Plan of Care  Recommendations:   Carbohydrate controlled diet - 5 choices - most liberal carb restriction selected given intake history and nutrition status  Modify ONS to Glucerna      Assessment: Discussed patient with RN and RD. BG remains above target and ranging 156-279 mg/dl over the last 24 hours. Patient without great appetite, 26-50%. Patient may benefit from diet adjustment to minimize hyperglycemia. Recent Glucose Results:   Lab Results   Component Value Date/Time     (H) 07/14/2022 12:25 AM     (H) 07/13/2022 08:30 PM    GLUCPOC 206 (H) 07/14/2022 11:45 AM    GLUCPOC 156 (H) 07/14/2022 07:02 AM    GLUCPOC 279 (H) 07/13/2022 09:21 PM           BG within target range (non-ICU: <180; -180):  [] Yes    [x] No   Current insulin orders: corrective Humalog  Total Daily Dose previous 24 hours = 21 units     Plan/Goals:   Blood glucose will be within target of 70 - 180 mg/dl within 72 hours  Reinforce dietary and medication compliance at home.        Education:  [] Refer to Diabetes Education Record                       [x] Education not indicated at this time     Rashmi Thompson RD  Glycemic Control Team  [phone]    Monday-Friday   9 am - 3 pm

## 2022-07-14 NOTE — PROGRESS NOTES
Bedside and Verbal shift change report given to [address] [facility] Alex Neff (oncoming nurse) by Deena Pendleton RN (offgoing nurse). Report included the following information SBAR, Kardex, MAR, Recent Results and Cardiac Rhythm .

## 2022-07-14 NOTE — PROGRESS NOTES
Hospitalist Progress Note-critical care note     Patient: Noris Al MRN: [id number]  [facility]: [id number]    YOB: 1940  Age: 78 y.o. Sex: female    DOA: 7/3/2022 LOS:  LOS: 11 days            Chief complaint: septic shock. Neutropenic fever, lymphoma, gerd , dm    Assessment/Plan         Hospital Problems  Date Reviewed: 9/21/2012          Codes Class Noted POA    Colonic diverticular abscess ICD-10-CM: K57.20  ICD-9-CM: 562.11  7/11/2022 Unknown        Septic shock ([facility] [address] ICD-10-CM: A41.9, R65.21  ICD-9-CM: 038.9, 785.52, 995.92  7/4/2022 Yes        Neutropenic fever ([facility] [address] ICD-10-CM: D70.9, R50.81  ICD-9-CM: 288.00, 780.61  7/3/2022 Yes        * (Principal) Acute respiratory failure with hypoxia (HCC) ICD-10-CM: J96.01  ICD-9-CM: 518.81  7/3/2022 Yes        Hypomagnesemia ICD-10-CM: E83.42  ICD-9-CM: 275.2  7/3/2022 Yes        Diffuse large B cell lymphoma (HCC) ICD-10-CM: C83.30  ICD-9-CM: 202.80  7/3/2022 Yes        GERD (gastroesophageal reflux disease) ICD-10-CM: K21.9  ICD-9-CM: 530.81  Unknown Yes        Thyroid disease ICD-10-CM: E07.9  ICD-9-CM: 246. 9  Unknown Unknown        Acute encephalopathy ICD-10-CM: G93.40  ICD-9-CM: 348.30  Unknown Yes        Hyponatremia ICD-10-CM: E87.1  ICD-9-CM: 276.1  7/3/2022 Yes        Pancytopenia ([facility] [address] ICD-10-CM: K87.229  ICD-9-CM: 284.19  7/3/2022 Yes        UTI (urinary tract infection) ICD-10-CM: N39.0  ICD-9-CM: 599.0  7/3/2022 Yes        Sinusitis ICD-10-CM: J32.9  ICD-9-CM: 473.9  7/3/2022 Unknown        Myopia of both eyes with astigmatism ICD-10-CM: H52.13, H52.203  ICD-9-CM: 367.1, 367.20  7/3/2022 Unknown        Pleural effusion ICD-10-CM: J90  ICD-9-CM: 511.9  7/3/2022 Unknown        Cellulitis ICD-10-CM: L03.90  ICD-9-CM: 682.9  7/3/2022 Yes        Severe protein-calorie malnutrition ([facility] [address] ICD-10-CM: E43  ICD-9-CM: 660  7/3/2022 Yes        Hypokalemia ICD-10-CM: E87.6  ICD-9-CM: 276.8  9/21/2012 Yes              Pt was admitted for respiratory failure and neutropenic fever     Acute respiratory failure with hypoxia with 6 L oxygen on admission  Improving down to 4 L   Flu negative, rapid covid 19 negative -confirmed per pcr   cta no central PE        Pleural effusion   Moderate and large effusion   ir consulted 1.4 L fluid removed   Will see the cx, exudate ,called lab to send for path,      Cellulitis of rt leg, neutropenia fever , acute on chronic sinusitis , UTI-resolved      Abdominal abscess : surgeon and ID f/u IR consulted for drain-recommend medical tx   Fever last night and abx changed to zosyn   Blood cx obtained and pending results      paf :  amiodorone and digoxin   Cardiologist f/u   Continue balance electrolytes and echo : ef wnl   Need full AC, lovenox hold for now for procedure -balance K and mg   K replacement     Septic shock -resolved     Pancytopenia resolved   Due to chemo   lovenox-will restart it tonight   neutropenic fever :resolved      Diffusion Large B lymphoma   On chemo therapy   Oncologist f/u             Hyponatremia , hypomagnesemia resolved ,   hypokalemia -K replacement          DM type II   Ssi , hypoglycemia protocol      hypothyroidism   Continue synthyroid         Acute encephalopath  Resolved and back to her baseline      Myopia       Severe malnutrition      Hypoalbuminemia   Resolved       Subjective I am ok     Will repeat scan for interval change and f/u with the cx     Not neutropenic any more , dc precaution     Review of systems:    General : no fever /chills . Lung no sob , no wheezing  Heart : no chest pain, no palpitation   Gi : no n/v , no  abdomen pain     Visit Vitals  BP (!) 110/50   Pulse 77   Temp 97.8 °F (36.6 °C)   Resp 20   Ht 5' 7\" (1.702 m)   Wt 53.5 kg (118 lb)   SpO2 98%   Breastfeeding No   BMI 18.48 kg/m²     Current Shift:  No intake/output data recorded. Last three shifts:  07/12 1901 - 07/14 0700  In: 0 [P.O.:960; I.V.:350]  Out: 2175 [Urine:2175]    Physical Exam:  General: WD, WN. Alert,  cooperative,   HEENT: NC, Atraumatic. PERRLA, anicteric sclerae. Lungs:  cta bilateral   Heart:  Regular  rhythm,  No murmur, No Rubs, No Gallops  Abdomen: Soft, Non distended, no tender. +Bowel sounds,    Extremities: No c/c/e   Psych:   Not anxious or agitated. Neurologic:  No acute neuro deficit, answer questions-but slow           Labs: Results:       Chemistry Recent Labs     07/14/22 0025 07/13/22 2030 07/13/22 0202   * 288* 177*    140 144   K 3.3* 4.2 3.6    105 109   CO2 30 29 30   BUN 41* 36* 29*   CREA 0.78 0.63 0.48*   CA 9.8 9.9 10.0   AGAP 6 6 5   BUCR 53* 57* 60*   AP 67 71 56   TP 6.8 6.7 7.1   ALB 4.7 5.0 5.1*   GLOB 2.1 1.7* 2.0   AGRAT 2.2* 2.9* 2.6*      CBC w/Diff Recent Labs     07/14/22 0025 07/13/22 2030 07/13/22 0202   WBC 10.5 12.0 7.5   RBC 3.59* 3.65* 3.31*   HGB 10.5* 10.6* 9.5*   HCT 34.6* 34.2* 31.8*   * 505* 505*   GRANS 83* 79* 85*   LYMPH 4* 4* 1*   EOS 3 0 1      Cardiac Enzymes No results for input(s): CPK, CKND1, HUMBERTO in the last 72 hours. No lab exists for component: CKRMB, TROIP   Coagulation No results for input(s): PTP, INR, APTT, INREXT, INREXT in the last 72 hours. Lipid Panel No results found for: CHOL, CHOLPOCT, CHOLX, CHLST, CHOLV, 926468, HDL, HDLP, LDL, LDLC, DLDLP, 879304, VLDLC, VLDL, TGLX, TRIGL, TRIGP, TGLPOCT, CHHD, CHHDX   BNP No results for input(s): BNPP in the last 72 hours. Liver Enzymes Recent Labs     07/14/22 0025   TP 6.8   ALB 4.7   AP 67      Thyroid Studies Lab Results   Component Value Date/Time    TSH 3.50 07/04/2022 04:40 AM        Procedures/imaging: see electronic medical records for all procedures/Xrays and details which were not copied into this note but were reviewed prior to creation of Plan    XR ANKLE RT MIN 3 V    Result Date: 7/3/2022  EXAM: ANKLE SERIES Indication: Swelling Technique: Frontal, , oblique, and lateral views of the right ankle.  Comparison studies: None. _______________ FINDINGS: -OSSEOUS: No acute or destructive osseous abnormality. Tibiotalar degenerative changes with small degenerative plantar calcaneal spur. Normal alignment with preserved ankle mortise. . -SOFT TISSUES: Benign calcifications of the lower leg with mild diffuse edema of the distal leg to visualized midfoot. . No significant joint effusion. _____________    1. Nonspecific soft tissue edema, with no acute or destructive osseous abnormality. CT HEAD WO CONT    Result Date: 7/3/2022  EXAM: CT HEAD WO CONT CLINICAL INDICATION/HISTORY: ams -Additional: None COMPARISON: None TECHNIQUE: Axial CT imaging of the head was performed without intravenous contrast. One or more dose reduction techniques were used on this CT: automated exposure control, adjustment of the mAs and/or kVp according to patient size, and iterative reconstruction techniques. The specific techniques used on this CT exam have been documented in the patient's electronic medical record. Digital Imaging and Communications in Medicine (DICOM) format image data are available to nonaffiliated external healthcare facilities or entities on a secure, media free, reciprocally searchable basis with patient authorization for at least a 12-month period after this study. _______________ FINDINGS: BRAIN:   > Brain volume: Age appropriate.   > White matter: Little or no white matter disease. > Infarcts, encephalomalacia: None.   > Parenchymal mass: None.   > Parenchymal hemorrhage: None.   > Midline shift: None.   > Miscellaneous: None. EXTRA-AXIAL SPACES: Unremarkable. No fluid collections. CALVARIUM: Intact. SINUSES, MASTOIDS: Complete opacified left sphenoethmoidal air cells with mild mucosal thickening on the right and small left anterior frontal ethmoidal opacities. OTHER EXTRACRANIAL: Asymmetric dense right globe with findings of prior bilateral cataract surgery. _______________     1. No CT findings of an acute intracranial abnormality.  Please note that noncontrast head CT may be normal in early acute infarct. 2. Chronic left sphenoethmoidal sinus disease, with questional small left frontoethmoidal air-fluid level potentially acute on chronic sinus disease. 3. Asymmetric hyperdense right globe. Recommend correlation with patient ocular history. CTA CHEST W OR W WO CONT    Result Date: 7/3/2022  EXAM: CTA Chest INDICATION: Hypoxic. COMPARISON: No recent exams for direct comparison, most recent study from 9/21/2012. TECHNIQUE: Axial CT imaging from the thoracic inlet through the diaphragm with intravenous contrast. Coronal and sagittal MIP reformats were generated. One or more dose reduction techniques were used on this CT: automated exposure control, adjustment of the mAs and/or kVp according to patient size, and iterative reconstruction techniques. The specific techniques used on this CT exam have been documented in the patient's electronic medical record. Digital Imaging and Communications in Medicine (DICOM) format image data are available to nonaffiliated external healthcare facilities or entities on a secure, media free, reciprocally searchable basis with patient authorization for at least a 12-month period after this study. _______________ FINDINGS: EXAM QUALITY: Adequate bolus timing with mild diffuse respiratory motion artifact degradation. Crystal Bernice PULMONARY ARTERIES: No evidence of central, interlobar or mid to proximal segmental branch pulmonary arterial filling defect. Asymmetric motion degradation involving the lower lobes. Normal sized main pulmonary artery. Pulmonary embolism. MEDIASTINUM: Normal heart size without pericardial effusion. Coronary artery and aortic atherosclerosis. A right upper chest Mediport is present with the catheter in the SVC. Moderate-sized hiatus hernia. LUNGS and PLEURA: Moderate to large left low attenuating layering pleural effusion. Marked diffuse emphysematous changes with chronic areas of scarring and atelectasis.  Bilateral lower lobe groundglass, difficult to differentiate between dependent changes, atelectasis and/or edema. . AIRWAY: Normal. LYMPH NODES: No enlarged nodes. UPPER ABDOMEN: Multiple splenic hypodensities, largest anteriorly measuring 3 cm. Overall normal splenic size measuring 11.3 cm in AP dimension. Prior cholecystectomy OTHER: No acute or aggressive osseous abnormalities identified. _______________     1. Respiratory motion degradation. Otherwise, No evidence of central pulmonary embolism. 2.  Interstitial and groundglass changes suggestive of underlying edema, with moderate to large left-sided pleural effusion. 3. Multiple Indeterminate splenic hypodensities, recommend comparison to any prior recent outside exams and patient history. 4. Moderate-sized hiatus hernia. XR CHEST PORT    Result Date: 7/3/2022  EXAM: XR CHEST PORT CLINICAL INDICATION/HISTORY: ams fever -Additional: None COMPARISON: None TECHNIQUE: Frontal view of the chest _______________ FINDINGS: SUPPORT LINES AND TUBES:Right-sided tunneled Mediport and catheter tip over the distal SVC. HEART AND MEDIASTINUM: Midline cardiac silhouette appreciable cardiomegaly. Hilar vascular congestion and cephalization. LUNGS AND PLEURAL SPACES: Diffuse bilateral hazy indistinct interstitial opacities with retrocardiac left basilar confluent opacity and blunted costophrenic angle/effusion. No significant right-sided pleural effusion. No pneumothorax. BONY THORAX AND SOFT TISSUES: No acute or destructive osseous abnormality. _______________     1. Findings of CHF versus fluid overload with edema and left pleural effusion.       Britney Javier MD

## 2022-07-14 NOTE — PROGRESS NOTES
Palliative Medicine Consult    Patient Name: Neva Dyson  YOB: 1940    Date of Initial Consult: 7/5/2022  Date of follow-up: 7/14/2022  Reason for Consult: Goals of care discussions  Requesting Provider: Dr. Ezio Klein   Primary Care Physician: Halima Bernal MD      SUMMARY:   Neva Dyson is a 78 y.o. with a past history of hypertension, hypothyroidism, atrial fibrillation, and lymphoma on chemo, who was admitted on 7/3/2022 from home with a diagnosis of septic shock, neutropenic fever, and lymphoma. Current medical issues leading to Palliative Medicine involvement include: Goals of care discussions. PALLIATIVE DIAGNOSES:   1. Goals of care discussions  2. Septic shock  3. Diffuse large B-cell lymphoma  4. Advanced age/debility       PLAN:   7/14/2022: Met with patient at patient's bedside. Patient's  entered room later during the visit. Patient is drowsy, but does engage in goals of care discussions.  and patient are quite positive regarding down titration of oxygen, improved breathing, resolved neutropenic fever, recent CT scan with no evidence of lymphadenopathy (understanding they still need outpatient follow-up to determine remission status of lymphoma), and resolution of septic shock. Patient's main concern is her generalized weakness, she did stand with PT yesterday, she is wanting to continue PT. Revisited goals of care today, including CODE STATUS.  remains consistent regarding full code with full interventions, and patient states that she agrees with this. Again, encouraged continuous discussions regarding goals of care to ensure that we are continuing to honor patient's choices. Will sign off as goals of care have been established. Please re-consult should it be warranted. Thank you for the opportunity to provide care to this patient.      See previous discussions below:    7/12/2022: Palliative medicine team including Dolores De La Rosa RN and I met with patient at patient's bedside. Patient is drowsy this AM, not able to engage in goals of care discussions. Continuing to build a therapeutic relationship with patient and .  remains positive regarding prognosis, awaiting IR recommendations for abdominal abscess. Appreciate Dr. Melody Clemens input regarding need for PET/CT as an outpatient but that patient's CT scan as an inpatient was with no evidence of lymphadenopathy. Provided support to patient and . At this time patient remains a full code with full interventions, we will continue attempts at getting patient's input again. See previous discussions below:    7/11/2022: Palliative medicine team including Alvarez Karimi RN and I met with patient at patient's bedside. Patient's  Mykecey Severs also present. Patient is awake, fatigued, in no acute distress. Patient has a flat affect, not very engaged in goals of care discussions. Her responses are short (one to two words). We were re-consulted to readdress goals of care. Since our sign off, she was transferred to the floor but quickly returned to the ICU for tachycardia and then returned to the floor on the afternoon of 7/8. Patient pending eval for possible IR drainage catheter for possible diverticular abscess. Both patient and  express frustration that this is yet another set back from rehab and outpatient follow up with oncology to determine remission status of her lymphoma. At this time patient and  agree to continue full aggressive measures. We will continue to have goals of care discussions. Continue full code with full interventions. See previous discussions below:    7/7/2022: Palliative medicine team including Alvarez Karimi RN and I met with patient at patient's bedside. Patient's  Yancey Severs also present. Patient is awake, fatigued, in no acute distress. Appreciate Dr. Berry Situ discussion with patient and family regarding the consideration of DNR status.   Reviewed this discussion from yesterday. Discussed the benefits and burdens of CPR in the event of cardiopulmonary arrest in the setting of advanced age with chronic comorbidities. Discussed the benefits and burdens of intubation in the event of respiratory decline pre-arrest.  Patient's  was clear that she would want an opportunity for CPR, but would not want to linger on life support.  states that if patient were not doing well postarrest, he would be willing to consider compassionate extubation. Patient agreed and stated \"I want to live\". Encouraged patient and  to continue open discussion regarding goals of care, and explained to patient that she has the right to update her goals of care at any time. Patient remains a full code with full interventions. Will sign off as goals of care have been established. Please re-consult should it be warranted. Thank you for the opportunity to provide care to this patient. See previous discussions below:    7/5/2022: Goals of care: Palliative medicine team including  Marin Kanner, MSW and I met with patient at patient's bedside. Patient is awake, alert, and oriented to person, place, and time. She is currently disoriented to situation, cannot express why she is hospitalized. Met with patient later in the morning when  was at bedside.  Lupillo Fabian is appreciative of all the medical care patient is receiving. Introduced role as palliative medicine. Support offered as  expresses his desire to determine patient's response to her chemotherapy which was last received 6/28/2022. Patient and  are hopeful for remission status, as treatment had a curative intent; patient was scheduled to have a CAT scan tomorrow, but due to hospitalization will be unable to complete. Oncology following.  reports patient was admitted earlier this year with similar septic shock.    is hopeful that patient will respond to medical treatments. Reviewed CODE STATUS including intubation in the event of cardiopulmonary arrest or respiratory decline prearrest.  At this time patient remains a full code with full interventions. They are open to further discussions once they are aware of patient's lymphoma cancer status. We will continue to follow for support, and further goals of care conversations as appropriate. 1. Septic shock: ID following, on vasopressor support. On broad-spectrum antibiotic therapy. 2. Diffuse large B-cell lymphoma: Follows Dr. Fela Montelongo from oncology, patient received her last chemotherapy session 6/28/2022, remission status is unknown as she was supposed to receive a CT scan as an outpatient but did not make appointment due to hospitalization. Treatment was curative intent. Oncology continuing to follow. 3. Advanced age/debility: 58-year-old female who lives at home with her . She has generalized weakness and malaise. Poor appetite, which has been improving since admission. At this time she needs assistance with all ADLs, including feeding. 4. Initial consult note routed to primary continuity provider  5.  Communicated plan of care with: Palliative IDT       GOALS OF CARE / TREATMENT PREFERENCES:   [====Goals of Care====]  GOALS OF CARE: Full code with full interventions  Patient/Health Care Proxy Stated Goals: Prolong life      TREATMENT PREFERENCES:   Code Status: Full Code    Advance Care Planning:  Advance Care Planning 7/6/2022   Patient's Healthcare Decision Maker is: Legal Next of Kin   Confirm Advance Directive None   Patient Would Like to Complete Advance Directive No       Medical Interventions: Full interventions            The palliative care team has discussed with patient / health care proxy about goals of care / treatment preferences for patient.  [====Goals of Care====]         HISTORY:     History obtained from: patient, chart, spouse    CHIEF COMPLAINT: drowsy, appetite improving slowly    HPI/SUBJECTIVE:    The patient is:   [x] Verbal and participatory  [] Non-participatory due to:   drowsy but oriented x 4    Clinical Pain Assessment (nonverbal scale for severity on nonverbal patients):   Clinical Pain Assessment  Severity: 0    Adult Nonverbal Pain Scale  Face: No particular expression or smile  Activity (Movement): Lying quietly, normal position  Guarding: Lying quietly, no positioning of hands over areas of body  Physiology (Vital Signs): Stable vital signs  Respiratory: Baseline RR/SpO2 compliant with ventilator  Total Score: 0       FUNCTIONAL ASSESSMENT:     Palliative Performance Scale (PPS):   PPS: 50       PSYCHOSOCIAL/SPIRITUAL SCREENING:     Advance Care Planning:  Advance Care Planning 7/6/2022   Patient's Healthcare Decision Maker is: Legal Next of Kin   Confirm Advance Directive None   Patient Would Like to Complete Advance Directive No        Any spiritual / Episcopal concerns:  [] Yes /  [x] No    Caregiver Burnout:  [] Yes /  [x] No /  [] No Caregiver Present      Anticipatory grief assessment:   [x] Normal  / [] Maladaptive          REVIEW OF SYSTEMS:     Positive and pertinent negative findings in ROS are noted above in HPI. The following systems were [x] reviewed / [] unable to be reviewed as noted in HPI  Other findings are noted below. Systems: constitutional, ears/nose/mouth/throat, respiratory, gastrointestinal, genitourinary, musculoskeletal, integumentary, neurologic, psychiatric, endocrine. Positive findings noted below. Modified ESAS Completed by: provider   Fatigue: 5       Pain: 0   Anxiety: 0 Nausea: 0   Anorexia: 0 Dyspnea: 0     Constipation: No     Stool Occurrence(s): 1        PHYSICAL EXAM:     From RN flowsheet:  Wt Readings from Last 3 Encounters:   07/05/22 53.5 kg (118 lb)   09/19/12 84.4 kg (186 lb 2 oz)   09/14/12 83.9 kg (185 lb)     Blood pressure (!) 107/49, pulse 79, temperature 97.7 °F (36.5 °C), resp.  rate 20, height 5' 7\" (1.702 m), weight 53.5 kg (118 lb), SpO2 95 %, not currently breastfeeding.     Pain Scale 1: Adult Nonverbal Pain Scale  Pain Intensity 1: 0                      Constitutional:drowsy but will answer questions, NAD, appears chronically ill  Eyes: pupils equal, anicteric  ENMT: no nasal discharge, dry mucous membranes  Cardiovascular: regular rhythm, distal pulses intact  Respiratory: breathing not labored, symmetric, on NC  Gastrointestinal: soft non-tender   Musculoskeletal: no deformity, no tenderness to palpation  Skin: warm, dry  Neurologic: following commands, moving all extremities, oriented to person, place, time, and situation  Psychiatric: drowsy affect, no hallucinations       HISTORY:     Principal Problem:    Acute respiratory failure with hypoxia ([address] (7/3/2022)    Active Problems:    Hypokalemia (9/21/2012)      Neutropenic fever ([address] (7/3/2022)      Hypomagnesemia (7/3/2022)      Diffuse large B cell lymphoma ([facility]) (7/3/2022)      GERD (gastroesophageal reflux disease) ()      Thyroid disease ()      Acute encephalopathy ()      Hyponatremia (7/3/2022)      Pancytopenia ([address] (7/3/2022)      UTI (urinary tract infection) (7/3/2022)      Sinusitis (7/3/2022)      Myopia of both eyes with astigmatism (7/3/2022)      Pleural effusion (7/3/2022)      Cellulitis (7/3/2022)      Severe protein-calorie malnutrition ([address] (7/3/2022)      Septic shock ([address] (7/4/2022)      Colonic diverticular abscess (7/11/2022)      Debility (7/14/2022)      Past Medical History:   Diagnosis Date    Arthritis     GERD (gastroesophageal reflux disease)     Hypertension     Osteoarthritis of right knee 9/19/2012    S/P total knee arthroplasty 9/19/2012    Thyroid disease       Past Surgical History:   Procedure Laterality Date    HX APPENDECTOMY  1950's    HX CHOLECYSTECTOMY      laparoscopic    HX HEENT      Surgery OD infection x 4    HX HEENT      throat surgery x 2    HX KNEE ARTHROSCOPY      right      No family history on file. History reviewed, no pertinent family history. Social History     Tobacco Use    Smoking status: Former Smoker     Quit date: 1999     Years since quittin.5    Smokeless tobacco: Never Used   Substance Use Topics    Alcohol use:  Yes     Alcohol/week: 1.7 standard drinks     Types: 1 Glasses of wine, 1 Cans of beer per week     Allergies   Allergen Reactions    Adhesive Tape-Silicones Other (comments)     Takes skin off    Penicillin G Itching      Current Facility-Administered Medications   Medication Dose Route Frequency    piperacillin-tazobactam (ZOSYN) 3.375 g in 0.9% sodium chloride (MBP/ADV) 100 mL MBP  3.375 g IntraVENous Q6H    enoxaparin (LOVENOX) injection 50 mg  1 mg/kg SubCUTAneous Q12H    prednisoLONE acetate (PRED FORTE) 1 % ophthalmic suspension 1 Drop  1 Drop Both Eyes BID    fluticasone propionate (FLONASE) 50 mcg/actuation nasal spray 2 Spray  2 Spray Both Nostrils DAILY    digoxin (LANOXIN) injection 100 mcg  100 mcg IntraVENous DAILY    lidocaine-prilocaine (EMLA) 2.5-2.5 % cream   Topical PRN    nystatin (MYCOSTATIN) 100,000 unit/gram powder   Topical BID    pantoprazole (PROTONIX) tablet 40 mg  40 mg Oral ACB    furosemide (LASIX) injection 20 mg  20 mg IntraVENous Q12H    insulin lispro (HUMALOG) injection   SubCUTAneous AC&HS    levothyroxine (SYNTHROID) tablet 125 mcg  125 mcg Oral DAILY    amiodarone (CORDARONE) tablet 100 mg  100 mg Oral DAILY    ipratropium (ATROVENT) 0.02 % nebulizer solution 0.5 mg  0.5 mg Nebulization TID PRN    albuterol (PROVENTIL VENTOLIN) nebulizer solution 2.5 mg  2.5 mg Nebulization Q4H PRN    sodium chloride (NS) flush 5-40 mL  5-40 mL IntraVENous Q8H    sodium chloride (NS) flush 5-40 mL  5-40 mL IntraVENous PRN    acetaminophen (TYLENOL) tablet 650 mg  650 mg Oral Q6H PRN    Or    acetaminophen (TYLENOL) suppository 650 mg  650 mg Rectal Q6H PRN    [Held by provider] polyethylene glycol (MIRALAX) packet 17 g  17 g Oral DAILY PRN    ondansetron (ZOFRAN ODT) tablet 4 mg  4 mg Oral Q8H PRN    Or    ondansetron (ZOFRAN) injection 4 mg  4 mg IntraVENous Q6H PRN    glucose chewable tablet 16 g  4 Tablet Oral PRN    glucagon (GLUCAGEN) injection 1 mg  1 mg IntraMUSCular PRN    dextrose 10% infusion 0-250 mL  0-250 mL IntraVENous PRN          LAB AND IMAGING FINDINGS:     Lab Results   Component Value Date/Time    WBC 10.5 07/14/2022 12:25 AM    HGB 10.5 (L) 07/14/2022 12:25 AM    PLATELET 709 (H) 79/93/5596 12:25 AM     Lab Results   Component Value Date/Time    Sodium 140 07/14/2022 12:25 AM    Potassium 3.3 (L) 07/14/2022 12:25 AM    Chloride 104 07/14/2022 12:25 AM    CO2 30 07/14/2022 12:25 AM    BUN 41 (H) 07/14/2022 12:25 AM    Creatinine 0.78 07/14/2022 12:25 AM    Calcium 9.8 07/14/2022 12:25 AM    Magnesium 2.1 07/14/2022 12:25 AM    Phosphorus 2.7 07/14/2022 12:25 AM      Lab Results   Component Value Date/Time    Alk. phosphatase 67 07/14/2022 12:25 AM    Protein, total 6.8 07/14/2022 12:25 AM    Albumin 4.7 07/14/2022 12:25 AM    Globulin 2.1 07/14/2022 12:25 AM     Lab Results   Component Value Date/Time    INR 1.1 07/06/2022 05:04 AM    Prothrombin time 15.0 07/06/2022 05:04 AM    aPTT 22.3 (L) 08/28/2012 08:50 AM      No results found for: IRON, FE, TIBC, IBCT, PSAT, FERR   No results found for: PH, PCO2, PO2  No components found for: GLPOC   No results found for: CPK, CKMB             Total time: 25 minutes  Counseling / coordination time, spent as noted above:   > 50% counseling / coordination?: yes, patient, family, and medical team    Prolonged service was provided for  []30 min   []75 min in face to face time in the presence of the patient, spent as noted above.   Time Start:   Time End:

## 2022-07-14 NOTE — PROGRESS NOTES
Problem: Self Care Deficits Care Plan (Adult)  Goal: *Acute Goals and Plan of Care (Insert Text)  Description: Initial Occupational Therapy Goals (7/10/2022) Within 7 day(s):  1. Patient will perform perform grooming at EOB with no posterior LOB with CGA to brush teeth and wash face for 5 minutes for increased independence in ADLs. 2. Patient will perform UB dressing with set up seated EOB for increased independence with ADLs. 3. Patient will perform LB dressing with min A & A/E PRN for increased independence with ADLs. 4. Patient will perform all aspects of toileting with min A for increased independence with ADLs. 5. Patient will perform LE ADLs utilizing body mechanics & adaptive strategies with 1 verbal cue for increased safety in ADLs. 6. Patient will independently apply energy conservation techniques with less than 3 verbal cue(s) for increased independence with ADLs. Outcome: Progressing Towards Goal    OCCUPATIONAL THERAPY TREATMENT    Patient: Deanne Hill (11 y.o. female)  Date: 7/14/2022  Diagnosis: Neutropenic fever ([facility] [address] [D70.9, R50.81] Acute respiratory failure with hypoxia ([facility] [address]       Precautions: Fall  PLOF:     Chart, occupational therapy assessment, plan of care, and goals were reviewed. ASSESSMENT:  Pt presented supine in bed at the beginning of session; requires min verbal encouragement to participate with therapy. Pt co-treat with PT for the need of another set of skilled hands and safety with transfers/ADLs. Pt performed bed mobility, supine<>sit, sit<>stand transfers with mod-max A x2; tolerate sitting EOB for ~10 minutes with occasional cues to maintain midline in sitting. pt with fair sitting balance and poor standing balance during this session and requires max verbal encouragement to maintain standing and lateral stepping at EOB with max A. Pt was left supine in bed at the end of session in NAD.     Progression toward goals:  []          Improving appropriately and progressing toward goals  [x]          Improving slowly and progressing toward goals  []          Not making progress toward goals and plan of care will be adjusted     PLAN:  Patient continues to benefit from skilled intervention to address the above impairments. Continue treatment per established plan of care. Discharge Recommendations:  Alexei Neville  Further Equipment Recommendations for Discharge:  N/A     SUBJECTIVE:   Patient stated  I will try.     OBJECTIVE DATA SUMMARY:   Cognitive/Behavioral Status:  Neurologic State: Alert  Orientation Level: Oriented to person  Cognition: Decreased command following,Decreased attention/concentration  Safety/Judgement: Fall prevention  Functional Mobility and Transfers for ADLs:   Bed Mobility:  Rolling: Moderate assistance  Supine to Sit: Moderate assistance  Sit to Supine: Moderate assistance  Scooting: Moderate assistance   Transfers:  Sit to Stand: Maximum assistance; Total assistance;Assist x2  Stand to Sit: Maximum assistance  Balance:  Sitting: Impaired  Sitting - Static: Fair (occasional)  Sitting - Dynamic: Fair (occasional)  Standing: Impaired;Pull to stand; With support  Standing - Static: Poor  Standing - Dynamic : Poor  ADL Intervention:  Cognitive Retraining  Safety/Judgement: Fall prevention  Pain:  Pain level pre-treatment: 0/10   Pain level post-treatment: 0/10  Pain Intervention(s): Medication (see MAR); Rest, Ice, Repositioning   Response to intervention: Nurse notified, See doc flow    Activity Tolerance:    Fair     Please refer to the flowsheet for vital signs taken during this treatment.   After treatment:   []  Patient left in no apparent distress sitting up in chair  [x]  Patient left in no apparent distress in bed  [x]  Call bell left within reach  [x]  Nursing notified  [x]  Caregiver present  []  Bed alarm activated    COMMUNICATION/EDUCATION:   [x] Role of Occupational Therapy in the acute care setting  [] Home safety education was provided and the patient/caregiver indicated understanding. [x] Patient/family have participated as able in working towards goals and plan of care. [x] Patient/family agree to work toward stated goals and plan of care. [] Patient understands intent and goals of therapy, but is neutral about his/her participation. [] Patient is unable to participate in goal setting and plan of care.       Thank you for this referral.  Juan Her, OTR/L  Time Calculation: 31 mins

## 2022-07-14 NOTE — PROGRESS NOTES
Problem: Dysphagia (Adult)  Description: Patient will:  1. Tolerate PO trials with 0 s/s overt distress in 4/5 trials. 2. Utilize compensatory swallow strategies/maneuvers (decrease bite/sip, size/rate, alt. liq/sol) with min cues in 4/5 trials. 3. As medically indicated by MD, complete an objective swallow study (i.e., MBSS) to assess swallow integrity, r/o aspiration, and determine of safest LR (goal met 7/13/22)    Rec:     Regular with thin liquids  Pt may require assistance with meal set up  Aspiration precautions  HOB >45 during po intake, remain >30 for 30-45 minutes after po   Small bites/sips; alternate liquid/solid with slow feeding rate   Oral care TID  Meds crushed in puree   Outcome: Progressing Towards Goal    SPEECH LANGUAGE PATHOLOGY DYSPHAGIA TREATMENT    Patient: Bert Araiza (50 y.o. female)  Date: 7/14/2022  Diagnosis: Neutropenic fever ([facility] [address] [D70.9, R50.81] Acute respiratory failure with hypoxia (HCC)       Precautions: Aspiration, Fall  PLOF: Regular, thin (per spouse)  ASSESSMENT:  Patient seen for dysphagia follow-up s/p MBS completed 7/13/22. Patient demonstrated WFL swallow function during study and regular, thin liquid diet recommended. Spouse and nurse present in room this morning. Patient observed to take whole medication with thin liquids via straw. No difficulty observed. Spouse reports that patient has had no difficulty with current diet (I.e., no episodes of coughing/choking); however, patient with limited appetite. Trials of thin liquid given this morning +/- straw. Patient denied trialing other consistencies/textures. Patient tolerated thin liquids in 9/10 trials when ST paced patient and reminded patient to take small, single sips. Patient demo strong cough and watery eyes when patient attempted to take consecutive straw sips.   Discussed continued aspiration risk due to overall weakness/fatigue/inconsistent alertness and re-educated patient's spouse on continuing to pace patient and remind patient to take small, single sips. Session d/c due to decreased alertness. Patient's spouse verbalized understanding of all swallowing precautions. ST to follow. Progression toward goals:  []         Improving appropriately and progressing toward goals  [x]         Improving slowly and progressing toward goals  []         Not making progress toward goals and plan of care will be adjusted     PLAN:  Recommendations and Planned Interventions:  See above  Patient continues to benefit from skilled intervention to address the above impairments. Continue treatment per established plan of care. Discharge Recommendations: To Be Determined     SUBJECTIVE:   Patient stated just great. OBJECTIVE:   Cognitive and Communication Status:  Neurologic State: Alert  Orientation Level: Oriented to person,Oriented to place  Cognition: Decreased attention/concentration,Decreased command following  Perception: Appears intact  Perseveration: No perseveration noted  Safety/Judgement: Fall prevention  Dysphagia Treatment:  Oral Assessment:  Oral Assessment  Labial: No impairment  Dentition: Intact,Natural  Oral Hygiene:  (fair)  Lingual: Decreased rate,Decreased strength  Velum: No impairment  Mandible: No impairment  Gag Reflex:  (did not test)  P.O. Trials:   Patient Position:  (HOB 45 degrees)   Vocal quality prior to P.O.: Breathy,Fatigue   Consistency Presented:  Thin liquid   How Presented: SLP-fed/presented,Straw,Successive swallows       Bolus Acceptance: No impairment   Bolus Formation/Control: No impairment   Type of Impairment: Other (comment) (suspect premature spillage)   Propulsion: No impairment   Oral Residue: None   Initiation of Swallow: No impairment   Laryngeal Elevation: Functional   Aspiration Signs/Symptoms: Strong cough,Watery eyes   Pharyngeal Phase Characteristics: Easily fatigued ,Poor endurance   Effective Modifications: Small sips and bites   Cues for Modifications: Minimal-moderate         Oral Phase Severity: No impairment   Pharyngeal Phase Severity : Other (comment) (suspect)      PAIN:  Pain level pre-treatment: 0/10   Pain level post-treatment: 0/10       After treatment:   []              Patient left in no apparent distress sitting up in chair  [x]              Patient left in no apparent distress in bed  []              Call bell left within reach  [x]              Nursing notified - present in room  [x]              Family present  []              Caregiver present  []              Bed alarm activated      COMMUNICATION/EDUCATION:   [x] Aspiration precautions; swallow safety; compensatory techniques  [x]        Patient unable to participate in education; education ongoing with staff  []  Posted safety precautions in patient's room.   [] Oral-motor/laryngeal strengthening exercises    DEBORAH Coles  Time Calculation: 10 mins

## 2022-07-15 LAB
ALBUMIN SERPL-MCNC: 3.9 G/DL (ref 3.4–5)
ALBUMIN/GLOB SERPL: 1.8 {RATIO} (ref 0.8–1.7)
ALP SERPL-CCNC: 60 U/L (ref 45–117)
ALT SERPL-CCNC: 9 U/L (ref 13–56)
ANION GAP SERPL CALC-SCNC: 7 MMOL/L (ref 3–18)
AST SERPL-CCNC: 9 U/L (ref 10–38)
ATRIAL RATE: 108 BPM
ATRIAL RATE: 144 BPM
BASOPHILS # BLD: 0.2 K/UL (ref 0–0.1)
BASOPHILS NFR BLD: 3 % (ref 0–2)
BILIRUB SERPL-MCNC: 0.4 MG/DL (ref 0.2–1)
BUN SERPL-MCNC: 39 MG/DL (ref 7–18)
BUN/CREAT SERPL: 72 (ref 12–20)
CALCIUM SERPL-MCNC: 9.6 MG/DL (ref 8.5–10.1)
CALCULATED R AXIS, ECG10: 25 DEGREES
CALCULATED R AXIS, ECG10: 7 DEGREES
CALCULATED T AXIS, ECG11: -143 DEGREES
CALCULATED T AXIS, ECG11: -155 DEGREES
CHLORIDE SERPL-SCNC: 103 MMOL/L (ref 100–111)
CO2 SERPL-SCNC: 27 MMOL/L (ref 21–32)
COVID-19 RAPID TEST, COVR: NOT DETECTED
CREAT SERPL-MCNC: 0.54 MG/DL (ref 0.6–1.3)
DIAGNOSIS, 93000: NORMAL
DIAGNOSIS, 93000: NORMAL
DIFFERENTIAL METHOD BLD: ABNORMAL
EOSINOPHIL # BLD: 0.2 K/UL (ref 0–0.4)
EOSINOPHIL NFR BLD: 3 % (ref 0–5)
ERYTHROCYTE [DISTWIDTH] IN BLOOD BY AUTOMATED COUNT: 30.3 % (ref 11.6–14.5)
GLOBULIN SER CALC-MCNC: 2.2 G/DL (ref 2–4)
GLUCOSE BLD STRIP.AUTO-MCNC: 170 MG/DL (ref 70–110)
GLUCOSE BLD STRIP.AUTO-MCNC: 177 MG/DL (ref 70–110)
GLUCOSE BLD STRIP.AUTO-MCNC: 177 MG/DL (ref 70–110)
GLUCOSE BLD STRIP.AUTO-MCNC: 196 MG/DL (ref 70–110)
GLUCOSE SERPL-MCNC: 161 MG/DL (ref 74–99)
HCT VFR BLD AUTO: 31.2 % (ref 35–45)
HGB BLD-MCNC: 9.6 G/DL (ref 12–16)
IMM GRANULOCYTES # BLD AUTO: 0 K/UL
IMM GRANULOCYTES NFR BLD AUTO: 0 %
LYMPHOCYTES # BLD: 0.2 K/UL (ref 0.9–3.6)
LYMPHOCYTES NFR BLD: 2 % (ref 21–52)
MAGNESIUM SERPL-MCNC: 1.9 MG/DL (ref 1.6–2.6)
MCH RBC QN AUTO: 29.3 PG (ref 24–34)
MCHC RBC AUTO-ENTMCNC: 30.8 G/DL (ref 31–37)
MCV RBC AUTO: 95.1 FL (ref 78–100)
METAMYELOCYTES NFR BLD MANUAL: 2 %
MONOCYTES # BLD: 0.3 K/UL (ref 0.05–1.2)
MONOCYTES NFR BLD: 4 % (ref 3–10)
NEUTS BAND NFR BLD MANUAL: 4 % (ref 0–5)
NEUTS SEG # BLD: 7 K/UL (ref 1.8–8)
NEUTS SEG NFR BLD: 82 % (ref 40–73)
NRBC # BLD: 0 K/UL (ref 0–0.01)
NRBC BLD-RTO: 0 PER 100 WBC
PHOSPHATE SERPL-MCNC: 3 MG/DL (ref 2.5–4.9)
PLATELET # BLD AUTO: 394 K/UL (ref 135–420)
PLATELET COMMENTS,PCOM: ABNORMAL
PMV BLD AUTO: 9.5 FL (ref 9.2–11.8)
POTASSIUM SERPL-SCNC: 3.6 MMOL/L (ref 3.5–5.5)
PROT SERPL-MCNC: 6.1 G/DL (ref 6.4–8.2)
Q-T INTERVAL, ECG07: 168 MS
Q-T INTERVAL, ECG07: 362 MS
QRS DURATION, ECG06: 72 MS
QRS DURATION, ECG06: 74 MS
QTC CALCULATION (BEZET), ECG08: 260 MS
QTC CALCULATION (BEZET), ECG08: 532 MS
RBC # BLD AUTO: 3.28 M/UL (ref 4.2–5.3)
RBC MORPH BLD: ABNORMAL
SODIUM SERPL-SCNC: 137 MMOL/L (ref 136–145)
SOURCE, COVRS: NORMAL
VENTRICULAR RATE, ECG03: 130 BPM
VENTRICULAR RATE, ECG03: 145 BPM
WBC # BLD AUTO: 8.1 K/UL (ref 4.6–13.2)

## 2022-07-15 PROCEDURE — 74011250636 HC RX REV CODE- 250/636: Performed by: HOSPITALIST

## 2022-07-15 PROCEDURE — 74011250636 HC RX REV CODE- 250/636: Performed by: INTERNAL MEDICINE

## 2022-07-15 PROCEDURE — 74011000258 HC RX REV CODE- 258: Performed by: INTERNAL MEDICINE

## 2022-07-15 PROCEDURE — 77010033678 HC OXYGEN DAILY

## 2022-07-15 PROCEDURE — 74011636637 HC RX REV CODE- 636/637: Performed by: HOSPITALIST

## 2022-07-15 PROCEDURE — 74011000250 HC RX REV CODE- 250: Performed by: HOSPITALIST

## 2022-07-15 PROCEDURE — 84100 ASSAY OF PHOSPHORUS: CPT

## 2022-07-15 PROCEDURE — 74011250637 HC RX REV CODE- 250/637: Performed by: INTERNAL MEDICINE

## 2022-07-15 PROCEDURE — 74011250637 HC RX REV CODE- 250/637: Performed by: HOSPITALIST

## 2022-07-15 PROCEDURE — 92526 ORAL FUNCTION THERAPY: CPT

## 2022-07-15 PROCEDURE — 83735 ASSAY OF MAGNESIUM: CPT

## 2022-07-15 PROCEDURE — 82962 GLUCOSE BLOOD TEST: CPT

## 2022-07-15 PROCEDURE — 87635 SARS-COV-2 COVID-19 AMP PRB: CPT

## 2022-07-15 PROCEDURE — 85025 COMPLETE CBC W/AUTO DIFF WBC: CPT

## 2022-07-15 PROCEDURE — 65270000046 HC RM TELEMETRY

## 2022-07-15 PROCEDURE — 36415 COLL VENOUS BLD VENIPUNCTURE: CPT

## 2022-07-15 PROCEDURE — 80053 COMPREHEN METABOLIC PANEL: CPT

## 2022-07-15 PROCEDURE — 74011250636 HC RX REV CODE- 250/636: Performed by: FAMILY MEDICINE

## 2022-07-15 RX ORDER — FUROSEMIDE 20 MG/1
20 TABLET ORAL DAILY
Status: DISCONTINUED | OUTPATIENT
Start: 2022-07-16 | End: 2022-07-16 | Stop reason: HOSPADM

## 2022-07-15 RX ORDER — DIGOXIN 125 MCG
0.12 TABLET ORAL DAILY
Status: DISCONTINUED | OUTPATIENT
Start: 2022-07-16 | End: 2022-07-16 | Stop reason: HOSPADM

## 2022-07-15 RX ADMIN — PREDNISOLONE ACETATE 1 DROP: 10 SUSPENSION/ DROPS OPHTHALMIC at 21:18

## 2022-07-15 RX ADMIN — PIPERACILLIN AND TAZOBACTAM 3.38 G: 3; .375 INJECTION, POWDER, LYOPHILIZED, FOR SOLUTION INTRAVENOUS at 09:26

## 2022-07-15 RX ADMIN — FUROSEMIDE 20 MG: 10 INJECTION, SOLUTION INTRAMUSCULAR; INTRAVENOUS at 09:24

## 2022-07-15 RX ADMIN — Medication 3 UNITS: at 17:43

## 2022-07-15 RX ADMIN — NYSTATIN: 100000 POWDER TOPICAL at 09:27

## 2022-07-15 RX ADMIN — PIPERACILLIN AND TAZOBACTAM 3.38 G: 3; .375 INJECTION, POWDER, LYOPHILIZED, FOR SOLUTION INTRAVENOUS at 21:28

## 2022-07-15 RX ADMIN — PREDNISOLONE ACETATE 1 DROP: 10 SUSPENSION/ DROPS OPHTHALMIC at 09:27

## 2022-07-15 RX ADMIN — ENOXAPARIN SODIUM 50 MG: 100 INJECTION SUBCUTANEOUS at 21:20

## 2022-07-15 RX ADMIN — PIPERACILLIN AND TAZOBACTAM 3.38 G: 3; .375 INJECTION, POWDER, LYOPHILIZED, FOR SOLUTION INTRAVENOUS at 15:16

## 2022-07-15 RX ADMIN — SODIUM CHLORIDE, PRESERVATIVE FREE 10 ML: 5 INJECTION INTRAVENOUS at 05:13

## 2022-07-15 RX ADMIN — AMIODARONE HYDROCHLORIDE 100 MG: 200 TABLET ORAL at 13:54

## 2022-07-15 RX ADMIN — PIPERACILLIN AND TAZOBACTAM 3.38 G: 3; .375 INJECTION, POWDER, LYOPHILIZED, FOR SOLUTION INTRAVENOUS at 02:07

## 2022-07-15 RX ADMIN — ENOXAPARIN SODIUM 50 MG: 100 INJECTION SUBCUTANEOUS at 09:26

## 2022-07-15 RX ADMIN — Medication 3 UNITS: at 13:54

## 2022-07-15 RX ADMIN — DIGOXIN 100 MCG: 250 INJECTION, SOLUTION INTRAMUSCULAR; INTRAVENOUS; PARENTERAL at 09:25

## 2022-07-15 RX ADMIN — SODIUM CHLORIDE, PRESERVATIVE FREE 10 ML: 5 INJECTION INTRAVENOUS at 21:30

## 2022-07-15 RX ADMIN — FLUTICASONE PROPIONATE 2 SPRAY: 50 SPRAY, METERED NASAL at 09:27

## 2022-07-15 RX ADMIN — Medication 3 UNITS: at 09:25

## 2022-07-15 RX ADMIN — PANTOPRAZOLE SODIUM 40 MG: 40 TABLET, DELAYED RELEASE ORAL at 09:26

## 2022-07-15 RX ADMIN — LEVOTHYROXINE SODIUM 125 MCG: 0.1 TABLET ORAL at 05:13

## 2022-07-15 RX ADMIN — SODIUM CHLORIDE, PRESERVATIVE FREE 10 ML: 5 INJECTION INTRAVENOUS at 13:54

## 2022-07-15 RX ADMIN — Medication 3 UNITS: at 21:21

## 2022-07-15 RX ADMIN — NYSTATIN 1 G: 100000 POWDER TOPICAL at 21:19

## 2022-07-15 RX ADMIN — POTASSIUM BICARBONATE 40 MEQ: 782 TABLET, EFFERVESCENT ORAL at 15:15

## 2022-07-15 NOTE — PROGRESS NOTES
[facility]  (A Division of [address] [place] [address])    Follow-up Note      Date of Admission: 7/3/2022       Date of Note:  7/15/2022        Summary:  Ms Monique Bellos a 78 y.o. WF with PMH as listed below-- she had her last chemo on 6/28 and received growth factor as well. She came to ED with fever/lethargy/ weakness and SOB of 1 day and found to have fever to 100.7, Neutropenia to 200, pyruria, and CT head/CT chest done as well as CXR and ankle X-ray R foot done and admitted to ICU. She is requiring levophed support for border line BP.     During interview( hard of hearing too), she denies HA/sorethroat. Chest pain, abd pain, dysuria. She has no focal complaint during exam.  Denies viral/COVID infection at home. Feels SOB. She is currently receiving V/Z.     Care Everywhere-- shows admission in 01 and 04 2022 with diagnosis of sepsis- Bacteroides bacteremia in jan( also dx of cancer)  and PNA with septic shock in 04/2022       CC:  \"more tired\"  HPI:  Chart reviewed since I last saw her Monday; examined briefly with daughter in room (talked to pt's  on phone)  Answered all their questions to best of my ability. More active since Monday, but tired today. Eating. Tm <100  WBC normal along with much improved repeat CT scan yesterday.       Current Antimicrobials:    Prior Antimicrobials:  1. Pip/tzb IV (7/3-9; 7/12-) #10 total 1. cefepime IV (7/9-11)  2. Vanco IV (7/3-9)  3. azithro IV (7/4)  4. Doxy IV (7/5-6)  5.clindamycin IV (7/10)  6. Metronidazole IV (7/10-12)  7. CAX IV (7/11-12)       Assessment: Rec / Plan:   Diverticular abscess  Much better/smaller with repeat CT yesterday. I'm good at this point to switch her over to short PO tail of amox-clav 875mg PO BID.   She is NOT PCN allergic given her response the past couple of weeks of getting IV pip/tzb (I'll adjust her chart for future sakes') ->abx#12    I'm good with switch to PO amox-clav tomorrow for another week (short tail); I don't think she needs any further f/u CT scan given such small size of her abscess yesterday. Will sign off.   Thanks   Diffuse Large B Cell lymphoma  Recent chemo (6/26)    Septic shock - see #1 above, better    Low BMI/nourishment    COPD        Microbiology:                7/15 - COVID-19 sent       7/13 - BCx x2 (-)       7/8 - BCx (-)                                         7/5 - CDT  (+/-) with negative confirmatory molecular test                                         7/4 - Legionella/Spneumo Ag (-)                                         7/3 - RVP (-)                                                      COVID-19 (-)                                                      Flu (-)                                                      UA (+) E coli (R amp/sulb)                                                      BCx x2 (-)        Lines / Catheters:         peripheral        Patient Active Problem List   Diagnosis Code    S/P total knee arthroplasty Z96.659    Hypokalemia E87.6    HTN (hypertension), benign I10    Neutropenic fever (HCC) D70.9, R50.81    Acute respiratory failure with hypoxia (HCC) J96.01    Hypomagnesemia E83.42    Diffuse large B cell lymphoma (HCC) C83.30    GERD (gastroesophageal reflux disease) K21.9    Thyroid disease E07.9    Acute encephalopathy G93.40    Hyponatremia E87.1    Pancytopenia (HCC) D61.818    UTI (urinary tract infection) N39.0    Sinusitis J32.9    Myopia of both eyes with astigmatism H52.13, H52.203    Pleural effusion J90    Cellulitis L03.90    Severe protein-calorie malnutrition (HCC) E43    Septic shock (HCC) A41.9, R65.21    Abscess of sigmoid colon due to diverticulitis K57.20    Debility R53.81       Current Facility-Administered Medications   Medication Dose Route Frequency    [START ON 7/16/2022] furosemide (LASIX) tablet 20 mg  20 mg Oral DAILY    [START ON 7/16/2022] digoxin (LANOXIN) tablet 0.125 mg  0.125 mg Oral DAILY    potassium bicarb-citric acid (EFFER-K) tablet 40 mEq  40 mEq Oral NOW    piperacillin-tazobactam (ZOSYN) 3.375 g in 0.9% sodium chloride (MBP/ADV) 100 mL MBP  3.375 g IntraVENous Q6H    enoxaparin (LOVENOX) injection 50 mg  1 mg/kg SubCUTAneous Q12H    prednisoLONE acetate (PRED FORTE) 1 % ophthalmic suspension 1 Drop  1 Drop Both Eyes BID    fluticasone propionate (FLONASE) 50 mcg/actuation nasal spray 2 Spray  2 Spray Both Nostrils DAILY    lidocaine-prilocaine (EMLA) 2.5-2.5 % cream   Topical PRN    nystatin (MYCOSTATIN) 100,000 unit/gram powder   Topical BID    pantoprazole (PROTONIX) tablet 40 mg  40 mg Oral ACB    insulin lispro (HUMALOG) injection   SubCUTAneous AC&HS    levothyroxine (SYNTHROID) tablet 125 mcg  125 mcg Oral DAILY    amiodarone (CORDARONE) tablet 100 mg  100 mg Oral DAILY    ipratropium (ATROVENT) 0.02 % nebulizer solution 0.5 mg  0.5 mg Nebulization TID PRN    albuterol (PROVENTIL VENTOLIN) nebulizer solution 2.5 mg  2.5 mg Nebulization Q4H PRN    sodium chloride (NS) flush 5-40 mL  5-40 mL IntraVENous Q8H    sodium chloride (NS) flush 5-40 mL  5-40 mL IntraVENous PRN    acetaminophen (TYLENOL) tablet 650 mg  650 mg Oral Q6H PRN    Or    acetaminophen (TYLENOL) suppository 650 mg  650 mg Rectal Q6H PRN    [Held by provider] polyethylene glycol (MIRALAX) packet 17 g  17 g Oral DAILY PRN    ondansetron (ZOFRAN ODT) tablet 4 mg  4 mg Oral Q8H PRN    Or    ondansetron (ZOFRAN) injection 4 mg  4 mg IntraVENous Q6H PRN    glucose chewable tablet 16 g  4 Tablet Oral PRN    glucagon (GLUCAGEN) injection 1 mg  1 mg IntraMUSCular PRN    dextrose 10% infusion 0-250 mL  0-250 mL IntraVENous PRN         Review of Systems - General ROS: negative for - chills, fever or night sweats  Respiratory ROS: no cough, shortness of breath, or wheezing  Cardiovascular ROS: no chest pain or dyspnea on exertion  Gastrointestinal ROS: no abdominal pain, change in bowel habits, or black or bloody stools       Objective:    Visit Vitals  BP (!) 112/47   Pulse 77   Temp 97.6 °F (36.4 °C)   Resp 18   Ht 5' 7\" (1.702 m)   Wt 69.3 kg (152 lb 11.2 oz)   SpO2 98%   Breastfeeding No   BMI 23.92 kg/m²       Temp (24hrs), Av.8 °F (36.6 °C), Min:97.3 °F (36.3 °C), Max:98.4 °F (36.9 °C)      GEN: Thin WF in NAD  HEENT: anicteric  CHEST: CTA  CVS:RRR  ABD: NT NABS  EXT: no edema         Lab results:    Chemistry  Recent Labs     07/15/22  0354 07/14/22  0025 07/13/22  2030   * 192* 288*    140 140   K 3.6 3.3* 4.2    104 105   CO2 27 30 29   BUN 39* 41* 36*   CREA 0.54* 0.78 0.63   CA 9.6 9.8 9.9   AGAP 7 6 6   BUCR 72* 53* 57*   AP 60 67 71   TP 6.1* 6.8 6.7   ALB 3.9 4.7 5.0   GLOB 2.2 2.1 1.7*   AGRAT 1.8* 2.2* 2.9*       CBC w/ Diff  Recent Labs     07/15/22  0354 07/14/22  0025 07/13/22  2030   WBC 8.1 10.5 12.0   RBC 3.28* 3.59* 3.65*   HGB 9.6* 10.5* 10.6*   HCT 31.2* 34.6* 34.2*    455* 505*   GRANS 82* 83* 79*   LYMPH 2* 4* 4*   EOS 3 3 0       Microbiology  All Micro Results     Procedure Component Value Units Date/Time    COVID-19 RAPID TEST [[id number]] Collected: 07/15/22 1415    Order Status: Completed Updated: 07/15/22 1424    CULTURE, BLOOD [[id number]] Collected: 22    Order Status: Completed Specimen: Blood Updated: 07/15/22 0737     Special Requests: NO SPECIAL REQUESTS        Culture result: NO GROWTH 2 DAYS       CULTURE, BLOOD [[id number]] Collected: 07/13/22 1910    Order Status: Completed Specimen: Blood Updated: 07/15/22 0737     Special Requests: NO SPECIAL REQUESTS        Culture result: NO GROWTH 2 DAYS       CULTURE, BLOOD [[id number]] Collected: 22 0443    Order Status: Completed Specimen: Blood Updated: 22 0748     Special Requests: NO SPECIAL REQUESTS        Culture result: NO GROWTH 6 DAYS       CULTURE, BLOOD [[id number]] Collected: 22 1645    Order Status: Completed Specimen: Blood Updated: 22 0720     Special Requests: NO SPECIAL REQUESTS        Culture result: NO GROWTH 6 DAYS       CULTURE, BLOOD [[id number]] Collected: 07/03/22 1650    Order Status: Completed Specimen: Blood Updated: 07/09/22 0720     Special Requests: NO SPECIAL REQUESTS        Culture result: NO GROWTH 6 DAYS       ENTERIC BACTERIA PANEL, DNA [[id number]] Collected: 07/05/22 1455    Order Status: Completed Specimen: Stool Updated: 07/06/22 2026     Shigella species, DNA Negative        Campylobacter species, DNA Negative        Vibrio species, DNA Negative        Enterotoxigen E Coli, DNA Negative        Shiga toxin producing, DNA Negative        Salmonella species, DNA Negative        P. shigelloides, DNA Negative        Y. enterocolitica, DNA Negative       CULTURE, URINE [[id number]]  (Abnormal)  (Susceptibility) Collected: 07/03/22 1700    Order Status: Completed Specimen: Urine from Clean catch Updated: 07/06/22 1315     Special Requests: NO SPECIAL REQUESTS        [place] Count --        >100,000  COLONIES/mL       Culture result: ESCHERICHIA COLI       C. DIFFICILE AG & TOXIN A/B [[id number]]  (Abnormal) Collected: 07/05/22 1455    Order Status: Completed Specimen: Miscellaneous sample Updated: 07/06/22 1133     C. difficile toxin Negative        PCR Reflex       See Reflex order for C. difficile (DNA)           INTERPRETATION       Indeterminate for Toxigenic C. difficile, DNA confirmation to follow. Enid Adair DIFF MOLECULAR [[id number]] Collected: 07/05/22 1455    Order Status: Completed Specimen: Miscellaneous sample Updated: 07/06/22 1133     C Diff Molecular Negative        Comment: This specimen is negative for toxigenic C difficile by DNA amplification. Repeat testing is not recommended for confirmation, samples received within 7 days of this negative result will be rejected.        Rolando Carrillo, URINE [[id number]] Collected: 07/04/22 1247    Order Status: Completed Specimen: Urine, random Updated: 07/04/22 2030     Strep pneumo Ag, urine Negative LEGIONELLA PNEUMOPHILA AG, URINE [[id number]] Collected: 07/04/22 1247    Order Status: Completed Specimen: Urine, random Updated: 07/04/22 2030     Legionella Ag, urine Negative       RESPIRATORY VIRUS PANEL W/COVID-19, PCR [[id number]] Collected: 07/03/22 2110    Order Status: Completed Specimen: Nasopharyngeal Updated: 07/04/22 1229     Adenovirus Not detected        Coronavirus 229E Not detected        Coronavirus HKU1 Not detected        Coronavirus CVNL63 Not detected        Coronavirus OC43 Not detected        SARS-CoV-2, PCR Not detected        Metapneumovirus Not detected        Rhinovirus and Enterovirus Not detected        Influenza A Not detected        Influenza B Not detected        Parainfluenza 1 Not detected        Parainfluenza 2 Not detected        Parainfluenza 3 Not detected        Parainfluenza virus 4 Not detected        RSV by PCR Not detected        B. parapertussis, PCR Not detected        Bordetella pertussis - PCR Not detected        Chlamydophila pneumoniae DNA, QL, PCR Not detected        Mycoplasma pneumoniae DNA, QL, PCR Not detected       C. DIFFICILE AG & TOXIN A/B [[id number]]     Order Status: Canceled Specimen: Stool     COVID-19 RAPID TEST [[id number]] Collected: 07/03/22 1715    Order Status: Completed Specimen: Nasopharyngeal Updated: 07/03/22 1839     Specimen source Nasopharyngeal        COVID-19 rapid test Not detected        Comment: Rapid Abbott ID Now       Rapid NAAT:  The specimen is NEGATIVE for SARS-CoV-2, the novel coronavirus associated with COVID-19. Negative results should be treated as presumptive and, if inconsistent with clinical signs and symptoms or necessary for patient management, should be tested with an alternative molecular assay. Negative results do not preclude SARS-CoV-2 infection and should not be used as the sole basis for patient management decisions.        This test has been authorized by the FDA under an Emergency Use Authorization (EUA) for use by authorized laboratories. Fact sheet for Healthcare Providers: ConventionUpdate.co.nz  Fact sheet for Patients: ConventionUpdate.co.nz       Methodology: Isothermal Nucleic Acid Amplification         INFLUENZA A & B AG (RAPID TEST) [[id number]] Collected: 07/03/22 1715    Order Status: Completed Specimen: Nasopharyngeal from Nasal washing Updated: 07/03/22 1744     Influenza A Antigen Negative        Comment: A negative result does not exclude influenza virus infection, seasonal or H1N1 due to suboptimal sensitivity. If influenza is circulating in your community, a diagnosis of influenza should be considered based on a patients clinical presentation and empiric antiviral treatment should be considered, if indicated.         Influenza B Antigen Negative              Vaibhav Moran MD  Cell [phone]  [facility]   7/15/2022   2:52 PM

## 2022-07-15 NOTE — PROGRESS NOTES
Problem: Mobility Impaired (Adult and Pediatric)  Goal: *Acute Goals and Plan of Care (Insert Text)  Description: FUNCTIONAL STATUS PRIOR TO ADMISSION: Pt. Requires assistance for ADLs and Mod I with RW at baseline for short distances    [facility]: The patient lives with  who provides assistance as well as care aids who provide bathing once per week. Physical Therapy Goals  Initiated 7/9/2022  1. Patient will move from supine to sit and sit to supine  in bed with minimal assistance/contact guard assist within 7 day(s). 2.  Patient will transfer from bed to chair and chair to bed with minimal assistance/contact guard assist using the least restrictive device within 7 day(s). 3.  Patient will perform sit to stand with minimal assistance/contact guard assist within 7 day(s). 4.  Patient will ambulate with minimal assistance/contact guard assist for 50ft feet with the least restrictive device within 7 day(s). Note:   physical Therapy TREATMENT    Patient: Prudence Hernandez (3 y.o. female)  Date: 7/14/2022  Diagnosis: Neutropenic fever ([facility] [address] [D70.9, R50.81] Acute respiratory failure with hypoxia [facility])  Precautions: Fall   Chart, physical therapy assessment, plan of care and goals were reviewed. ASSESSMENT:  Pt progressing slowly with mobility. Pt required [address] [place] for bed mobility. Pt required Marylu for sitting balance and frequent verbal and tactile cues to limit posterior lean. Pt required MaxAx2 for stand transfers, x3 stands for ~10 seconds. Pt amb 3 side steps with MaxAx2 for RW management, balance and upright positioning. Pt performed LE there for strengthening with AROM, fatigues quickly. Pt assisted with return to supine with MaxA. Continue to recommend SNF placement at d/c.   Progression toward goals:  []      Improving appropriately and progressing toward goals  [x]      Improving slowly and progressing toward goals  []      Not making progress toward goals and plan of care will be adjusted     PLAN:  Patient continues to benefit from skilled intervention to address the above impairments. Continue treatment per established plan of care. Discharge Recommendations:  Alexei Neville  Further Equipment Recommendations for Discharge:  N/A     SUBJECTIVE:   Patient stated I am doing ok.     OBJECTIVE DATA SUMMARY:   Critical Behavior:  Neurologic State: Alert  Orientation Level: Oriented X4  Cognition: Decreased command following  Safety/Judgement: Fall prevention  Functional Mobility Training:  Bed Mobility:  ModA for supine to sit, MaxA for return to supine  Transfers:  MaxAx2 for stand transfers to RW  Ambulation/Gait Training:  Side steps with RW and MaxA  Pain:  Pain Scale 1: Numeric (0 - 10)  Pain Intensity 1: 0  Activity Tolerance:   Good  Please refer to the flowsheet for vital signs taken during this treatment.   After treatment:   [] Patient left in no apparent distress sitting up in chair  [x] Patient left in no apparent distress in bed  [x] Call bell left within reach  [x] Nursing notified  [] Caregiver present  [] Bed alarm activated      Leo Cone Titcomb   Time Calculation: 38 mins

## 2022-07-15 NOTE — PROGRESS NOTES
Hospitalist Progress Note-critical care note     Patient: Estuardo Al MRN: [id number]  CSN: [id number]    YOB: 1940  Age: 78 y.o. Sex: female    DOA: 7/3/2022 LOS:  LOS: 12 days            Chief complaint: septic shock. Neutropenic fever, lymphoma, gerd , dm    Assessment/Plan         Hospital Problems  Date Reviewed: 9/21/2012          Codes Class Noted POA    Debility ICD-10-CM: R53.81  ICD-9-CM: 799.3  7/14/2022 Unknown        Abscess of sigmoid colon due to diverticulitis ICD-10-CM: K57.20  ICD-9-CM: 562.11, 569.5  7/11/2022 Unknown        Septic shock ([facility] [address] ICD-10-CM: A41.9, R65.21  ICD-9-CM: 038.9, 785.52, 995.92  7/4/2022 Yes        Neutropenic fever ([facility] [address] ICD-10-CM: D70.9, R50.81  ICD-9-CM: 288.00, 780.61  7/3/2022 Yes        * (Principal) Acute respiratory failure with hypoxia (HCC) ICD-10-CM: J96.01  ICD-9-CM: 518.81  7/3/2022 Yes        Hypomagnesemia ICD-10-CM: E83.42  ICD-9-CM: 275.2  7/3/2022 Yes        Diffuse large B cell lymphoma (HCC) ICD-10-CM: C83.30  ICD-9-CM: 202.80  7/3/2022 Yes        GERD (gastroesophageal reflux disease) ICD-10-CM: K21.9  ICD-9-CM: 530.81  Unknown Yes        Thyroid disease ICD-10-CM: E07.9  ICD-9-CM: 246. 9  Unknown Unknown        Acute encephalopathy ICD-10-CM: G93.40  ICD-9-CM: 348.30  Unknown Yes        Hyponatremia ICD-10-CM: E87.1  ICD-9-CM: 276.1  7/3/2022 Yes        Pancytopenia ([facility] [address] ICD-10-CM: R41.008  ICD-9-CM: 284.19  7/3/2022 Yes        UTI (urinary tract infection) ICD-10-CM: N39.0  ICD-9-CM: 599.0  7/3/2022 Yes        Sinusitis ICD-10-CM: J32.9  ICD-9-CM: 473.9  7/3/2022 Unknown        Myopia of both eyes with astigmatism ICD-10-CM: H52.13, H52.203  ICD-9-CM: 367.1, 367.20  7/3/2022 Unknown        Pleural effusion ICD-10-CM: J90  ICD-9-CM: 511.9  7/3/2022 Unknown        Cellulitis ICD-10-CM: L03.90  ICD-9-CM: 682.9  7/3/2022 Yes        Severe protein-calorie malnutrition ([address] ICD-10-CM: E43  ICD-9-CM: 918  7/3/2022 Yes        Hypokalemia ICD-10-CM: E87.6  ICD-9-CM: 276.8  9/21/2012 Yes              Pt was admitted for respiratory failure and neutropenic fever     Acute respiratory failure with hypoxia with 6 L oxygen on admission  Improving down to 4 L   Flu negative, rapid covid 19 negative -confirmed per pcr   cta no central PE        Pleural effusion   Moderate and large effusion-1.4 L removed       Cellulitis of rt leg, neutropenia fever , acute on chronic sinusitis , UTI-resolved      Abdominal abscess : surgeon and ID f/u IR consulted for drain-recommend medical tx   On zosyn now-id f/u repeated scan decrease size   Blood cx negative      paf :  amiodorone and digoxin -change to po dig -discussed with Dr. Karo Vazquez -ok to dc   Cardiologist f/u   Continue balance electrolytes and echo : ef wnl   Need full AC, lovenox hold for now for procedure -balance K and mg   K replacement     Septic shock -resolved     Pancytopenia resolved   Due to chemo   lovenox-will restart it tonight   neutropenic fever :resolved      Diffusion Large B lymphoma   On chemo therapy   Oncologist f/u             Hyponatremia , hypomagnesemia resolved ,   hypokalemia -K replacement          DM type II   Ssi , hypoglycemia protocol      hypothyroidism   Continue synthyroid         Acute encephalopath  Resolved and back to her baseline      Myopia       Severe malnutrition      Hypoalbuminemia   Resolved       Subjective I am fine       Was at the bedside. He reported pt has good appetite today and he was helping her and told me the food is  Very good     Planning dc to rehab tomorrow if cleared per ID   Review of systems:    General : no fever /chills .   Lung no sob , no wheezing  Heart : no chest pain, no palpitation   Gi : no n/v , no  abdomen pain     Visit Vitals  BP (!) 112/47   Pulse 77   Temp 97.6 °F (36.4 °C)   Resp 18   Ht 5' 7\" (1.702 m)   Wt 69.3 kg (152 lb 11.2 oz)   SpO2 98%   Breastfeeding No   BMI 23.92 kg/m²     Current Shift:  No intake/output data recorded. Last three shifts:  07/13 1901 - 07/15 0700  In: 1040 [P.O.:840; I.V.:200]  Out: 2175 [Urine:2175]    Physical Exam:  General: WD, WN. Alert,  cooperative,   HEENT: NC, Atraumatic. PERRLA, anicteric sclerae. Lungs:  cta bilateral   Heart:  Regular  rhythm,  No murmur, No Rubs, No Gallops  Abdomen: Soft, Non distended, no tender. +Bowel sounds,    Extremities: No c/c/e   Psych:   Not anxious or agitated. Neurologic:  No acute neuro deficit, answer questions-but slow           Labs: Results:       Chemistry Recent Labs     07/15/22  0354 07/14/22  0025 07/13/22  2030   * 192* 288*    140 140   K 3.6 3.3* 4.2    104 105   CO2 27 30 29   BUN 39* 41* 36*   CREA 0.54* 0.78 0.63   CA 9.6 9.8 9.9   AGAP 7 6 6   BUCR 72* 53* 57*   AP 60 67 71   TP 6.1* 6.8 6.7   ALB 3.9 4.7 5.0   GLOB 2.2 2.1 1.7*   AGRAT 1.8* 2.2* 2.9*      CBC w/Diff Recent Labs     07/15/22  0354 07/14/22  0025 07/13/22  2030   WBC 8.1 10.5 12.0   RBC 3.28* 3.59* 3.65*   HGB 9.6* 10.5* 10.6*   HCT 31.2* 34.6* 34.2*    455* 505*   GRANS 82* 83* 79*   LYMPH 2* 4* 4*   EOS 3 3 0      Cardiac Enzymes No results for input(s): CPK, CKND1, HUMBERTO in the last 72 hours. No lab exists for component: CKRMB, TROIP   Coagulation No results for input(s): PTP, INR, APTT, INREXT, INREXT in the last 72 hours. Lipid Panel No results found for: CHOL, CHOLPOCT, CHOLX, CHLST, CHOLV, 409050, HDL, HDLP, LDL, LDLC, DLDLP, 174662, VLDLC, VLDL, TGLX, TRIGL, TRIGP, TGLPOCT, CHHD, CHHDX   BNP No results for input(s): BNPP in the last 72 hours.    Liver Enzymes Recent Labs     07/15/22  0354   TP 6.1*   ALB 3.9   AP 60      Thyroid Studies Lab Results   Component Value Date/Time    TSH 3.50 07/04/2022 04:40 AM        Procedures/imaging: see electronic medical records for all procedures/Xrays and details which were not copied into this note but were reviewed prior to creation of Plan    XR ANKLE RT MIN 3 V    Result Date: 7/3/2022  EXAM: ANKLE SERIES Indication: Swelling Technique: Frontal, , oblique, and lateral views of the right ankle. Comparison studies: None. _______________ FINDINGS: -OSSEOUS: No acute or destructive osseous abnormality. Tibiotalar degenerative changes with small degenerative plantar calcaneal spur. Normal alignment with preserved ankle mortise. . -SOFT TISSUES: Benign calcifications of the lower leg with mild diffuse edema of the distal leg to visualized midfoot. . No significant joint effusion. _____________    1. Nonspecific soft tissue edema, with no acute or destructive osseous abnormality. CT HEAD WO CONT    Result Date: 7/3/2022  EXAM: CT HEAD WO CONT CLINICAL INDICATION/HISTORY: ams -Additional: None COMPARISON: None TECHNIQUE: Axial CT imaging of the head was performed without intravenous contrast. One or more dose reduction techniques were used on this CT: automated exposure control, adjustment of the mAs and/or kVp according to patient size, and iterative reconstruction techniques. The specific techniques used on this CT exam have been documented in the patient's electronic medical record. Digital Imaging and Communications in Medicine (DICOM) format image data are available to nonaffiliated external healthcare facilities or entities on a secure, media free, reciprocally searchable basis with patient authorization for at least a 12-month period after this study. _______________ FINDINGS: BRAIN:   > Brain volume: Age appropriate.   > White matter: Little or no white matter disease. > Infarcts, encephalomalacia: None.   > Parenchymal mass: None.   > Parenchymal hemorrhage: None.   > Midline shift: None.   > Miscellaneous: None. EXTRA-AXIAL SPACES: Unremarkable. No fluid collections. CALVARIUM: Intact. SINUSES, MASTOIDS: Complete opacified left sphenoethmoidal air cells with mild mucosal thickening on the right and small left anterior frontal ethmoidal opacities.  OTHER EXTRACRANIAL: Asymmetric dense right globe with findings of prior bilateral cataract surgery. _______________     1. No CT findings of an acute intracranial abnormality. Please note that noncontrast head CT may be normal in early acute infarct. 2. Chronic left sphenoethmoidal sinus disease, with questional small left frontoethmoidal air-fluid level potentially acute on chronic sinus disease. 3. Asymmetric hyperdense right globe. Recommend correlation with patient ocular history. CTA CHEST W OR W WO CONT    Result Date: 7/3/2022  EXAM: CTA Chest INDICATION: Hypoxic. COMPARISON: No recent exams for direct comparison, most recent study from 9/21/2012. TECHNIQUE: Axial CT imaging from the thoracic inlet through the diaphragm with intravenous contrast. Coronal and sagittal MIP reformats were generated. One or more dose reduction techniques were used on this CT: automated exposure control, adjustment of the mAs and/or kVp according to patient size, and iterative reconstruction techniques. The specific techniques used on this CT exam have been documented in the patient's electronic medical record. Digital Imaging and Communications in Medicine (DICOM) format image data are available to nonaffiliated external healthcare facilities or entities on a secure, media free, reciprocally searchable basis with patient authorization for at least a 12-month period after this study. _______________ FINDINGS: EXAM QUALITY: Adequate bolus timing with mild diffuse respiratory motion artifact degradation. Lisa Salen PULMONARY ARTERIES: No evidence of central, interlobar or mid to proximal segmental branch pulmonary arterial filling defect. Asymmetric motion degradation involving the lower lobes. Normal sized main pulmonary artery. Pulmonary embolism. MEDIASTINUM: Normal heart size without pericardial effusion. Coronary artery and aortic atherosclerosis. A right upper chest Mediport is present with the catheter in the SVC. Moderate-sized hiatus hernia.  LUNGS and PLEURA: Moderate to large left low attenuating layering pleural effusion. Marked diffuse emphysematous changes with chronic areas of scarring and atelectasis. Bilateral lower lobe groundglass, difficult to differentiate between dependent changes, atelectasis and/or edema. . AIRWAY: Normal. LYMPH NODES: No enlarged nodes. UPPER ABDOMEN: Multiple splenic hypodensities, largest anteriorly measuring 3 cm. Overall normal splenic size measuring 11.3 cm in AP dimension. Prior cholecystectomy OTHER: No acute or aggressive osseous abnormalities identified. _______________     1. Respiratory motion degradation. Otherwise, No evidence of central pulmonary embolism. 2.  Interstitial and groundglass changes suggestive of underlying edema, with moderate to large left-sided pleural effusion. 3. Multiple Indeterminate splenic hypodensities, recommend comparison to any prior recent outside exams and patient history. 4. Moderate-sized hiatus hernia. XR CHEST PORT    Result Date: 7/3/2022  EXAM: XR CHEST PORT CLINICAL INDICATION/HISTORY: ams fever -Additional: None COMPARISON: None TECHNIQUE: Frontal view of the chest _______________ FINDINGS: SUPPORT LINES AND TUBES:Right-sided tunneled Mediport and catheter tip over the distal SVC. HEART AND MEDIASTINUM: Midline cardiac silhouette appreciable cardiomegaly. Hilar vascular congestion and cephalization. LUNGS AND PLEURAL SPACES: Diffuse bilateral hazy indistinct interstitial opacities with retrocardiac left basilar confluent opacity and blunted costophrenic angle/effusion. No significant right-sided pleural effusion. No pneumothorax. BONY THORAX AND SOFT TISSUES: No acute or destructive osseous abnormality. _______________     1. Findings of CHF versus fluid overload with edema and left pleural effusion.       Yoli Perrin MD

## 2022-07-15 NOTE — PROGRESS NOTES
Hematology / Oncology Progress Note    Impression:   Principal Problem:    Acute respiratory failure with hypoxia ([address] (7/3/2022)    Active Problems:    Hypokalemia (9/21/2012)      Neutropenic fever ([address] (7/3/2022)      Hypomagnesemia (7/3/2022)      Diffuse large B cell lymphoma (HCC) (7/3/2022)      GERD (gastroesophageal reflux disease) ()      Thyroid disease ()      Acute encephalopathy ()      Hyponatremia (7/3/2022)      Pancytopenia ([address] (7/3/2022)      UTI (urinary tract infection) (7/3/2022)      Sinusitis (7/3/2022)      Myopia of both eyes with astigmatism (7/3/2022)      Pleural effusion (7/3/2022)      Cellulitis (7/3/2022)      Severe protein-calorie malnutrition ([address] (7/3/2022)      Septic shock ([address] (7/4/2022)      Abscess of sigmoid colon due to diverticulitis (7/11/2022)      Debility (7/14/2022)        Sepsis/Fluid Collection: Appreciate GenSurg and IR input. No plan for surgical intervention given poor ECOG/small abscess and location of abscess makes it difficult for drainage catheter placement. Vitals within normal. WBC normal. Plan for continued medical management. Followed by ID. Broadened to Zosyn    Pleural Effusion: CXR 7/12/22 with ongoing L Moderate pleural effusion. S/p L Thoracentesis on 7/13 with 1.4L removed. Will follow up cytology. Diffuse Large B Cell Lymphoma -  Completed chemotherapy with R-CHOP on 6/28/22. CT Chest on 7/3/22 and CT Abdomen/Pelvis on 7/11/22 with no lymphadenopathy, suggestive of possible cure of her lymphoma. Will confirm this with PET/CT as outpatient, but overall prognosis from lymphoma standpoint is good. Anemia - dt/ chemotherapy. Hg 9.6, overall stable    Hx of AFib    Weakness - dt/ chemotherapy + illness.  PT/OT with rec for SNF    Plan:     Continued medical management for abscess  Continue PT/OT  Further care per primary team  PET/CT as outpatient  Will continue to follow    Rich Prieto MD  [facility]      Subjective:     No acute overnight events  Satting 98% on 4L- asked nursing to titrate if possible  Reports feeling better    Hospital Medications:     Current Facility-Administered Medications   Medication Dose Route Frequency Provider Last Rate Last Admin    piperacillin-tazobactam (ZOSYN) 3.375 g in 0.9% sodium chloride (MBP/ADV) 100 mL MBP  3.375 g IntraVENous Q6H Cathy Cervantes  mL/hr at 07/15/22 0207 3.375 g at 07/15/22 0207    enoxaparin (LOVENOX) injection 50 mg  1 mg/kg SubCUTAneous Q12H Maile Garg MD   50 mg at 07/14/22 2213    prednisoLONE acetate (PRED FORTE) 1 % ophthalmic suspension 1 Drop  1 Drop Both Eyes BID Nile Morris MD   1 Drop at 07/14/22 2008    fluticasone propionate (FLONASE) 50 mcg/actuation nasal spray 2 Spray  2 Spray Both Nostrils DAILY Nile Morris MD   2 [place] at 07/14/22 0852    digoxin (LANOXIN) injection 100 mcg  100 mcg IntraVENous DAILY Elmo VILLELA MD   100 mcg at 07/14/22 0851    lidocaine-prilocaine (EMLA) 2.5-2.5 % cream   Topical PRN Delia Rooney MD        nystatin (MYCOSTATIN) 100,000 unit/gram powder   Topical BID Rozina Bhakta MD   Given at 07/14/22 2009    pantoprazole (PROTONIX) tablet 40 mg  40 mg Oral ACB Maile Garg MD   40 mg at 07/14/22 0851    furosemide (LASIX) injection 20 mg  20 mg IntraVENous Q12H Delia Rooney MD   20 mg at 07/14/22 2007    insulin lispro (HUMALOG) injection   SubCUTAneous AC&HS Maile Garg MD   3 Units at 07/14/22 2214    levothyroxine (SYNTHROID) tablet 125 mcg  125 mcg Oral DAILY Shawna Lemons MD   125 mcg at 07/15/22 0513    amiodarone (CORDARONE) tablet 100 mg  100 mg Oral DAILY Shawna Lemons MD   100 mg at 07/14/22 1201    ipratropium (ATROVENT) 0.02 % nebulizer solution 0.5 mg  0.5 mg Nebulization TID PRN Shawna Lemons MD   0.5 mg at 07/07/22 2021    albuterol (PROVENTIL VENTOLIN) nebulizer solution 2.5 mg  2.5 mg Nebulization Q4H PRN Clementina Rossi MD   2.5 mg at 07/10/22 1504    sodium chloride (NS) flush 5-40 mL  5-40 mL IntraVENous Q8H Hetal Bacon MD   10 mL at 07/15/22 0513    sodium chloride (NS) flush 5-40 mL  5-40 mL IntraVENous PRN Hetal Bacon MD        acetaminophen (TYLENOL) tablet 650 mg  650 mg Oral Q6H PRN Hetal Bacon MD   650 mg at 22 1814    Or    acetaminophen (TYLENOL) suppository 650 mg  650 mg Rectal Q6H PRN Hetal Bacon MD        [Held by provider] polyethylene glycol (MIRALAX) packet 17 g  17 g Oral DAILY PRN Hetal Bacon MD        ondansetron (ZOFRAN ODT) tablet 4 mg  4 mg Oral Q8H PRN Hetal Bacon MD        Or    ondansetron [facility]) injection 4 mg  4 mg IntraVENous Q6H PRN Hetal Bacon MD   4 mg at 22 1052    glucose chewable tablet 16 g  4 Tablet Oral PRN Hetal Bacon MD        glucagon [facility]) injection 1 mg  1 mg IntraMUSCular PRN Hetal Bacon MD        dextrose 10% infusion 0-250 mL  0-250 mL IntraVENous PRN Hetal Bacon MD           Review of Systems:   Constitutional:  Limited ROS, but denies fever or pain.       Visit Vitals  BP (!) 151/58   Pulse 73   Temp 97.3 °F (36.3 °C)   Resp 22   Ht 5' 7\" (1.702 m)   Wt 69.3 kg (152 lb 11.2 oz)   SpO2 97%   Breastfeeding No   BMI 23.92 kg/m²       Temp (24hrs), Av.8 °F (36.6 °C), Min:97.3 °F (36.3 °C), Max:98.4 °F (36.9 °C)      General: no acute distress and laying in bed, comfortable  HEENT: no icterus, no oral lesions  Lym: no cervical or supraclavicular adenopathy  CV: rate is regular and normal and their is no murmur  Lung: clear to auscultation, no increased work of breathing  Abd: nontender, no organomegaly  Neuro: cnoversant and moving extremities  MSK: no sarcopenia, normal tone  Derm: no rash  Psych: normal affect  Per: warm, no edema    Labs:  Recent Labs     07/15/22  0354 22  0025 22  2030   WBC 8.1 10.5 12.0   RBC 3.28* 3.59* 3.65*   HCT 31.2* 34.6* 34.2*   MCV 95.1 96.4 93.7   MCH 29.3 29.2 29.0   MCHC 30.8* 30.3* 31.0   RDW 30.3* 31.2* 30.8*       Recent Labs     07/15/22  035 07/14/22  0025 07/13/22  2030   CO2 27 30 29   BUN 39* 41* 36*       No results for input(s): ALBUMIN in the last 72 hours.     No lab exists for component: [facility]

## 2022-07-15 NOTE — PROGRESS NOTES
Transition of care to SNF: [address] [place] [address],[address] 07/16/22 at 1300. Pt's spouse and Dr. Meghna Hood are aware of the transport time and are in agreement. Communication to Patient/Family:  Met with patient and family, and they are agreeable to the transition plan. The Plan for Transition of Care is related to the following treatment goals: SNF prior to d/c home. The Patient and/or patient representative Spouse was provided with a choice of provider and agrees   with the discharge plan. Yes [x] No []    Freedom of choice list was provided with basic dialogue that supports the patient's individualized plan of care/goals and shares the quality data associated with the providers. Yes [x] No []    SNF/Rehab Transition:  Patient has been accepted to [address] [place] [address],[address] and meets criteria for admission. Patient will transported by [facility] and expected to leave at 07/16/22 at 1300. Communication to SNF/Rehab:  Bedside RN, , has been notified to update the transition plan to the facility and call report [phone]    Discharge information has been updated on the AVS and sent via [facility] and/or CC link. Discharge instructions to be fax'd to facility at [phone]     Please include all hard scripts for controlled substances, med rec and dc summary, and AVS in packet. Please medicate for pain prior to dc if possible and needed to help offset delay when patient first arrives to facility. Reviewed and confirmed with facility, [facility] of , can manage the patient care needs for the following:     Lary Laughlin with (X) only those applicable:  Medication:  []Medications are available at the facility  []IV Antibiotics    []Controlled Substance - hard copies available sent.   []Weekly Labs    Equipment:  []CPAP/BiPAP  []Wound Vacuum  []Lucero or Urinary Device  []PICC/Central Line  []Nebulizer  []Ventilator    Treatment:  []Isolation (for MRSA, VRE, etc.)  []Surgical Drain Management  []Tracheostomy Care  []Dressing Changes  []Dialysis with transportation  []PEG Care  []Oxygen  []Daily Weights for Heart Failure    Dietary:  []Any diet limitations  []Tube Feedings   []Total Parenteral Management (TPN)    Financial Resources:  []Medicaid Application Completed    []UAI Completed and copy given to pt/family. []A screening has previously been completed. []Level II Completed    [] Private pay individual who will not become financially eligible for Medicaid within 6 months from admission to a [address] [place] [address] facility. [] Individual refused to have screening conducted. []Medicaid Application Completed    []The screening denied because it was determined individual did not need/did not qualify for nursing facility level of care. [] Out of state residents seeking direct admission to a [address] facility. [] Individuals who are inpatients of an out of state hospital, or in state or out of state veterans/ hospital and seek direct admission to a [address] facility  [] Individuals who are pateints or residents of a state owned/operated facility that is licensed by Department of [employer] ([employer]) and seek direct admission to [address] [place] [address]  [] A screening not required for enrollment in [facility] services as set out in [facility]-  [] [facility]) staff shall perform screenings of the [facility] clients. Advanced Care Plan:  []Surrogate Decision Maker of Care  []POA  []Communicated Code Status and copy sent.     Other:

## 2022-07-15 NOTE — PROGRESS NOTES
No acute changes in pt status. Education provided, reinforcement necessary.  called as requested by Pt and updated on care. Will cont to monitor.

## 2022-07-16 VITALS
WEIGHT: 152.7 LBS | BODY MASS INDEX: 23.97 KG/M2 | OXYGEN SATURATION: 97 % | DIASTOLIC BLOOD PRESSURE: 67 MMHG | TEMPERATURE: 97.7 F | HEART RATE: 79 BPM | RESPIRATION RATE: 16 BRPM | SYSTOLIC BLOOD PRESSURE: 118 MMHG | HEIGHT: 67 IN

## 2022-07-16 LAB
ALBUMIN SERPL-MCNC: 3.6 G/DL (ref 3.4–5)
ALBUMIN/GLOB SERPL: 1.9 {RATIO} (ref 0.8–1.7)
ALP SERPL-CCNC: 57 U/L (ref 45–117)
ALT SERPL-CCNC: 9 U/L (ref 13–56)
ANION GAP SERPL CALC-SCNC: 5 MMOL/L (ref 3–18)
AST SERPL-CCNC: 9 U/L (ref 10–38)
BASOPHILS # BLD: 0.1 K/UL (ref 0–0.1)
BASOPHILS NFR BLD: 2 % (ref 0–2)
BILIRUB SERPL-MCNC: 0.3 MG/DL (ref 0.2–1)
BUN SERPL-MCNC: 31 MG/DL (ref 7–18)
BUN/CREAT SERPL: 78 (ref 12–20)
CALCIUM SERPL-MCNC: 9.1 MG/DL (ref 8.5–10.1)
CHLORIDE SERPL-SCNC: 105 MMOL/L (ref 100–111)
CO2 SERPL-SCNC: 29 MMOL/L (ref 21–32)
CREAT SERPL-MCNC: 0.4 MG/DL (ref 0.6–1.3)
DIFFERENTIAL METHOD BLD: ABNORMAL
EOSINOPHIL # BLD: 0.2 K/UL (ref 0–0.4)
EOSINOPHIL NFR BLD: 3 % (ref 0–5)
ERYTHROCYTE [DISTWIDTH] IN BLOOD BY AUTOMATED COUNT: 30.1 % (ref 11.6–14.5)
GLOBULIN SER CALC-MCNC: 1.9 G/DL (ref 2–4)
GLUCOSE BLD STRIP.AUTO-MCNC: 153 MG/DL (ref 70–110)
GLUCOSE BLD STRIP.AUTO-MCNC: 182 MG/DL (ref 70–110)
GLUCOSE SERPL-MCNC: 127 MG/DL (ref 74–99)
HCT VFR BLD AUTO: 30.2 % (ref 35–45)
HGB BLD-MCNC: 9.2 G/DL (ref 12–16)
IMM GRANULOCYTES # BLD AUTO: 0 K/UL
IMM GRANULOCYTES NFR BLD AUTO: 0 %
LYMPHOCYTES # BLD: 0.5 K/UL (ref 0.9–3.6)
LYMPHOCYTES NFR BLD: 7 % (ref 21–52)
MAGNESIUM SERPL-MCNC: 1.9 MG/DL (ref 1.6–2.6)
MCH RBC QN AUTO: 29.1 PG (ref 24–34)
MCHC RBC AUTO-ENTMCNC: 30.5 G/DL (ref 31–37)
MCV RBC AUTO: 95.6 FL (ref 78–100)
METAMYELOCYTES NFR BLD MANUAL: 1 %
MONOCYTES # BLD: 0.3 K/UL (ref 0.05–1.2)
MONOCYTES NFR BLD: 4 % (ref 3–10)
NEUTS SEG # BLD: 5.7 K/UL (ref 1.8–8)
NEUTS SEG NFR BLD: 83 % (ref 40–73)
NRBC # BLD: 0 K/UL (ref 0–0.01)
NRBC BLD-RTO: 0 PER 100 WBC
PHOSPHATE SERPL-MCNC: 3.1 MG/DL (ref 2.5–4.9)
PLATELET # BLD AUTO: 386 K/UL (ref 135–420)
PLATELET COMMENTS,PCOM: ABNORMAL
PMV BLD AUTO: 9.2 FL (ref 9.2–11.8)
POTASSIUM SERPL-SCNC: 3.9 MMOL/L (ref 3.5–5.5)
PROT SERPL-MCNC: 5.5 G/DL (ref 6.4–8.2)
RBC # BLD AUTO: 3.16 M/UL (ref 4.2–5.3)
RBC MORPH BLD: ABNORMAL
RBC MORPH BLD: ABNORMAL
SODIUM SERPL-SCNC: 139 MMOL/L (ref 136–145)
WBC # BLD AUTO: 6.9 K/UL (ref 4.6–13.2)

## 2022-07-16 PROCEDURE — 74011636637 HC RX REV CODE- 636/637: Performed by: HOSPITALIST

## 2022-07-16 PROCEDURE — 74011250636 HC RX REV CODE- 250/636: Performed by: INTERNAL MEDICINE

## 2022-07-16 PROCEDURE — 84100 ASSAY OF PHOSPHORUS: CPT

## 2022-07-16 PROCEDURE — 74011000258 HC RX REV CODE- 258: Performed by: INTERNAL MEDICINE

## 2022-07-16 PROCEDURE — 77010033678 HC OXYGEN DAILY

## 2022-07-16 PROCEDURE — 83735 ASSAY OF MAGNESIUM: CPT

## 2022-07-16 PROCEDURE — 85025 COMPLETE CBC W/AUTO DIFF WBC: CPT

## 2022-07-16 PROCEDURE — 36415 COLL VENOUS BLD VENIPUNCTURE: CPT

## 2022-07-16 PROCEDURE — 74011250637 HC RX REV CODE- 250/637: Performed by: HOSPITALIST

## 2022-07-16 PROCEDURE — 80053 COMPREHEN METABOLIC PANEL: CPT

## 2022-07-16 PROCEDURE — 82962 GLUCOSE BLOOD TEST: CPT

## 2022-07-16 PROCEDURE — 74011000250 HC RX REV CODE- 250: Performed by: HOSPITALIST

## 2022-07-16 PROCEDURE — 74011250637 HC RX REV CODE- 250/637: Performed by: INTERNAL MEDICINE

## 2022-07-16 RX ORDER — DIGOXIN 125 MCG
0.12 TABLET ORAL DAILY
Qty: 30 TABLET | Refills: 0 | Status: SHIPPED
Start: 2022-07-17

## 2022-07-16 RX ORDER — PREDNISOLONE ACETATE 10 MG/ML
1 SUSPENSION/ DROPS OPHTHALMIC 2 TIMES DAILY
Qty: 5 ML | Refills: 0 | Status: SHIPPED
Start: 2022-07-16

## 2022-07-16 RX ORDER — ALBUTEROL SULFATE 0.83 MG/ML
2.5 SOLUTION RESPIRATORY (INHALATION)
Qty: 30 NEBULE | Refills: 0 | Status: SHIPPED
Start: 2022-07-16

## 2022-07-16 RX ORDER — FUROSEMIDE 20 MG/1
20 TABLET ORAL DAILY
Qty: 30 TABLET | Refills: 0 | Status: SHIPPED
Start: 2022-07-16

## 2022-07-16 RX ORDER — FLUTICASONE PROPIONATE 50 MCG
2 SPRAY, SUSPENSION (ML) NASAL DAILY
Qty: 1 EACH | Refills: 0 | Status: SHIPPED
Start: 2022-07-16

## 2022-07-16 RX ORDER — PANTOPRAZOLE SODIUM 40 MG/1
40 TABLET, DELAYED RELEASE ORAL
Qty: 30 TABLET | Refills: 0 | Status: SHIPPED
Start: 2022-07-17 | End: 2022-07-16

## 2022-07-16 RX ORDER — IPRATROPIUM BROMIDE 0.5 MG/2.5ML
0.5 SOLUTION RESPIRATORY (INHALATION)
Qty: 2.5 ML | Refills: 0 | Status: SHIPPED
Start: 2022-07-16

## 2022-07-16 RX ORDER — AMOXICILLIN AND CLAVULANATE POTASSIUM 875; 125 MG/1; MG/1
1 TABLET, FILM COATED ORAL EVERY 12 HOURS
Status: DISCONTINUED | OUTPATIENT
Start: 2022-07-16 | End: 2022-07-16 | Stop reason: HOSPADM

## 2022-07-16 RX ORDER — AMOXICILLIN AND CLAVULANATE POTASSIUM 875; 125 MG/1; MG/1
1 TABLET, FILM COATED ORAL EVERY 12 HOURS
Qty: 14 TABLET | Refills: 0 | Status: SHIPPED
Start: 2022-07-16 | End: 2022-10-20

## 2022-07-16 RX ADMIN — PANTOPRAZOLE SODIUM 40 MG: 40 TABLET, DELAYED RELEASE ORAL at 09:01

## 2022-07-16 RX ADMIN — AMOXICILLIN AND CLAVULANATE POTASSIUM 1 TABLET: 875; 125 TABLET, FILM COATED ORAL at 09:01

## 2022-07-16 RX ADMIN — NYSTATIN: 100000 POWDER TOPICAL at 09:02

## 2022-07-16 RX ADMIN — Medication 3 UNITS: at 12:14

## 2022-07-16 RX ADMIN — SODIUM CHLORIDE, PRESERVATIVE FREE 10 ML: 5 INJECTION INTRAVENOUS at 06:45

## 2022-07-16 RX ADMIN — LEVOTHYROXINE SODIUM 125 MCG: 0.1 TABLET ORAL at 06:25

## 2022-07-16 RX ADMIN — APIXABAN 5 MG: 5 TABLET, FILM COATED ORAL at 09:11

## 2022-07-16 RX ADMIN — FLUTICASONE PROPIONATE 2 SPRAY: 50 SPRAY, METERED NASAL at 09:02

## 2022-07-16 RX ADMIN — DIGOXIN 0.12 MG: 125 TABLET ORAL at 09:01

## 2022-07-16 RX ADMIN — Medication 3 UNITS: at 09:01

## 2022-07-16 RX ADMIN — AMIODARONE HYDROCHLORIDE 100 MG: 200 TABLET ORAL at 12:14

## 2022-07-16 RX ADMIN — FUROSEMIDE 20 MG: 20 TABLET ORAL at 09:01

## 2022-07-16 RX ADMIN — PIPERACILLIN AND TAZOBACTAM 3.38 G: 3; .375 INJECTION, POWDER, LYOPHILIZED, FOR SOLUTION INTRAVENOUS at 03:03

## 2022-07-16 RX ADMIN — PREDNISOLONE ACETATE 1 DROP: 10 SUSPENSION/ DROPS OPHTHALMIC at 09:02

## 2022-07-16 NOTE — PROGRESS NOTES
Cardiology Progress Note        Patient: Estuardo Al        Sex: female          DOA: 7/3/2022  YOB: 1940      Age:  78 y.o.        LOS:  LOS: 12 days      Patient seen and examined, chart reviewed. Assessment/Plan     Patient Active Problem List   Diagnosis Code    S/P total knee arthroplasty Z96.659    Hypokalemia E87.6    HTN (hypertension), benign I10    Neutropenic fever ([address] D70.9, R50.81    Acute respiratory failure with hypoxia ([address] J96.01    Hypomagnesemia E83.42    Diffuse large B cell lymphoma (HCC) C83.30    GERD (gastroesophageal reflux disease) K21.9    Thyroid disease E07.9    Acute encephalopathy G93.40    Hyponatremia E87.1    Pancytopenia ([address] D61.818    UTI (urinary tract infection) N39.0    Sinusitis J32.9    Myopia of both eyes with astigmatism H52.13, H52.203    Pleural effusion J90    Cellulitis L03.90    Severe protein-calorie malnutrition (HCC) E43    Septic shock (HCC) A41.9, R65.21    Abscess of sigmoid colon due to diverticulitis K57.20    Debility R53.81        Paroxysmal atrial fibrillation        Echocardiogram revealed       Left Ventricle: Normal left ventricular systolic function with a visually estimated EF of 60 - 65%. Left ventricle size is normal. Normal wall thickness. Normal wall motion. Indeterminate diastolic function due to atrial fibrillation.   Tricuspid Valve: The estimated PASP is 41 mmHg. Plan:    Continue Amiodarone   Continue digoxin   Continue anticoagulation  Monitor and replace electrolytes as per hospitalist  Cardiology sign off               Subjective:    cc:  Denies any chest pain or shortness of breath      REVIEW OF SYSTEMS:     General: No fevers or chills. Cardiovascular: Positive shortness of breath, No chest pain,No palpitations, No orthopnea, No PND, No leg swelling, No claudication  Pulmonary: No dyspnea.    Gastrointestinal: No nausea, vomiting, bleeding  Neurology: No Dizziness    Objective:      Visit Vitals  BP (!) 105/49   Pulse 73   Temp 98.2 °F (36.8 °C)   Resp 18   Ht 5' 7\" (1.702 m)   Wt 69.3 kg (152 lb 11.2 oz)   SpO2 95%   Breastfeeding No   BMI 23.92 kg/m²     Body mass index is 23.92 kg/m². Physical Exam:  General Appearance: Looks chronically ill, Comfortable, not using accessory muscles of respiration. HEENT: MICHELINE. HEAD: Atraumatic  NECK: No JVD, no thyroidomeglay. CAROTIDS: No bruit  LUNGS: Clear bilaterally. HEART: S1+S2 audible, no murmur, no pericardial rub.    NEUROLOGICAL: Alert, follows verbal commands    Medication:  Current Facility-Administered Medications   Medication Dose Route Frequency    [START ON 7/16/2022] furosemide (LASIX) tablet 20 mg  20 mg Oral DAILY    [START ON 7/16/2022] digoxin (LANOXIN) tablet 0.125 mg  0.125 mg Oral DAILY    piperacillin-tazobactam (ZOSYN) 3.375 g in 0.9% sodium chloride (MBP/ADV) 100 mL MBP  3.375 g IntraVENous Q6H    enoxaparin (LOVENOX) injection 50 mg  1 mg/kg SubCUTAneous Q12H    prednisoLONE acetate (PRED FORTE) 1 % ophthalmic suspension 1 Drop  1 Drop Both Eyes BID    fluticasone propionate (FLONASE) 50 mcg/actuation nasal spray 2 Spray  2 Spray Both Nostrils DAILY    lidocaine-prilocaine (EMLA) 2.5-2.5 % cream   Topical PRN    nystatin (MYCOSTATIN) 100,000 unit/gram powder   Topical BID    pantoprazole (PROTONIX) tablet 40 mg  40 mg Oral ACB    insulin lispro (HUMALOG) injection   SubCUTAneous AC&HS    levothyroxine (SYNTHROID) tablet 125 mcg  125 mcg Oral DAILY    amiodarone (CORDARONE) tablet 100 mg  100 mg Oral DAILY    ipratropium (ATROVENT) 0.02 % nebulizer solution 0.5 mg  0.5 mg Nebulization TID PRN    albuterol (PROVENTIL VENTOLIN) nebulizer solution 2.5 mg  2.5 mg Nebulization Q4H PRN    sodium chloride (NS) flush 5-40 mL  5-40 mL IntraVENous Q8H    sodium chloride (NS) flush 5-40 mL  5-40 mL IntraVENous PRN    acetaminophen (TYLENOL) tablet 650 mg  650 mg Oral Q6H PRN    Or    acetaminophen (TYLENOL) suppository 650 mg  650 mg Rectal Q6H PRN    [Held by provider] polyethylene glycol (MIRALAX) packet 17 g  17 g Oral DAILY PRN    ondansetron (ZOFRAN ODT) tablet 4 mg  4 mg Oral Q8H PRN    Or    ondansetron (ZOFRAN) injection 4 mg  4 mg IntraVENous Q6H PRN    glucose chewable tablet 16 g  4 Tablet Oral PRN    glucagon (GLUCAGEN) injection 1 mg  1 mg IntraMUSCular PRN    dextrose 10% infusion 0-250 mL  0-250 mL IntraVENous PRN               Lab/Data Reviewed:       Recent Labs     07/15/22  0354 07/14/22  0025 07/13/22 2030   WBC 8.1 10.5 12.0   HGB 9.6* 10.5* 10.6*   HCT 31.2* 34.6* 34.2*    455* 505*     Recent Labs     07/15/22  0354 07/14/22  0025 07/13/22 2030    140 140   K 3.6 3.3* 4.2    104 105   CO2 27 30 29   * 192* 288*   BUN 39* 41* 36*   CREA 0.54* 0.78 0.63   CA 9.6 9.8 9.9       Signed By: Radha Sprague MD     July 15, 2022

## 2022-07-16 NOTE — PROGRESS NOTES
Problem: Pressure Injury - Risk of  Goal: *Prevention of pressure injury  Description: Document Ronald Scale and appropriate interventions in the flowsheet. Outcome: Resolved/Met     Problem: Patient Education: Go to Patient Education Activity  Goal: Patient/Family Education  Outcome: Resolved/Met     Problem: Falls - Risk of  Goal: *Absence of Falls  Description: Document Mic Fall Risk and appropriate interventions in the flowsheet. Outcome: Resolved/Met     Problem: Patient Education: Go to Patient Education Activity  Goal: Patient/Family Education  Outcome: Resolved/Met     Problem: Diabetes Self-Management  Goal: *Disease process and treatment process  Description: Define diabetes and identify own type of diabetes; list 3 options for treating diabetes. Outcome: Resolved/Met  Goal: *Incorporating nutritional management into lifestyle  Description: Describe effect of type, amount and timing of food on blood glucose; list 3 methods for planning meals. Outcome: Resolved/Met  Goal: *Incorporating physical activity into lifestyle  Description: State effect of exercise on blood glucose levels. Outcome: Resolved/Met  Goal: *Developing strategies to promote health/change behavior  Description: Define the ABC's of diabetes; identify appropriate screenings, schedule and personal plan for screenings. Outcome: Resolved/Met  Goal: *Using medications safely  Description: State effect of diabetes medications on diabetes; name diabetes medication taking, action and side effects. Outcome: Resolved/Met  Goal: *Monitoring blood glucose, interpreting and using results  Description: Identify recommended blood glucose targets  and personal targets. Outcome: Resolved/Met  Goal: *Prevention, detection, treatment of acute complications  Description: List symptoms of hyper- and hypoglycemia; describe how to treat low blood sugar and actions for lowering  high blood glucose level.   Outcome: Resolved/Met  Goal: *Prevention, detection and treatment of chronic complications  Description: Define the natural course of diabetes and describe the relationship of blood glucose levels to long term complications of diabetes.   Outcome: Resolved/Met  Goal: *Developing strategies to address psychosocial issues  Description: Describe feelings about living with diabetes; identify support needed and support network  Outcome: Resolved/Met  Goal: *Insulin pump training  Outcome: Resolved/Met  Goal: *Sick day guidelines  Outcome: Resolved/Met  Goal: *Patient Specific Goal (EDIT GOAL, INSERT TEXT)  Outcome: Resolved/Met     Problem: Patient Education: Go to Patient Education Activity  Goal: Patient/Family Education  Outcome: Resolved/Met     Problem: Patient Education: Go to Patient Education Activity  Goal: Patient/Family Education  Outcome: Resolved/Met     Problem: Patient Education: Go to Patient Education Activity  Goal: Patient/Family Education  Outcome: Resolved/Met

## 2022-07-16 NOTE — PROGRESS NOTES
Problem: Pressure Injury - Risk of  Goal: *Prevention of pressure injury  Description: Document Ronald Scale and appropriate interventions in the flowsheet. Outcome: Progressing Towards Goal  Note: Pressure Injury Interventions:  Sensory Interventions: Assess changes in LOC,Assess need for specialty bed    Moisture Interventions: Absorbent underpads,Limit adult briefs    Activity Interventions: Assess need for specialty bed,Pressure redistribution bed/mattress(bed type)    Mobility Interventions: Float heels    Nutrition Interventions: Document food/fluid/supplement intake    Friction and Shear Interventions: Feet elevated on foot rest                Problem: Patient Education: Go to Patient Education Activity  Goal: Patient/Family Education  Outcome: Progressing Towards Goal     Problem: Falls - Risk of  Goal: *Absence of Falls  Description: Document Mic Fall Risk and appropriate interventions in the flowsheet. Outcome: Progressing Towards Goal  Note: Fall Risk Interventions:  Mobility Interventions: Assess mobility with egress test,Patient to call before getting OOB    Mentation Interventions: Bed/chair exit alarm,Door open when patient unattended    Medication Interventions: Patient to call before getting OOB    Elimination Interventions: Bed/chair exit alarm,Call light in reach              Problem: Patient Education: Go to Patient Education Activity  Goal: Patient/Family Education  Outcome: Progressing Towards Goal     Problem: Diabetes Self-Management  Goal: *Disease process and treatment process  Description: Define diabetes and identify own type of diabetes; list 3 options for treating diabetes. Outcome: Progressing Towards Goal  Goal: *Incorporating nutritional management into lifestyle  Description: Describe effect of type, amount and timing of food on blood glucose; list 3 methods for planning meals.   Outcome: Progressing Towards Goal  Goal: *Incorporating physical activity into lifestyle  Description: State effect of exercise on blood glucose levels. Outcome: Progressing Towards Goal  Goal: *Developing strategies to promote health/change behavior  Description: Define the ABC's of diabetes; identify appropriate screenings, schedule and personal plan for screenings. Outcome: Progressing Towards Goal  Goal: *Using medications safely  Description: State effect of diabetes medications on diabetes; name diabetes medication taking, action and side effects. Outcome: Progressing Towards Goal  Goal: *Monitoring blood glucose, interpreting and using results  Description: Identify recommended blood glucose targets  and personal targets. Outcome: Progressing Towards Goal  Goal: *Prevention, detection, treatment of acute complications  Description: List symptoms of hyper- and hypoglycemia; describe how to treat low blood sugar and actions for lowering  high blood glucose level. Outcome: Progressing Towards Goal  Goal: *Prevention, detection and treatment of chronic complications  Description: Define the natural course of diabetes and describe the relationship of blood glucose levels to long term complications of diabetes.   Outcome: Progressing Towards Goal  Goal: *Developing strategies to address psychosocial issues  Description: Describe feelings about living with diabetes; identify support needed and support network  Outcome: Progressing Towards Goal  Goal: *Insulin pump training  Outcome: Progressing Towards Goal  Goal: *Sick day guidelines  Outcome: Progressing Towards Goal  Goal: *Patient Specific Goal (EDIT GOAL, INSERT TEXT)  Outcome: Progressing Towards Goal     Problem: Patient Education: Go to Patient Education Activity  Goal: Patient/Family Education  Outcome: Progressing Towards Goal     Problem: Patient Education: Go to Patient Education Activity  Goal: Patient/Family Education  Outcome: Progressing Towards Goal     Problem: Patient Education: Go to Patient Education Activity  Goal: Patient/Family Education  Outcome: Progressing Towards Goal     Problem: Patient Education: Go to Patient Education Activity  Goal: Patient/Family Education  Outcome: Progressing Towards Goal

## 2022-07-16 NOTE — PROGRESS NOTES
Phone: [phone]  Paging : [phone]      Hematology / Oncology Progress Note    Admit Date: 7/3/2022    Assessment:     Principal Problem:    Acute respiratory failure with hypoxia ([address] (7/3/2022)    Active Problems:    Hypokalemia (9/21/2012)      Neutropenic fever ([address] (7/3/2022)      Hypomagnesemia (7/3/2022)      Diffuse large B cell lymphoma (HCC) (7/3/2022)      GERD (gastroesophageal reflux disease) ()      Thyroid disease ()      Acute encephalopathy ()      Hyponatremia (7/3/2022)      Pancytopenia ([address] (7/3/2022)      UTI (urinary tract infection) (7/3/2022)      Sinusitis (7/3/2022)      Myopia of both eyes with astigmatism (7/3/2022)      Pleural effusion (7/3/2022)      Cellulitis (7/3/2022)      Severe protein-calorie malnutrition ([address] (7/3/2022)      Septic shock ([address] (7/4/2022)      Abscess of sigmoid colon due to diverticulitis (7/11/2022)      Debility (7/14/2022)        Plan:     1. Ruptured diverticulum continues on Zosyn. The diverticular abscess is smaller on repeat CT. ID has suggested Augmentin 875 mg twice daily. Her infection is clearing up    2. Diffuse large B-cell lymphoma with an excellent response to R-CHOP    3. Sepsis resolving    4. Malnutrition need to improve functional status with nutrition PT and OT  5. We will fibrillation continue amiodarone and digoxin  6. Continue anticoagulation  7. Monitor electrolytes  8. Diabetes mellitus on sliding scale insulin  9. Thyroid replacement for hypothyroidism  10. Likely will need rehab. Hoping to discharge to rehab today. [facility]        Subjective:     Better. Going to SNF today for rehab. Has had an excellent response to chemotherapy.     Objective:     Patient Vitals for the past 24 hrs:   BP Temp Pulse Resp SpO2   07/16/22 0335 (!) 104/43 97.7 °F (36.5 °C) 66 16 96 %   07/16/22 0307 (!) 108/48 98.3 °F (36.8 °C) 71 16 97 %   07/15/22 2113 (!) 105/49 -- -- -- --   07/15/22 1928 (!) 99/48 98.2 °F (36.8 °C) 73 18 95 %   07/15/22 1625 (!) 100/46 97.4 °F (36.3 °C) 76 17 96 %   07/15/22 1058 (!) 112/47 97.6 °F (36.4 °C) 77 18 98 %   07/15/22 0722 (!) 120/49 97.7 °F (36.5 °C) 75 20 98 %         Intake/Output Summary (Last 24 hours) at 2022 0535  Last data filed at 2022 0335  Gross per 24 hour   Intake 600 ml   Output 1300 ml   Net -700 ml       Temp (24hrs), Av.8 °F (36.6 °C), Min:97.4 °F (36.3 °C), Max:98.3 °F (36.8 °C)    Denies any fever chills or night sweats. Denies any headaches double vision blurred vision sinus congestion cough sputum reduction hemoptysis angina nausea vomiting diarrhea melena hematochezia dysuria urgency or frequency. She denies any bleeding bruising petechiae rashes. Exam:  General:  alert, cooperative, no distress, appears stated age, nourished and weak  HEENT: Head: Normocephalic, no lesions, without obvious abnormality. Head: Normal, normocephalic, atraumatic.   Lymphatic:  no lymphadenopathy   Lungs: clear to auscultation bilaterally  Cardiovascular:  regular heart rate, no murmurs, no JVD  Pulses: 2+ and symmetric  Abdomen: Soft, non-tender and without masses or organomegaly  Musculoskeletal: no joint tenderness, deformity or swelling  Extremities: peripheral pulses normal, no pedal edema, no clubbing or cyanosis  Skin:  no rash or abnormalities  Neurologic: oriented, normal, normal mood, grossly non-focal      Recent Results (from the past 24 hour(s))   GLUCOSE, POC    Collection Time: 07/15/22  6:51 AM   Result Value Ref Range    Glucose (POC) 177 (H) 70 - 110 mg/dL   GLUCOSE, POC    Collection Time: 07/15/22 11:20 AM   Result Value Ref Range    Glucose (POC) 196 (H) 70 - 110 mg/dL   COVID-19 RAPID TEST    Collection Time: 07/15/22  2:15 PM   Result Value Ref Range    Specimen source NOSE      COVID-19 rapid test Not detected NOTD     GLUCOSE, POC    Collection Time: 07/15/22  4:55 PM   Result Value Ref Range    Glucose (POC) 170 (H) 70 - 110 mg/dL   GLUCOSE, POC    Collection Time: 07/15/22  9:08 PM   Result Value Ref Range    Glucose (POC) 177 (H) 70 - 110 mg/dL   MAGNESIUM    Collection Time: 07/16/22  3:22 AM   Result Value Ref Range    Magnesium 1.9 1.6 - 2.6 mg/dL   CBC WITH AUTOMATED DIFF    Collection Time: 07/16/22  3:22 AM   Result Value Ref Range    WBC 6.9 4.6 - 13.2 K/uL    RBC 3.16 (L) 4.20 - 5.30 M/uL    HGB 9.2 (L) 12.0 - 16.0 g/dL    HCT 30.2 (L) 35.0 - 45.0 %    MCV 95.6 78.0 - 100.0 FL    MCH 29.1 24.0 - 34.0 PG    MCHC 30.5 (L) 31.0 - 37.0 g/dL    RDW 30.1 (H) 11.6 - 14.5 %    PLATELET 922 033 - 802 K/uL    MPV 9.2 9.2 - 11.8 FL    NRBC 0.0 0  WBC    ABSOLUTE NRBC 0.00 0.00 - 0.01 K/uL    NEUTROPHILS 83 (H) 40 - 73 %    LYMPHOCYTES 7 (L) 21 - 52 %    MONOCYTES 4 3 - 10 %    EOSINOPHILS 3 0 - 5 %    BASOPHILS 2 0 - 2 %    METAMYELOCYTES 1 (H) 0 %    IMMATURE GRANULOCYTES 0 %    ABS. NEUTROPHILS 5.7 1.8 - 8.0 K/UL    ABS. LYMPHOCYTES 0.5 (L) 0.9 - 3.6 K/UL    ABS. MONOCYTES 0.3 0.05 - 1.2 K/UL    ABS. EOSINOPHILS 0.2 0.0 - 0.4 K/UL    ABS. BASOPHILS 0.1 0.0 - 0.1 K/UL    ABS. IMM. GRANS. 0.0 K/UL    DF MANUAL      PLATELET COMMENTS ADEQUATE PLATELETS      RBC COMMENTS ANISOCYTOSIS  2+        RBC COMMENTS OVALOCYTES  1+       PHOSPHORUS    Collection Time: 07/16/22  3:22 AM   Result Value Ref Range    Phosphorus 3.1 2.5 - 4.9 MG/DL   METABOLIC PANEL, COMPREHENSIVE    Collection Time: 07/16/22  3:22 AM   Result Value Ref Range    Sodium 139 136 - 145 mmol/L    Potassium 3.9 3.5 - 5.5 mmol/L    Chloride 105 100 - 111 mmol/L    CO2 29 21 - 32 mmol/L    Anion gap 5 3.0 - 18 mmol/L    Glucose 127 (H) 74 - 99 mg/dL    BUN 31 (H) 7.0 - 18 MG/DL    Creatinine 0.40 (L) 0.6 - 1.3 MG/DL    BUN/Creatinine ratio 78 (H) 12 - 20      GFR est AA >60 >60 ml/min/1.73m2    GFR est non-AA >60 >60 ml/min/1.73m2    Calcium 9.1 8.5 - 10.1 MG/DL    Bilirubin, total 0.3 0.2 - 1.0 MG/DL    ALT (SGPT) 9 (L) 13 - 56 U/L    AST (SGOT) 9 (L) 10 - 38 U/L    Alk.  phosphatase 57 45 - 117 U/L    Protein, total 5.5 (L) 6.4 - 8.2 g/dL    Albumin 3.6 3.4 - 5.0 g/dL    Globulin 1.9 (L) 2.0 - 4.0 g/dL    A-G Ratio 1.9 (H) 0.8 - 1.7       Current Facility-Administered Medications   Medication Dose Route Frequency Provider Last Rate Last Admin    furosemide (LASIX) tablet 20 mg  20 mg Oral DAILY Stephan Jimenez MD        digoxin Susan Flower) tablet 0.125 mg  0.125 mg Oral DAILY Stephan Jimenez MD        piperacillin-tazobactam (ZOSYN) 3.375 g in 0.9% sodium chloride (MBP/ADV) 100 mL MBP  3.375 g IntraVENous Q6H Cathy Cervantes  mL/hr at 07/16/22 0303 3.375 g at 07/16/22 0303    enoxaparin (LOVENOX) injection 50 mg  1 mg/kg SubCUTAneous Q12H Stephan Jimenez MD   50 mg at 07/15/22 2120    prednisoLONE acetate (PRED FORTE) 1 % ophthalmic suspension 1 Drop  1 Drop Both Eyes BID Louise Kirk MD   1 Drop at 07/15/22 2118    fluticasone propionate (FLONASE) 50 mcg/actuation nasal spray 2 Spray  2 Spray Both Nostrils DAILY Louise Kirk MD   2 [place] at 07/15/22 2070    lidocaine-prilocaine (EMLA) 2.5-2.5 % cream   Topical PRN Sam Em MD        nystatin (MYCOSTATIN) 100,000 unit/gram powder   Topical BID Perry Bhakta MD   1 g at 07/15/22 2119    pantoprazole (PROTONIX) tablet 40 mg  40 mg Oral ACB Stephan Jimenez MD   40 mg at 07/15/22 7457    insulin lispro (HUMALOG) injection   SubCUTAneous AC&HS Stephan Jimenez MD   3 Units at 07/15/22 2121    levothyroxine (SYNTHROID) tablet 125 mcg  125 mcg Oral DAILY Wade Sparks MD   125 mcg at 07/15/22 0513    amiodarone (CORDARONE) tablet 100 mg  100 mg Oral DAILY Wade Sparks MD   100 mg at 07/15/22 1354    ipratropium (ATROVENT) 0.02 % nebulizer solution 0.5 mg  0.5 mg Nebulization TID PRN Wade Sparks MD   0.5 mg at 07/07/22 2021    albuterol (PROVENTIL VENTOLIN) nebulizer solution 2.5 mg  2.5 mg Nebulization Q4H PRN Angela Mcnally MD   2.5 mg at 07/10/22 1504    sodium chloride (NS) flush 5-40 mL  5-40 mL IntraVENous Q8H Stephan Jimenez MD   10 mL at 07/15/22 2130    sodium chloride (NS) flush 5-40 mL  5-40 mL IntraVENous PRN Yudith Jarrett MD        acetaminophen (TYLENOL) tablet 650 mg  650 mg Oral Q6H PRN Yudith Jarrett MD   650 mg at 07/13/22 1814    Or    acetaminophen (TYLENOL) suppository 650 mg  650 mg Rectal Q6H PRN Yudith Jarrett MD        [Held by provider] polyethylene glycol (MIRALAX) packet 17 g  17 g Oral DAILY PRN Yudith Jarrett MD        ondansetron (ZOFRAN ODT) tablet 4 mg  4 mg Oral Q8H PRN Yudith Jarrett MD        Or    ondansetron [facility]) injection 4 mg  4 mg IntraVENous Q6H PRN Yudith Jarrett MD   4 mg at 07/06/22 1052    glucose chewable tablet 16 g  4 Tablet Oral PRN Yudith Jarrett MD        glucagon [facility]) injection 1 mg  1 mg IntraMUSCular PRN Yudith Jarrett MD        dextrose 10% infusion 0-250 mL  0-250 mL IntraVENous PRN MD Olu Gregg MD

## 2022-07-16 NOTE — PROGRESS NOTES
Report called in to reporting unit. Transport set for 1pm, will get pt ready prior to discharge with all belongings and paper-work.

## 2022-07-16 NOTE — DISCHARGE SUMMARY
Discharge Summary    Patient: Parveen Novoa MRN: [id number]  CSN: [id number]    YOB: 1940  Age: 78 y.o. Sex: female    DOA: 7/3/2022 LOS:  LOS: 13 days   Discharge Date:      Primary Care Provider:  Connor Tao MD    Admission Diagnoses: Neutropenic fever ([facility] [D70.9, R50.81]    Discharge Diagnoses:    Hospital Problems  Date Reviewed: 9/21/2012          Codes Class Noted POA    Debility ICD-10-CM: R53.81  ICD-9-CM: 799.3  7/14/2022 Unknown        Abscess of sigmoid colon due to diverticulitis ICD-10-CM: K57.20  ICD-9-CM: 562.11, 569.5  7/11/2022 Unknown        Septic shock ([facility] ICD-10-CM: A41.9, R65.21  ICD-9-CM: 038.9, 785.52, 995.92  7/4/2022 Yes        Neutropenic fever ([facility] ICD-10-CM: D70.9, R50.81  ICD-9-CM: 288.00, 780.61  7/3/2022 Yes        * (Principal) Acute respiratory failure with hypoxia ([facility] ICD-10-CM: J96.01  ICD-9-CM: 518.81  7/3/2022 Yes        Hypomagnesemia ICD-10-CM: E83.42  ICD-9-CM: 275.2  7/3/2022 Yes        Diffuse large B cell lymphoma ([facility] ICD-10-CM: C83.30  ICD-9-CM: 202.80  7/3/2022 Yes        GERD (gastroesophageal reflux disease) ICD-10-CM: K21.9  ICD-9-CM: 530.81  Unknown Yes        Thyroid disease ICD-10-CM: E07.9  ICD-9-CM: 246. 9  Unknown Unknown        Acute encephalopathy ICD-10-CM: G93.40  ICD-9-CM: 348.30  Unknown Yes        Hyponatremia ICD-10-CM: E87.1  ICD-9-CM: 276.1  7/3/2022 Yes        Pancytopenia ([address] ICD-10-CM: N11.512  ICD-9-CM: 284.19  7/3/2022 Yes        UTI (urinary tract infection) ICD-10-CM: N39.0  ICD-9-CM: 599.0  7/3/2022 Yes        Sinusitis ICD-10-CM: J32.9  ICD-9-CM: 473.9  7/3/2022 Unknown        Myopia of both eyes with astigmatism ICD-10-CM: H52.13, H52.203  ICD-9-CM: 367.1, 367.20  7/3/2022 Unknown        Pleural effusion ICD-10-CM: J90  ICD-9-CM: 511.9  7/3/2022 Unknown        Cellulitis ICD-10-CM: L03.90  ICD-9-CM: 682.9  7/3/2022 Yes        Severe protein-calorie malnutrition ([facility] ICD-10-CM: E43  ICD-9-CM: 632  7/3/2022 Yes Hypokalemia ICD-10-CM: E87.6  ICD-9-CM: 276.8  9/21/2012 Yes              Discharge Condition: stable     Discharge Medications:     Current Discharge Medication List      START taking these medications    Details   amoxicillin-clavulanate (AUGMENTIN) 875-125 mg per tablet Take 1 Tablet by mouth every twelve (12) hours. Qty: 14 Tablet, Refills: 0  Start date: 7/16/2022      digoxin (LANOXIN) 0.125 mg tablet Take 1 Tablet by mouth daily. Qty: 30 Tablet, Refills: 0  Start date: 7/17/2022      fluticasone propionate (FLONASE) 50 mcg/actuation nasal spray 2 Sprays by Both Nostrils route daily. Qty: 1 Each, Refills: 0  Start date: 7/16/2022      furosemide (LASIX) 20 mg tablet Take 1 Tablet by mouth daily. Qty: 30 Tablet, Refills: 0  Start date: 7/16/2022      prednisoLONE acetate (PRED FORTE) 1 % ophthalmic suspension Administer 1 Drop to both eyes two (2) times a day. Qty: 5 mL, Refills: 0  Start date: 7/16/2022      ipratropium (ATROVENT) 0.02 % soln 2.5 mL by Nebulization route three (3) times daily as needed for Shortness of Breath. Qty: 2.5 mL, Refills: 0  Start date: 7/16/2022      albuterol (PROVENTIL VENTOLIN) 2.5 mg /3 mL (0.083 %) nebu 3 mL by Nebulization route every four (4) hours as needed for Wheezing or Shortness of Breath. Qty: 30 Nebule, Refills: 0  Start date: 7/16/2022         CONTINUE these medications which have NOT CHANGED    Details   levothyroxine (Synthroid) 125 mcg tablet Take 125 mcg by mouth Daily (before breakfast). acyclovir (ZOVIRAX) 400 mg tablet Take 400 mg by mouth two (2) times a day. amiodarone (PACERONE) 100 mg tablet Take 100 mg by mouth daily. apixaban (Eliquis) 5 mg tablet Take 5 mg by mouth two (2) times a day.      ezetimibe (Zetia) 10 mg tablet Take  by mouth. SITagliptin (Januvia) 100 mg tablet Take 100 mg by mouth daily. RABEprazole (ACIPHEX) 20 mg TbEC Take 20 mg by mouth daily. melatonin 5 mg tablet Take 5 mg by mouth nightly. magnesium oxide (MAG-OX) 400 mg tablet Take 400 mg by mouth two (2) times a day. Procedures : thoracentesis     Consults: Cardiology, Infectious Disease and Pulmonary/Critical Care general surgeon , oncologist       PHYSICAL EXAM   Visit Vitals  BP (!) 114/59   Pulse 69   Temp 97.5 °F (36.4 °C)   Resp 16   Ht 5' 7\" (1.702 m)   Wt 69.3 kg (152 lb 11.2 oz)   SpO2 97%   Breastfeeding No   BMI 23.92 kg/m²     General: Awake, cooperative, no acute distress    HEENT: NC, Atraumatic. PERRLA, EOMI. Anicteric sclerae. Lungs:  CTA Bilaterally. No Wheezing/Rhonchi/Rales. Heart:  Regular  rhythm,  No murmur, No Rubs, No Gallops  Abdomen: Soft, Non distended, Non tender. +Bowel sounds,   Extremities: No c/c/e  Psych:   Not anxious or agitated. Neurologic:  No acute neurological deficits. Admission HPI :   Bushra Porter is a 78 y.o. female with hypertension, hypothyroidism, a fib,  lymphoma last chemo Monday presented to ER due to weakness/AMS/shortness of breath. Pt presented weakness after nap today.  called on call oncologist , he was  recommend to check vitals and she was back to her normal, then she became weakness and confused again.  was recommended to send patient  to ER for further evaluation. EMS was called and she was found hypoxia and bagged her. In ER, She presented Neutropenia with neutrophile  #0, pancytopenia, magnesium 1.5, hypoxia she was put on 6 L oxygen in ER. Ct head no acute issue, but indicated acute on chronic sinusitis, ua -uti. She also has rt ankle injured two days ago. She received iv abx in ER. She still confusion, oriented to her self, unable to answer questions. Above information came from  and ER physician    Hospital Course :  Pt was admitted to the hospital for acute respiratory failure and neutropenic fever. She was admitted to ICU. Neutropenic precaution was administrated. She received broad coverage antibiotics. Infection disease /oncologist/ intensivist were on board. With the treatment, her neutropenia resolved. Pancytopenia resolved. She also have pleural effusion, she received thoracentesis. Have  1.4 L fluid removed. Cardiology has been consulted for A. fib with RVR. She received amiodarone drip with digoxin. She is survived with the sepsis shock. CT scan indicated diverticular abscess. Surgery and IR were  been consulted. Repeat imaging got improving. ID recommended to continue Augmentin for 1 week treatment. She has been stable after transfer from ICU. She received speech evaluation and PT OT. She will be discharged to rehab for further recovery. Palliative care team has been consulted. Patient remain full code      Activity: Activity as tolerated    Diet: Regular Diet    Follow-up: PCP and cardiologist , oncologist     Disposition: snf     Minutes spent on discharge: 45 min       Labs: Results:       Chemistry Recent Labs     07/16/22  0322 07/15/22  0354 07/14/22  0025   * 161* 192*    137 140   K 3.9 3.6 3.3*    103 104   CO2 29 27 30   BUN 31* 39* 41*   CREA 0.40* 0.54* 0.78   CA 9.1 9.6 9.8   AGAP 5 7 6   BUCR 78* 72* 53*   AP 57 60 67   TP 5.5* 6.1* 6.8   ALB 3.6 3.9 4.7   GLOB 1.9* 2.2 2.1   AGRAT 1.9* 1.8* 2.2*      CBC w/Diff Recent Labs     07/16/22  0322 07/15/22  0354 07/14/22  0025   WBC 6.9 8.1 10.5   RBC 3.16* 3.28* 3.59*   HGB 9.2* 9.6* 10.5*   HCT 30.2* 31.2* 34.6*    394 455*   GRANS 83* 82* 83*   LYMPH 7* 2* 4*   EOS 3 3 3      Cardiac Enzymes No results for input(s): CPK, CKND1, HUMBERTO in the last 72 hours. No lab exists for component: CKRMB, TROIP   Coagulation No results for input(s): PTP, INR, APTT, INREXT in the last 72 hours.     Lipid Panel No results found for: CHOL, CHOLPOCT, CHOLX, CHLST, CHOLV, 423085, HDL, HDLP, LDL, LDLC, DLDLP, 347312, VLDLC, VLDL, TGLX, TRIGL, TRIGP, TGLPOCT, CHHD, CHHDX   BNP No results for input(s): BNPP in the last 72 hours.   Liver Enzymes Recent Labs     07/16/22  0322   TP 5.5*   ALB 3.6   AP 57      Thyroid Studies Lab Results   Component Value Date/Time    TSH 3.50 07/04/2022 04:40 AM              Significant Diagnostic Studies: XR CHEST PA LAT    Result Date: 7/12/2022  EXAM: XR CHEST PA LAT CLINICAL INDICATION/HISTORY: Effusion -Additional: None COMPARISON: 7/8/2022 TECHNIQUE: Frontal view of the chest _______________ FINDINGS: HEART AND MEDIASTINUM: Right chest wall port tip at the cavoatrial junction. Stable cardiac mediastinal silhouette. LUNGS AND PLEURAL SPACES: Bilateral interstitial and parenchymal opacities, essentially unchanged from prior study. Probable moderate left pleural effusion. No pneumothorax. BONY THORAX AND SOFT TISSUES: No acute osseous abnormality _______________     Bilateral interstitial and parenchymal opacities, essentially unchanged from prior study. Probable moderate left pleural effusion. XR ANKLE RT MIN 3 V    Result Date: 7/3/2022  EXAM: ANKLE SERIES Indication: Swelling Technique: Frontal, , oblique, and lateral views of the right ankle. Comparison studies: None. _______________ FINDINGS: -OSSEOUS: No acute or destructive osseous abnormality. Tibiotalar degenerative changes with small degenerative plantar calcaneal spur. Normal alignment with preserved ankle mortise. . -SOFT TISSUES: Benign calcifications of the lower leg with mild diffuse edema of the distal leg to visualized midfoot. . No significant joint effusion. _____________    1. Nonspecific soft tissue edema, with no acute or destructive osseous abnormality. XR SWALLOW FUNC VIDEO    Result Date: 7/13/2022  Barium speech study History: Dysphagia, feeding difficulty. Barium pharyngogram performed under the guidance of speech therapist, with fluoroscopic guidance by Amando Grider. Thin barium via cup and straw, semisolid barium pudding, solid barium coated cracker, and 13 mm barium pill administered.  Fluoro Dose (reference air kerma): 2.62 mGy. Findings: Thin barium: Normal exam. Pudding: Normal exam. Solid crackers: Normal exam. Pill: Normal exam.     Normal exam. Please refer to the full report from speech pathologist submitted separately. CT HEAD WO CONT    Result Date: 7/3/2022  EXAM: CT HEAD WO CONT CLINICAL INDICATION/HISTORY: ams -Additional: None COMPARISON: None TECHNIQUE: Axial CT imaging of the head was performed without intravenous contrast. One or more dose reduction techniques were used on this CT: automated exposure control, adjustment of the mAs and/or kVp according to patient size, and iterative reconstruction techniques. The specific techniques used on this CT exam have been documented in the patient's electronic medical record. Digital Imaging and Communications in Medicine (DICOM) format image data are available to nonaffiliated external healthcare facilities or entities on a secure, media free, reciprocally searchable basis with patient authorization for at least a 12-month period after this study. _______________ FINDINGS: BRAIN:   > Brain volume: Age appropriate.   > White matter: Little or no white matter disease. > Infarcts, encephalomalacia: None.   > Parenchymal mass: None.   > Parenchymal hemorrhage: None.   > Midline shift: None.   > Miscellaneous: None. EXTRA-AXIAL SPACES: Unremarkable. No fluid collections. CALVARIUM: Intact. SINUSES, MASTOIDS: Complete opacified left sphenoethmoidal air cells with mild mucosal thickening on the right and small left anterior frontal ethmoidal opacities. OTHER EXTRACRANIAL: Asymmetric dense right globe with findings of prior bilateral cataract surgery. _______________     1. No CT findings of an acute intracranial abnormality. Please note that noncontrast head CT may be normal in early acute infarct. 2. Chronic left sphenoethmoidal sinus disease, with questional small left frontoethmoidal air-fluid level potentially acute on chronic sinus disease.  3. Asymmetric hyperdense right globe. Recommend correlation with patient ocular history. CT NECK SOFT TISSUE W CONT    Result Date: 7/9/2022  EXAM: CT of the neck with intravenous contrast. INDICATION:  \"diffuse large b cell lymphoma. \" COMPARISON:  CT is available for direct comparison. DOSE REDUCTION AND DICOM INFORMATION: One or more dose reduction techniques were used on this CT: automated exposure control, adjustment of the mAs and/or kVp according to patient size, and iterative reconstruction techniques. The specific techniques used on this CT exam have been documented in the patient's electronic medical record. Digital Imaging and Communications in Medicine (DICOM) format image data are available to nonaffiliated external healthcare facilities or entities on a secure, media free, reciprocally searchable basis with patient authorization for at least a 12-month period after this study. _______________ FINDINGS:    IMAGE QUALITY:  Neck base photopenia secondary to shoulder overlap extends caudally from the axial level of C5. Dental streak artifact partially obscures the structures at the axial level of the teeth. DEVICES:  There is a right internal jugular tunneled port catheter. LYMPH NODES:  No enlarged lymph nodes are detected. SUPERFICIAL SOFT TISSUES:  Unremarkable. DEEP NECK FLUID COLLECTION:  None. ORAL CAVITY:  Unremarkable. PHARYNX:  Unremarkable. LARYNX:  Unremarkable. UPPER TRACHEA:  Unremarkable. PAROTID GLANDS:  Unremarkable. SUBMANDIBULAR GLANDS:  Unremarkable. THYROID GLAND:  Unremarkable. INCLUDED ORBITS:  Dense right orbit, presumably from silicone oil retinal detachment therapy. NASAL CAVITY AND INCLUDED PARANASAL SINUSES:  Chronic left sphenoid sinusitis with chronic circumferential osteitis and complete opacification. MASTOID AIR CELLS AND MIDDLE EARS:  Clear. VASCULATURE:   Unremarkable. INCLUDED BRAIN:  Diffuse volume loss.     INCLUDED THORACIC APEX:  Small right and large left pleural effusions, mildly increased since the recent chest CT. Redemonstrated advanced pulmonary emphysema. Interval development of superimposed pulmonary edema. BONES:  C7-T1 Klippel-Feil spectrum developmental circumferential osseous union. Low-grade torticollis. C5-C6 disc degeneration. _______________     No suspicious mass or enlarged cervical lymph nodes are detected. Increased pulmonary edema and pleural effusions. Chronic left sphenoid sinusitis, pulmonary emphysema, other ancillary findings as described. _______________     CT CHEST ABD PELV W CONT    Result Date: 7/14/2022  EXAM: CT chest, abdomen, and pelvis INDICATION: Fever COMPARISON: 7/1/2022 TECHNIQUE: Axial CT imaging of the chest, abdomen, and pelvis was performed after IV contrast administration. Multiplanar reformats were generated. One or more dose reduction techniques were used on this CT: automated exposure control, adjustment of the mAs and/or kVp according to patient size, and iterative reconstruction techniques. The specific techniques used on this CT exam have been documented in the patient's electronic medical record. Digital Imaging and Communications in Medicine (DICOM) format image data are available to nonaffiliated external healthcare facilities or entities on a secure, media free, reciprocally searchable basis with patient authorization for at least a 12-month period after this study. _______________ FINDINGS: CHEST: MEDIASTINUM: Normal caliber aorta with vascular calcifications. No pericardial effusion. LYMPH NODES: No pathologically enlarged mediastinal or hilar lymph nodes. PLEURA: Unremarkable without pleural effusion or pneumothorax. LUNGS/AIRWAY: Severe emphysema. Bilateral parenchymal consolidations/infiltrates. Small right and moderate left loculated effusions. OTHER: None. ABDOMEN/PELVIS: LIVER, BILIARY: Liver is unremarkable. No abnormal biliary dilation. Cholecystectomy. PANCREAS: Unremarkable. SPLEEN: Redemonstrated hypodensities throughout the spleen. ADRENALS: Unremarkable. KIDNEYS: Lucero catheter within incompletely decompressed urinary bladder. LYMPH NODES: No pathologically enlarged lymph nodes. GASTROINTESTINAL TRACT: Diverticulosis. Redemonstrated showing a small abscess along the posterior uterine body measures 1.0 x 0.6 cm, significantly smaller from prior study. No bowel obstruction. PELVIC ORGANS: Unremarkable. VASCULATURE: Vascular calcifications. OSSEOUS: No area of erosion or aggressive-appearing bone destruction. OTHER: Trace perisplenic free fluid. _______________     Severe emphysema. -Extensive bilateral parenchymal infiltrates, edema versus pneumonia. Moderate left and small right loculated effusions. Sigmoid diverticulosis. -Interval decreased size of pelvic abscess, now 1.0 cm, previously measuring up to 3.8 cm. Lucero catheter within incompletely decompressed urinary bladder. Splenic hypodensities. CTA CHEST W OR W WO CONT    Result Date: 7/3/2022  EXAM: CTA Chest INDICATION: Hypoxic. COMPARISON: No recent exams for direct comparison, most recent study from 9/21/2012. TECHNIQUE: Axial CT imaging from the thoracic inlet through the diaphragm with intravenous contrast. Coronal and sagittal MIP reformats were generated. One or more dose reduction techniques were used on this CT: automated exposure control, adjustment of the mAs and/or kVp according to patient size, and iterative reconstruction techniques. The specific techniques used on this CT exam have been documented in the patient's electronic medical record. Digital Imaging and Communications in Medicine (DICOM) format image data are available to nonaffiliated external healthcare facilities or entities on a secure, media free, reciprocally searchable basis with patient authorization for at least a 12-month period after this study.  _______________ FINDINGS: EXAM QUALITY: Adequate bolus timing with mild diffuse respiratory motion artifact degradation. Jabari Glow PULMONARY ARTERIES: No evidence of central, interlobar or mid to proximal segmental branch pulmonary arterial filling defect. Asymmetric motion degradation involving the lower lobes. Normal sized main pulmonary artery. Pulmonary embolism. MEDIASTINUM: Normal heart size without pericardial effusion. Coronary artery and aortic atherosclerosis. A right upper chest Mediport is present with the catheter in the SVC. Moderate-sized hiatus hernia. LUNGS and PLEURA: Moderate to large left low attenuating layering pleural effusion. Marked diffuse emphysematous changes with chronic areas of scarring and atelectasis. Bilateral lower lobe groundglass, difficult to differentiate between dependent changes, atelectasis and/or edema. . AIRWAY: Normal. LYMPH NODES: No enlarged nodes. UPPER ABDOMEN: Multiple splenic hypodensities, largest anteriorly measuring 3 cm. Overall normal splenic size measuring 11.3 cm in AP dimension. Prior cholecystectomy OTHER: No acute or aggressive osseous abnormalities identified. _______________     1. Respiratory motion degradation. Otherwise, No evidence of central pulmonary embolism. 2.  Interstitial and groundglass changes suggestive of underlying edema, with moderate to large left-sided pleural effusion. 3. Multiple Indeterminate splenic hypodensities, recommend comparison to any prior recent outside exams and patient history. 4. Moderate-sized hiatus hernia. CT ABD PELV W CONT    Result Date: 7/11/2022  EXAM: CT ABDOMEN AND PELVIS: INDICATION: Respiratory failure with hypoxia COMPARISON: CT dated 7/9/2022. TECHNIQUE: Axial CT imaging of the abdomen and pelvis was performed after intravenous contrast administration only. Multiplanar reformats were generated. One or more dose reduction techniques were used on this CT: automated exposure control, adjustment of the mAs and/or kVp according to patient's size, and iterative reconstruction techniques.  The specific techniques utilized on this CT exam have been documented in the patient's electronic medical record. Digital Imaging and Communications in Medicine (DICOM) format image data are available to nonaffiliated external healthcare facilities or entities on a secure, media free, reciprocally searchable basis with patient authorization for at least a 12-month period after this study. _______________ FINDINGS: LOWER CHEST: Extensive infiltrates are seen in the right lower lobe with minimal right pleural effusion. Large left pleural effusion noted with adjacent lower lobe atelectasis mildly increasing since the prior study 2 days earlier. LIVER, BILIARY: Liver is normal. No biliary dilation. Cholecystectomy. PANCREAS: Normal. SPLEEN: Multiple low-density lesions are seen throughout the spleen similar to the prior study. ADRENALS: Normal. KIDNEYS/URETERS/BLADDER: There is no hydronephrosis on either side. Lucero catheter decompresses the bladder. PELVIC ORGANS: Calcifications are seen within the atrophic uterus. VASCULATURE: Extensive atherosclerotic changes are seen throughout the aorta and branches. LYMPH NODES: No enlarged lymph nodes. GASTROINTESTINAL TRACT: Moderate size hiatal hernia noted. Numerous colonic diverticula are present mainly in the sigmoid colon with mild thickening of the colonic wall particularly in the left colon suspicious for colitis without obstruction. Small air-fluid level in the pelvis appears stable measuring 3.8 cm x 2.3 cm. BONES: Mild L2-3 and L3-4 retrolisthesis noted with mild diffuse degenerative changes. No fracture or dislocation. OTHER: None. _______________     Colonic diverticulosis worst in the sigmoid colon with stable appearance of the small adjacent air-fluid level compared to the study 2 days earlier most likely representing a small abscess from diverticulitis. There is slight thickening of the left colonic segments consistent with nonspecific colitis. No evidence of obstruction seen.  Large left pleural effusion and adjacent lower lobe atelectasis appears to be slightly worsening since 2 days earlier. Extensive infiltrates in the right lower lobe with minimal adjacent effusion appears stable. No significant change in the multiple low-density lesions throughout the spleen. Moderate-sized hiatal hernia. CT ABD PELV W CONT    Result Date: 7/9/2022  EXAM: CT ABD PELV W CONT CLINICAL INDICATION/HISTORY: diffuse large b cell lymphoma COMPARISON: CTA chest 7/3/2022 TECHNIQUE:   CT of the abdomen and pelvis following intravenous contrast administration. Coronal and sagittal reformats were generated and reviewed. One or more dose reduction techniques were used on this CT: automated exposure control, adjustment of the mAs and/or kVp according to patient size, and iterative reconstruction techniques. The specific techniques used on this CT exam have been documented in the patient's electronic medical record. Digital Imaging and Communications in Medicine (DICOM) format image data are available to nonaffiliated external healthcare facilities or entities on a secure, media free, reciprocally searchable basis with patient authorization for at least a 12-month period after this study. _______________ FINDINGS: LIMITATIONS:   > Motion artifact is present which limits evaluation.   > Suboptimal exam due to patient positioning with arms by the side. LOWER THORAX: Unchanged borderline enlarged heart with aortic and coronary artery atherosclerotic calcifications. Moderate volume left pleural effusion measures higher than simple fluid density, minimal right pleural effusion. Emphysema, left lower lobe consolidation with air bronchograms, and patchy right lower lung opacities (new since CTA 7/3/2022). LIVER: No enhancing hepatic mass. The portal and hepatic veins are patent. BILIARY: Prior cholecystectomy. No biliary dilation. SPLEEN: Measures 12.3 cm.  Multiple low-attenuation splenic lesions, largest measuring 3.1 cm (axial image 39). PANCREAS: Atrophic. ADRENALS: Normal. KIDNEYS: Normal. GI TRACT:  Moderate size hiatal hernia is present. Normal caliber small and large bowel loops. No morphology of bowel obstruction. Extensive diverticulosis throughout the sigmoid colon. There is a rim-enhancing complex fluid collection containing air extending from diverticula in the distal sigmoid colon (axial image 103) into the rectovaginal space that measures 3.2 x 2.2 x 10.4 cm (axial image 22, sagittal image 87). BLADDER: Diffuse wall thickening and mucosal hyperenhancement. A small focus of air seen in the bladder lumen. PELVIC ORGANS: No acute abnormality. Several calcified fibroids are identified. VASCULATURE: No arterial aneurysm. Severe burden of atherosclerotic plaque around the aorta and branch vessels notably at the main abdominal ostia. LYMPH NODES: No mesenteric or retroperitoneal lymphadenopathy. OTHER: No free intraperitoneal air. Complex rim-enhancing collection extending from the distal sigmoid into the rectovaginal space measures 3.2 x 2.2 x 10.4 cm, as detailed above. OSSEOUS STRUCTURES: No acute osseous abnormality. Multilevel degenerative changes most pronounced in the lumbar spine. _______________     1. New lung base opacities and moderate volume left pleural effusion, likely related to aspiration/pneumonia. Recommend repeat imaging following completion of appropriate medical therapy. 2.  Rim-enhancing pelvic complex fluid collection containing air-fluid level favored to represent diverticular abscess. 3.  Severe atherosclerosis including the coronary arteries, correlate for cardiac dysfunction. 4.  Indeterminate splenic lesions, differential includes benign and malignant etiologies. No splenomegaly. 5.  Hiatal hernia, osseous degenerative changes and additional findings, as detailed in the body of the report. XR CHEST PORT    Result Date: 7/13/2022  CHEST AP PORTABLE Indication: Left thoracentesis. Comparison: X-ray 07/12/2022. Findings: Right IJ port. Near-complete resolution of left pleural effusion with no evidence for pneumothorax. Persistent mild streaky parenchymal opacities bilaterally and hazy opacity in the right lung base. The cardiac silhouette and pulmonary vascularity appear within normal limits. Aortic atherosclerosis noted. No evidence for pneumothorax or right pleural effusion. No pneumothorax post left thoracentesis. Persistent bilateral infiltrates, right more than left. XR CHEST PORT    Result Date: 7/8/2022  EXAM: XR CHEST PORT CLINICAL INDICATION/HISTORY: sepsis eval, neutropenic -Additional: None COMPARISON: One day prior TECHNIQUE: Portable frontal view of the chest _______________ FINDINGS: SUPPORT DEVICES: Right chest wall port unchanged. HEART AND MEDIASTINUM: Cardiomediastinal silhouette within normal limits. LUNGS AND PLEURAL SPACES: Bilateral interstitial and parenchymal opacities. No pneumothorax. _______________     Bilateral interstitial and parenchymal opacities. XR CHEST PORT    Result Date: 7/7/2022  EXAM: XR CHEST PORT CLINICAL INDICATION/HISTORY: hypoxia -Additional: None COMPARISON: Earlier the same day TECHNIQUE: Portable frontal view of the chest _______________ FINDINGS: SUPPORT DEVICES: None. HEART AND MEDIASTINUM: Cardiomediastinal silhouette within normal limits. LUNGS AND PLEURAL SPACES: Bilateral interstitial and parenchymal opacities. Moderate left effusion/atelectasis. No pneumothorax. _______________     Bilateral interstitial and parenchymal opacities. Moderate left effusion/atelectasis. XR CHEST PORT    Result Date: 7/7/2022  EXAM: PORTABLE  FRONTAL CHEST RADIOGRAPH CLINICAL INDICATION/HISTORY: Shortness of breath COMPARISON: 7/5/2022 TECHNIQUE: Portable frontal view of the chest _______________ FINDINGS: SUPPORT DEVICES:   > Stably positioned tunneled right internal jugular venous catheter tip terminates over the mid SVC.    >  EKG leads overlie the patient. HEART AND MEDIASTINUM: Normal heart size. Indistinct hilar contours. Coarse atherosclerotic calcifications present. LUNGS: Bilateral basilar predominant hazy lung opacities (left greater than right). PLEURAL SPACES:No large pneumothorax. Moderate volume left pleural effusion, very small-volume right pleural effusion. BONY THORAX AND SOFT TISSUES: No acute abnormality. Multilevel degenerative changes throughout the spine and both shoulders. _______________     1. Left pleural effusion has mildly increased size since 7/5/2022, correlate for cardiac dysfunction/volume overload. 2.  Bilateral lung opacities appear similar to prior study, differential includes interstitial edema and multifocal pneumonia. XR CHEST PORT    Result Date: 7/5/2022  EXAM: XR CHEST PORT CLINICAL INDICATION/HISTORY: Pleural effusion -Additional: None COMPARISON: 7/3/2022 TECHNIQUE: Portable frontal view of the chest _______________ FINDINGS: SUPPORT DEVICES: None. HEART AND MEDIASTINUM: Cardiomediastinal silhouette within normal limits. LUNGS AND PLEURAL SPACES: Bilateral interstitial and parenchymal opacities. Small to moderate left effusion/atelectasis. No pneumothorax. _______________     Bilateral interstitial and parenchymal opacities. Small to moderate left effusion/atelectasis. XR CHEST PORT    Result Date: 7/3/2022  EXAM: XR CHEST PORT CLINICAL INDICATION/HISTORY: ams fever -Additional: None COMPARISON: None TECHNIQUE: Frontal view of the chest _______________ FINDINGS: SUPPORT LINES AND TUBES:Right-sided tunneled Mediport and catheter tip over the distal SVC. HEART AND MEDIASTINUM: Midline cardiac silhouette appreciable cardiomegaly. Hilar vascular congestion and cephalization. LUNGS AND PLEURAL SPACES: Diffuse bilateral hazy indistinct interstitial opacities with retrocardiac left basilar confluent opacity and blunted costophrenic angle/effusion. No significant right-sided pleural effusion. No pneumothorax.  BONY THORAX AND SOFT TISSUES: No acute or destructive osseous abnormality. _______________     1. Findings of CHF versus fluid overload with edema and left pleural effusion. US THORACENTESIS LT NDL W IMAGE    Result Date: 7/13/2022  PROCEDURE: ULTRASOUND GUIDED LEFT THORACENTESIS INDICATION: Left pleural effusion, shortness of breath PERFORMED BY:  Niels Ariza PA-C ATTENDING PHYSICIAN:  Issac Gavin MD SUPERVISION: General _______________ TECHNIQUE & FINDINGS: CONSENT: The risks, benefits, and alternatives to the procedure were explained to the patient prior to the procedure. Written informed consent was obtained and witnessed. Standard pre-procedure time out performed at 1219. TECHNIQUE/FINDINGS:  The left pleural effusion was localized under ultrasound guidance. Sterile ultrasound probe cover and ultrasound gel were utilized. The skin was marked, prepped and draped in the usual sterile fashion. 1% Lidocaine was used for local anesthesia. A 5 Western Rosita Yueh needle was advanced into the left pleural space under US guidance. A total of 1400 mL of serosanguineous fluid was drained using a Vacutainer system. A specimen was collected and taken to the lab, as requested. The catheter was then removed and the site dressed with a sterile bandage. The patient tolerated the procedure well and there were no immediate complications. Post-procedure chest x-ray shows no pneumothorax. GUIDANCE: Ultrasound guidance was used to position (and confirm the position of) the needle. Ultrasound image(s) saved in PACS. _______________     Successful ultrasound-guided thoracentesis with removal of 1400 mL of fluid. Post-procedure chest x-ray shows no pneumothorax. A specimen was collected and taken to the lab, as requested. ECHO ADULT COMPLETE    Result Date: 7/5/2022    Left Ventricle: Normal left ventricular systolic function with a visually estimated EF of 60 - 65%. Left ventricle size is normal. Normal wall thickness. Normal wall motion. Indeterminate diastolic function due to atrial fibrillation.   Tricuspid Valve: The estimated PASP is 41 mmHg. DUPLEX LOWER EXT VENOUS BILAT    Result Date: 7/5/2022  · No evidence of deep vein thrombosis in the right lower extremity. · No evidence of deep vein thrombosis in the left lower extremity.               [facility]     CC: Nimisha Alford MD

## 2022-07-16 NOTE — PROGRESS NOTES
D/c plan: [facility] for SNF today via [address] [place] [address],[address] at 1300. CM met w/ pt's spouse. He is in agreement w/ the d/c to [facility]. NATE completed w/ the pt's spouse. Copy provided. Care Management Interventions  PCP Verified by CM: Yes  Palliative Care Criteria Met (RRAT>21 & CHF Dx)?: No  Mode of Transport at Discharge: BLS  Transition of Care Consult (CM Consult): SNF (SNF at [facility] AT [facility]. )  Partner SNF: Yes  Reason Why Partner SNF Not Chosen: Friend/family recommendation  Discharge Durable Medical Equipment: No  Physical Therapy Consult: Yes  Occupational Therapy Consult: Yes  Speech Therapy Consult: Yes  Support Systems: Spouse/Significant Other,Child(wayne)  Confirm Follow Up Transport: Family  The Plan for Transition of Care is Related to the Following Treatment Goals :  [facility] for SNF  The Patient and/or Patient Representative was Provided with a Choice of Provider and Agrees with the Discharge Plan?: Yes  Freedom of Choice List was Provided with Basic Dialogue that Supports the Patient's Individualized Plan of Care/Goals, Treatment Preferences and Shares the Quality Data Associated with the Providers?:  ([facility])  Discharge Location  Patient Expects to be Discharged to[de-identified] Skilled nursing facility

## 2022-07-19 LAB
BACTERIA SPEC CULT: NORMAL
BACTERIA SPEC CULT: NORMAL
SERVICE CMNT-IMP: NORMAL
SERVICE CMNT-IMP: NORMAL

## 2022-07-21 ENCOUNTER — PATIENT OUTREACH (OUTPATIENT)
Dept: CASE MANAGEMENT | Age: 82
End: 2022-07-21

## 2022-07-21 NOTE — PROGRESS NOTES
Post Acute Facility Update     *The information contained in this note was received during a weekly care coordination call with the post acute facility*       This patient was discharged from [place] [address] to  [address] [place] [address] ([facility])   on 7/16/22. Hospital Discharge Diagnosis per Dr. López Setting:    Debility  Sigmoid colon Abscess  Septic Shock  Fever  Acute Resp Failure with Hypoxia  GERD    Current Facility: [address] [place] [address] ([facility])  Anticipated Discharge Date: TBD    Facility Nursing Update: no new orders  Facility Social Work Update: was living at home with spouse PTA  Bundle Program Status: none  Upper Extremity Assistance: Minimal Assistance   Lower Extremity Assistance: Min/Mod Assistance  Bed Mobility: Stand by Assist - Presence and Cueing  Transfers: Minimal Assistance   Ambulation: Dependent  How far (in feet) is the patient ambulating?  Refused at time of eval this week  Device Used:    Barriers to Discharge: none at this time  Other:

## 2022-07-28 ENCOUNTER — PATIENT OUTREACH (OUTPATIENT)
Dept: CASE MANAGEMENT | Age: 82
End: 2022-07-28

## 2022-07-29 NOTE — PROGRESS NOTES
Post Acute Facility Update     *The information contained in this note was received during a weekly care coordination call with the post acute facility*       This patient was discharged from [place] [address] to  [address] [place] [address] ([facility])   on 7/16/22. Hospital Discharge Diagnosis per Dr. Anshul Lora:    Debility  Sigmoid colon Abscess  Septic Shock  Fever  Acute Resp Failure with Hypoxia  GERD    Current Facility: [address] [place] [address] ([facility])  Anticipated Discharge Date: 7/30/22    Facility Nursing Update: no new orders  Facility Social Work Update: was living at home with spouse PTA  Bundle Program Status: none  Upper Extremity Assistance: Minimal Assistance   Lower Extremity Assistance: Min/Mod Assistance  Bed Mobility: Stand by Assist - Presence and Cueing  Transfers: Minimal Assistance   Ambulation: Minimal Assistance   How far (in feet) is the patient ambulating?  Amb  feet with FWW and Min assist  Device Used: FWW    Barriers to Discharge: none at this time  Other:  home with [facility] /PT/OT

## 2022-08-02 ENCOUNTER — PATIENT OUTREACH (OUTPATIENT)
Dept: CASE MANAGEMENT | Age: 82
End: 2022-08-02

## 2022-08-02 PROBLEM — J32.9 SINUSITIS: Status: RESOLVED | Noted: 2022-07-03 | Resolved: 2022-08-02

## 2022-08-04 PROBLEM — N39.0 UTI (URINARY TRACT INFECTION): Status: RESOLVED | Noted: 2022-07-03 | Resolved: 2022-08-04

## 2022-08-22 NOTE — PROGRESS NOTES
Have attempted x 3 to reach patient for Transition of care follow up - each time leaving message with information for return call. No response from patient. Episode resolved at this time.

## 2022-10-20 ENCOUNTER — HOSPITAL ENCOUNTER (OUTPATIENT)
Dept: INFUSION THERAPY | Age: 82
Discharge: HOME | End: 2022-10-20
Payer: MEDICARE

## 2022-10-20 ENCOUNTER — APPOINTMENT (OUTPATIENT)
Dept: INFUSION THERAPY | Age: 82
End: 2022-10-20

## 2022-10-20 VITALS
TEMPERATURE: 98 F | SYSTOLIC BLOOD PRESSURE: 111 MMHG | HEART RATE: 110 BPM | RESPIRATION RATE: 18 BRPM | OXYGEN SATURATION: 99 % | DIASTOLIC BLOOD PRESSURE: 55 MMHG

## 2022-10-20 PROCEDURE — 74011250636 HC RX REV CODE- 250/636: Performed by: UROLOGY

## 2022-10-20 PROCEDURE — 74011000250 HC RX REV CODE- 250: Performed by: UROLOGY

## 2022-10-20 PROCEDURE — 96372 THER/PROPH/DIAG INJ SC/IM: CPT

## 2022-10-20 RX ADMIN — LIDOCAINE HYDROCHLORIDE 1 G: 10 INJECTION, SOLUTION EPIDURAL; INFILTRATION; INTRACAUDAL; PERINEURAL at 11:35

## 2022-10-20 NOTE — PROGRESS NOTES
[facility] Progress Note    Date: 2022    Name: Mateo Jon    MRN: [id number]         : 1940    Invanz 1 of 7      Ms. Marr was assessed and education was provided. Ms. Rafy Cormier arrived via wheelchair with her  at her side.  talkative and asking questions about treatment and process. Mr. Rafy Cormier asking Yolette Rehman long is this going to take, I thought it was just a shot\". Explained to Mr. Rafy Cormier that admission assessment must be completed, vital signs assessed etc.  Mr. Rafy Cormier verbalized understanding. Education given, Gissel-comp education reviewed with patient and  regarding Belinda Usha. Patient and  verbalized understanding. Ms. Eden Melo vitals were reviewed. Visit Vitals  BP (!) 111/55   Pulse (!) 110   Temp 98 °F (36.7 °C)   Resp 18   SpO2 99%   Breastfeeding No           []  Vancomycin     [x]  Invanz 1 gram IM     []  Cubicin     []  Rocephin    Injected in patient's LGM and band aid applied to site. Ms. Rafy Cormier was observed in office for approximately 20 min before patient and  politely declined observation for remaining recommended 10 min. Discharge education reviewed. Patient and  verbalized understanding to go to ED with any s/s of reaction or complications. Ms. Rafy Cormier was discharged from [place] [address] in stable condition at 1200. She is to return on 10/21/22 at 1100 for her next appointment. Armband removed and shredded.      Amor Thompson RN  2022  11:57 AM

## 2022-10-21 ENCOUNTER — HOSPITAL ENCOUNTER (OUTPATIENT)
Dept: INFUSION THERAPY | Age: 82
Discharge: HOME | End: 2022-10-21
Payer: MEDICARE

## 2022-10-21 ENCOUNTER — APPOINTMENT (OUTPATIENT)
Dept: INFUSION THERAPY | Age: 82
End: 2022-10-21

## 2022-10-21 VITALS
RESPIRATION RATE: 18 BRPM | SYSTOLIC BLOOD PRESSURE: 106 MMHG | TEMPERATURE: 98.2 F | HEART RATE: 103 BPM | DIASTOLIC BLOOD PRESSURE: 58 MMHG | OXYGEN SATURATION: 97 %

## 2022-10-21 PROCEDURE — 96372 THER/PROPH/DIAG INJ SC/IM: CPT

## 2022-10-21 PROCEDURE — 74011000250 HC RX REV CODE- 250: Performed by: UROLOGY

## 2022-10-21 PROCEDURE — 74011250636 HC RX REV CODE- 250/636: Performed by: UROLOGY

## 2022-10-21 RX ADMIN — LIDOCAINE HYDROCHLORIDE 1 G: 10 INJECTION, SOLUTION EPIDURAL; INFILTRATION; INTRACAUDAL; PERINEURAL at 11:21

## 2022-10-22 ENCOUNTER — APPOINTMENT (OUTPATIENT)
Dept: INFUSION THERAPY | Age: 82
End: 2022-10-22

## 2022-10-22 ENCOUNTER — HOSPITAL ENCOUNTER (OUTPATIENT)
Dept: INFUSION THERAPY | Age: 82
Discharge: HOME | End: 2022-10-22
Payer: MEDICARE

## 2022-10-22 VITALS
OXYGEN SATURATION: 97 % | HEART RATE: 91 BPM | RESPIRATION RATE: 18 BRPM | DIASTOLIC BLOOD PRESSURE: 60 MMHG | TEMPERATURE: 97.4 F | SYSTOLIC BLOOD PRESSURE: 106 MMHG

## 2022-10-22 PROCEDURE — 96372 THER/PROPH/DIAG INJ SC/IM: CPT

## 2022-10-22 PROCEDURE — 74011250636 HC RX REV CODE- 250/636: Performed by: UROLOGY

## 2022-10-22 PROCEDURE — 74011000250 HC RX REV CODE- 250: Performed by: UROLOGY

## 2022-10-22 RX ADMIN — LIDOCAINE HYDROCHLORIDE 1 G: 10 INJECTION, SOLUTION EPIDURAL; INFILTRATION; INTRACAUDAL; PERINEURAL at 09:24

## 2022-10-22 NOTE — PROGRESS NOTES
[facility] Progress Note    Date: 2022    Name: Jodee Puente    MRN: [id number]         : 1940    Invanz 3 of 7      Ms. Marr was assessed and education was provided. Ms. Lorraine Nunes arrived via wheelchair with her  at her side. Ms. González Sagastume vitals were reviewed. Visit Vitals  /60   Pulse 91   Temp 97.4 °F (36.3 °C)   Resp 18   SpO2 97%           []  Vancomycin     [x]  Invanz 1 gram IM     []  Cubicin     []  Rocephin    Injected in patient's LGM and band aid applied to site. Patient armband removed and shredded. Ms. Lorraine Nunes was discharged from [place] [address] in stable condition at 0930. She is to return on 10/23/22 at 0930 for her next appointment.      Lynne De La Torre RN  2022

## 2022-10-23 ENCOUNTER — APPOINTMENT (OUTPATIENT)
Dept: INFUSION THERAPY | Age: 82
End: 2022-10-23

## 2022-10-23 ENCOUNTER — HOSPITAL ENCOUNTER (OUTPATIENT)
Dept: INFUSION THERAPY | Age: 82
Discharge: HOME | End: 2022-10-23
Payer: MEDICARE

## 2022-10-23 VITALS
TEMPERATURE: 98.1 F | OXYGEN SATURATION: 90 % | SYSTOLIC BLOOD PRESSURE: 114 MMHG | DIASTOLIC BLOOD PRESSURE: 62 MMHG | HEART RATE: 100 BPM | RESPIRATION RATE: 18 BRPM

## 2022-10-23 PROCEDURE — 74011000250 HC RX REV CODE- 250: Performed by: UROLOGY

## 2022-10-23 PROCEDURE — 74011250636 HC RX REV CODE- 250/636: Performed by: UROLOGY

## 2022-10-23 PROCEDURE — 96372 THER/PROPH/DIAG INJ SC/IM: CPT

## 2022-10-23 RX ADMIN — LIDOCAINE HYDROCHLORIDE 1 G: 10 INJECTION, SOLUTION EPIDURAL; INFILTRATION; INTRACAUDAL; PERINEURAL at 09:15

## 2022-10-23 NOTE — PROGRESS NOTES
\[facility]\"" Progress Note    Date: 2022    Name: Dariel Herrera    MRN: [id number]         : 1940    Invanz 4 of 7      Ms. Marr was assessed and education was provided. Ms. Kenan Sifuentes arrived via wheelchair with her  at her side. Ms. Kaykay Lam vitals were reviewed. Visit Vitals  /62   Pulse 100   Temp 98.1 °F (36.7 °C)   Resp 18   SpO2 90%           []  Vancomycin     [x]  Invanz 1 gram IM     []  Cubicin     []  Rocephin    Injected in patient's RGM and band aid applied to site. Patient armband removed and shredded. Ms. Kenan Sifuentes was discharged from [place] [address] in stable condition at [phone]. She is to return on 10/24/22 at 1100 for her next appointment.      Allison Scott RN  2022

## 2022-10-24 ENCOUNTER — APPOINTMENT (OUTPATIENT)
Dept: INFUSION THERAPY | Age: 82
End: 2022-10-24

## 2022-10-24 ENCOUNTER — HOSPITAL ENCOUNTER (OUTPATIENT)
Dept: INFUSION THERAPY | Age: 82
Discharge: HOME | End: 2022-10-24
Payer: MEDICARE

## 2022-10-24 VITALS
RESPIRATION RATE: 18 BRPM | OXYGEN SATURATION: 95 % | DIASTOLIC BLOOD PRESSURE: 55 MMHG | HEART RATE: 100 BPM | SYSTOLIC BLOOD PRESSURE: 107 MMHG | TEMPERATURE: 48.2 F

## 2022-10-24 PROCEDURE — 96372 THER/PROPH/DIAG INJ SC/IM: CPT

## 2022-10-24 PROCEDURE — 74011250636 HC RX REV CODE- 250/636: Performed by: UROLOGY

## 2022-10-24 PROCEDURE — 74011000250 HC RX REV CODE- 250: Performed by: UROLOGY

## 2022-10-24 RX ADMIN — LIDOCAINE HYDROCHLORIDE 1 G: 10 INJECTION, SOLUTION EPIDURAL; INFILTRATION; INTRACAUDAL; PERINEURAL at 10:59

## 2022-10-24 NOTE — PROGRESS NOTES
[facility] Progress Note    Date: 2022    Name: Renata Koo    MRN: [id number]         : 1940    Invanz 5 of 7    Ms. Marr was assessed and education was provided. Ms. Polina Dennison arrived via wheelchair with her  at her side    Ms. Marr's vitals were reviewed. Visit Vitals  BP (!) 107/55 (BP 1 Location: Left arm, BP Patient Position: Sitting)   Pulse 100   Temp (!) 48.2 °F (9 °C)   Resp 18   SpO2 95%       Lab results were obtained and reviewed. No results found for this or any previous visit (from the past 12 hour(s)). []  Vancomycin     [x]  Invanz 1 gram IM     []  Cubicin     []  Rocephin      Injected in patient's LGM and band aid applied to site. Ms. Polina Dennison tolerated infusion, and had no complaints at this time. Patient armband removed and shredded. Ms. Polina Dennison was discharged from [place] [address] in stable condition at . She is to return on 10/25/22 at 1100 for her next appointment.     Ca Dewey RN  2022  11:52 AM

## 2022-10-25 ENCOUNTER — APPOINTMENT (OUTPATIENT)
Dept: INFUSION THERAPY | Age: 82
End: 2022-10-25

## 2022-10-25 ENCOUNTER — HOSPITAL ENCOUNTER (OUTPATIENT)
Dept: INFUSION THERAPY | Age: 82
Discharge: HOME | End: 2022-10-25
Payer: MEDICARE

## 2022-10-25 VITALS
OXYGEN SATURATION: 92 % | TEMPERATURE: 97.8 F | RESPIRATION RATE: 18 BRPM | DIASTOLIC BLOOD PRESSURE: 56 MMHG | SYSTOLIC BLOOD PRESSURE: 102 MMHG | HEART RATE: 102 BPM

## 2022-10-25 PROCEDURE — 96372 THER/PROPH/DIAG INJ SC/IM: CPT

## 2022-10-25 PROCEDURE — 74011250636 HC RX REV CODE- 250/636: Performed by: UROLOGY

## 2022-10-25 PROCEDURE — 74011000250 HC RX REV CODE- 250: Performed by: UROLOGY

## 2022-10-25 PROCEDURE — 74011000250 HC RX REV CODE- 250

## 2022-10-25 RX ORDER — LIDOCAINE HYDROCHLORIDE 10 MG/ML
INJECTION, SOLUTION EPIDURAL; INFILTRATION; INTRACAUDAL; PERINEURAL
Status: COMPLETED
Start: 2022-10-25 | End: 2022-10-25

## 2022-10-25 RX ADMIN — LIDOCAINE HYDROCHLORIDE 2 ML: 10 INJECTION, SOLUTION EPIDURAL; INFILTRATION; INTRACAUDAL; PERINEURAL at 11:15

## 2022-10-25 RX ADMIN — LIDOCAINE HYDROCHLORIDE 1 G: 10 INJECTION, SOLUTION EPIDURAL; INFILTRATION; INTRACAUDAL; PERINEURAL at 11:15

## 2022-10-25 NOTE — PROGRESS NOTES
[facility] Progress Note    Date: 2022    Name: Mateo Jon    MRN: [id number]         : 1940    Invanz 6 of 7    Ms. Marr was assessed and education was provided. Ms. Rafy Cormier arrived via wheelchair with her  at her side    Ms. Marr's vitals were reviewed. Visit Vitals  BP (!) 102/56 (BP 1 Location: Left arm, BP Patient Position: Sitting)   Pulse (!) 102   Temp 97.8 °F (36.6 °C)   Resp 18   SpO2 92%       Lab results were obtained and reviewed. No results found for this or any previous visit (from the past 12 hour(s)). []  Vancomycin     [x]  Invanz 1 gram IM     []  Cubicin     []  Rocephin      Injected in patient's RGM and band aid applied to site. Ms. Rafy Cormier tolerated infusion, and had no complaints at this time. Patient armband removed and shredded. Ms. Rafy Cormier was discharged from [place] [address] in stable condition at 1115. She is to return on 10/26/22 at 1100 for her next appointment.     Teri Amaya RN  2022  11:52 AM

## 2022-10-26 ENCOUNTER — APPOINTMENT (OUTPATIENT)
Dept: INFUSION THERAPY | Age: 82
End: 2022-10-26

## 2022-10-26 ENCOUNTER — HOSPITAL ENCOUNTER (OUTPATIENT)
Dept: INFUSION THERAPY | Age: 82
Discharge: HOME | End: 2022-10-26
Payer: MEDICARE

## 2022-10-26 VITALS
HEART RATE: 98 BPM | DIASTOLIC BLOOD PRESSURE: 68 MMHG | OXYGEN SATURATION: 97 % | TEMPERATURE: 97.6 F | RESPIRATION RATE: 16 BRPM | SYSTOLIC BLOOD PRESSURE: 104 MMHG

## 2022-10-26 PROCEDURE — 96372 THER/PROPH/DIAG INJ SC/IM: CPT

## 2022-10-26 PROCEDURE — 74011000250 HC RX REV CODE- 250: Performed by: UROLOGY

## 2022-10-26 PROCEDURE — 74011250636 HC RX REV CODE- 250/636: Performed by: UROLOGY

## 2022-10-26 RX ADMIN — LIDOCAINE HYDROCHLORIDE 1 G: 10 INJECTION, SOLUTION EPIDURAL; INFILTRATION; INTRACAUDAL; PERINEURAL at 13:07

## 2022-10-26 NOTE — PROGRESS NOTES
[facility] Progress Note    Date: 2022    Name: Michael Stahl    MRN: [id number]         : 1940    Invanz 7 of 7    Ms. Marr was assessed and education was provided. Ms. Troy Tadeo arrived via wheelchair with her  at her side    Ms. Marr's vitals were reviewed. Visit Vitals  /68 (BP 1 Location: Left arm, BP Patient Position: Sitting)   Pulse 98   Temp 97.6 °F (36.4 °C)   Resp 16   SpO2 97%       Lab results were obtained and reviewed. No results found for this or any previous visit (from the past 12 hour(s)). []  Vancomycin     [x]  Invanz 1 gram IM     []  Cubicin     []  Rocephin      Injected in patient's RGM and band aid applied to site. Ms. Troy Tadeo tolerated infusion, and had no complaints at this time. Patient armband removed and shredded. Ms. Troy Tadeo was discharged from [place] [address] in stable condition at 1315. She has no further appointments scheduled with [facility] at this time.     Anna Reardon RN  2022  11:52 AM

## 2023-03-02 PROBLEM — N39.0 UTI (URINARY TRACT INFECTION): Status: ACTIVE | Noted: 2023-03-02

## 2023-03-02 RX ORDER — ERTAPENEM 1 G/1
1000 INJECTION, POWDER, LYOPHILIZED, FOR SOLUTION INTRAMUSCULAR; INTRAVENOUS ONCE
Status: DISCONTINUED | OUTPATIENT
Start: 2023-03-02 | End: 2023-03-02

## 2023-03-02 RX ORDER — ERTAPENEM 1 G/1
1000 INJECTION, POWDER, LYOPHILIZED, FOR SOLUTION INTRAMUSCULAR; INTRAVENOUS ONCE
Status: COMPLETED | OUTPATIENT
Start: 2023-03-04 | End: 2023-03-04

## 2023-03-02 RX ORDER — ERTAPENEM 1 G/1
1000 INJECTION, POWDER, LYOPHILIZED, FOR SOLUTION INTRAMUSCULAR; INTRAVENOUS ONCE
Status: DISCONTINUED | OUTPATIENT
Start: 2023-03-02 | End: 2023-03-02 | Stop reason: CLARIF

## 2023-03-02 RX ORDER — ERTAPENEM 1 G/1
1000 INJECTION, POWDER, LYOPHILIZED, FOR SOLUTION INTRAMUSCULAR; INTRAVENOUS ONCE
Status: COMPLETED | OUTPATIENT
Start: 2023-03-05 | End: 2023-03-05

## 2023-03-03 ENCOUNTER — HOSPITAL ENCOUNTER (OUTPATIENT)
Facility: HOSPITAL | Age: 83
Setting detail: INFUSION SERIES
End: 2023-03-03
Payer: MEDICARE

## 2023-03-03 VITALS
HEART RATE: 110 BPM | SYSTOLIC BLOOD PRESSURE: 116 MMHG | RESPIRATION RATE: 16 BRPM | OXYGEN SATURATION: 94 % | DIASTOLIC BLOOD PRESSURE: 60 MMHG | TEMPERATURE: 98.4 F

## 2023-03-03 PROCEDURE — 2500000003 HC RX 250 WO HCPCS: Performed by: UROLOGY

## 2023-03-03 PROCEDURE — 6360000002 HC RX W HCPCS: Performed by: UROLOGY

## 2023-03-03 PROCEDURE — 96372 THER/PROPH/DIAG INJ SC/IM: CPT

## 2023-03-03 RX ORDER — ERTAPENEM 1 G/1
1000 INJECTION, POWDER, LYOPHILIZED, FOR SOLUTION INTRAMUSCULAR; INTRAVENOUS ONCE
Status: COMPLETED | OUTPATIENT
Start: 2023-03-03 | End: 2023-03-03

## 2023-03-03 RX ADMIN — LIDOCAINE HYDROCHLORIDE 3.2 ML: 10 INJECTION, SOLUTION EPIDURAL; INFILTRATION; INTRACAUDAL; PERINEURAL at 14:06

## 2023-03-03 RX ADMIN — ERTAPENEM SODIUM 1000 MG: 1 INJECTION INTRAMUSCULAR; INTRAVENOUS at 14:05

## 2023-03-03 ASSESSMENT — PAIN SCALES - GENERAL: PAINLEVEL_OUTOF10: 0

## 2023-03-03 NOTE — PROGRESS NOTES
\[facility]\"" Progress Note    Date: March 3, 2023    Name: Juan Acevedo    MRN: [id number]         : 1940    Invanz IM 1 of 7    Ms. Alba was assessed and education was provided. Ms. Ni Landis arrived ambulatory using rolling walker with her  at the side. Ms. Ni Landis has previously received Diya Fidel without complications and verbalized understanding on the medication and treatment. Ms. Yarelis Galdamez vitals were reviewed. Vitals:    23 1445   BP: 116/60   Pulse: (!) 110   Resp: 16   Temp: 98.4 °F (36.9 °C)   SpO2: 94%         []  Vancomycin     [x]  Invanz 1 gram IM     []  Cubicin     []  Rocephin   Injected in patient's left dorsal gluteal muscle. Band aid applied to site. Ms. Ni Landis tolerated injection, and had no complaints at this time. Patient armband removed and shredded. Ms. Ni Landis was discharged from [place] [address] in stable condition at 1405. She is to return on 3/4/23 at 0800 for her next appointment.     Cortez Faajrdo RN  March 3, 2023  3:20 PM

## 2023-03-04 ENCOUNTER — HOSPITAL ENCOUNTER (OUTPATIENT)
Facility: HOSPITAL | Age: 83
Setting detail: INFUSION SERIES
End: 2023-03-04
Payer: MEDICARE

## 2023-03-04 VITALS
TEMPERATURE: 97.7 F | DIASTOLIC BLOOD PRESSURE: 67 MMHG | RESPIRATION RATE: 16 BRPM | HEART RATE: 81 BPM | SYSTOLIC BLOOD PRESSURE: 130 MMHG | OXYGEN SATURATION: 96 %

## 2023-03-04 PROCEDURE — 6360000002 HC RX W HCPCS: Performed by: UROLOGY

## 2023-03-04 PROCEDURE — 2500000003 HC RX 250 WO HCPCS: Performed by: UROLOGY

## 2023-03-04 PROCEDURE — 96372 THER/PROPH/DIAG INJ SC/IM: CPT

## 2023-03-04 RX ADMIN — ERTAPENEM SODIUM 1000 MG: 1 INJECTION INTRAMUSCULAR; INTRAVENOUS at 08:05

## 2023-03-04 RX ADMIN — LIDOCAINE HYDROCHLORIDE 3.2 ML: 10 INJECTION, SOLUTION EPIDURAL; INFILTRATION; INTRACAUDAL; PERINEURAL at 08:06

## 2023-03-04 ASSESSMENT — PAIN SCALES - GENERAL: PAINLEVEL_OUTOF10: 0

## 2023-03-04 NOTE — PROGRESS NOTES
[facility] Progress Note    Date: 2023    Name: Campbell Thakkar    MRN: [id number]         : 1940    Alvaro Carolina IM 2 of 7    Ms. Alba was assessed and education was provided. Ms. Susan Dent arrived ambulatory using rolling walker at 0800 with her  at the side. Ms. Sigala Score vitals were reviewed. Vitals:    23 0800   BP: 130/67   Pulse: 81   Resp: 16   Temp: 97.7 °F (36.5 °C)   SpO2: 96%         []  Vancomycin     [x]  Invanz 1 gram IM     []  Cubicin     []  Rocephin   Injected in patient's right dorsal gluteal muscle. Band aid applied to site. Ms. Susan Dent tolerated injection, and had no complaints at this time. Patient armband removed and shredded. Ms. Susan Dent was discharged from [place] [address] in stable condition at [facility] 134. She is to return on 3/5/23 at 0845 for her next appointment.     Altagracia Eckert RN  2023

## 2023-03-05 ENCOUNTER — HOSPITAL ENCOUNTER (OUTPATIENT)
Facility: HOSPITAL | Age: 83
Setting detail: INFUSION SERIES
End: 2023-03-05
Payer: MEDICARE

## 2023-03-05 VITALS
DIASTOLIC BLOOD PRESSURE: 66 MMHG | SYSTOLIC BLOOD PRESSURE: 120 MMHG | TEMPERATURE: 97.6 F | HEART RATE: 80 BPM | RESPIRATION RATE: 16 BRPM | OXYGEN SATURATION: 95 %

## 2023-03-05 PROCEDURE — 2500000003 HC RX 250 WO HCPCS: Performed by: UROLOGY

## 2023-03-05 PROCEDURE — 96372 THER/PROPH/DIAG INJ SC/IM: CPT

## 2023-03-05 PROCEDURE — 6360000002 HC RX W HCPCS: Performed by: UROLOGY

## 2023-03-05 RX ADMIN — ERTAPENEM SODIUM 1000 MG: 1 INJECTION INTRAMUSCULAR; INTRAVENOUS at 08:34

## 2023-03-05 RX ADMIN — LIDOCAINE HYDROCHLORIDE 3.2 ML: 10 INJECTION, SOLUTION EPIDURAL; INFILTRATION; INTRACAUDAL; PERINEURAL at 08:34

## 2023-03-05 ASSESSMENT — PAIN SCALES - GENERAL: PAINLEVEL_OUTOF10: 0

## 2023-03-05 NOTE — PROGRESS NOTES
[facility] Progress Note    Date: 2023    Name: Jocelyn Shore    MRN: [id number]         : 1940    Invanz IM 3 of 7    Ms. Alba was assessed and education was provided. Ms. Zulay Barron arrived ambulatory using rolling walker at 0830 with her  at the side. Ms. Otis Adams vitals were reviewed. Vitals:    23 0830   BP: 120/66   Pulse: 80   Resp: 16   Temp: 97.6 °F (36.4 °C)   SpO2: 95%         []  Vancomycin     [x]  Invanz 1 gram IM     []  Cubicin     []  Rocephin   Injected in patient's Left dorsal gluteal muscle. Band aid applied to site. Ms. Zulay Barron tolerated injection, and had no complaints at this time. Patient armband removed and shredded. Ms. Zulay Barron was discharged from [place] [address] in stable condition at [address] [facility]. She is to return on 3/6/23 at 1300 for her next appointment.     Madelaine Bustamante RN  2023

## 2023-03-06 ENCOUNTER — HOSPITAL ENCOUNTER (OUTPATIENT)
Facility: HOSPITAL | Age: 83
Setting detail: INFUSION SERIES
End: 2023-03-06
Payer: MEDICARE

## 2023-03-06 VITALS
OXYGEN SATURATION: 96 % | TEMPERATURE: 97.5 F | SYSTOLIC BLOOD PRESSURE: 115 MMHG | DIASTOLIC BLOOD PRESSURE: 72 MMHG | HEART RATE: 102 BPM | RESPIRATION RATE: 16 BRPM

## 2023-03-06 PROCEDURE — 2500000003 HC RX 250 WO HCPCS: Performed by: UROLOGY

## 2023-03-06 PROCEDURE — 6360000002 HC RX W HCPCS: Performed by: UROLOGY

## 2023-03-06 PROCEDURE — 96372 THER/PROPH/DIAG INJ SC/IM: CPT

## 2023-03-06 RX ORDER — ERTAPENEM 1 G/1
1000 INJECTION, POWDER, LYOPHILIZED, FOR SOLUTION INTRAMUSCULAR; INTRAVENOUS ONCE
Status: COMPLETED | OUTPATIENT
Start: 2023-03-06 | End: 2023-03-06

## 2023-03-06 RX ADMIN — ERTAPENEM SODIUM 1000 MG: 1 INJECTION, POWDER, LYOPHILIZED, FOR SOLUTION INTRAMUSCULAR; INTRAVENOUS at 13:18

## 2023-03-06 RX ADMIN — LIDOCAINE HYDROCHLORIDE 3.2 ML: 10 INJECTION, SOLUTION EPIDURAL; INFILTRATION; INTRACAUDAL; PERINEURAL at 13:18

## 2023-03-06 ASSESSMENT — PAIN SCALES - GENERAL: PAINLEVEL_OUTOF10: 0

## 2023-03-06 NOTE — PROGRESS NOTES
[facility] Progress Note    Date: 2023    Name: Trevor Han    MRN: [id number]         : 1940    Invanz IM 4 of 7    Ms. Alba was assessed and education was provided. Ms. Sherie Pascual arrived ambulatory using rolling walker at 1250 with her  at the side. Ms. Brandon Aguirre vitals were reviewed. Vitals:    23 1300   BP: 115/72   Pulse: (!) 102   Resp: 16   Temp: 97.5 °F (36.4 °C)   SpO2: 96%         []  Vancomycin     [x]  Invanz 1 gram IM     []  Cubicin     []  Rocephin   Injected in patient's Right dorsal gluteal muscle. Band aid applied to site. Ms. Sherie Pascual tolerated injection, and had no complaints at this time. Patient armband removed and shredded. Ms. Sherie Pascual was discharged from [place] [address] in stable condition at 1320. She is to return on 3/7/23 at 1330 for her next appointment.     Merline Carlson RN  2023

## 2023-03-07 ENCOUNTER — HOSPITAL ENCOUNTER (OUTPATIENT)
Facility: HOSPITAL | Age: 83
Setting detail: INFUSION SERIES
End: 2023-03-07
Payer: MEDICARE

## 2023-03-07 VITALS
TEMPERATURE: 97.9 F | OXYGEN SATURATION: 98 % | DIASTOLIC BLOOD PRESSURE: 64 MMHG | SYSTOLIC BLOOD PRESSURE: 116 MMHG | HEART RATE: 92 BPM | RESPIRATION RATE: 16 BRPM

## 2023-03-07 PROCEDURE — 2500000003 HC RX 250 WO HCPCS: Performed by: UROLOGY

## 2023-03-07 PROCEDURE — 96372 THER/PROPH/DIAG INJ SC/IM: CPT

## 2023-03-07 PROCEDURE — 6360000002 HC RX W HCPCS: Performed by: UROLOGY

## 2023-03-07 RX ORDER — ERTAPENEM 1 G/1
1000 INJECTION, POWDER, LYOPHILIZED, FOR SOLUTION INTRAMUSCULAR; INTRAVENOUS ONCE
Status: COMPLETED | OUTPATIENT
Start: 2023-03-07 | End: 2023-03-07

## 2023-03-07 RX ADMIN — LIDOCAINE HYDROCHLORIDE 3.2 ML: 10 INJECTION, SOLUTION EPIDURAL; INFILTRATION; INTRACAUDAL; PERINEURAL at 13:28

## 2023-03-07 RX ADMIN — ERTAPENEM SODIUM 1000 MG: 1 INJECTION INTRAMUSCULAR; INTRAVENOUS at 13:28

## 2023-03-07 ASSESSMENT — PAIN SCALES - GENERAL: PAINLEVEL_OUTOF10: 0

## 2023-03-07 NOTE — PROGRESS NOTES
[facility] Progress Note    Date: 2023    Name: Татьяна Lo    MRN: [id number]         : 1940    Invanz IM 5 of 7    Ms. Alba was assessed and education was provided. Ms. Belkis Gale arrived ambulatory using rolling walker, assisted by her  at the side. Ms. Lopez Abreu vitals were reviewed. Vitals:    23 1315   BP: 116/64   Pulse: 92   Resp: 16   Temp: 97.9 °F (36.6 °C)   SpO2: 98%         []  Vancomycin     [x]  Invanz 1 gram IM     []  Cubicin     []  Rocephin   Injected in patient's Left dorsal gluteal muscle. Band aid applied to site. Ms. Belkis Gale tolerated injection, and had no complaints at this time. Patient armband removed and shredded. Ms. Belkis Gale was discharged from [place] [address] in stable condition at 1330. She is to return on 3/8/23 at 1330 for her next appointment.     Radha Peña RN  2023

## 2023-03-08 ENCOUNTER — HOSPITAL ENCOUNTER (OUTPATIENT)
Facility: HOSPITAL | Age: 83
Setting detail: INFUSION SERIES
End: 2023-03-08
Payer: MEDICARE

## 2023-03-08 VITALS
RESPIRATION RATE: 16 BRPM | DIASTOLIC BLOOD PRESSURE: 63 MMHG | TEMPERATURE: 97.9 F | HEART RATE: 88 BPM | SYSTOLIC BLOOD PRESSURE: 116 MMHG | OXYGEN SATURATION: 95 %

## 2023-03-08 PROCEDURE — 6360000002 HC RX W HCPCS: Performed by: UROLOGY

## 2023-03-08 PROCEDURE — 2500000003 HC RX 250 WO HCPCS: Performed by: UROLOGY

## 2023-03-08 PROCEDURE — 96372 THER/PROPH/DIAG INJ SC/IM: CPT

## 2023-03-08 RX ORDER — ERTAPENEM 1 G/1
1000 INJECTION, POWDER, LYOPHILIZED, FOR SOLUTION INTRAMUSCULAR; INTRAVENOUS ONCE
Status: DISCONTINUED | OUTPATIENT
Start: 2023-03-08 | End: 2023-03-08

## 2023-03-08 RX ORDER — ERTAPENEM 1 G/1
1000 INJECTION, POWDER, LYOPHILIZED, FOR SOLUTION INTRAMUSCULAR; INTRAVENOUS ONCE
Status: COMPLETED | OUTPATIENT
Start: 2023-03-08 | End: 2023-03-08

## 2023-03-08 RX ADMIN — ERTAPENEM SODIUM 1000 MG: 1 INJECTION INTRAMUSCULAR; INTRAVENOUS at 13:38

## 2023-03-08 RX ADMIN — LIDOCAINE HYDROCHLORIDE 3.2 ML: 10 INJECTION, SOLUTION EPIDURAL; INFILTRATION; INTRACAUDAL; PERINEURAL at 13:48

## 2023-03-08 ASSESSMENT — PAIN SCALES - GENERAL: PAINLEVEL_OUTOF10: 0

## 2023-03-08 NOTE — PROGRESS NOTES
[facility] Progress Note    Date: 2023    Name: Curly Yip    MRN: [id number]         : 1940    Invanz IM 6 of 7    Ms. Alba was assessed and education was provided. Ms. Josue Balbuena arrived ambulatory using rolling walker, assisted by her  at the side. Ms. Claudio Mai vitals were reviewed. Vitals:    23 1330   BP: 116/63   Pulse: 88   Resp: 16   Temp: 97.9 °F (36.6 °C)   SpO2: 95%         []  Vancomycin     [x]  Invanz 1 gram IM     []  Cubicin     []  Rocephin   Injected in patient's Right dorsal gluteal muscle. Band aid applied to site. Ms. Josue Balbuena tolerated injection, and had no complaints at this time. Patient armband removed and shredded. Ms. Josue Balbuena was discharged from [place] [address] in stable condition at 1350. She is to return on 3/9/23 at 1330 for her next appointment.     True Harper RN  2023

## 2023-03-09 ENCOUNTER — HOSPITAL ENCOUNTER (OUTPATIENT)
Facility: HOSPITAL | Age: 83
Setting detail: INFUSION SERIES
End: 2023-03-09
Payer: MEDICARE

## 2023-03-09 VITALS
TEMPERATURE: 98.2 F | DIASTOLIC BLOOD PRESSURE: 67 MMHG | OXYGEN SATURATION: 96 % | RESPIRATION RATE: 16 BRPM | SYSTOLIC BLOOD PRESSURE: 123 MMHG | HEART RATE: 106 BPM

## 2023-03-09 PROCEDURE — 2500000003 HC RX 250 WO HCPCS: Performed by: UROLOGY

## 2023-03-09 PROCEDURE — 6360000002 HC RX W HCPCS: Performed by: UROLOGY

## 2023-03-09 PROCEDURE — 96372 THER/PROPH/DIAG INJ SC/IM: CPT

## 2023-03-09 RX ORDER — ERTAPENEM 1 G/1
1000 INJECTION, POWDER, LYOPHILIZED, FOR SOLUTION INTRAMUSCULAR; INTRAVENOUS ONCE
Status: COMPLETED | OUTPATIENT
Start: 2023-03-09 | End: 2023-03-09

## 2023-03-09 RX ADMIN — ERTAPENEM SODIUM 1000 MG: 1 INJECTION, POWDER, LYOPHILIZED, FOR SOLUTION INTRAMUSCULAR; INTRAVENOUS at 13:40

## 2023-03-09 RX ADMIN — LIDOCAINE HYDROCHLORIDE 3.2 ML: 10 INJECTION, SOLUTION EPIDURAL; INFILTRATION; INTRACAUDAL; PERINEURAL at 13:40

## 2023-03-09 ASSESSMENT — PAIN SCALES - GENERAL: PAINLEVEL_OUTOF10: 0

## 2023-03-09 NOTE — PROGRESS NOTES
[facility] Progress Note    Date: 2023    Name: Chacorta Ruffin    MRN: [id number]         : 1940    Invanz IM 7 of 7    Ms. Alba was assessed and education was provided. Ms. Christiano Fierro arrived ambulatory using rolling walker, assisted by her  at the side. She reports still having some discomfort when urinating. Her  will call Dr. Yamil Wright for further follow-up. Ms. Starling Boxer vitals were reviewed. Vitals:    23 1330   BP: 123/67   Pulse: (!) 106   Resp: 16   Temp: 98.2 °F (36.8 °C)   SpO2: 96%         []  Vancomycin     [x]  Invanz 1 gram IM     []  Cubicin     []  Rocephin   Injected in patient's left dorsal gluteal muscle. Band aid applied to site. Ms. Christiano Fierro tolerated injection, and had no complaints at this time. Patient armband removed and shredded. Ms. Christiano Fierro was discharged from [place] [address] in stable condition at [phone].      Oneal Lee RN  2023

## 2023-04-01 PROBLEM — N39.0 UTI (URINARY TRACT INFECTION): Status: RESOLVED | Noted: 2023-03-02 | Resolved: 2023-04-01

## 2023-07-20 ENCOUNTER — HOSPITAL ENCOUNTER (OUTPATIENT)
Facility: HOSPITAL | Age: 83
Setting detail: RECURRING SERIES
Discharge: HOME | End: 2023-07-23
Payer: MEDICARE

## 2023-07-20 PROCEDURE — 97110 THERAPEUTIC EXERCISES: CPT

## 2023-07-20 PROCEDURE — 97162 PT EVAL MOD COMPLEX 30 MIN: CPT

## 2023-07-26 ENCOUNTER — HOSPITAL ENCOUNTER (OUTPATIENT)
Facility: HOSPITAL | Age: 83
Setting detail: RECURRING SERIES
Discharge: HOME | End: 2023-07-29
Payer: MEDICARE

## 2023-07-26 PROCEDURE — 97110 THERAPEUTIC EXERCISES: CPT

## 2023-07-26 PROCEDURE — 97530 THERAPEUTIC ACTIVITIES: CPT

## 2023-07-26 PROCEDURE — 97112 NEUROMUSCULAR REEDUCATION: CPT

## 2023-07-26 NOTE — PROGRESS NOTES
PHYSICAL / OCCUPATIONAL THERAPY - DAILY TREATMENT NOTE (updated )    Patient Name: Hailey Gilmore    Date: 2023    : 1940  Insurance: Payor: MEDICARE / Plan: MEDICARE PART A AND B / Product Type: *No Product type* /      Patient  verified Yes     Visit #   Current / Total 2 16   Time   In / Out 1008 1055   Pain   In / Out 0 0   Subjective Functional Status/Changes: \"I have no pain but I really don't feel like being here because I still just don't feel well. \"   Changes to:  Meds, Allergies, Med Hx, Sx Hx? If yes, update Summary List no       TREATMENT AREA =  Other abnormalities of gait and mobility [R26.89]    OBJECTIVE    Therapeutic Procedures: Tx Min Billable or 1:1 Min (if diff from Tx Min) Procedure, Rationale, Specifics   17 15      Details if applicable:       15  31758 Therapeutic Activity (timed):  use of dynamic activities replicating functional movements to increase ROM, strength, coordination, balance, and proprioception in order to improve patient's ability to progress to PLOF and address remaining functional goals. (see flow sheet as applicable)     Details if applicable:     14  20674 Neuromuscular Re-Education (timed):  improve balance, coordination, kinesthetic sense, posture, core stability and proprioception to improve patient's ability to develop conscious control of individual muscles and awareness of position of extremities in order to progress to PLOF and address remaining functional goals.  (see flow sheet as applicable)     Details if applicable:     52 44 [facility] Totals Reminder: bill using total billable min of TIMED therapeutic procedures (example: do not include dry needle or estim unattended, both untimed codes, in totals to left)  8-22 min = 1 unit; 23-37 min = 2 units; 38-52 min = 3 units; 53-67 min = 4 units; 68-82 min = 5 units   Total Total       TOTAL TREATMENT TIME:        47       [x]  Patient Education billed concurrently with other procedures   [x] Review HEP

## 2023-07-31 ENCOUNTER — HOSPITAL ENCOUNTER (OUTPATIENT)
Facility: HOSPITAL | Age: 83
Setting detail: RECURRING SERIES
Discharge: HOME | End: 2023-08-03
Payer: MEDICARE

## 2023-07-31 PROCEDURE — 97530 THERAPEUTIC ACTIVITIES: CPT

## 2023-07-31 PROCEDURE — 97110 THERAPEUTIC EXERCISES: CPT

## 2023-07-31 PROCEDURE — 97112 NEUROMUSCULAR REEDUCATION: CPT

## 2023-07-31 NOTE — PROGRESS NOTES
LE strength and decreased fall risk. Eval Status: 5x sit to stand: 56 seconds      3. Pt will have 5/5 quadriceps/ knee extension strength to return to goals of performing steps step over step. Eval Status:   Knee Left (1-5) Right (1-5)   Knee Extension 3+ 4         4. Patient will demonstrate safe use of SPC or LRAD for household distances to improve ease of moving through home safely. Eval Status: patient reports falls and not being safe while moving around small areas of home without rollator                 5. Patient will demonstrate ability to stand with FT on compliance surface with eyes closed to improve patient's confidence in balance to perform exercises independently.               Eval status:     FA,on foam  EC: unable      PLAN  Yes  Continue plan of care  []  Upgrade activities as tolerated  []  Discharge due to :  []  Other:    Mikaela Nguyen PT    7/31/2023    10:23 AM    Future Appointments   Date Time Provider 4600  46[facility]   8/7/2023 10:10 AM [facility] PT SHANIKAY [facility]PT[facility]Y [facility]   8/9/2023 10:10 AM [facility] PT SHANIKAY [facility]   8/16/2023 10:10 AM Mikaela Nguyen, PT [facility]   8/18/2023 10:10 AM Mikaela Nguyen, PT [facility]   8/23/2023 10:10 AM Mikaela Nguyen, PT [facility]   8/25/2023 10:10 AM Mikaela Nguyen, PT [facility]   8/30/2023 10:10 AM Mikaela Nguyen, PT [facility]   9/1/2023 10:10 AM Mikaela Nguyen, PT [facility]   9/6/2023 10:10 AM Mikaela Nguyen, PT [facility]   9/8/2023 10:10 AM Mikaela Nguyen, PT [facility]   9/13/2023 10:10 AM [facility]   9/15/2023 10:10 AM [facility] Estes [facility]

## 2023-08-07 ENCOUNTER — HOSPITAL ENCOUNTER (OUTPATIENT)
Facility: HOSPITAL | Age: 83
Setting detail: RECURRING SERIES
Discharge: HOME | End: 2023-08-10
Payer: MEDICARE

## 2023-08-07 PROCEDURE — 97110 THERAPEUTIC EXERCISES: CPT

## 2023-08-07 PROCEDURE — 97530 THERAPEUTIC ACTIVITIES: CPT

## 2023-08-07 PROCEDURE — 97112 NEUROMUSCULAR REEDUCATION: CPT

## 2023-08-07 NOTE — PROGRESS NOTES
PHYSICAL / OCCUPATIONAL THERAPY - DAILY TREATMENT NOTE (updated )    Patient Name: Dallas Boateng    Date: 2023    : 1940  Insurance: Payor: MEDICARE / Plan: MEDICARE PART A AND B / Product Type: *No Product type* /      Patient  verified Yes     Visit #   Current / Total 4 16   Time   In / Out 10:08 am 10:52 am   Pain   In / Out 0 0   Subjective Functional Status/Changes: \"I feel about the same. \" Pt states she has been doing the exercises sometimes at home. Changes to:  Meds, Allergies, Med Hx, Sx Hx? If yes, update Summary List no       TREATMENT AREA =  Other abnormalities of gait and mobility [R26.89]    OBJECTIVE    Therapeutic Procedures: Tx Min Billable or 1:1 Min (if diff from Tx Min) Procedure, Rationale, Specifics   19  09052 Therapeutic Exercise (timed):  increase ROM, strength, coordination, balance, and proprioception to improve patient's ability to progress to PLOF and address remaining functional goals. (see flow sheet as applicable)     Details if applicable:       15 15 06676 Therapeutic Activity (timed):  use of dynamic activities replicating functional movements to increase ROM, strength, coordination, balance, and proprioception in order to improve patient's ability to progress to PLOF and address remaining functional goals. (see flow sheet as applicable)     Details if applicable:     10 10 89543 Neuromuscular Re-Education (timed):  improve balance, coordination, kinesthetic sense, posture, core stability and proprioception to improve patient's ability to develop conscious control of individual muscles and awareness of position of extremities in order to progress to PLOF and address remaining functional goals.  (see flow sheet as applicable)     Details if applicable:     40 42 [facility] Totals Reminder: bill using total billable min of TIMED therapeutic procedures (example: do not include dry needle or estim unattended, both untimed codes, in totals to left)  8-22 min = 1 unit;

## 2023-08-09 ENCOUNTER — HOSPITAL ENCOUNTER (OUTPATIENT)
Facility: HOSPITAL | Age: 83
Setting detail: RECURRING SERIES
Discharge: HOME | End: 2023-08-12
Payer: MEDICARE

## 2023-08-09 PROCEDURE — 97112 NEUROMUSCULAR REEDUCATION: CPT

## 2023-08-09 PROCEDURE — 97110 THERAPEUTIC EXERCISES: CPT

## 2023-08-09 PROCEDURE — 97530 THERAPEUTIC ACTIVITIES: CPT

## 2023-08-09 NOTE — PROGRESS NOTES
PHYSICAL / OCCUPATIONAL THERAPY - DAILY TREATMENT NOTE (updated )    Patient Name: Cholo Conroy    Date: 2023    : 1940  Insurance: Payor: MEDICARE / Plan: MEDICARE PART A AND B / Product Type: *No Product type* /      Patient  verified Yes     Visit #   Current / Total 5 16   Time   In / Out 1010 1050   Pain   In / Out 0 0   Subjective Functional Status/Changes: Pt denied pain but  noted greatly increased difficulty getting out of car last few days     TREATMENT AREA =  Other abnormalities of gait and mobility [R26.89]    OBJECTIVE         Therapeutic Procedures: Tx Min Billable or 1:1 Min (if diff from Tx Min) Procedure, Rationale, Specifics   10  67239 Therapeutic Exercise (timed):  increase ROM, strength, coordination, balance, and proprioception to improve patient's ability to progress to PLOF and address remaining functional goals. (see flow sheet as applicable)     Details if applicable:       10  55752 Neuromuscular Re-Education (timed):  improve balance, coordination, kinesthetic sense, posture, core stability and proprioception to improve patient's ability to develop conscious control of individual muscles and awareness of position of extremities in order to progress to PLOF and address remaining functional goals. (see flow sheet as applicable)     Details if applicable:     20  26063 Therapeutic Activity (timed):  use of dynamic activities replicating functional movements to increase ROM, strength, coordination, balance, and proprioception in order to improve patient's ability to progress to PLOF and address remaining functional goals.   (see flow sheet as applicable)     Details if applicable:            Details if applicable:            Details if applicable:     36  [facility] Totals Reminder: bill using total billable min of TIMED therapeutic procedures (example: do not include dry needle or estim unattended, both untimed codes, in totals to left)  8-22 min = 1 unit; 23-37 min = 2

## 2023-08-16 ENCOUNTER — HOSPITAL ENCOUNTER (OUTPATIENT)
Facility: HOSPITAL | Age: 83
Setting detail: RECURRING SERIES
Discharge: HOME | End: 2023-08-19
Payer: MEDICARE

## 2023-08-16 PROCEDURE — 97530 THERAPEUTIC ACTIVITIES: CPT

## 2023-08-16 PROCEDURE — 97110 THERAPEUTIC EXERCISES: CPT

## 2023-08-16 PROCEDURE — 97112 NEUROMUSCULAR REEDUCATION: CPT

## 2023-08-16 NOTE — PROGRESS NOTES
[facility] at [address] Driver: [phone]   Fax: [phone]  Progress Note  Patient name: Sung Johnson Start of Care: 2023   Referral source: Marilu Nj DO : 1940               Medical Diagnosis: Other abnormalities of gait and mobility [R26.89]      Onset Date:2022               Treatment Diagnosis:  Other abnormalities of gait and mobility [R26.89]                                               Prior Hospitalization: see medical history Provider#: [id number]   Medications: Verified on Patient Summary List      ===========================================================================================  Assessment / Summary of Care:  Sung Johnson is a 78 y.o.  female who remains hesitant to exercise due to complaint of persistent generalized malaise. But, patient does cooperate with exercises with gentle coaching. As a result, patient is making gradual gains in improved LE strength, endurance, balance, and functional mobility skills. Family reports fall frequency has reduced and gait in home setting is improved. Patient will continue to benefit from skilled PT / OT services to modify and progress therapeutic interventions, analyze and address functional mobility deficits, analyze and address ROM deficits, analyze and address strength deficits, analyze and address soft tissue restrictions, analyze and cue for proper movement patterns, analyze and modify for postural abnormalities, analyze and address imbalance/dizziness, and instruct in home and community integration to address functional deficits and attain remaining goals.    ===========================================================================================    Plan:Continue therapy per initial plan/protocol at a frequency of  2 x per week for 5 weeks    Progress Towards Goals:   Short Term Goals:  To be accomplished in 4 weeks}:  Patient will be independent and compliant

## 2023-08-18 ENCOUNTER — HOSPITAL ENCOUNTER (OUTPATIENT)
Facility: HOSPITAL | Age: 83
Setting detail: RECURRING SERIES
Discharge: HOME | End: 2023-08-21
Payer: MEDICARE

## 2023-08-18 PROCEDURE — 97110 THERAPEUTIC EXERCISES: CPT

## 2023-08-18 PROCEDURE — 97112 NEUROMUSCULAR REEDUCATION: CPT

## 2023-08-18 PROCEDURE — 97530 THERAPEUTIC ACTIVITIES: CPT

## 2023-08-18 NOTE — PROGRESS NOTES
PHYSICAL / OCCUPATIONAL THERAPY - DAILY TREATMENT NOTE (updated )    Patient Name: Bo Alanis    Date: 2023    : 1940  Insurance: Payor: MEDICARE / Plan: MEDICARE PART A AND B / Product Type: *No Product type* /      Patient  verified Yes     Visit #   Current / Total 7 16   Time   In / Out 1022 1108   Pain   In / Out 0 0   Subjective Functional Status/Changes: \"I had blood in my urine this morning. The doctor suspects a UTI. So, I had to go to the doctor's office for tests. I felt so weak that my knees gave out when using my walker and I went down on my knees. \"   Changes to:  Meds, Allergies, Med Hx, Sx Hx? If yes, update Summary List no       TREATMENT AREA =  Other abnormalities of gait and mobility [R26.89]    OBJECTIVE    Therapeutic Procedures: Tx Min Billable or 1:1 Min (if diff from Tx Min) Procedure, Rationale, Specifics   16  89299 Therapeutic Exercise (timed):  increase ROM, strength, coordination, balance, and proprioception to improve patient's ability to progress to PLOF and address remaining functional goals. (see flow sheet as applicable)     Details if applicable:       15 10 65171 Therapeutic Activity (timed):  use of dynamic activities replicating functional movements to increase ROM, strength, coordination, balance, and proprioception in order to improve patient's ability to progress to PLOF and address remaining functional goals. (see flow sheet as applicable)     Details if applicable:     15  84386 Neuromuscular Re-Education (timed):  improve balance, coordination, kinesthetic sense, posture, core stability and proprioception to improve patient's ability to develop conscious control of individual muscles and awareness of position of extremities in order to progress to PLOF and address remaining functional goals.  (see flow sheet as applicable)     Details if applicable:     55 41 [facility] Totals Reminder: bill using total billable min of TIMED therapeutic procedures

## 2023-08-23 ENCOUNTER — HOSPITAL ENCOUNTER (OUTPATIENT)
Facility: HOSPITAL | Age: 83
Setting detail: RECURRING SERIES
Discharge: HOME | End: 2023-08-26
Payer: MEDICARE

## 2023-08-23 PROCEDURE — 97112 NEUROMUSCULAR REEDUCATION: CPT

## 2023-08-23 PROCEDURE — 97530 THERAPEUTIC ACTIVITIES: CPT

## 2023-08-23 PROCEDURE — 97110 THERAPEUTIC EXERCISES: CPT

## 2023-08-23 NOTE — PROGRESS NOTES
min = 3 units; 53-67 min = 4 units; 68-82 min = 5 units   Total Total       TOTAL TREATMENT TIME:        51       [x]  Patient Education billed concurrently with other procedures   [x] Review HEP    [] Progressed/Changed HEP, detail:    [] Other detail:       Objective Information/Functional Measures/Assessment    Focused treatment on functional strengthening with sit to stand training and weight shifting in standing. Instructed patient and spouse in benefits of gait belt when ambulating in community to reduce risk of fall during community ambulation. Patient will continue to benefit from skilled PT / OT services to modify and progress therapeutic interventions, analyze and address functional mobility deficits, analyze and address ROM deficits, analyze and address strength deficits, analyze and address soft tissue restrictions, analyze and cue for proper movement patterns, analyze and modify for postural abnormalities, analyze and address imbalance/dizziness, and instruct in home and community integration to address functional deficits and attain remaining goals. Progress toward goals / Updated goals:  []  See Progress Note/Recertification    Short Term Goals: To be accomplished in 4 weeks}:  Patient will be independent and compliant with HEP to progress toward goals and restore functional mobility. Eval Status: to be issued at 2nd visit  Current: Patient and spouse instructed in additional seated exercises to add to HEP and provided resistance bands for home use. In-progress, 8/18/2023     Pt will perform TUG in 19 seconds with rollator to demonstrate age-normed functional mobility. Eval Status: 26 seconds with rollator  Current: 23.2 seconds with rollator. In-progress, 8/23/2023     Long Term Goals: To be accomplished in 8 weeks:  Patient will improve FOTO score by 2 points to improve functional tolerance for walk in social settings confidently.   Eval Status: FOTO 52    FOTO score = an established

## 2023-08-25 ENCOUNTER — HOSPITAL ENCOUNTER (OUTPATIENT)
Facility: HOSPITAL | Age: 83
Setting detail: RECURRING SERIES
Discharge: HOME | End: 2023-08-28
Payer: MEDICARE

## 2023-08-25 PROCEDURE — 97112 NEUROMUSCULAR REEDUCATION: CPT

## 2023-08-25 PROCEDURE — 97530 THERAPEUTIC ACTIVITIES: CPT

## 2023-08-25 PROCEDURE — 97110 THERAPEUTIC EXERCISES: CPT

## 2023-08-25 NOTE — PROGRESS NOTES
PHYSICAL / OCCUPATIONAL THERAPY - DAILY TREATMENT NOTE (updated )    Patient Name: Demi Smith    Date: 2023    : 1940  Insurance: Payor: MEDICARE / Plan: MEDICARE PART A AND B / Product Type: *No Product type* /      Patient  verified Yes     Visit #   Current / Total 9 16   Time   In / Out 1010 1058   Pain   In / Out 4 0-2   Subjective Functional Status/Changes: \"My low back is hurting today and I am feeling pretty weak and tired. \"   Changes to:  Meds, Allergies, Med Hx, Sx Hx? If yes, update Summary List no       TREATMENT AREA =  Other abnormalities of gait and mobility [R26.89]    OBJECTIVE    Therapeutic Procedures: Tx Min Billable or 1:1 Min (if diff from Tx Min) Procedure, Rationale, Specifics   18 15 73113 Therapeutic Exercise (timed):  increase ROM, strength, coordination, balance, and proprioception to improve patient's ability to progress to PLOF and address remaining functional goals. (see flow sheet as applicable)     Details if applicable:       15  61972 Therapeutic Activity (timed):  use of dynamic activities replicating functional movements to increase ROM, strength, coordination, balance, and proprioception in order to improve patient's ability to progress to PLOF and address remaining functional goals. (see flow sheet as applicable)     Details if applicable:     15  35981 Neuromuscular Re-Education (timed):  improve balance, coordination, kinesthetic sense, posture, core stability and proprioception to improve patient's ability to develop conscious control of individual muscles and awareness of position of extremities in order to progress to PLOF and address remaining functional goals.  (see flow sheet as applicable)     Details if applicable:     48 39 [facility] Totals Reminder: bill using total billable min of TIMED therapeutic procedures (example: do not include dry needle or estim unattended, both untimed codes, in totals to left)  8-22 min = 1 unit; 23-37 min = 2 units;

## 2023-08-30 ENCOUNTER — HOSPITAL ENCOUNTER (OUTPATIENT)
Facility: HOSPITAL | Age: 83
Setting detail: RECURRING SERIES
Discharge: HOME | End: 2023-09-02
Payer: MEDICARE

## 2023-08-30 PROCEDURE — 97110 THERAPEUTIC EXERCISES: CPT

## 2023-08-30 PROCEDURE — 97530 THERAPEUTIC ACTIVITIES: CPT

## 2023-08-30 PROCEDURE — 97112 NEUROMUSCULAR REEDUCATION: CPT

## 2023-08-30 NOTE — PROGRESS NOTES
PHYSICAL / OCCUPATIONAL THERAPY - DAILY TREATMENT NOTE (updated )    Patient Name: Melissa Browning    Date: 2023    : 1940  Insurance: Payor: MEDICARE / Plan: MEDICARE PART A AND B / Product Type: *No Product type* /      Patient  verified Yes     Visit #   Current / Total 10 16   Time   In / Out 1011 1101   Pain   In / Out 3 2-3   Subjective Functional Status/Changes: \"I think my balance is getting just a little bit better. \"   Changes to:  Meds, Allergies, Med Hx, Sx Hx? If yes, update Summary List no       TREATMENT AREA =  Other abnormalities of gait and mobility [R26.89]    OBJECTIVE    Therapeutic Procedures: Tx Min Billable or 1:1 Min (if diff from Tx Min) Procedure, Rationale, Specifics   20 15 53084 Therapeutic Exercise (timed):  increase ROM, strength, coordination, balance, and proprioception to improve patient's ability to progress to PLOF and address remaining functional goals. (see flow sheet as applicable)     Details if applicable:       15  61511 Neuromuscular Re-Education (timed):  improve balance, coordination, kinesthetic sense, posture, core stability and proprioception to improve patient's ability to develop conscious control of individual muscles and awareness of position of extremities in order to progress to PLOF and address remaining functional goals. (see flow sheet as applicable)     Details if applicable:     15  74186 Therapeutic Activity (timed):  use of dynamic activities replicating functional movements to increase ROM, strength, coordination, balance, and proprioception in order to improve patient's ability to progress to PLOF and address remaining functional goals.   (see flow sheet as applicable)     Details if applicable:     50 39 [address] [facility] Reminder: bill using total billable min of TIMED therapeutic procedures (example: do not include dry needle or estim unattended, both untimed codes, in totals to left)  8-22 min = 1 unit; 23-37 min = 2 units; 38-52 min = 3

## 2023-09-01 ENCOUNTER — HOSPITAL ENCOUNTER (OUTPATIENT)
Facility: HOSPITAL | Age: 83
Setting detail: RECURRING SERIES
Discharge: HOME | End: 2023-09-04
Payer: MEDICARE

## 2023-09-01 PROCEDURE — 97530 THERAPEUTIC ACTIVITIES: CPT

## 2023-09-01 PROCEDURE — 97110 THERAPEUTIC EXERCISES: CPT

## 2023-09-01 PROCEDURE — 97112 NEUROMUSCULAR REEDUCATION: CPT

## 2023-09-01 NOTE — PROGRESS NOTES
PHYSICAL / OCCUPATIONAL THERAPY - DAILY TREATMENT NOTE (updated )    Patient Name: Cholo Conroy    Date: 2023    : 1940  Insurance: Payor: MEDICARE / Plan: MEDICARE PART A AND B / Product Type: *No Product type* /      Patient  verified Yes     Visit #   Current / Total 11 16   Time   In / Out 0955 1044   Pain   In / Out 0 0   Subjective Functional Status/Changes: \"My  and I have been having a lot of trouble getting me out of the car. \"   Changes to:  Meds, Allergies, Med Hx, Sx Hx? If yes, update Summary List no       TREATMENT AREA =  Other abnormalities of gait and mobility [R26.89]    OBJECTIVE    Therapeutic Procedures: Tx Min Billable or 1:1 Min (if diff from Tx Min) Procedure, Rationale, Specifics   20 15 99663 Therapeutic Exercise (timed):  increase ROM, strength, coordination, balance, and proprioception to improve patient's ability to progress to PLOF and address remaining functional goals. (see flow sheet as applicable)     Details if applicable:       15  96105 Neuromuscular Re-Education (timed):  improve balance, coordination, kinesthetic sense, posture, core stability and proprioception to improve patient's ability to develop conscious control of individual muscles and awareness of position of extremities in order to progress to PLOF and address remaining functional goals. (see flow sheet as applicable)     Details if applicable:     14  31765 Neuromuscular Re-Education (timed):  improve balance, coordination, kinesthetic sense, posture, core stability and proprioception to improve patient's ability to develop conscious control of individual muscles and awareness of position of extremities in order to progress to PLOF and address remaining functional goals.  (see flow sheet as applicable)     Details if applicable:     52 44 [facility] Totals Reminder: bill using total billable min of TIMED therapeutic procedures (example: do not include dry needle or estim unattended, both untimed

## 2023-09-06 ENCOUNTER — HOSPITAL ENCOUNTER (OUTPATIENT)
Facility: HOSPITAL | Age: 83
Setting detail: RECURRING SERIES
Discharge: HOME | End: 2023-09-09
Payer: MEDICARE

## 2023-09-06 PROCEDURE — 97110 THERAPEUTIC EXERCISES: CPT

## 2023-09-06 PROCEDURE — 97530 THERAPEUTIC ACTIVITIES: CPT

## 2023-09-06 PROCEDURE — 97112 NEUROMUSCULAR REEDUCATION: CPT

## 2023-09-06 NOTE — PROGRESS NOTES
PHYSICAL / OCCUPATIONAL THERAPY - DAILY TREATMENT NOTE (updated )    Patient Name: Dallas Boateng    Date: 2023    : 1940  Insurance: Payor: MEDICARE / Plan: MEDICARE PART A AND B / Product Type: *No Product type* /      Patient  verified Yes     Visit #   Current / Total 12 16   Time   In / Out 1010 1101   Pain   In / Out 3 2   Subjective Functional Status/Changes: \"I didn't want to come today because I am having a bad day, but my  made me come anyway. \"   Changes to:  Meds, Allergies, Med Hx, Sx Hx? If yes, update Summary List no       TREATMENT AREA =  Other abnormalities of gait and mobility [R26.89]    OBJECTIVE    Therapeutic Procedures: Tx Min Billable or 1:1 Min (if diff from Tx Min) Procedure, Rationale, Specifics   26 20 63271 Therapeutic Exercise (timed):  increase ROM, strength, coordination, balance, and proprioception to improve patient's ability to progress to PLOF and address remaining functional goals. (see flow sheet as applicable)     Details if applicable:       15  28961 Therapeutic Activity (timed):  use of dynamic activities replicating functional movements to increase ROM, strength, coordination, balance, and proprioception in order to improve patient's ability to progress to PLOF and address remaining functional goals. (see flow sheet as applicable)     Details if applicable:     10  83695 Neuromuscular Re-Education (timed):  improve balance, coordination, kinesthetic sense, posture, core stability and proprioception to improve patient's ability to develop conscious control of individual muscles and awareness of position of extremities in order to progress to PLOF and address remaining functional goals.  (see flow sheet as applicable)     Details if applicable:     46 45 [facility] Totals Reminder: bill using total billable min of TIMED therapeutic procedures (example: do not include dry needle or estim unattended, both untimed codes, in totals to left)  8-22 min = 1 unit;

## 2023-09-08 ENCOUNTER — HOSPITAL ENCOUNTER (OUTPATIENT)
Facility: HOSPITAL | Age: 83
Setting detail: RECURRING SERIES
Discharge: HOME | End: 2023-09-11
Payer: MEDICARE

## 2023-09-08 PROCEDURE — 97110 THERAPEUTIC EXERCISES: CPT

## 2023-09-08 PROCEDURE — 97112 NEUROMUSCULAR REEDUCATION: CPT

## 2023-09-08 PROCEDURE — 97530 THERAPEUTIC ACTIVITIES: CPT

## 2023-09-08 NOTE — PROGRESS NOTES
PHYSICAL / OCCUPATIONAL THERAPY - DAILY TREATMENT NOTE (updated )    Patient Name: Dallas Boateng    Date: 2023    : 1940  Insurance: Payor: MEDICARE / Plan: MEDICARE PART A AND B / Product Type: *No Product type* /      Patient  verified Yes     Visit #   Current / Total 13 16   Time   In / Out 1010 1101   Pain   In / Out 3 2   Subjective Functional Status/Changes: \"I know I often say I am having a bad day, but today is an especially bad day. I just feel weak. \"   Changes to:  Meds, Allergies, Med Hx, Sx Hx? If yes, update Summary List no       TREATMENT AREA =  Other abnormalities of gait and mobility [R26.89]    OBJECTIVE    Therapeutic Procedures: Tx Min Billable or 1:1 Min (if diff from Tx Min) Procedure, Rationale, Specifics   21 16 28289 Therapeutic Exercise (timed):  increase ROM, strength, coordination, balance, and proprioception to improve patient's ability to progress to PLOF and address remaining functional goals. (see flow sheet as applicable)     Details if applicable:       15  94344 Neuromuscular Re-Education (timed):  improve balance, coordination, kinesthetic sense, posture, core stability and proprioception to improve patient's ability to develop conscious control of individual muscles and awareness of position of extremities in order to progress to PLOF and address remaining functional goals. (see flow sheet as applicable)     Details if applicable:     15  50906 Therapeutic Activity (timed):  use of dynamic activities replicating functional movements to increase ROM, strength, coordination, balance, and proprioception in order to improve patient's ability to progress to PLOF and address remaining functional goals.   (see flow sheet as applicable)     Details if applicable:     46 46 [facility] Totals Reminder: bill using total billable min of TIMED therapeutic procedures (example: do not include dry needle or estim unattended, both untimed codes, in totals to left)  8-22 min = 1 unit;

## 2023-09-13 ENCOUNTER — HOSPITAL ENCOUNTER (OUTPATIENT)
Facility: HOSPITAL | Age: 83
Setting detail: RECURRING SERIES
Discharge: HOME | End: 2023-09-16
Payer: MEDICARE

## 2023-09-13 PROCEDURE — 97530 THERAPEUTIC ACTIVITIES: CPT

## 2023-09-13 PROCEDURE — 97112 NEUROMUSCULAR REEDUCATION: CPT

## 2023-09-13 PROCEDURE — 97110 THERAPEUTIC EXERCISES: CPT

## 2023-09-13 NOTE — PROGRESS NOTES
PHYSICAL / OCCUPATIONAL THERAPY - DAILY TREATMENT NOTE (updated )    Patient Name: Zain Zhao    Date: 2023    : 1940  Insurance: Payor: MEDICARE / Plan: MEDICARE PART A AND B / Product Type: *No Product type* /      Patient  verified Yes     Visit #   Current / Total 14 16   Time   In / Out 1010 1057   Pain   In / Out 7 3   Subjective Functional Status/Changes: \"I am getting better, but it is a real struggle to do the exercises. \"   Changes to:  Meds, Allergies, Med Hx, Sx Hx? If yes, update Summary List no       TREATMENT AREA =  Other abnormalities of gait and mobility [R26.89]    OBJECTIVE    Therapeutic Procedures: Tx Min Billable or 1:1 Min (if diff from Tx Min) Procedure, Rationale, Specifics   22 17 65591 Therapeutic Exercise (timed):  increase ROM, strength, coordination, balance, and proprioception to improve patient's ability to progress to PLOF and address remaining functional goals. (see flow sheet as applicable)     Details if applicable:       15  52996 Therapeutic Activity (timed):  use of dynamic activities replicating functional movements to increase ROM, strength, coordination, balance, and proprioception in order to improve patient's ability to progress to PLOF and address remaining functional goals. (see flow sheet as applicable)     Details if applicable:     10  25830 Therapeutic Activity (timed):  use of dynamic activities replicating functional movements to increase ROM, strength, coordination, balance, and proprioception in order to improve patient's ability to progress to PLOF and address remaining functional goals.   (see flow sheet as applicable)     Details if applicable:     52 42 [facility] Totals Reminder: bill using total billable min of TIMED therapeutic procedures (example: do not include dry needle or estim unattended, both untimed codes, in totals to left)  8-22 min = 1 unit; 23-37 min = 2 units; 38-52 min = 3 units; 53-67 min = 4 units; 68-82 min = 5 units

## 2023-09-15 ENCOUNTER — HOSPITAL ENCOUNTER (OUTPATIENT)
Facility: HOSPITAL | Age: 83
Setting detail: RECURRING SERIES
Discharge: HOME | End: 2023-09-18
Payer: MEDICARE

## 2023-09-15 PROCEDURE — 97530 THERAPEUTIC ACTIVITIES: CPT

## 2023-09-15 PROCEDURE — 97112 NEUROMUSCULAR REEDUCATION: CPT

## 2023-09-15 PROCEDURE — 97110 THERAPEUTIC EXERCISES: CPT

## 2023-09-15 NOTE — PROGRESS NOTES
PHYSICAL / OCCUPATIONAL THERAPY - DAILY TREATMENT NOTE (updated )    Patient Name: Rosie Moreno    Date: 9/15/2023    : 1940  Insurance: Payor: MEDICARE / Plan: MEDICARE PART A AND B / Product Type: *No Product type* /      Patient  verified Yes     Visit #   Current / Total 15 16   Time   In / Out 1010 1055   Pain   In / Out 3 3   Subjective Functional Status/Changes: \"My  and I have been working on the home exercises and I think we are ready to continue on our own. \"   Changes to:  Meds, Allergies, Med Hx, Sx Hx? If yes, update Summary List no       TREATMENT AREA =  Other abnormalities of gait and mobility [R26.89]    OBJECTIVE    Therapeutic Procedures: Tx Min Billable or 1:1 Min (if diff from Tx Min) Procedure, Rationale, Specifics   20 15 10205 Therapeutic Exercise (timed):  increase ROM, strength, coordination, balance, and proprioception to improve patient's ability to progress to PLOF and address remaining functional goals. (see flow sheet as applicable)     Details if applicable:       15  70275 Therapeutic Activity (timed):  use of dynamic activities replicating functional movements to increase ROM, strength, coordination, balance, and proprioception in order to improve patient's ability to progress to PLOF and address remaining functional goals. (see flow sheet as applicable)     Details if applicable:     10  46464 Neuromuscular Re-Education (timed):  improve balance, coordination, kinesthetic sense, posture, core stability and proprioception to improve patient's ability to develop conscious control of individual muscles and awareness of position of extremities in order to progress to PLOF and address remaining functional goals.  (see flow sheet as applicable)     Details if applicable:     39 40 [address] [facility] Reminder: bill using total billable min of TIMED therapeutic procedures (example: do not include dry needle or estim unattended, both untimed codes, in totals to left)  8-

## 2023-09-15 NOTE — PROGRESS NOTES
[facility] beds at the [facility], [address] [place] [address] Drive  Phone: [phone]   Fax: [phone]    Discharge Summary    Patient name: Crow Sexton Start of Care: 2023   Referral source: Cecilia León DO : 1940               Medical Diagnosis: Other abnormalities of gait and mobility [R26.89]      Onset Date:2022               Treatment Diagnosis:  Other abnormalities of gait and mobility [R26.89]                                               Prior Hospitalization: see medical history Provider#: [id number]   Medications: Verified on Patient Summary List      Comorbidities: recent Hx lymphoma and chemo- in remission; 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 Eval Status: to be issued at 2nd visit  Current: Spouse and patient now fully independent in comprehensive HEP. MET, 9/15/2023     Pt will perform TUG in 19 seconds with rollator to demonstrate age-normed functional mobility. Eval Status: 26 seconds with rollator  Current: 18.8 seconds with rollator. MET, 9/15/2023     Long Term Goals: To be accomplished in 8 weeks:  Patient will improve FOTO score by 2 points to improve functional tolerance for walk in social settings confidently. Eval Status: FOTO 52   Current: 47          Slight regression, 2023  FOTO score = an established functional score where 100 = no disability     2.    Pt will demonstrate 15 seconds 5x STS test to demonstrate age-norm improvement in LE strength and decreased

## 2023-09-15 NOTE — PROGRESS NOTES
Physical Therapy Discharge Instructions    [facility], [address] [place] [address] Drive  Phone: [phone]   Fax: [phone]      Patient: Bo Alanis  : 1940    Continue Home Exercise Program 2 times per day for 6 weeks, then decrease to 4-5 times per week      Continue with    [x] Ice  as needed      [] Heat           Follow up with MD:     [] Upon completion of therapy     [x] As needed      Recommendations:     [x]   Return to activity with home program    []   Return to activity with the following modifications:       []Post Rehab Program    []Join Independent aquatic program     []Return to/join local gym      Additional Comments: Please contact clinic at above phone number if you have any questions regarding Home Exercise Program or self care instructions.